# Patient Record
Sex: MALE | Race: WHITE | NOT HISPANIC OR LATINO | Employment: OTHER | URBAN - METROPOLITAN AREA
[De-identification: names, ages, dates, MRNs, and addresses within clinical notes are randomized per-mention and may not be internally consistent; named-entity substitution may affect disease eponyms.]

---

## 2017-03-08 ENCOUNTER — APPOINTMENT (OUTPATIENT)
Dept: GENERAL RADIOLOGY | Facility: CLINIC | Age: 82
End: 2017-03-08
Attending: EMERGENCY MEDICINE
Payer: COMMERCIAL

## 2017-03-08 ENCOUNTER — HOSPITAL ENCOUNTER (EMERGENCY)
Facility: CLINIC | Age: 82
Discharge: HOME | End: 2017-03-08
Attending: EMERGENCY MEDICINE | Admitting: EMERGENCY MEDICINE
Payer: COMMERCIAL

## 2017-03-08 VITALS
RESPIRATION RATE: 18 BRPM | OXYGEN SATURATION: 98 % | SYSTOLIC BLOOD PRESSURE: 150 MMHG | TEMPERATURE: 99.4 F | DIASTOLIC BLOOD PRESSURE: 79 MMHG | HEART RATE: 85 BPM | BODY MASS INDEX: 24.34 KG/M2 | HEIGHT: 70 IN | WEIGHT: 170 LBS

## 2017-03-08 DIAGNOSIS — R11.0 NAUSEA: ICD-10-CM

## 2017-03-08 DIAGNOSIS — R33.9 URINARY RETENTION: ICD-10-CM

## 2017-03-08 DIAGNOSIS — R55 VASOVAGAL SYNCOPE: ICD-10-CM

## 2017-03-08 LAB
ALBUMIN SERPL-MCNC: 3.2 G/DL (ref 3.4–5)
ALBUMIN UR-MCNC: NEGATIVE MG/DL
ALP SERPL-CCNC: 122 U/L (ref 40–150)
ALT SERPL W P-5'-P-CCNC: 22 U/L (ref 0–70)
ANION GAP SERPL CALCULATED.3IONS-SCNC: 13 MMOL/L (ref 3–14)
APPEARANCE UR: CLEAR
AST SERPL W P-5'-P-CCNC: 39 U/L (ref 0–45)
BASOPHILS # BLD AUTO: 0.1 10E9/L (ref 0–0.2)
BASOPHILS NFR BLD AUTO: 1 %
BILIRUB SERPL-MCNC: 0.4 MG/DL (ref 0.2–1.3)
BILIRUB UR QL STRIP: NEGATIVE
BUN SERPL-MCNC: 13 MG/DL (ref 7–30)
CALCIUM SERPL-MCNC: 8 MG/DL (ref 8.5–10.1)
CHLORIDE SERPL-SCNC: 107 MMOL/L (ref 94–109)
CO2 SERPL-SCNC: 20 MMOL/L (ref 20–32)
COLOR UR AUTO: YELLOW
CREAT SERPL-MCNC: 0.91 MG/DL (ref 0.66–1.25)
DIFFERENTIAL METHOD BLD: ABNORMAL
EOSINOPHIL # BLD AUTO: 0.1 10E9/L (ref 0–0.7)
EOSINOPHIL NFR BLD AUTO: 2.3 %
ERYTHROCYTE [DISTWIDTH] IN BLOOD BY AUTOMATED COUNT: 13.3 % (ref 10–15)
GFR SERPL CREATININE-BSD FRML MDRD: 79 ML/MIN/1.7M2
GLUCOSE SERPL-MCNC: 157 MG/DL (ref 70–99)
GLUCOSE UR STRIP-MCNC: NEGATIVE MG/DL
HCT VFR BLD AUTO: 46.5 % (ref 40–53)
HGB BLD-MCNC: 15.9 G/DL (ref 13.3–17.7)
HGB UR QL STRIP: NEGATIVE
IMM GRANULOCYTES # BLD: 0.1 10E9/L (ref 0–0.4)
IMM GRANULOCYTES NFR BLD: 1 %
INTERPRETATION ECG - MUSE: NORMAL
KETONES UR STRIP-MCNC: 20 MG/DL
LEUKOCYTE ESTERASE UR QL STRIP: NEGATIVE
LIPASE SERPL-CCNC: 108 U/L (ref 73–393)
LYMPHOCYTES # BLD AUTO: 1 10E9/L (ref 0.8–5.3)
LYMPHOCYTES NFR BLD AUTO: 15.7 %
MCH RBC QN AUTO: 32.1 PG (ref 26.5–33)
MCHC RBC AUTO-ENTMCNC: 34.2 G/DL (ref 31.5–36.5)
MCV RBC AUTO: 94 FL (ref 78–100)
MONOCYTES # BLD AUTO: 0.8 10E9/L (ref 0–1.3)
MONOCYTES NFR BLD AUTO: 13.7 %
MUCOUS THREADS #/AREA URNS LPF: PRESENT /LPF
NEUTROPHILS # BLD AUTO: 4 10E9/L (ref 1.6–8.3)
NEUTROPHILS NFR BLD AUTO: 66.3 %
NITRATE UR QL: NEGATIVE
NRBC # BLD AUTO: 0 10*3/UL
NRBC BLD AUTO-RTO: 0 /100
PH UR STRIP: 6 PH (ref 5–7)
PLATELET # BLD AUTO: 113 10E9/L (ref 150–450)
PLATELET # BLD EST: ABNORMAL 10*3/UL
POTASSIUM SERPL-SCNC: 4.2 MMOL/L (ref 3.4–5.3)
PROT SERPL-MCNC: 6.6 G/DL (ref 6.8–8.8)
RBC # BLD AUTO: 4.95 10E12/L (ref 4.4–5.9)
RBC #/AREA URNS AUTO: 2 /HPF (ref 0–2)
RBC MORPH BLD: ABNORMAL
SODIUM SERPL-SCNC: 140 MMOL/L (ref 133–144)
SP GR UR STRIP: 1.01 (ref 1–1.03)
SQUAMOUS #/AREA URNS AUTO: <1 /HPF (ref 0–1)
TROPONIN I SERPL-MCNC: NORMAL UG/L (ref 0–0.04)
URN SPEC COLLECT METH UR: ABNORMAL
UROBILINOGEN UR STRIP-MCNC: 0 MG/DL (ref 0–2)
WBC # BLD AUTO: 6 10E9/L (ref 4–11)
WBC #/AREA URNS AUTO: 4 /HPF (ref 0–2)

## 2017-03-08 PROCEDURE — 51702 INSERT TEMP BLADDER CATH: CPT

## 2017-03-08 PROCEDURE — 96374 THER/PROPH/DIAG INJ IV PUSH: CPT

## 2017-03-08 PROCEDURE — 99285 EMERGENCY DEPT VISIT HI MDM: CPT | Mod: 25

## 2017-03-08 PROCEDURE — 74020 XR ABDOMEN 2 VW: CPT

## 2017-03-08 PROCEDURE — 36416 COLLJ CAPILLARY BLOOD SPEC: CPT | Performed by: EMERGENCY MEDICINE

## 2017-03-08 PROCEDURE — 84484 ASSAY OF TROPONIN QUANT: CPT | Performed by: EMERGENCY MEDICINE

## 2017-03-08 PROCEDURE — 71020 XR CHEST 2 VW: CPT

## 2017-03-08 PROCEDURE — 25000128 H RX IP 250 OP 636: Performed by: EMERGENCY MEDICINE

## 2017-03-08 PROCEDURE — 93005 ELECTROCARDIOGRAM TRACING: CPT

## 2017-03-08 PROCEDURE — 85025 COMPLETE CBC W/AUTO DIFF WBC: CPT | Performed by: EMERGENCY MEDICINE

## 2017-03-08 PROCEDURE — 83690 ASSAY OF LIPASE: CPT | Performed by: EMERGENCY MEDICINE

## 2017-03-08 PROCEDURE — 81001 URINALYSIS AUTO W/SCOPE: CPT | Performed by: EMERGENCY MEDICINE

## 2017-03-08 PROCEDURE — 96361 HYDRATE IV INFUSION ADD-ON: CPT

## 2017-03-08 PROCEDURE — 51798 US URINE CAPACITY MEASURE: CPT

## 2017-03-08 PROCEDURE — 80053 COMPREHEN METABOLIC PANEL: CPT | Performed by: EMERGENCY MEDICINE

## 2017-03-08 PROCEDURE — 96375 TX/PRO/DX INJ NEW DRUG ADDON: CPT

## 2017-03-08 RX ORDER — ONDANSETRON 2 MG/ML
4 INJECTION INTRAMUSCULAR; INTRAVENOUS ONCE
Status: COMPLETED | OUTPATIENT
Start: 2017-03-08 | End: 2017-03-08

## 2017-03-08 RX ORDER — PROCHLORPERAZINE MALEATE 5 MG
5 TABLET ORAL EVERY 6 HOURS PRN
Qty: 10 TABLET | Refills: 0 | Status: SHIPPED | OUTPATIENT
Start: 2017-03-08 | End: 2017-03-10

## 2017-03-08 RX ADMIN — PROCHLORPERAZINE EDISYLATE 5 MG: 5 INJECTION INTRAMUSCULAR; INTRAVENOUS at 07:59

## 2017-03-08 RX ADMIN — SODIUM CHLORIDE 1000 ML: 9 INJECTION, SOLUTION INTRAVENOUS at 07:30

## 2017-03-08 RX ADMIN — ONDANSETRON 4 MG: 2 INJECTION INTRAMUSCULAR; INTRAVENOUS at 07:30

## 2017-03-08 ASSESSMENT — ENCOUNTER SYMPTOMS
HEADACHES: 0
ABDOMINAL PAIN: 0
NAUSEA: 1
VOMITING: 0
LIGHT-HEADEDNESS: 1
WEAKNESS: 0
NUMBNESS: 0

## 2017-03-08 NOTE — ED AVS SNAPSHOT
[facility]    [address]    [facility] [address]    Phone:  [phone]    Fax:  [phone]                                       DR Zbigniew Mendoza   MRN: [id number]    Department:  [facility]   Date of Visit:  3/8/2017           Patient Information     Date Of Birth          5/22/1934        Your diagnoses for this visit were:     Nausea     Vasovagal syncope     Urinary retention        You were seen by Ivis Berman MD.      Follow-up Information     Schedule an appointment as soon as possible for a visit with [facility] [place].    Contact information:    [address]  [address]  [facility] [id number] [phone]        Schedule an appointment as soon as possible for a visit with Epi Thompson MD.    Specialty:  Internal Medicine    Contact information:    [facility]  [address]  [facility] [id number] [phone]          Follow up with [facility].    Specialty:  EMERGENCY MEDICINE    Why:  As needed, If symptoms worsen    Contact information:    [address]  [facility] [address]  [phone]        Discharge Instructions         Fainting: Vagal Reaction  Fainting (syncope) is a temporary loss of consciousness. It s also called passing out. It occurs when blood flow to the brain is less than normal. Your health care provider believes that your fainting was because of a vagal reaction. This condition is not a sign of serious disease.  A vagal reaction is a response in your body that causes your pulse to slow down or the blood vessels to expand. This causes your blood pressure to fall. And this sends less blood to your brain if you are standing or sitting. That results in dizziness, near-fainting, or fainting. Lying down usually stops the reaction within 60 seconds.  This response can occur during  sudden fear, severe pain, emotional stress, overexertion, overheating, hunger, nausea or vomiting, prolonged standing, or standing up after sitting or lying for a long time.  Home care  Follow these guidelines when caring for yourself at home:    Rest today. Go back to your normal activities as soon as you are feeling back to normal.    If you feel lightheaded or dizzy, lie down right away. Or sit with your head lowered between your knees.  Follow-up care  Follow up with your health care provider, or as advised.  When to seek medical advice  Call your health care provider right away if any of these occur:    Another fainting spell that s not explained by the common causes listed above    Pain in your chest, arm, neck, jaw, back, or abdomen    Shortness of breath    Severe headache or seizure    Blood in vomit or stools (black or red color)    Unexpected vaginal bleeding    Your heart beats very rapidly, very slowly, or irregularly (palpitations)  Also call your provider if you have signs of stroke:    Weakness in an arm or leg or on one side of the face    Difficulty speaking or seeing    Extreme drowsiness, confusion, dizziness, or fainting     9142-4467 [employer]. [address] [place], [place] [address]. All rights reserved. This information is not intended as a substitute for professional medical care. Always follow your healthcare professional's instructions.          Urinary Retention (Male)  Urinary retention is the medical term for difficulty or inability to pass urine, even though your bladder is full.  Causes  The most common cause of urinary retention in men is the bladder outlet being blocked. This can be due to an enlarged prostate gland or a bladder infection. Certain medicines can also cause this problem. This condition is more likely to occur as men get older.    This condition is treated by insertion of a catheter into the bladder to drain the urine. This provides immediate relief.  The catheter may need to remain in place for a few days to prevent a recurrence. The catheter has a balloon on the tip which was inflated after insertion. This prevents the catheter from falling out.  Symptoms  Common symptoms of urinary retention include:    Pain (not experienced by everyone)    Frequent urination    Feeling that the bladder is still full after urinating    Incontinence (not being able to control the release of urine)    Swollen abdomen  Treatment  This condition is treated by inserting a tube (catheter) into the bladder to drain the urine. This provides immediate relief. The catheter may need to stay in place for a few days. The catheter has a balloon on the tip, which is inflated after insertion. This prevents the catheter from falling out.  Home care    If you were given antibiotics, take them until they are used up, or your healthcare provider tells you to stop. It is important to finish the antibiotics even though you feel better. This is to make sure your infection has cleared.    If a catheter was left in place, it is important to keep bacteria from getting into the collection bag. Do not disconnect the catheter from the collection bag.    Use a leg band to secure the drainage tube, so it does not pull on the catheter. Drain the collection bag when it becomes full using the drain spout at the bottom of the bag.    Do not pull on or try to remove your catheter. This will injure your urethra. The catheter must be removed by a healthcare provider.  Follow-up care  Follow up with your healthcare provider, or as advised.  If a catheter was left in place, it can usually be removed within 3 to 7 days. Some conditions require the catheter to stay in longer. Your healthcare provider will tell you when to return to have the catheter removed.  When to seek medical advice  Call your healthcare provider right away if any of these occur:    Fever of 100.4 F (38 C) or higher, or as directed by your  healthcare provider    Bladder or lower-abdominal pain or fullness    Abdominal swelling, nausea, vomiting, or back pain    Blood or urine leakage around the catheter    Bloody urine coming from the catheter (if a new symptom)    Weakness, dizziness, or fainting    Confusion or change in usual level of alertness    If a catheter was left in place, return if:    Catheter falls out    Catheter stops draining for 6 hours    2404-0385 [employer]. [address] [place] [address], [place] [address]. All rights reserved. This information is not intended as a substitute for professional medical care. Always follow your healthcare professional's instructions.          24 Hour Appointment Hotline       To make an appointment at any [facility], call [phone] ([phone]). If you don't have a family doctor or clinic, we will help you find one. [facility] clinics are conveniently located to serve the needs of you and your family.             Review of your medicines      START taking        Dose / Directions Last dose taken    prochlorperazine 5 MG tablet   Commonly known as:  COMPAZINE   Dose:  5 mg   Quantity:  10 tablet        Take 1 tablet (5 mg) by mouth every 6 hours as needed for nausea or vomiting   Refills:  0          Our records show that you are taking the medicines listed below. If these are incorrect, please call your family doctor or clinic.        Dose / Directions Last dose taken    aspirin EC 81 MG EC tablet   Dose:  81 mg        Take 1 tablet by mouth daily.   Refills:  0        losartan 50 MG tablet   Commonly known as:  COZAAR   Dose:  50 mg   Quantity:  90 tablet        Take 1 tablet (50 mg) by mouth daily   Refills:  4        MULTIVITAL Tabs        Take  by mouth daily.   Refills:  0                Prescriptions were sent or printed at these locations (1 Prescription)                   Other Prescriptions                Printed at Department/Unit printer (1 of 1)         prochlorperazine  (COMPAZINE) 5 MG tablet                Procedures and tests performed during your visit     Abdomen XR, 2 vw, flat and upright    CBC with platelets differential    Comprehensive metabolic panel    EKG 12-lead, tracing only    Lipase    Troponin I (now)    UA with Microscopic    XR Chest 2 Views      Orders Needing Specimen Collection     None      Pending Results     No orders found from 3/6/2017 to 3/9/2017.            Pending Culture Results     No orders found from 3/6/2017 to 3/9/2017.             Test Results from your hospital stay     3/8/2017  8:42 AM - Interface, Radiant Ib      Narrative     XR CHEST 2 VW 3/8/2017 8:39 AM    HISTORY: Chest pain, short of breath.    COMPARISON: 2/25/2015    FINDINGS: No airspace consolidation, pleural effusion or pneumothorax.  Stable heart size.        Impression     IMPRESSION: No acute cardiopulmonary abnormality.    GEORGI LIRIANO MD         3/8/2017  9:01 AM - Interface, Flexilab Results      Component Results     Component Value Ref Range & Units Status    WBC 6.0 4.0 - 11.0 10e9/L Final    RBC Count 4.95 4.4 - 5.9 10e12/L Final    Hemoglobin 15.9 13.3 - 17.7 g/dL Final    Hematocrit 46.5 40.0 - 53.0 % Final    MCV 94 78 - 100 fl Final    MCH 32.1 26.5 - 33.0 pg Final    MCHC 34.2 31.5 - 36.5 g/dL Final    RDW 13.3 10.0 - 15.0 % Final    Platelet Count 113 (L) 150 - 450 10e9/L Final    Diff Method Automated Method  Final    % Neutrophils 66.3 % Final    % Lymphocytes 15.7 % Final    % Monocytes 13.7 % Final    % Eosinophils 2.3 % Final    % Basophils 1.0 % Final    % Immature Granulocytes 1.0 % Final    Nucleated RBCs 0 0 /100 Final    Absolute Neutrophil 4.0 1.6 - 8.3 10e9/L Final    Absolute Lymphocytes 1.0 0.8 - 5.3 10e9/L Final    Absolute Monocytes 0.8 0.0 - 1.3 10e9/L Final    Absolute Eosinophils 0.1 0.0 - 0.7 10e9/L Final    Absolute Basophils 0.1 0.0 - 0.2 10e9/L Final    Abs Immature Granulocytes 0.1 0 - 0.4 10e9/L Final    Absolute Nucleated RBC 0.0  Final     RBC Morphology   Final    Consistent with reported results    Platelet Estimate Decreased  Final         3/8/2017  8:19 AM - Interface, Flexilab Results      Component Results     Component Value Ref Range & Units Status    Sodium 140 133 - 144 mmol/L Final    Potassium 4.2 3.4 - 5.3 mmol/L Final    Chloride 107 94 - 109 mmol/L Final    Carbon Dioxide 20 20 - 32 mmol/L Final    Anion Gap 13 3 - 14 mmol/L Final    Glucose 157 (H) 70 - 99 mg/dL Final    Urea Nitrogen 13 7 - 30 mg/dL Final    Creatinine 0.91 0.66 - 1.25 mg/dL Final    GFR Estimate 79 >60 mL/min/1.7m2 Final    Non  GFR Calc    GFR Estimate If Black >90   GFR Calc   >60 mL/min/1.7m2 Final    Calcium 8.0 (L) 8.5 - 10.1 mg/dL Final    Bilirubin Total 0.4 0.2 - 1.3 mg/dL Final    Albumin 3.2 (L) 3.4 - 5.0 g/dL Final    Protein Total 6.6 (L) 6.8 - 8.8 g/dL Final    Alkaline Phosphatase 122 40 - 150 U/L Final    ALT 22 0 - 70 U/L Final    AST 39 0 - 45 U/L Final         3/8/2017  8:19 AM - Interface, Flexilab Results      Component Results     Component Value Ref Range & Units Status    Lipase 108 73 - 393 U/L Final         3/8/2017  8:19 AM - Interface, Flexilab Results      Component Results     Component Value Ref Range & Units Status    Troponin I ES  0.000 - 0.045 ug/L Final    <0.015  The 99th percentile for upper reference range is 0.045 ug/L.  Troponin values in   the range of 0.045 - 0.120 ug/L may be associated with risks of adverse   clinical events.           3/8/2017 10:20 AM - Interface, Radiant Ib      Narrative     XR ABDOMEN 2 VW 3/8/2017 10:11 AM    HISTORY: Abdominal pain and abdominal distention.    COMPARISON: 4/21/2009    FINDINGS: Large amount of stool throughout the colon. The gas pattern  is nonobstructive. No specific evidence of free intraperitoneal air.        Impression     IMPRESSION: Possible constipation.    GEORGI LIRIANO MD         3/8/2017 11:41 AM - Interface, Flexilab Results       Component Results     Component Value Ref Range & Units Status    Color Urine Yellow  Final    Appearance Urine Clear  Final    Glucose Urine Negative NEG mg/dL Final    Bilirubin Urine Negative NEG Final    Ketones Urine 20 (A) NEG mg/dL Final    Specific Gravity Urine 1.012 1.003 - 1.035 Final    Blood Urine Negative NEG Final    pH Urine 6.0 5.0 - 7.0 pH Final    Protein Albumin Urine Negative NEG mg/dL Final    Urobilinogen mg/dL 0.0 0.0 - 2.0 mg/dL Final    Nitrite Urine Negative NEG Final    Leukocyte Esterase Urine Negative NEG Final    Source Catheterized Urine  Final    WBC Urine 4 (H) 0 - 2 /HPF Final    RBC Urine 2 0 - 2 /HPF Final    Squamous Epithelial /HPF Urine <1 0 - 1 /HPF Final    Mucous Urine Present (A) NEG /LPF Final                Clinical Quality Measure: Blood Pressure Screening     Your blood pressure was checked while you were in the emergency department today. The last reading we obtained was  BP: 156/87 . Please read the guidelines below about what these numbers mean and what you should do about them.  If your systolic blood pressure (the top number) is less than 120 and your diastolic blood pressure (the bottom number) is less than 80, then your blood pressure is normal. There is nothing more that you need to do about it.  If your systolic blood pressure (the top number) is 120-139 or your diastolic blood pressure (the bottom number) is 80-89, your blood pressure may be higher than it should be. You should have your blood pressure rechecked within a year by a primary care provider.  If your systolic blood pressure (the top number) is 140 or greater or your diastolic blood pressure (the bottom number) is 90 or greater, you may have high blood pressure. High blood pressure is treatable, but if left untreated over time it can put you at risk for heart attack, stroke, or kidney failure. You should have your blood pressure rechecked by a primary care provider within the next 4 weeks.  If  your provider in the emergency department today gave you specific instructions to follow-up with your doctor or provider even sooner than that, you should follow that instruction and not wait for up to 4 weeks for your follow-up visit.        Thank you for choosing [facility]       Thank you for choosing [facility] for your care. Our goal is always to provide you with excellent care. Hearing back from our patients is one way we can continue to improve our services. Please take a few minutes to complete the written survey that you may receive in the mail after you visit with us. Thank you!        [employer] Information     [employer] gives you secure access to your electronic health record. If you see a primary care provider, you can also send messages to your care team and make appointments. If you have questions, please call your primary care clinic.  If you do not have a primary care provider, please call [phone] and they will assist you.        Care EveryWhere ID     This is your Care EveryWhere ID. This could be used by other organizations to access your [facility] medical records  [id number]        After Visit Summary       This is your record. Keep this with you and show to your community pharmacist(s) and doctor(s) at your next visit.

## 2017-03-08 NOTE — ED NOTES
Osorio switched over from drainage bag to leg bag.  Patient instructed on aseptic technique to switch drainage bags at home.  Verbalized understanding.

## 2017-03-08 NOTE — ED NOTES
Bed: ED14  Expected date: 3/8/17  Expected time: 7:07 AM  Means of arrival: Ambulance  Comments:  Cayla 81 yo M

## 2017-03-08 NOTE — ED AVS SNAPSHOT
[facility] Emergency Department    [address]    [facility] [address]    Phone:  [phone]    Fax:  [phone]                                       DR Zbigniew Mendoza   MRN: [id number]    Department:  [facility]   Date of Visit:  3/8/2017           After Visit Summary Signature Page     I have received my discharge instructions, and my questions have been answered. I have discussed any challenges I see with this plan with the nurse or doctor.    ..........................................................................................................................................  Patient/Patient Representative Signature      ..........................................................................................................................................  Patient Representative Print Name and Relationship to Patient    ..................................................               ................................................  Date                                            Time    ..........................................................................................................................................  Reviewed by Signature/Title    ...................................................              ..............................................  Date                                                            Time

## 2017-03-08 NOTE — ED NOTES
"Pt presents via EMS after having woke at 0200 with light-headedness, nausea and vomiting. Pt believes he may have \"passed out\" at home. Pt is A&O, ABC's intact.   "

## 2017-03-08 NOTE — DISCHARGE INSTRUCTIONS
Fainting: Vagal Reaction  Fainting (syncope) is a temporary loss of consciousness. It s also called passing out. It occurs when blood flow to the brain is less than normal. Your health care provider believes that your fainting was because of a vagal reaction. This condition is not a sign of serious disease.  A vagal reaction is a response in your body that causes your pulse to slow down or the blood vessels to expand. This causes your blood pressure to fall. And this sends less blood to your brain if you are standing or sitting. That results in dizziness, near-fainting, or fainting. Lying down usually stops the reaction within 60 seconds.  This response can occur during sudden fear, severe pain, emotional stress, overexertion, overheating, hunger, nausea or vomiting, prolonged standing, or standing up after sitting or lying for a long time.  Home care  Follow these guidelines when caring for yourself at home:    Rest today. Go back to your normal activities as soon as you are feeling back to normal.    If you feel lightheaded or dizzy, lie down right away. Or sit with your head lowered between your knees.  Follow-up care  Follow up with your health care provider, or as advised.  When to seek medical advice  Call your health care provider right away if any of these occur:    Another fainting spell that s not explained by the common causes listed above    Pain in your chest, arm, neck, jaw, back, or abdomen    Shortness of breath    Severe headache or seizure    Blood in vomit or stools (black or red color)    Unexpected vaginal bleeding    Your heart beats very rapidly, very slowly, or irregularly (palpitations)  Also call your provider if you have signs of stroke:    Weakness in an arm or leg or on one side of the face    Difficulty speaking or seeing    Extreme drowsiness, confusion, dizziness, or fainting     9592-8335 [employer]. [address] [place] [address], [place] [address]. All rights reserved.  This information is not intended as a substitute for professional medical care. Always follow your healthcare professional's instructions.          Urinary Retention (Male)  Urinary retention is the medical term for difficulty or inability to pass urine, even though your bladder is full.  Causes  The most common cause of urinary retention in men is the bladder outlet being blocked. This can be due to an enlarged prostate gland or a bladder infection. Certain medicines can also cause this problem. This condition is more likely to occur as men get older.    This condition is treated by insertion of a catheter into the bladder to drain the urine. This provides immediate relief. The catheter may need to remain in place for a few days to prevent a recurrence. The catheter has a balloon on the tip which was inflated after insertion. This prevents the catheter from falling out.  Symptoms  Common symptoms of urinary retention include:    Pain (not experienced by everyone)    Frequent urination    Feeling that the bladder is still full after urinating    Incontinence (not being able to control the release of urine)    Swollen abdomen  Treatment  This condition is treated by inserting a tube (catheter) into the bladder to drain the urine. This provides immediate relief. The catheter may need to stay in place for a few days. The catheter has a balloon on the tip, which is inflated after insertion. This prevents the catheter from falling out.  Home care    If you were given antibiotics, take them until they are used up, or your healthcare provider tells you to stop. It is important to finish the antibiotics even though you feel better. This is to make sure your infection has cleared.    If a catheter was left in place, it is important to keep bacteria from getting into the collection bag. Do not disconnect the catheter from the collection bag.    Use a leg band to secure the drainage tube, so it does not pull on the catheter.  Drain the collection bag when it becomes full using the drain spout at the bottom of the bag.    Do not pull on or try to remove your catheter. This will injure your urethra. The catheter must be removed by a healthcare provider.  Follow-up care  Follow up with your healthcare provider, or as advised.  If a catheter was left in place, it can usually be removed within 3 to 7 days. Some conditions require the catheter to stay in longer. Your healthcare provider will tell you when to return to have the catheter removed.  When to seek medical advice  Call your healthcare provider right away if any of these occur:    Fever of 100.4 F (38 C) or higher, or as directed by your healthcare provider    Bladder or lower-abdominal pain or fullness    Abdominal swelling, nausea, vomiting, or back pain    Blood or urine leakage around the catheter    Bloody urine coming from the catheter (if a new symptom)    Weakness, dizziness, or fainting    Confusion or change in usual level of alertness    If a catheter was left in place, return if:    Catheter falls out    Catheter stops draining for 6 hours    [id number] [employer]. [address] [place] [address], [place] [address]. All rights reserved. This information is not intended as a substitute for professional medical care. Always follow your healthcare professional's instructions.

## 2017-03-08 NOTE — ED NOTES
Pt. Stated he is having a problem being able to urinate. Bladder scanned Pt. 605ml noted in bladder MD notified, [employer] monitoring devices (EKG, BP, and pulse ox) onto Patient.

## 2017-03-08 NOTE — ED PROVIDER NOTES
"  History     Chief Complaint:  Nausea and lightheadedness     HPI   Zbigniew Mendoza is a 82 year old male who presents via EMS for evaluation of nausea and lightheadedness. The patient states that he awoke about 5 hours prior to arrival around 0200 he began to feel acutely nauseated. Since that time he has had \"an uncountable,\" amount of dry heaves without any actual production of vomiting or development of abdominal pain. After about 4 hours of discomfort he attempted to get out of bed, however upon standing he states that he must have sustained a loss of consciousness because the next thing he remembers is waking up sitting at the bottom of his bed. The patient does not recall any other preceding symptoms such as chest pain, shortness of breath, heart palpitations, shortness of breath, or numbness, tingling or weakness in her extremities. He was able to contact EMS who found him lying on the floor to weak to stand. En route they provided 4 mg of Zofran and found his BG to be 152.     Of note, the patient states that the constellation of these symptoms feel similar to when he had an episode of atrial fibrillation which resolved after spontaneous cardioversion as did his nausea. No known episodes of atrial fibrillation since.     Allergies:  Ciprofloxacin, confusion      Medications:    Losartan 50mg     Past medical history:  Hypertension   Single episode of atrial fibrillation w/ spontaneous resolution.     Past Surgical History:    Right rotator cuff surgery  Back surgery     Family History:    Father positive for coronary artery disease     Social History:  Negative for tobacco use.  Positive for alcohol use.    Marital Status:   [2]  The patient is a semi-retired psychiatrics professor at the  of     Review of Systems   Gastrointestinal: Positive for nausea. Negative for abdominal pain and vomiting.   Neurological: Positive for syncope and light-headedness. Negative for weakness, numbness and " "headaches.   All other systems reviewed and are negative.    Physical Exam   First Vitals:  BP: 160/88  Temp: 97.2  F (36.2  C)  Temp src: Temporal  Pulse: 85  Resp: 18  SpO2: 100 %  Height: 177.8 cm (5' 10\")  Weight: 77.1 kg (170 lb) (03/08 0719)        Physical Exam  General/Appearance: appears stated age, well-groomed, appears to not feel well, active dry heaving, diaphoretic    Eyes: EOMI, no scleral injection, no icterus  ENT: MMM  Neck: supple, nl ROM, no stiffness  Cardiovascular: RRR, nl S1S2, no m/r/g, 2+ pulses in all 4 extremities, cap refill <2sec  Respiratory: CTAB, good air movement throughout, no wheezes/rhonchi/rales, no increased WOB, no retractions  Back: no lesions  GI: abd soft, non-distended, nttp however palpating increases nausea,  no HSM, no rebound, no guarding, nl BS  MSK: PURVIS, good tone, no bony abnormality  Skin: warm and well-perfused, no rash, no edema, no ecchymosis, nl turgor  Neuro: GCS 15, alert and oriented, no gross focal neuro deficits  Psych: interacts appropriately  Heme: no petechia, no purpura, no active bleeding      Emergency Department Course   ECG:  Indication: nausea  Time: 0720  Vent. Rate 91 bpm. AL interval 186. QRS duration 96. QT/QTc 402/466. P-R-T axis 88 -4 68. normal sinus rhythm. Normal ECG. Read time: 0722     Imaging:  Radiographic findings were communicated with the patient who voiced understanding of the findings.    XR Chest 2 views:   No acute cardiopulmonary abnormality. As per radiology.    XR Abdomen, 2 views:   Possible constipation. As per radiology.       Laboratory:  CBC: WBC: 6.0, HGB: 15.9, PLT: 113(H)  CMP: Glucose 157(H), Ca 8.0(L), albumin 3.2(L), Protein total 6.6(L), o/w WNL (Creatinine: 0.91)      Lipase: 108  Troponin: <0.015    UA with micro: ketone 20, WBC 4(H), mucous present, o/w negative     Interventions:  0730 Zofran, 4 mg, IV injection   NS 1L IV   0759 Compazine 5mg IV     Emergency Department Course:  Nursing notes and vitals " reviewed. I performed an exam of the patient as documented above.      EKG obtained in the ED, see results above.      Blood drawn. This was sent to the lab for further testing, results above.    The patient was sent for a CXR while in the emergency department, findings above.      0911 I reevaluated the patient and provided an update in regards to his ED course. Nausea had initially improved, though is slowly returning. He requests another compazine.     1022 The patient disclosed to nursing that he has stood up multiple times during ED visit w/o lightheadedness. Unable to urinate here in the ED. Bedside scan shows slightly over 600cc's in bladder. Will place christie catheter.     Findings and plan explained to the Patient. Patient discharged home with instructions regarding supportive care, medications, and reasons to return. The importance of close follow-up was reviewed.     I personally reviewed the laboratory results with the Patient and answered all related questions prior to discharge.     Impression & Plan    Medical Decision Making:  Zbigniew Mendoza is a 82 year old male who presents after a syncopal episode with some diaphoresis, nausea, and dry heaving. Nausea was significant that it required multiple doses of medication. He also appeared mildly distended in the abdomen so a KUB was obtained. There was large stool in the colon which may be contributing to his symptoms, however no evidence of obstruction. At this time, given lack of tenderness to exam; I feel it is reasonable to forgo a CT scan. He also today endorses difficulty with urination; bedside bladder scanning shows significant urinary retention. There was no obvious positive urinalysis and this is therefore it is possible this is secondary to prostate enlargement versus stool in the colon restricting bladder emptying. Either way, he will leave with a leg-bag and follow up with urology. I suspect his syncope episode was secondary to a vasovagal  response from just standing up as well as the bladder distension. EKG and troponin were negative. He denies any chest pain or shortness of breath, or other concerning features. I think it is reasonable as he has had resolution of the lightheadedness with fluids; that he be discharged which he feels comfortable with.     Diagnosis:    ICD-10-CM    1. Nausea R11.0    2. Vasovagal syncope R55    3. Urinary retention R33.9      Discharge Medications:  New Prescriptions    PROCHLORPERAZINE (COMPAZINE) 5 MG TABLET    Take 1 tablet (5 mg) by mouth every 6 hours as needed for nausea or vomiting     I, Ayush Fernandez, am serving as a scribe on 3/8/2017 at 7:14 AM to personally document services performed by Ivis Berman MD based on my observations and the provider's statements to me.      Ayush Fernandez  3/8/2017   [facility] EMERGENCY DEPARTMENT       Ivis Berman MD  03/08/17 [id number]

## 2017-03-09 ENCOUNTER — TELEPHONE (OUTPATIENT)
Dept: NURSING | Facility: CLINIC | Age: 82
End: 2017-03-09

## 2017-03-10 ENCOUNTER — OFFICE VISIT (OUTPATIENT)
Dept: UROLOGY | Facility: CLINIC | Age: 82
End: 2017-03-10
Payer: COMMERCIAL

## 2017-03-10 VITALS
HEIGHT: 70 IN | HEART RATE: 80 BPM | SYSTOLIC BLOOD PRESSURE: 138 MMHG | DIASTOLIC BLOOD PRESSURE: 78 MMHG | WEIGHT: 170 LBS | BODY MASS INDEX: 24.34 KG/M2

## 2017-03-10 DIAGNOSIS — Z90.79 S/P TURP: ICD-10-CM

## 2017-03-10 DIAGNOSIS — R33.9 URINARY RETENTION: Primary | ICD-10-CM

## 2017-03-10 DIAGNOSIS — Z29.89 NEED FOR PROPHYLAXIS AGAINST URINARY TRACT INFECTION: ICD-10-CM

## 2017-03-10 DIAGNOSIS — N40.0 BENIGN PROSTATIC HYPERPLASIA, PRESENCE OF LOWER URINARY TRACT SYMPTOMS UNSPECIFIED, UNSPECIFIED MORPHOLOGY: ICD-10-CM

## 2017-03-10 DIAGNOSIS — Z87.442 HISTORY OF NEPHROLITHIASIS: ICD-10-CM

## 2017-03-10 PROCEDURE — 99203 OFFICE O/P NEW LOW 30 MIN: CPT | Performed by: PHYSICIAN ASSISTANT

## 2017-03-10 RX ORDER — SULFAMETHOXAZOLE/TRIMETHOPRIM 800-160 MG
1 TABLET ORAL ONCE
Qty: 1 TABLET | Refills: 0 | Status: SHIPPED | OUTPATIENT
Start: 2017-03-10 | End: 2017-03-10

## 2017-03-10 RX ORDER — TAMSULOSIN HYDROCHLORIDE 0.4 MG/1
0.4 CAPSULE ORAL DAILY
Qty: 30 CAPSULE | Refills: 5 | Status: SHIPPED | OUTPATIENT
Start: 2017-03-10 | End: 2017-08-11

## 2017-03-10 ASSESSMENT — PAIN SCALES - GENERAL: PAINLEVEL: MODERATE PAIN (5)

## 2017-03-10 NOTE — TELEPHONE ENCOUNTER
"Call Type: Triage Call    Presenting Problem: \"I was seen yesterday(see epic) for urinary  retention, I have a catheter and I have an appt with Urology  tomorrow. I haven't had as much output today as I did yesterday.  (Today about 350 cc) I have some mild pain in the penis area.I  haven't had as much water as I should though. \"  Denies blood in  urine, no fever, no flank pain or other sx. Advised to push fluids,  if sx develop advised ER. Keep appt tomorrow, call back as needed.  Triage Note:  Guideline Title: Postoperative Problems ; Postoperative Problems  Recommended Disposition: Call Provider within 72 Hours  Original Inclination: Wanted to speak with a nurse  Override Disposition: See Physician within 24 Hours  Intended Action: Follow advice given  Physician Contacted: No  Evaluated by provider and has question/concern about their condition, treatment  plan, treatment options, follow-up appointments, or other follow-up care. ?  YES  Pain in shoulder or upper back following laparoscopic procedure ? NO  Signs of dehydration ? NO  Unconscious now ? NO  Abdominal bloating ? NO  New onset OR worsening bleeding from incision requiring pressure to control ? NO  Depression and no other symptoms ? NO  Severe breathing problems ? NO  Wound separation AND internal organs protrude through wound or surgical incision  (evisceration) ? NO  Hives /Urticaria /Rash ? NO  New or worsening signs and symptoms that may indicate shock ? NO  Neck lump/swelling ? NO  Coughing up large amount of obvious blood (not blood-streaked sputum) ? NO  Orthopedic hardware (metal plate, sagrario or screw) newly bulging under or through  skin ? NO  New seizure now or within last 6 hours ? NO  Continuous or heavy bleeding from operative site and NOT controlled with 10  minutes of steady pressure ? NO  Productive cough AND new onset green, yellow, brown or bloody sputum ? NO  Pain not relieved by pain medication when taken as directed ? NO  Passing red, " black or tarry material from rectum AND onset of new signs and  symptoms of hypovolemia ? NO  Wearing cast or splint AND new or worsening pain, swelling, numbness, tingling,  coolness or change in color that is NOT improved by elevation for 30 minutes OR  not resolved within 2 hours ? NO  Temperature of 101.5 F (38.6 C) or greater that has not responded to 24 hours of  home care measures ? NO  Vomiting red, bloody or coffee-ground material, more than streaks of blood or  scant amount (not following nosebleed within past day) ? NO  New onset severe pain and pale, discolored or cool below the surgical site  compared to the other extremity ? NO  Current or recent urinary tract instrumentation AND urinary tract symptoms OR no  urine flow ? NO  New or worsening breathing problems ? NO  Heavy vaginal bleeding (soaking 1 pad/tampon every hour for 2 hours or more) ? NO  Urinary tract symptoms AND any flank or low back pain ? NO  Medical device (pump, infusion catheter) damaged or not functioning as expected  (leaking, not infusing, swelling at insertion site) ? NO  More bleeding from site of instrumentation or procedure OR bleeding lasting longer  than defined in provider specified discharge information ? NO  Has one or more urinary tract symptoms AND has not been previously evaluated ? NO  Chest discomfort associated with shortness of breath, sweating, odd heartbeats or  different heart rate, nausea, vomiting, lightheadedness, or fainting lasting 5 or  more minutes now or within the last hour ? NO  Chest pain spreading to the shoulders, neck, jaw, in one or both arms, stomach or  back lasting 5 or more minutes now or within the last hour. Pain is NOT  associated with taking a deep breath or a productive cough, movement, or touch to  a localized area. ? NO  Recent onset of pain, tenderness or aching in an extremity that may worsen with  standing or walking OR a cord-like swelling in an extremity that may feel warm,  look  red or discolored. ? NO  Signs and symptoms of anaphylaxis ? NO  Questions or concerns about postoperative wound ? NO  Headache following spinal anesthesia AND not relieved by pain medication ? NO  Pressure, fullness, squeezing sensation or pain anywhere in the chest lasting 5 or  more minutes now or within the last hour. Pain is NOT associated with taking a  deep breath or a productive cough, movement, or touch to a localized area on the  chest. ? NO  Persistent abdominal OR pelvic pain lasting longer than defined in provider  specified discharge information. ? NO  Persistent mild-moderate vaginal bleeding lasting longer than defined in provider  specified discharge information. ? NO  Sudden onset of focal neurological changes (difficulty speaking, numbness,  weakness, paralysis, loss of coordination, or change in vision such as double  vision or loss of visual field) ? NO  Sudden onset of shortness of breath, chest pain and cough with blood tinged sputum  ? NO  No urination for 12 or more hours ? NO  Abdominal pain, any blood in vomitus or stool, abdominal bloating, new fever or  other cautionary symptoms began after GI surgery or procedure and is lasting  longer than defined in provider specified discharge information. ? NO  Abdominal pain, any blood in vomitus or stool, abdominal bloating, new fever or  other cautionary symptoms began after GI surgery or procedure and is lasting  longer than defined in provider specified discharge information. ? NO  New onset of unbearable pain within last 24 hours ? NO  New onset of appetite loss that has lasted 7 days or more ? NO  Any temperature elevation in an immunocompromised individual OR frail elderly with  signs of dehydration ? NO  Unable to retain food or fluids for 24 hours or more due to recurrent vomiting ? NO  Vomiting within 48 hours after the procedure and is not relieved by provider  recommended treatment ? NO  Any other unexpected urinary symptoms following  urinary tract or abdominal surgery  within timeframe specified by provider ? NO  Physician Instructions:  Care Advice: HEALTH PROMOTION / MAINTENANCE

## 2017-03-10 NOTE — LETTER
3/10/2017       RE: Zbigniew Mendoza  [address] [facility] [id number]     Dear Colleague,    Thank you for referring your patient, Zbigniew Mendoza, to the [facility] [place] at [facility]. Please see a copy of my visit note below.    CC: Urinary retention.    HPI: It is a pleasure to see Mr. Zbigniew Mendoza, a very pleasant 82 year old male with a history of nephrolithiasis and BPH s/p TURP in 1988 seen today in the urology clinic in consultation from ED physician for evaluation of urinary retention. The patient had initially woken from sleep in the early morning hours 2 days ago and was quite dizzy, diaphoretic, and nauseous. He had an apparent vasovagal versus syncopal event and was taken to the ER via EMS. Workup here included normal EKG, normal CXR, abdominal flat plate which revealed a large amount of stool throughout the colon, normal blood work, and a normal UA. He was bladder scanned for > 600 cc and was unable to void in the ED. Therefore, Osorio catheter was placed and he presents today for continued evaluation of urinary retention and possible voiding trial. Suspicion was that he had a vasovagal episode from his distended bladder.     Today, he states that he was voiding well with possibly only very mild obstructive symptoms in the preceding weeks to months. Had previously been managed with alfuzosin 10 mg daily but eventually stopped this as he felt he was voiding without any difficulty. He does note significant issues with constipation in the past week. Took Colace yesterday and had a small BM this morning but only passed very small, hard, round kristopher of stool. Feels he has more in his colon and is considering attempting a Fleets enema later today. He currently complains that the catheter is quite painful. Wonders if he pushed it out of place with straining for a BM. He would like it removed today if possible. Was not  "started on any alpha blockers in the ED.     Past Medical History   Diagnosis Date     Atrial fibrillation (H)      vasovagal from pain?     Calculus of kidney      Cyst      infected hair follicle     Mitral prolapse      mild, with mild MR     Syncope      vasovagal from pain?     Past Surgical History   Procedure Laterality Date     C nonspecific procedure  04/93     S/P TURP     C nonspecific procedure       S/P Spiral fracture of right tibia closed reduction     Rotator cuff repair rt/lt  1996     right     Back surgery       Current Outpatient Prescriptions   Medication Sig Dispense Refill     losartan (COZAAR) 50 MG tablet Take 1 tablet (50 mg) by mouth daily 90 tablet 4     Multiple Vitamins-Minerals (MULTIVITAL) TABS Take  by mouth daily.       aspirin EC 81 MG tablet Take 1 tablet by mouth daily.       Allergies   Allergen Reactions     Ciprofloxacin      Acute confusion     Family History: There is no h/o  malignancy.  There is no h/o urolithiasis.     Social History: The patient does not smoke cigarettes, minimal EtOH and no illicit drug use.    ROS: A comprehensive 14 point ROS was obtained and was positive for nephrolithiasis, HTN, h/o atrial fibrillation and otherwise negative except for that outlined above in the HPI.    PHYSICAL EXAM:   Vitals:    03/10/17 1326   BP: 138/78   BP Location: Left arm   Patient Position: Dangled   Cuff Size: Adult Regular   Pulse: 80   Weight: 77.1 kg (170 lb)   Height: 1.778 m (5' 10\")     GENERAL: Well groomed/well developed/well nourished male in NAD.  HEENT: EOMI, AT, NC.  SKIN: Warm to touch, dry.  No visible rashes or lesions.  RESP: No increased respiratory effort.  LYMPH: No LE edema.  MS: Full ROM in extremities.  : Osorio catheter indwelling draining clear yellow urine.  NEURO: Alert and oriented x 3.  PSYCH: Normal mood and affect, pleasant and agreeable during interview and exam.    PROCEDURE: A trial of void was performed by nursing staff today in the " clinic.  The patient's Osorio leg bag was disconnected and 175 cc of sterile water were infused into the patient's bladder via gravity drainage, which the patient tolerated well.  The Osorio balloon was decompressed and the catheter was then removed.  After 2 minutes Mr. Mendoza voided 200 cc.  Postvoid residual urine volume by ultrasound was measured as 0 cc.  The patient did pass today's trial of void and the catheter did not need to be replaced.      REVIEW OF OUTSIDE RECORDS: 5 minutes spent reviewing previous/outside records.    IMAGING:   XR ABDOMEN 2 VW 3/8/2017   FINDINGS: Large amount of stool throughout the colon. The gas pattern  is nonobstructive. No specific evidence of free intraperitoneal air.    PSA   Date Value Ref Range Status   05/23/2012 1.13 0 - 4 ug/L Final       ASSESSMENT/PLAN:  Mr. Zbigniew Mendoza is a pleasant 82 year old male with a h/o nephrolithiasis and BPH s/p TURP in 1988 who developed acute urinary retention in the setting of significant constipation on 3/8/17 requiring Osorio catheter placement in the ED. We discussed potential etiologies for retention including constipation, an enlarged prostate resulting in outlet obstruction, urethral stricture disease, medications, etc. His retention is most likely felt to be 2/2 severe constipation, but cannot rule out regrowth of obstructing prostate tissue without further evaluation. He is quite eager to have the catheter removed so it was mutually agreed to perform a trial of void in clinic today. He understands that if he is unable to void, his options would be 1) have Osorio catheter replaced or 2) instruction in clean intermittent catheterization.    The patient did successfully pass a trial of void today in clinic today (see above for details). Plan for the following:  - Start tamsulosin 0.4 mg daily. Rx sent to pharmacy. Side effects discussed.   - Take 1 dose of Bactrim DS to prevent UTI   - Drink plenty of water and avoid known bladder  irritants  - Use a Fleets enema today to promote a BM as it sounds as though he remains constipated    Follow up in 1-2 months for uroflow/PVR, AUA SS, and JAMMIE. Call or RTC sooner with any difficulty voiding. Warning signs and ER precautions discussed. Patient verbalizes understanding and agreement.     Should the patient continue to have voiding difficulty, the next appropriate studies would be cystoscopy and/or videourodynamics to better assess bladder function.      I have enjoyed participating in the medical care of this very pleasant patient.  Please don't hesitate to contact me with any questions or concerns.      Violeta Pérez PA-C  Urology

## 2017-03-10 NOTE — MR AVS SNAPSHOT
After Visit Summary   3/10/2017    DR Zbigniew Mendoza    MRN: [id number]           Patient Information     Date Of Birth          5/22/1934        Visit Information        Provider Department      3/10/2017 1:20 PM Violeta Pérez PA-C [facility] [place]        Today's Diagnoses     Urinary retention    -  1    Need for prophylaxis against urinary tract infection        Benign prostatic hyperplasia, presence of lower urinary tract symptoms unspecified, unspecified morphology        S/P TURP        History of nephrolithiasis           Follow-ups after your visit        Who to contact     If you have questions or need follow up information about today's clinic visit or your schedule please contact [facility] [place] directly at [phone].  Normal or non-critical lab and imaging results will be communicated to you by CareDoxhart, letter or phone within 4 business days after the clinic has received the results. If you do not hear from us within 7 days, please contact the clinic through CareDoxhart or phone. If you have a critical or abnormal lab result, we will notify you by phone as soon as possible.  Submit refill requests through 4vets or call your pharmacy and they will forward the refill request to us. Please allow 3 business days for your refill to be completed.          Additional Information About Your Visit        MyChart Information     4vets gives you secure access to your electronic health record. If you see a primary care provider, you can also send messages to your care team and make appointments. If you have questions, please call your primary care clinic.  If you do not have a primary care provider, please call [phone] and they will assist you.        Care EveryWhere ID     This is your Care EveryWhere ID. This could be used by other organizations to access your [facility] records  [id number]        Your  "Vitals Were     Pulse Height BMI (Body Mass Index)             80 1.778 m (5' 10\") 24.39 kg/m2          Blood Pressure from Last 3 Encounters:   03/10/17 138/78   03/08/17 150/79   06/21/16 144/72    Weight from Last 3 Encounters:   03/10/17 77.1 kg (170 lb)   03/08/17 77.1 kg (170 lb)   06/21/16 77.4 kg (170 lb 11.2 oz)              Today, you had the following     No orders found for display         Today's Medication Changes          These changes are accurate as of: 3/10/17  5:19 PM.  If you have any questions, ask your nurse or doctor.               Start taking these medicines.        Dose/Directions    sulfamethoxazole-trimethoprim 800-160 MG per tablet   Commonly known as:  BACTRIM DS/SEPTRA DS   Used for:  Need for prophylaxis against urinary tract infection   Started by:  Violeta Pérez PA-C        Dose:  1 tablet   Take 1 tablet by mouth once for 1 dose   Quantity:  1 tablet   Refills:  0       tamsulosin 0.4 MG capsule   Commonly known as:  FLOMAX   Used for:  Urinary retention   Started by:  Violeta Pérez PA-C        Dose:  0.4 mg   Take 1 capsule (0.4 mg) by mouth daily   Quantity:  30 capsule   Refills:  5            Where to get your medicines      These medications were sent to [facility]pharm[facility] #[id number] - [place], [place] [facility] [address] [address] [facility]  [address] [address] [facility] [address]     Phone:  [phone]     sulfamethoxazole-trimethoprim 800-160 MG per tablet    tamsulosin 0.4 MG capsule                Primary Care Provider Office Phone # Fax #    Epi Thompson [phone]       [facility] [address]  [facility] [address]        Thank you!     Thank you for choosing [facility] [place]  for your care. Our goal is always to provide you with excellent care. Hearing back from our patients is one way we can continue to improve our services. Please take a few minutes to complete the written survey that you may receive " in the mail after your visit with us. Thank you!             Your Updated Medication List - Protect others around you: Learn how to safely use, store and throw away your medicines at www.disposemymeds.org.          This list is accurate as of: 3/10/17  5:19 PM.  Always use your most recent med list.                   Brand Name Dispense Instructions for use    aspirin EC 81 MG EC tablet      Take 1 tablet by mouth daily.       losartan 50 MG tablet    COZAAR    90 tablet    Take 1 tablet (50 mg) by mouth daily       MULTIVITAL Tabs      Take  by mouth daily.       sulfamethoxazole-trimethoprim 800-160 MG per tablet    BACTRIM DS/SEPTRA DS    1 tablet    Take 1 tablet by mouth once for 1 dose       tamsulosin 0.4 MG capsule    FLOMAX    30 capsule    Take 1 capsule (0.4 mg) by mouth daily

## 2017-03-10 NOTE — LETTER
3/10/2017      RE: Zbigniew Mendoza  [address] [address] [address]       CC: Urinary retention.    HPI: It is a pleasure to see Mr. Zbigniew Mendoza, a very pleasant 82 year old male with a history of nephrolithiasis and BPH s/p TURP in 1988 seen today in the urology clinic in consultation from ED physician for evaluation of urinary retention. The patient had initially woken from sleep in the early morning hours 2 days ago and was quite dizzy, diaphoretic, and nauseous. He had an apparent vasovagal versus syncopal event and was taken to the ER via EMS. Workup here included normal EKG, normal CXR, abdominal flat plate which revealed a large amount of stool throughout the colon, normal blood work, and a normal UA. He was bladder scanned for > 600 cc and was unable to void in the ED. Therefore, Osorio catheter was placed and he presents today for continued evaluation of urinary retention and possible voiding trial. Suspicion was that he had a vasovagal episode from his distended bladder.     Today, he states that he was voiding well with possibly only very mild obstructive symptoms in the preceding weeks to months. Had previously been managed with alfuzosin 10 mg daily but eventually stopped this as he felt he was voiding without any difficulty. He does note significant issues with constipation in the past week. Took Colace yesterday and had a small BM this morning but only passed very small, hard, round kristopher of stool. Feels he has more in his colon and is considering attempting a Fleets enema later today. He currently complains that the catheter is quite painful. Wonders if he pushed it out of place with straining for a BM. He would like it removed today if possible. Was not started on any alpha blockers in the ED.     Past Medical History   Diagnosis Date     Atrial fibrillation (H)      vasovagal from pain?     Calculus of kidney      Cyst      infected hair follicle     Mitral prolapse      mild, with  "mild MR     Syncope      vasovagal from pain?     Past Surgical History   Procedure Laterality Date     C nonspecific procedure  04/93     S/P TURP     C nonspecific procedure       S/P Spiral fracture of right tibia closed reduction     Rotator cuff repair rt/lt  1996     right     Back surgery       Current Outpatient Prescriptions   Medication Sig Dispense Refill     losartan (COZAAR) 50 MG tablet Take 1 tablet (50 mg) by mouth daily 90 tablet 4     Multiple Vitamins-Minerals (MULTIVITAL) TABS Take  by mouth daily.       aspirin EC 81 MG tablet Take 1 tablet by mouth daily.       Allergies   Allergen Reactions     Ciprofloxacin      Acute confusion     Family History: There is no h/o  malignancy.  There is no h/o urolithiasis.     Social History: The patient does not smoke cigarettes, minimal EtOH and no illicit drug use.    ROS: A comprehensive 14 point ROS was obtained and was positive for nephrolithiasis, HTN, h/o atrial fibrillation and otherwise negative except for that outlined above in the HPI.    PHYSICAL EXAM:   Vitals:    03/10/17 1326   BP: 138/78   BP Location: Left arm   Patient Position: Dangled   Cuff Size: Adult Regular   Pulse: 80   Weight: 77.1 kg (170 lb)   Height: 1.778 m (5' 10\")     GENERAL: Well groomed/well developed/well nourished male in NAD.  HEENT: EOMI, AT, NC.  SKIN: Warm to touch, dry.  No visible rashes or lesions.  RESP: No increased respiratory effort.  LYMPH: No LE edema.  MS: Full ROM in extremities.  : Osorio catheter indwelling draining clear yellow urine.  NEURO: Alert and oriented x 3.  PSYCH: Normal mood and affect, pleasant and agreeable during interview and exam.    PROCEDURE: A trial of void was performed by nursing staff today in the clinic.  The patient's Osorio leg bag was disconnected and 175 cc of sterile water were infused into the patient's bladder via gravity drainage, which the patient tolerated well.  The Osorio balloon was decompressed and the catheter was " then removed.  After 2 minutes Mr. Mendoza voided 200 cc.  Postvoid residual urine volume by ultrasound was measured as 0 cc.  The patient did pass today's trial of void and the catheter did not need to be replaced.      REVIEW OF OUTSIDE RECORDS: 5 minutes spent reviewing previous/outside records.    IMAGING:   XR ABDOMEN 2 VW 3/8/2017   FINDINGS: Large amount of stool throughout the colon. The gas pattern  is nonobstructive. No specific evidence of free intraperitoneal air.    PSA   Date Value Ref Range Status   05/23/2012 1.13 0 - 4 ug/L Final       ASSESSMENT/PLAN:  Mr. Zbigniew Mendoza is a pleasant 82 year old male with a h/o nephrolithiasis and BPH s/p TURP in 1988 who developed acute urinary retention in the setting of significant constipation on 3/8/17 requiring Osorio catheter placement in the ED. We discussed potential etiologies for retention including constipation, an enlarged prostate resulting in outlet obstruction, urethral stricture disease, medications, etc. His retention is most likely felt to be 2/2 severe constipation, but cannot rule out regrowth of obstructing prostate tissue without further evaluation. He is quite eager to have the catheter removed so it was mutually agreed to perform a trial of void in clinic today. He understands that if he is unable to void, his options would be 1) have Osorio catheter replaced or 2) instruction in clean intermittent catheterization.    The patient did successfully pass a trial of void today in clinic today (see above for details). Plan for the following:  - Start tamsulosin 0.4 mg daily. Rx sent to pharmacy. Side effects discussed.   - Take 1 dose of Bactrim DS to prevent UTI   - Drink plenty of water and avoid known bladder irritants  - Use a Fleets enema today to promote a BM as it sounds as though he remains constipated    Follow up in 1-2 months for uroflow/PVR, AUA SS, and JAMMIE. Call or RTC sooner with any difficulty voiding. Warning signs and ER precautions  discussed. Patient verbalizes understanding and agreement.     Should the patient continue to have voiding difficulty, the next appropriate studies would be cystoscopy and/or videourodynamics to better assess bladder function.      I have enjoyed participating in the medical care of this very pleasant patient.  Please don't hesitate to contact me with any questions or concerns.      Violeta Pérez PA-C  Urology

## 2017-03-10 NOTE — PROGRESS NOTES
CC: Urinary retention.    HPI: It is a pleasure to see Mr. Zbigniew Mendoza, a very pleasant 82 year old male with a history of nephrolithiasis and BPH s/p TURP in 1988 seen today in the urology clinic in consultation from ED physician for evaluation of urinary retention. The patient had initially woken from sleep in the early morning hours 2 days ago and was quite dizzy, diaphoretic, and nauseous. He had an apparent vasovagal versus syncopal event and was taken to the ER via EMS. Workup here included normal EKG, normal CXR, abdominal flat plate which revealed a large amount of stool throughout the colon, normal blood work, and a normal UA. He was bladder scanned for > 600 cc and was unable to void in the ED. Therefore, Osorio catheter was placed and he presents today for continued evaluation of urinary retention and possible voiding trial. Suspicion was that he had a vasovagal episode from his distended bladder.     Today, he states that he was voiding well with possibly only very mild obstructive symptoms in the preceding weeks to months. Had previously been managed with alfuzosin 10 mg daily but eventually stopped this as he felt he was voiding without any difficulty. He does note significant issues with constipation in the past week. Took Colace yesterday and had a small BM this morning but only passed very small, hard, round kristopher of stool. Feels he has more in his colon and is considering attempting a Fleets enema later today. He currently complains that the catheter is quite painful. Wonders if he pushed it out of place with straining for a BM. He would like it removed today if possible. Was not started on any alpha blockers in the ED.     Past Medical History   Diagnosis Date     Atrial fibrillation (H)      vasovagal from pain?     Calculus of kidney      Cyst      infected hair follicle     Mitral prolapse      mild, with mild MR     Syncope      vasovagal from pain?     Past Surgical History   Procedure  "Laterality Date     C nonspecific procedure  04/93     S/P TURP     C nonspecific procedure       S/P Spiral fracture of right tibia closed reduction     Rotator cuff repair rt/lt  1996     right     Back surgery       Current Outpatient Prescriptions   Medication Sig Dispense Refill     losartan (COZAAR) 50 MG tablet Take 1 tablet (50 mg) by mouth daily 90 tablet 4     Multiple Vitamins-Minerals (MULTIVITAL) TABS Take  by mouth daily.       aspirin EC 81 MG tablet Take 1 tablet by mouth daily.       Allergies   Allergen Reactions     Ciprofloxacin      Acute confusion     Family History: There is no h/o  malignancy.  There is no h/o urolithiasis.     Social History: The patient does not smoke cigarettes, minimal EtOH and no illicit drug use.    ROS: A comprehensive 14 point ROS was obtained and was positive for nephrolithiasis, HTN, h/o atrial fibrillation and otherwise negative except for that outlined above in the HPI.    PHYSICAL EXAM:   Vitals:    03/10/17 1326   BP: 138/78   BP Location: Left arm   Patient Position: Dangled   Cuff Size: Adult Regular   Pulse: 80   Weight: 77.1 kg (170 lb)   Height: 1.778 m (5' 10\")     GENERAL: Well groomed/well developed/well nourished male in NAD.  HEENT: EOMI, AT, NC.  SKIN: Warm to touch, dry.  No visible rashes or lesions.  RESP: No increased respiratory effort.  LYMPH: No LE edema.  MS: Full ROM in extremities.  : Osroio catheter indwelling draining clear yellow urine.  NEURO: Alert and oriented x 3.  PSYCH: Normal mood and affect, pleasant and agreeable during interview and exam.    PROCEDURE: A trial of void was performed by nursing staff today in the clinic.  The patient's Osorio leg bag was disconnected and 175 cc of sterile water were infused into the patient's bladder via gravity drainage, which the patient tolerated well.  The Osorio balloon was decompressed and the catheter was then removed.  After 2 minutes Mr. Mendoza voided 200 cc.  Postvoid residual urine " volume by ultrasound was measured as 0 cc.  The patient did pass today's trial of void and the catheter did not need to be replaced.      REVIEW OF OUTSIDE RECORDS: 5 minutes spent reviewing previous/outside records.    IMAGING:   XR ABDOMEN 2 VW 3/8/2017   FINDINGS: Large amount of stool throughout the colon. The gas pattern  is nonobstructive. No specific evidence of free intraperitoneal air.    PSA   Date Value Ref Range Status   05/23/2012 1.13 0 - 4 ug/L Final       ASSESSMENT/PLAN:  Mr. Zbigniew Mendoza is a pleasant 82 year old male with a h/o nephrolithiasis and BPH s/p TURP in 1988 who developed acute urinary retention in the setting of significant constipation on 3/8/17 requiring Osorio catheter placement in the ED. We discussed potential etiologies for retention including constipation, an enlarged prostate resulting in outlet obstruction, urethral stricture disease, medications, etc. His retention is most likely felt to be 2/2 severe constipation, but cannot rule out regrowth of obstructing prostate tissue without further evaluation. He is quite eager to have the catheter removed so it was mutually agreed to perform a trial of void in clinic today. He understands that if he is unable to void, his options would be 1) have Osorio catheter replaced or 2) instruction in clean intermittent catheterization.    The patient did successfully pass a trial of void today in clinic today (see above for details). Plan for the following:  - Start tamsulosin 0.4 mg daily. Rx sent to pharmacy. Side effects discussed.   - Take 1 dose of Bactrim DS to prevent UTI   - Drink plenty of water and avoid known bladder irritants  - Use a Fleets enema today to promote a BM as it sounds as though he remains constipated    Follow up in 1-2 months for uroflow/PVR, AUA SS, and JAMMIE. Call or RTC sooner with any difficulty voiding. Warning signs and ER precautions discussed. Patient verbalizes understanding and agreement.     Should the  patient continue to have voiding difficulty, the next appropriate studies would be cystoscopy and/or videourodynamics to better assess bladder function.      I have enjoyed participating in the medical care of this very pleasant patient.  Please don't hesitate to contact me with any questions or concerns.      Violeta Pérez PA-C  Urology

## 2017-03-11 ENCOUNTER — HOSPITAL ENCOUNTER (EMERGENCY)
Facility: CLINIC | Age: 82
Discharge: HOME | End: 2017-03-11
Attending: EMERGENCY MEDICINE | Admitting: EMERGENCY MEDICINE
Payer: COMMERCIAL

## 2017-03-11 VITALS
RESPIRATION RATE: 18 BRPM | HEART RATE: 145 BPM | OXYGEN SATURATION: 95 % | WEIGHT: 170 LBS | DIASTOLIC BLOOD PRESSURE: 68 MMHG | TEMPERATURE: 99.5 F | BODY MASS INDEX: 24.39 KG/M2 | SYSTOLIC BLOOD PRESSURE: 113 MMHG

## 2017-03-11 DIAGNOSIS — E86.9 VOLUME DEPLETION: ICD-10-CM

## 2017-03-11 LAB
ALBUMIN SERPL-MCNC: 3.5 G/DL (ref 3.4–5)
ALP SERPL-CCNC: 115 U/L (ref 40–150)
ALT SERPL W P-5'-P-CCNC: 27 U/L (ref 0–70)
ANION GAP SERPL CALCULATED.3IONS-SCNC: 9 MMOL/L (ref 3–14)
AST SERPL W P-5'-P-CCNC: 38 U/L (ref 0–45)
BASOPHILS # BLD AUTO: 0.1 10E9/L (ref 0–0.2)
BASOPHILS NFR BLD AUTO: 0.8 %
BILIRUB SERPL-MCNC: 0.8 MG/DL (ref 0.2–1.3)
BUN SERPL-MCNC: 15 MG/DL (ref 7–30)
CALCIUM SERPL-MCNC: 8.6 MG/DL (ref 8.5–10.1)
CHLORIDE SERPL-SCNC: 103 MMOL/L (ref 94–109)
CO2 SERPL-SCNC: 26 MMOL/L (ref 20–32)
CREAT SERPL-MCNC: 1.12 MG/DL (ref 0.66–1.25)
DIFFERENTIAL METHOD BLD: ABNORMAL
EOSINOPHIL # BLD AUTO: 0.1 10E9/L (ref 0–0.7)
EOSINOPHIL NFR BLD AUTO: 1.6 %
ERYTHROCYTE [DISTWIDTH] IN BLOOD BY AUTOMATED COUNT: 13.4 % (ref 10–15)
GFR SERPL CREATININE-BSD FRML MDRD: 63 ML/MIN/1.7M2
GLUCOSE SERPL-MCNC: 139 MG/DL (ref 70–99)
HCT VFR BLD AUTO: 46.7 % (ref 40–53)
HGB BLD-MCNC: 16 G/DL (ref 13.3–17.7)
IMM GRANULOCYTES # BLD: 0 10E9/L (ref 0–0.4)
IMM GRANULOCYTES NFR BLD: 0.5 %
LYMPHOCYTES # BLD AUTO: 0.7 10E9/L (ref 0.8–5.3)
LYMPHOCYTES NFR BLD AUTO: 9.4 %
MCH RBC QN AUTO: 32.1 PG (ref 26.5–33)
MCHC RBC AUTO-ENTMCNC: 34.3 G/DL (ref 31.5–36.5)
MCV RBC AUTO: 94 FL (ref 78–100)
MONOCYTES # BLD AUTO: 0.7 10E9/L (ref 0–1.3)
MONOCYTES NFR BLD AUTO: 8.8 %
NEUTROPHILS # BLD AUTO: 5.9 10E9/L (ref 1.6–8.3)
NEUTROPHILS NFR BLD AUTO: 78.9 %
NRBC # BLD AUTO: 0 10*3/UL
NRBC BLD AUTO-RTO: 0 /100
PLATELET # BLD AUTO: 166 10E9/L (ref 150–450)
POTASSIUM SERPL-SCNC: 3.8 MMOL/L (ref 3.4–5.3)
PROT SERPL-MCNC: 7.4 G/DL (ref 6.8–8.8)
RBC # BLD AUTO: 4.98 10E12/L (ref 4.4–5.9)
SODIUM SERPL-SCNC: 138 MMOL/L (ref 133–144)
WBC # BLD AUTO: 7.5 10E9/L (ref 4–11)

## 2017-03-11 PROCEDURE — 96361 HYDRATE IV INFUSION ADD-ON: CPT

## 2017-03-11 PROCEDURE — 85025 COMPLETE CBC W/AUTO DIFF WBC: CPT | Performed by: EMERGENCY MEDICINE

## 2017-03-11 PROCEDURE — 93005 ELECTROCARDIOGRAM TRACING: CPT

## 2017-03-11 PROCEDURE — 25000128 H RX IP 250 OP 636: Performed by: EMERGENCY MEDICINE

## 2017-03-11 PROCEDURE — 99284 EMERGENCY DEPT VISIT MOD MDM: CPT | Mod: 25

## 2017-03-11 PROCEDURE — 96360 HYDRATION IV INFUSION INIT: CPT

## 2017-03-11 PROCEDURE — 80053 COMPREHEN METABOLIC PANEL: CPT | Performed by: EMERGENCY MEDICINE

## 2017-03-11 RX ADMIN — SODIUM CHLORIDE 1000 ML: 9 INJECTION, SOLUTION INTRAVENOUS at 10:58

## 2017-03-11 ASSESSMENT — ENCOUNTER SYMPTOMS
FEVER: 0
DIFFICULTY URINATING: 0
PALPITATIONS: 1
LIGHT-HEADEDNESS: 0
COUGH: 1
NAUSEA: 0
SHORTNESS OF BREATH: 0
ABDOMINAL PAIN: 0
FREQUENCY: 1

## 2017-03-11 NOTE — ED PROVIDER NOTES
History     Chief Complaint:  Tachycardia and Palpitations    HPI   Zbigniew Mendoza is a 82 year old male with a history of HTN who presents with palpitations.  The patient was seen here 3 days ago after waking up feeling cold and clammy and then had multiple episodes of dry heaving.  He then passed out after standing and woke up on the floor at the end of his bed.  Those symptoms felt similar to when he had an episode of atrial fibrillation a year and a half ago which spontaneously converted.  He did see cardiology at that time and was given a clean bill of health.  During his ER evaluation 3 days ago, he had an abdominal x-ray that showed constipation and was also found to have urinary retention for which a Osorio catheter was placed.  He was not in atrial fibrillation and no other significant abnormalities seen on x-ray or lab work.  He had the catheter removed yesterday in urology clinic after passing a voiding test.  He was started on Flomax and took the first dose last night.  He also did a Fleets enema last night with good results.  He woke this morning with a rapid irregular pulse which has gradually improved since then.  He felt somewhat generally unwell but had no lightheadedness, nausea, or chest pain.  He has been urinating a lot, noting 1000 cc overnight the first night and then 1200 cc the second night.  He also lost a fair amount of fluid with the enema last night.  He has been trying to drink a lot of fluids.  He has noticed some bruising over his lower chest/upper abdominal area which he believes is from hitting the side of the bed when he fell the other day.  He did not notice this initially at the time of the fall and denies any pain in that area.  He works out every other day and has no symptoms with exercise.  He recently had a cough with sputum production although this has improved in the last few days and he has not taken Robitussin for this in several days.  He denies any leg  swelling.    Cardiac/PE/DVT Risk Factors:  The patient has a history of hypertension.  He has no history of hyperlipidemia or diabetes and is not a smoker.  He reports a family history of coronary artery disease in his father.  He denies any personal or familial history of PE, DVT or clotting disorder.  He reports no recent travel, surgery or other immobilizations.  He is not on hormone therapy and has no known malignancy.     Allergies:  Ciprofloxacin - confusion     Medications:    Flomax  Losartan  Multivitamin  Aspirin     Past Medical History:    Atrial fibrillation, single episode  Mitral valve prolapse with mild mitral regurgitation  Hypertension   Kidney stone  Generalized osteoarthrosis     Past Surgical History:    Rotator cuff repair 1996  Transurethral prostatectomy 1993  Back surgery  Cystoscopy    Family History:    Coronary artery disease - father    Social History:  Marital Status:   Presents to the ED with his daughter, Nisreen.   Occupation: retired psychiatrist  Tobacco Use: No previous or current tobacco use.  Alcohol Use: Occasional alcohol use.   PCP: Epi Thompson      Review of Systems   Constitutional: Negative for fever.   Respiratory: Positive for cough. Negative for shortness of breath.    Cardiovascular: Positive for palpitations. Negative for chest pain and leg swelling.   Gastrointestinal: Negative for abdominal pain and nausea.   Genitourinary: Positive for frequency. Negative for decreased urine volume and difficulty urinating.   Skin:        Positive for bruise.   Neurological: Negative for light-headedness.   All other systems reviewed and are negative.    Physical Exam   First Vitals:  BP: (!) 139/98  Pulse: 145  Heart Rate: 145  Temp: 99.5  F (37.5  C)  Resp: 18  Weight: 77.1 kg (170 lb)  SpO2: 97 %      Physical Exam  Gen: Pleasant, appears stated age.    Eye:   Pupils are equal, round, and reactive.     Sclera non-injected.    ENT:   Moist mucus membranes.     Normal  tongue.    Oropharynx without lesions.    Cardiac:     Tachycardic but regular rhythm.    No murmurs, gallops, or rubs.    Pulmonary:     Clear to auscultation bilaterally.    No wheezes, rales, or rhonchi.    Abdomen:     Normal active bowel sounds.     Abdomen is soft and non-distended, without focal tenderness.    Musculoskeletal:     Normal movement of all extremities without evidence for deficit.    Extremities:    No edema.    Skin:   Warm and dry.   Resolving bruising over epigastrium.    Neurologic:    Non-focal exam without asymmetric weakness or numbness.    Normal tone    Psychiatric:     Normal affect with appropriate interaction with examiner.     Emergency Department Course   ECG:  @ 0953  Indication: palpitations  Vent. Rate 116 bpm. RI interval 174 ms. QRS duration 90 ms. QT/QTc 320/444 ms. P-R-T axis 76 -16 64.   Sinus tachycardia with frequent premature ventricular complexes. Otherwise normal ECG.  No significant change when compared to previous ECG from 3/8/17.   Read @ 0955 by Dr. Mcfarland.     Laboratory:  CBC: WNL (WBC 7.5, HGB 16, )   CMP: Glucose 139 (H), otherwise WNL (Creatinine 1.12)     Interventions:  (1058) Normal Saline, 1 liter, IV bolus      Emergency Department Course:  EKG was done, interpretation as above.     A peripheral IV was established. Blood was drawn from the patient. This was sent for laboratory testing, findings above.       Nursing notes and vitals reviewed.  I performed an exam of the patient as documented above.     (1130) /68, HR 88.  Patient feeling much better.  Ambulating steadily, symptoms resolved.    (1225) Findings and plan explained to the patient.   Patient discharged home with instructions regarding supportive care, medications, and reasons to return. The importance of close follow-up was reviewed.    I personally reviewed the laboratory results with the patient and answered all related questions prior to discharge.       Impression & Plan       Medical Decision Making:  This is a 82 year old male with a history of hypertension who presents with palpitations.  On exam, the patient was initially slightly tachycardic although otherwise well appearing.  His tachycardia seemed to get markedly worse with sitting upright in bed.  Essentially resolved with fluid resuscitation.  Laboratory evaluation is unchanged although blood sugar was slightly elevated.  I informed him of this although I do not think he meets criteria for DKA or for diabetes at this time.  He notes that diuresis he had a large volume of fluid loss and also had a large volume of fluid loss with his bowel movement last night.  I think this is likely the cause of his symptoms.  He is able to ambulate now without dizziness and overall feels much improved.  EKG confirms sinus tachycardia with frequent PVC's.  At this point, there is no evidence for atrial fibrillation or other dangerous arrhythmia.  I think he is safe to follow up with PCP as needed and otherwise I have recommended avoiding Dextromethorphan as I think this may have caused his urinary retention. He will return to the ED as needed for chest pain, high heart rates, or for any other concerns.      Diagnosis:    ICD-10-CM    1. Volume depletion E86.9      Disposition:  Discharged to home.     Discharge Medications:  None       I, Sonya Jeffery, am serving as a scribe on 3/11/2017 at 10:12 AM to personally document services performed by Dr. Mcfarland based on my observations and the provider's statements to me.    Sonya Jeffery  3/11/2017   [facility] EMERGENCY DEPARTMENT       Elly Mcfarland MD  03/11/17 2020

## 2017-03-11 NOTE — ED AVS SNAPSHOT
[facility] Emergency Department    [address]    [facility] [address]    Phone:  [phone]    Fax:  [phone]                                       DR Zbigniew Mendoza   MRN: [id number]    Department:  [facility] Emergency Department   Date of Visit:  3/11/2017           Patient Information     Date Of Birth          5/22/1934        Your diagnoses for this visit were:     Volume depletion        You were seen by Elly Mcfarland MD.      Follow-up Information     Follow up with [facility] Emergency Department.    Specialty:  EMERGENCY MEDICINE    Why:  immediately , If symptoms worsen    Contact information:    [address]  [facility] [address] [phone]        Follow up with Epi Thompson MD In 4 days.    Specialty:  Internal Medicine    Contact information:    [facility]  [address]  [facility] [id number] [phone]          Discharge Instructions         Dehydration (Adult)  Dehydration occurs when your body loses too much fluid. This may be the result of prolonged vomiting or diarrhea, excessive sweating, or a high fever. It may also happen if you don t drink enough fluid when you re sick or out in the heat. Misuse of diuretics (water pills) can also be a cause.  Symptoms include thirst and decreased urine output. You may also feel dizzy, weak, fatigued, or very drowsy. The diet described below is usually enough to treat dehydration. In some cases, you may need medicine.  Home care    Drink at least 12 8-ounce glasses of fluid every day to resolve the dehydration. Fluid may include water; orange juice; lemonade; apple, grape, or cranberry juice; clear fruit drinks; electrolyte replacement and sports drinks; and teas and coffee without caffeine. If you have been diagnosed with a kidney disease, ask your doctor how much and what types of fluids you should drink to prevent dehydration. If you have  kidney disease, fluid can build up in the body. This can be dangerous to your health.     If you have a fever, muscle aches, or a headache as a result of a cold or flu, you may take acetaminophen or ibuprofen, unless another medicine was prescribed. If you have chronic liver or kidney disease, or have ever had a stomach ulcer or gastrointestinal bleeding, talk with your health care provider before using these medicines. Don't take aspirin if you are younger than 18 and have a fever. Aspirin raises the chance for severe liver injury.  Follow-up care  Follow up with your health care provider, or as advised.  When to seek medical advice  Call your health care provider right away if any of these occur:    Continued vomiting    Frequent diarrhea (more than 5 times a day); blood (red or black color) or mucus in diarrhea    Blood in vomit or stool    Swollen abdomen or increasing abdominal pain    Weakness, dizziness, or fainting    Unusual drowsiness or confusion    Reduced urine output or extreme thirst    Fever of 100.4 F (34 C) or higher     1131-0848 [employer]. [address] [place] [address]. All rights reserved. This information is not intended as a substitute for professional medical care. Always follow your healthcare professional's instructions.          24 Hour Appointment Hotline       To make an appointment at any [facility], call [phone] ([phone]). If you don't have a family doctor or clinic, we will help you find one. [facility] are conveniently located to serve the needs of you and your family.             Review of your medicines      Our records show that you are taking the medicines listed below. If these are incorrect, please call your family doctor or clinic.        Dose / Directions Last dose taken    aspirin EC 81 MG EC tablet   Dose:  81 mg        Take 1 tablet by mouth daily.   Refills:  0        losartan 50 MG tablet   Commonly known as:  COZAAR    Dose:  50 mg   Quantity:  90 tablet        Take 1 tablet (50 mg) by mouth daily   Refills:  4        MULTIVITAL Tabs        Take  by mouth daily.   Refills:  0        tamsulosin 0.4 MG capsule   Commonly known as:  FLOMAX   Dose:  0.4 mg   Quantity:  30 capsule        Take 1 capsule (0.4 mg) by mouth daily   Refills:  5          ASK your doctor about these medications        Dose / Directions Last dose taken    sulfamethoxazole-trimethoprim 800-160 MG per tablet   Commonly known as:  BACTRIM DS/SEPTRA DS   Dose:  1 tablet   Quantity:  1 tablet   Ask about: Should I take this medication?        Take 1 tablet by mouth once for 1 dose   Refills:  0                Procedures and tests performed during your visit     CBC with platelets differential    Comprehensive metabolic panel    EKG 12 lead      Orders Needing Specimen Collection     None      Pending Results     Date and Time Order Name Status Description    3/11/2017 0951 EKG 12 lead Preliminary             Pending Culture Results     No orders found from 3/9/2017 to 3/12/2017.             Test Results from your hospital stay     3/11/2017 10:15 AM - Interface, [employer] Results      Component Results     Component Value Ref Range & Units Status    WBC 7.5 4.0 - 11.0 10e9/L Final    RBC Count 4.98 4.4 - 5.9 10e12/L Final    Hemoglobin 16.0 13.3 - 17.7 g/dL Final    Hematocrit 46.7 40.0 - 53.0 % Final    MCV 94 78 - 100 fl Final    MCH 32.1 26.5 - 33.0 pg Final    MCHC 34.3 31.5 - 36.5 g/dL Final    RDW 13.4 10.0 - 15.0 % Final    Platelet Count 166 150 - 450 10e9/L Final    Diff Method Automated Method  Final    % Neutrophils 78.9 % Final    % Lymphocytes 9.4 % Final    % Monocytes 8.8 % Final    % Eosinophils 1.6 % Final    % Basophils 0.8 % Final    % Immature Granulocytes 0.5 % Final    Nucleated RBCs 0 0 /100 Final    Absolute Neutrophil 5.9 1.6 - 8.3 10e9/L Final    Absolute Lymphocytes 0.7 (L) 0.8 - 5.3 10e9/L Final    Absolute Monocytes 0.7 0.0 - 1.3  10e9/L Final    Absolute Eosinophils 0.1 0.0 - 0.7 10e9/L Final    Absolute Basophils 0.1 0.0 - 0.2 10e9/L Final    Abs Immature Granulocytes 0.0 0 - 0.4 10e9/L Final    Absolute Nucleated RBC 0.0  Final         3/11/2017 10:33 AM - Interface, [employer] Results      Component Results     Component Value Ref Range & Units Status    Sodium 138 133 - 144 mmol/L Final    Potassium 3.8 3.4 - 5.3 mmol/L Final    Chloride 103 94 - 109 mmol/L Final    Carbon Dioxide 26 20 - 32 mmol/L Final    Anion Gap 9 3 - 14 mmol/L Final    Glucose 139 (H) 70 - 99 mg/dL Final    Urea Nitrogen 15 7 - 30 mg/dL Final    Creatinine 1.12 0.66 - 1.25 mg/dL Final    GFR Estimate 63 >60 mL/min/1.7m2 Final    Non  GFR Calc    GFR Estimate If Black 76 >60 mL/min/1.7m2 Final    African American GFR Calc    Calcium 8.6 8.5 - 10.1 mg/dL Final    Bilirubin Total 0.8 0.2 - 1.3 mg/dL Final    Albumin 3.5 3.4 - 5.0 g/dL Final    Protein Total 7.4 6.8 - 8.8 g/dL Final    Alkaline Phosphatase 115 40 - 150 U/L Final    ALT 27 0 - 70 U/L Final    AST 38 0 - 45 U/L Final                Clinical Quality Measure: Blood Pressure Screening     Your blood pressure was checked while you were in the emergency department today. The last reading we obtained was  BP: 113/68 . Please read the guidelines below about what these numbers mean and what you should do about them.  If your systolic blood pressure (the top number) is less than 120 and your diastolic blood pressure (the bottom number) is less than 80, then your blood pressure is normal. There is nothing more that you need to do about it.  If your systolic blood pressure (the top number) is 120-139 or your diastolic blood pressure (the bottom number) is 80-89, your blood pressure may be higher than it should be. You should have your blood pressure rechecked within a year by a primary care provider.  If your systolic blood pressure (the top number) is 140 or greater or your diastolic blood pressure  (the bottom number) is 90 or greater, you may have high blood pressure. High blood pressure is treatable, but if left untreated over time it can put you at risk for heart attack, stroke, or kidney failure. You should have your blood pressure rechecked by a primary care provider within the next 4 weeks.  If your provider in the emergency department today gave you specific instructions to follow-up with your doctor or provider even sooner than that, you should follow that instruction and not wait for up to 4 weeks for your follow-up visit.        Thank you for choosing [facility]       Thank you for choosing [facility] for your care. Our goal is always to provide you with excellent care. Hearing back from our patients is one way we can continue to improve our services. Please take a few minutes to complete the written survey that you may receive in the mail after you visit with us. Thank you!        "[employer]hart Information     [facility] gives you secure access to your electronic health record. If you see a primary care provider, you can also send messages to your care team and make appointments. If you have questions, please call your primary care clinic.  If you do not have a primary care provider, please call [phone] and they will assist you.        Care EveryWhere ID     This is your Care EveryWhere ID. This could be used by other organizations to access your [facility] medical records  [id number]        After Visit Summary       This is your record. Keep this with you and show to your community pharmacist(s) and doctor(s) at your next visit.

## 2017-03-11 NOTE — ED NOTES
"Pt states that when he woke up this morning he felt \"not quite right\", took his pulse and states it was \"pretty rapid\". Pt states he felt like he was having heart palpitations, however he says he normally has PVCs. Pt denies CP, SOB. Alert and oriented. ABCs intact.    "

## 2017-03-11 NOTE — ED AVS SNAPSHOT
[facility] Emergency Department    [address]    [facility] [address]    Phone:  [phone]    Fax:  [phone]                                       DR Zbigniew Mendoza   MRN: [id number]    Department:  [facility] Emergency Department   Date of Visit:  3/11/2017           After Visit Summary Signature Page     I have received my discharge instructions, and my questions have been answered. I have discussed any challenges I see with this plan with the nurse or doctor.    ..........................................................................................................................................  Patient/Patient Representative Signature      ..........................................................................................................................................  Patient Representative Print Name and Relationship to Patient    ..................................................               ................................................  Date                                            Time    ..........................................................................................................................................  Reviewed by Signature/Title    ...................................................              ..............................................  Date                                                            Time

## 2017-03-11 NOTE — DISCHARGE INSTRUCTIONS
Dehydration (Adult)  Dehydration occurs when your body loses too much fluid. This may be the result of prolonged vomiting or diarrhea, excessive sweating, or a high fever. It may also happen if you don t drink enough fluid when you re sick or out in the heat. Misuse of diuretics (water pills) can also be a cause.  Symptoms include thirst and decreased urine output. You may also feel dizzy, weak, fatigued, or very drowsy. The diet described below is usually enough to treat dehydration. In some cases, you may need medicine.  Home care    Drink at least 12 8-ounce glasses of fluid every day to resolve the dehydration. Fluid may include water; orange juice; lemonade; apple, grape, or cranberry juice; clear fruit drinks; electrolyte replacement and sports drinks; and teas and coffee without caffeine. If you have been diagnosed with a kidney disease, ask your doctor how much and what types of fluids you should drink to prevent dehydration. If you have kidney disease, fluid can build up in the body. This can be dangerous to your health.     If you have a fever, muscle aches, or a headache as a result of a cold or flu, you may take acetaminophen or ibuprofen, unless another medicine was prescribed. If you have chronic liver or kidney disease, or have ever had a stomach ulcer or gastrointestinal bleeding, talk with your health care provider before using these medicines. Don't take aspirin if you are younger than 18 and have a fever. Aspirin raises the chance for severe liver injury.  Follow-up care  Follow up with your health care provider, or as advised.  When to seek medical advice  Call your health care provider right away if any of these occur:    Continued vomiting    Frequent diarrhea (more than 5 times a day); blood (red or black color) or mucus in diarrhea    Blood in vomit or stool    Swollen abdomen or increasing abdominal pain    Weakness, dizziness, or fainting    Unusual drowsiness or confusion    Reduced  urine output or extreme thirst    Fever of 100.4 F (34 C) or higher     8023-5658 [employer]. [address] [place] [address], [place] [address]. All rights reserved. This information is not intended as a substitute for professional medical care. Always follow your healthcare professional's instructions.

## 2017-03-13 LAB — INTERPRETATION ECG - MUSE: NORMAL

## 2017-08-11 ENCOUNTER — OFFICE VISIT (OUTPATIENT)
Dept: INTERNAL MEDICINE | Facility: CLINIC | Age: 82
End: 2017-08-11
Payer: COMMERCIAL

## 2017-08-11 ENCOUNTER — MYC MEDICAL ADVICE (OUTPATIENT)
Dept: INTERNAL MEDICINE | Facility: CLINIC | Age: 82
End: 2017-08-11

## 2017-08-11 ENCOUNTER — RADIANT APPOINTMENT (OUTPATIENT)
Dept: GENERAL RADIOLOGY | Facility: CLINIC | Age: 82
End: 2017-08-11
Attending: INTERNAL MEDICINE
Payer: COMMERCIAL

## 2017-08-11 VITALS
TEMPERATURE: 97.9 F | WEIGHT: 174 LBS | BODY MASS INDEX: 24.91 KG/M2 | HEIGHT: 70 IN | HEART RATE: 100 BPM | OXYGEN SATURATION: 99 % | DIASTOLIC BLOOD PRESSURE: 66 MMHG | SYSTOLIC BLOOD PRESSURE: 160 MMHG

## 2017-08-11 DIAGNOSIS — Z00.00 ROUTINE GENERAL MEDICAL EXAMINATION AT A HEALTH CARE FACILITY: Primary | ICD-10-CM

## 2017-08-11 DIAGNOSIS — I10 ESSENTIAL HYPERTENSION WITH GOAL BLOOD PRESSURE LESS THAN 130/85: ICD-10-CM

## 2017-08-11 DIAGNOSIS — M54.2 CERVICALGIA: ICD-10-CM

## 2017-08-11 DIAGNOSIS — N40.0 HYPERTROPHY OF PROSTATE WITHOUT URINARY OBSTRUCTION: ICD-10-CM

## 2017-08-11 DIAGNOSIS — Z23 PNEUMOCOCCAL VACCINATION ADMINISTERED AT CURRENT VISIT: ICD-10-CM

## 2017-08-11 DIAGNOSIS — R33.9 URINARY RETENTION: ICD-10-CM

## 2017-08-11 LAB
ALBUMIN UR-MCNC: NEGATIVE MG/DL
APPEARANCE UR: CLEAR
BILIRUB UR QL STRIP: NEGATIVE
COLOR UR AUTO: YELLOW
ERYTHROCYTE [DISTWIDTH] IN BLOOD BY AUTOMATED COUNT: 13.9 % (ref 10–15)
GLUCOSE UR STRIP-MCNC: NEGATIVE MG/DL
HCT VFR BLD AUTO: 46.3 % (ref 40–53)
HGB BLD-MCNC: 15.3 G/DL (ref 13.3–17.7)
HGB UR QL STRIP: NEGATIVE
KETONES UR STRIP-MCNC: NEGATIVE MG/DL
LEUKOCYTE ESTERASE UR QL STRIP: NEGATIVE
MCH RBC QN AUTO: 31.4 PG (ref 26.5–33)
MCHC RBC AUTO-ENTMCNC: 33 G/DL (ref 31.5–36.5)
MCV RBC AUTO: 95 FL (ref 78–100)
NITRATE UR QL: NEGATIVE
PH UR STRIP: 6 PH (ref 5–7)
PLATELET # BLD AUTO: 140 10E9/L (ref 150–450)
RBC # BLD AUTO: 4.88 10E12/L (ref 4.4–5.9)
SP GR UR STRIP: 1.02 (ref 1–1.03)
URN SPEC COLLECT METH UR: NORMAL
UROBILINOGEN UR STRIP-ACNC: 0.2 EU/DL (ref 0.2–1)
WBC # BLD AUTO: 5 10E9/L (ref 4–11)

## 2017-08-11 PROCEDURE — 99397 PER PM REEVAL EST PAT 65+ YR: CPT | Mod: 25 | Performed by: INTERNAL MEDICINE

## 2017-08-11 PROCEDURE — 80053 COMPREHEN METABOLIC PANEL: CPT | Performed by: INTERNAL MEDICINE

## 2017-08-11 PROCEDURE — 81003 URINALYSIS AUTO W/O SCOPE: CPT | Performed by: INTERNAL MEDICINE

## 2017-08-11 PROCEDURE — G0009 ADMIN PNEUMOCOCCAL VACCINE: HCPCS | Performed by: INTERNAL MEDICINE

## 2017-08-11 PROCEDURE — 36415 COLL VENOUS BLD VENIPUNCTURE: CPT | Performed by: INTERNAL MEDICINE

## 2017-08-11 PROCEDURE — 72040 X-RAY EXAM NECK SPINE 2-3 VW: CPT

## 2017-08-11 PROCEDURE — 85027 COMPLETE CBC AUTOMATED: CPT | Performed by: INTERNAL MEDICINE

## 2017-08-11 PROCEDURE — 90670 PCV13 VACCINE IM: CPT | Performed by: INTERNAL MEDICINE

## 2017-08-11 PROCEDURE — 80061 LIPID PANEL: CPT | Performed by: INTERNAL MEDICINE

## 2017-08-11 RX ORDER — LOSARTAN POTASSIUM 50 MG/1
50 TABLET ORAL DAILY
Qty: 90 TABLET | Refills: 4 | Status: SHIPPED | OUTPATIENT
Start: 2017-08-11 | End: 2018-09-19

## 2017-08-11 RX ORDER — TAMSULOSIN HYDROCHLORIDE 0.4 MG/1
0.4 CAPSULE ORAL DAILY
Qty: 90 CAPSULE | Refills: 3 | Status: SHIPPED | OUTPATIENT
Start: 2017-08-11 | End: 2018-09-19

## 2017-08-11 NOTE — NURSING NOTE
"Chief Complaint   Patient presents with     Wellness Visit     Fasting Px       Initial /66  Pulse 100  Temp 97.9  F (36.6  C) (Oral)  Ht 5' 10\" (1.778 m)  Wt 174 lb (78.9 kg)  SpO2 99%  BMI 24.97 kg/m2 Estimated body mass index is 24.97 kg/(m^2) as calculated from the following:    Height as of this encounter: 5' 10\" (1.778 m).    Weight as of this encounter: 174 lb (78.9 kg).  Medication Reconciliation: complete   Lynda Farfan MA      "

## 2017-08-11 NOTE — PROGRESS NOTES
SUBJECTIVE:   Zbigniew Mendoza is a 83 year old male who presents for Preventive Visit.      Are you in the first 12 months of your Medicare Part B coverage?  No    Healthy Habits:    Do you get at least three servings of calcium containing foods daily (dairy, green leafy vegetables, etc.)? yes    Amount of exercise or daily activities, outside of work: 4 day(s) per week    Problems taking medications regularly No    Medication side effects: No    Have you had an eye exam in the past two years? yes    Do you see a dentist twice per year? yes    Do you have sleep apnea, excessive snoring or daytime drowsiness?no    COGNITIVE SCREEN  1) Repeat 3 items (Banana, Sunrise, Chair)    2) Clock draw: NORMAL  3) 3 item recall: Recalls 3 objects  Results: NORMAL clock, 2 items recalled: COGNITIVE IMPAIRMENT LESS LIKELY    Mini-CogTM Copyright S Zoila. Licensed by the author for use in [facility]; reprinted with permission (coby@[facility]). All rights reserved.              PROBLEMS TO ADD ON...    Has h/o HTN. on medical treatment. BP has been controlled. No side effects from medications. No CP, HA, dizziness. good compliance with medications and low salt diet.  Has H/O BPH. On treatment with Flomax . No  symptoms of frequency, decreased urinary stream or dysuria. No side effects from medications.      Reviewed and updated as needed this visit by clinical staff         Reviewed and updated as needed this visit by Provider      Social History   Substance Use Topics     Smoking status: Never Smoker     Smokeless tobacco: Never Used     Alcohol use Yes      Comment: oc glass of wine       The patient does not drink >3 drinks per day nor >7 drinks per week.    Today's PHQ-2 Score: 0  PHQ-2 ( 1999 Pf[employer]) 8/11/2017 6/21/2016   Q1: Little interest or pleasure in doing things 0 0   Q2: Feeling down, depressed or hopeless 0 0   PHQ-2 Score 0 0       Do you feel safe in your environment - Yes    Do you have a Health Care  "Directive?: Yes: Advance Directive has been received and scanned.    Current providers sharing in care for this patient include:   Patient Care Team:  Eagle Negrete MD as PCP - General (Internal Medicine)      Hearing impairment: Yes, Difficulty following a conversation in a noisy restaurant or crowded room.    Ability to successfully perform activities of daily living: Yes, no assistance needed     Fall risk:         Home safety:  none identified      The following health maintenance items are reviewed in Epic and correct as of today:Health Maintenance   Topic Date Due     PNEUMOCOCCAL (2 of 2 - PCV13) 03/18/2016     ADVANCE DIRECTIVE PLANNING Q5 YRS  05/23/2017     FALL RISK ASSESSMENT  06/21/2017     INFLUENZA VACCINE (SYSTEM ASSIGNED)  09/01/2017     TETANUS IMMUNIZATION (SYSTEM ASSIGNED)  11/18/2019     Labs reviewed in EPIC    Pneumonia Vaccine:Adults age 65+ who received Pneumovax (PPSV23) at 65 years or older: Should be given PCV13 > 1 year after their most recent PPSV23    ROS:  C: NEGATIVE for fever, chills, change in weight  I: NEGATIVE for worrisome rashes, moles or lesions  E: NEGATIVE for vision changes or irritation  E/M: NEGATIVE for ear, mouth and throat problems  R: NEGATIVE for significant cough or SOB  B: NEGATIVE for masses, tenderness or discharge  CV: NEGATIVE for chest pain, palpitations or peripheral edema  GI: NEGATIVE for nausea, abdominal pain, heartburn, or change in bowel habits  : NEGATIVE for frequency, dysuria, or hematuria  M: NEGATIVE for significant arthralgias or myalgia  N: NEGATIVE for weakness, dizziness or paresthesias  E: NEGATIVE for temperature intolerance, skin/hair changes  H: NEGATIVE for bleeding problems  P: NEGATIVE for changes in mood or affect    OBJECTIVE:   There were no vitals taken for this visit. Estimated body mass index is 24.39 kg/(m^2) as calculated from the following:    Height as of 3/10/17: 5' 10\" (1.778 m).    Weight as of 3/11/17: 170 lb (77.1 " kg).  EXAM:   GENERAL: healthy, alert and no distress  EYES: Eyes grossly normal to inspection, PERRL and conjunctivae and sclerae normal  HENT: ear canals and TM's normal, nose and mouth without ulcers or lesions  NECK: no adenopathy, no asymmetry, masses, or scars and thyroid normal to palpation  RESP: lungs clear to auscultation - no rales, rhonchi or wheezes  CV: regular rate and rhythm, normal S1 S2, no S3 or S4, no murmur, click or rub, no peripheral edema and peripheral pulses strong  ABDOMEN: soft, nontender, no hepatosplenomegaly, no masses and bowel sounds normal  MS: no gross musculoskeletal defects noted, no edema  SKIN: no suspicious lesions or rashes  NEURO: Normal strength and tone, mentation intact and speech normal  PSYCH: mentation appears normal, affect normal/bright    ASSESSMENT / PLAN:       ICD-10-CM    1. Routine general medical examination at a health care facility Z00.00 Lipid panel reflex to direct LDL     CBC with platelets     Comprehensive metabolic panel     *UA reflex to Microscopic and Culture ([facility] (except [place] and [place])   2. Essential hypertension with goal blood pressure less than 130/85 I10 losartan (COZAAR) 50 MG tablet     Lipid panel reflex to direct LDL     CBC with platelets     Comprehensive metabolic panel     *UA reflex to Microscopic and Culture ([facility] (except [place] and [place])   3. Urinary retention R33.9 tamsulosin (FLOMAX) 0.4 MG capsule     *UA reflex to Microscopic and Culture ([facility] (except [place] and [place])   4. Cervicalgia M54.2 XR Cervical Spine 2/3 Views     [facility] PT, HAND, AND CHIROPRACTIC REFERRAL   5. Pneumococcal vaccination administered at current visit Z23 PNEUMOCOCCAL CONJ VACCINE 13 VALENT IM   6. Hypertrophy of prostate without urinary obstruction N40.0      Monitor BP, recheck in one month with a nurse.   Has not taken his Losartan today because of his visit/  "fasting.     End of Life Planning:  Patient currently has an advanced directive: Yes.  Practitioner is supportive of decision.    COUNSELING:  Reviewed preventive health counseling, as reflected in patient instructions       Regular exercise       Healthy diet/nutrition       Vision screening       Hearing screening       Dental care       Colon cancer screening       Prostate cancer screening        Estimated body mass index is 24.39 kg/(m^2) as calculated from the following:    Height as of 3/10/17: 5' 10\" (1.778 m).    Weight as of 3/11/17: 170 lb (77.1 kg).     reports that he has never smoked. He has never used smokeless tobacco.        Appropriate preventive services were discussed with this patient, including applicable screening as appropriate for cardiovascular disease, diabetes, osteopenia/osteoporosis, and glaucoma.  As appropriate for age/gender, discussed screening for colorectal cancer, prostate cancer, breast cancer, and cervical cancer. Checklist reviewing preventive services available has been given to the patient.    Reviewed patients plan of care and provided an AVS. The Intermediate Care Plan ( asthma action plan, low back pain action plan, and migraine action plan) for Zbigniew meets the Care Plan requirement. This Care Plan has been established and reviewed with the Patient.    Counseling Resources:  ATP IV Guidelines  Pooled Cohorts Equation Calculator  Breast Cancer Risk Calculator  FRAX Risk Assessment  ICSI Preventive Guidelines  Dietary Guidelines for Americans, 2010  USDA's MyPlate  ASA Prophylaxis  Lung CA Screening    Eagle Negrete MD  [facility]  "

## 2017-08-11 NOTE — NURSING NOTE
Prior to injection verified patient identity using patient's name and date of birth.  Screening Questionnaire for Adult Immunization    Are you sick today?   No   Do you have allergies to medications, food, a vaccine component or latex?   Yes   Have you ever had a serious reaction after receiving a vaccination?   No   Do you have a long-term health problem with heart disease, lung disease, asthma, kidney disease, metabolic disease (e.g. diabetes), anemia, or other blood disorder?   HTN   Do you have cancer, leukemia, HIV/AIDS, or any other immune system problem?   No   In the past 3 months, have you taken medications that affect  your immune system, such as prednisone, other steroids, or anticancer drugs; drugs for the treatment of rheumatoid arthritis, Crohn s disease, or psoriasis; or have you had radiation treatments?   No   Have you had a seizure, or a brain or other nervous system problem?   No   During the past year, have you received a transfusion of blood or blood     products, or been given immune (gamma) globulin or antiviral drug?   No   For women: Are you pregnant or is there a chance you could become        pregnant during the next month?   No   Have you received any vaccinations in the past 4 weeks?   No     Immunization questionnaire was positive for at least one answer.   MNVFC doesn't apply on this patient  Per orders of Dr. Negrete, injection of Prevnar given by Tad Torre. Patient instructed to remain in clinic for 20 minutes afterwards, and to report any adverse reaction to me immediately.     Screening performed by Tad Torre on 5/24/2017 at 11:08 AM.    Patient instructed to remain in clinic for 20 minutes afterwards, and to report any adverse reaction to me immediately.

## 2017-08-11 NOTE — MR AVS SNAPSHOT
After Visit Summary   8/11/2017    DR Zbigniew Mendoza    MRN: [id number]           Patient Information     Date Of Birth          5/22/1934        Visit Information        Provider Department      8/11/2017 8:40 AM Eagle Negrete MD [facility]        Today's Diagnoses     Routine general medical examination at a health care facility    -  1    Essential hypertension with goal blood pressure less than 130/85        Urinary retention        Cervicalgia          Care Instructions      Preventive Health Recommendations:       Male Ages 65 and over    Yearly exam:             See your health care provider every year in order to  o   Review health changes.   o   Discuss preventive care.    o   Review your medicines if your doctor has prescribed any.    Talk with your health care provider about whether you should have a test to screen for prostate cancer (PSA).    Every 3 years, have a diabetes test (fasting glucose). If you are at risk for diabetes, you should have this test more often.    Every 5 years, have a cholesterol test. Have this test more often if you are at risk for high cholesterol or heart disease.     Every 10 years, have a colonoscopy. Or, have a yearly FIT test (stool test). These exams will check for colon cancer.    Talk to with your health care provider about screening for Abdominal Aortic Aneurysm if you have a family history of AAA or have a history of smoking.  Shots:     Get a flu shot each year.     Get a tetanus shot every 10 years.     Talk to your doctor about your pneumonia vaccines. There are now two you should receive - Pneumovax (PPSV 23) and Prevnar (PCV 13).    Talk to your doctor about a shingles vaccine.     Talk to your doctor about the hepatitis B vaccine.  Nutrition:     Eat at least 5 servings of fruits and vegetables each day.     Eat whole-grain bread, whole-wheat pasta and brown rice instead of white grains and rice.     Talk to your doctor about  Calcium and Vitamin D.   Lifestyle    Exercise for at least 150 minutes a week (30 minutes a day, 5 days a week). This will help you control your weight and prevent disease.     Limit alcohol to one drink per day.     No smoking.     Wear sunscreen to prevent skin cancer.     See your dentist every six months for an exam and cleaning.     See your eye doctor every 1 to 2 years to screen for conditions such as glaucoma, macular degeneration and cataracts.          Follow-ups after your visit        Additional Services     [facility] PT, HAND, AND CHIROPRACTIC REFERRAL       **This order will print in the [facility] Scheduling Office**    Physical Therapy, Hand Therapy and Chiropractic Care are available through:    *[facility]  *[facility]  *[facility]    Call one number to schedule at any of the above locations: [phone].    Your provider has referred you to: Physical Therapy at [facility] or [facility]    Indication/Reason for Referral: Neck Pain  Onset of Illness: 1 month   Therapy Orders: Evaluate and Treat  Special Programs: None  Special Request: None    Maximino Stanton      Additional Comments for the Therapist or Chiropractor:     Please be aware that coverage of these services is subject to the terms and limitations of your health insurance plan.  Call member services at your health plan with any benefit or coverage questions.      Please bring the following to your appointment:    *Your personal calendar for scheduling future appointments  *Comfortable clothing                  Follow-up notes from your care team     Return in about 4 weeks (around 9/8/2017) for BP Recheck.      Future tests that were ordered for you today     Open Future Orders        Priority Expected Expires Ordered    XR Cervical Spine 2/3 Views Routine 8/11/2017 8/11/2018 8/11/2017            Who to contact     If you have questions or need follow up information about today's clinic visit or your  "schedule please contact [facility] directly at [phone].  Normal or non-critical lab and imaging results will be communicated to you by MyChart, letter or phone within 4 business days after the clinic has received the results. If you do not hear from us within 7 days, please contact the clinic through MyChart or phone. If you have a critical or abnormal lab result, we will notify you by phone as soon as possible.  Submit refill requests through [employer] or call your pharmacy and they will forward the refill request to us. Please allow 3 business days for your refill to be completed.          Additional Information About Your Visit        [employer] Information     [employer] gives you secure access to your electronic health record. If you see a primary care provider, you can also send messages to your care team and make appointments. If you have questions, please call your primary care clinic.  If you do not have a primary care provider, please call [phone] and they will assist you.        Care EveryWhere ID     This is your Care EveryWhere ID. This could be used by other organizations to access your [facility] medical records  [id number]        Your Vitals Were     Pulse Temperature Height Pulse Oximetry BMI (Body Mass Index)       100 97.9  F (36.6  C) (Oral) 5' 10\" (1.778 m) 99% 24.97 kg/m2        Blood Pressure from Last 3 Encounters:   08/11/17 160/66   03/11/17 113/68   03/10/17 138/78    Weight from Last 3 Encounters:   08/11/17 174 lb (78.9 kg)   03/11/17 170 lb (77.1 kg)   03/10/17 170 lb (77.1 kg)              We Performed the Following     *UA reflex to Microscopic and Culture ([place] [facility] (except [place] and [place])     CBC with platelets     Comprehensive metabolic panel     [facility] PT, HAND, AND CHIROPRACTIC REFERRAL     Lipid panel reflex to direct LDL          Where to get your medicines      These medications were sent to [facility]/pharmacy #[id number] - [place] - " [address]  [address], [facility] [address]     Phone:  [phone]     losartan 50 MG tablet    tamsulosin 0.4 MG capsule          Primary Care Provider Office Phone # Fax #    Eagle Negrete [phone]       [address]  [facility] [address]        Equal Access to Services     [facility]: Hadii aad ku hadasho Soomaali, waaxda luqadaha, qaybta kaalmada adeegyada, waxay idiin hayaan adeeg kharash la'aan ah. So [facility] [phone].    ATENCIÓN: Si habla español, tiene a spence disposición servicios gratuitos de asistencia lingüística. Llame al [phone].    We comply with applicable federal civil rights laws and [place] laws. We do not discriminate on the basis of race, color, national origin, age, disability sex, sexual orientation or gender identity.            Thank you!     Thank you for choosing [facility]  for your care. Our goal is always to provide you with excellent care. Hearing back from our patients is one way we can continue to improve our services. Please take a few minutes to complete the written survey that you may receive in the mail after your visit with us. Thank you!             Your Updated Medication List - Protect others around you: Learn how to safely use, store and throw away your medicines at www.disposemymeds.org.          This list is accurate as of: 8/11/17  9:23 AM.  Always use your most recent med list.                   Brand Name Dispense Instructions for use Diagnosis    aspirin EC 81 MG EC tablet      Take 1 tablet by mouth daily.        losartan 50 MG tablet    COZAAR    90 tablet    Take 1 tablet (50 mg) by mouth daily    Essential hypertension with goal blood pressure less than 130/85       MULTIVITAL Tabs      Take  by mouth daily.        tamsulosin 0.4 MG capsule    FLOMAX    90 capsule    Take 1 capsule (0.4 mg) by mouth daily    Urinary retention

## 2017-08-12 LAB
ALBUMIN SERPL-MCNC: 4 G/DL (ref 3.4–5)
ALP SERPL-CCNC: 89 U/L (ref 40–150)
ALT SERPL W P-5'-P-CCNC: 14 U/L (ref 0–70)
ANION GAP SERPL CALCULATED.3IONS-SCNC: 11 MMOL/L (ref 3–14)
AST SERPL W P-5'-P-CCNC: 24 U/L (ref 0–45)
BILIRUB SERPL-MCNC: 0.7 MG/DL (ref 0.2–1.3)
BUN SERPL-MCNC: 20 MG/DL (ref 7–30)
CALCIUM SERPL-MCNC: 9.1 MG/DL (ref 8.5–10.1)
CHLORIDE SERPL-SCNC: 105 MMOL/L (ref 94–109)
CHOLEST SERPL-MCNC: 125 MG/DL
CO2 SERPL-SCNC: 26 MMOL/L (ref 20–32)
CREAT SERPL-MCNC: 0.98 MG/DL (ref 0.66–1.25)
GFR SERPL CREATININE-BSD FRML MDRD: 73 ML/MIN/1.7M2
GLUCOSE SERPL-MCNC: 91 MG/DL (ref 70–99)
HDLC SERPL-MCNC: 51 MG/DL
LDLC SERPL CALC-MCNC: 58 MG/DL
NONHDLC SERPL-MCNC: 74 MG/DL
POTASSIUM SERPL-SCNC: 4.1 MMOL/L (ref 3.4–5.3)
PROT SERPL-MCNC: 7.3 G/DL (ref 6.8–8.8)
SODIUM SERPL-SCNC: 142 MMOL/L (ref 133–144)
TRIGL SERPL-MCNC: 80 MG/DL

## 2017-09-26 DIAGNOSIS — I10 ESSENTIAL HYPERTENSION WITH GOAL BLOOD PRESSURE LESS THAN 130/85: ICD-10-CM

## 2017-09-26 RX ORDER — LOSARTAN POTASSIUM 50 MG/1
50 TABLET ORAL DAILY
Qty: 90 TABLET | Refills: 4 | OUTPATIENT
Start: 2017-09-26

## 2017-09-26 NOTE — TELEPHONE ENCOUNTER
LOSARTAN      Last Written Prescription Date: 08/11/17  Last Fill Quantity: 90, # refills: 4  Last Office Visit with [facility], [facility] or [facility] prescribing provider: 08/11/17       Potassium   Date Value Ref [facility] Status   08/11/2017 4.1 3.4 - 5.3 mmol/L Final     Creatinine   Date Value Ref [facility] Status   08/11/2017 0.98 0.66 - 1.25 mg/dL Final     BP Readings from Last 3 Encounters:   08/11/17 160/66   03/11/17 113/68   03/10/17 138/78

## 2018-03-27 ENCOUNTER — TELEPHONE (OUTPATIENT)
Dept: INTERNAL MEDICINE | Facility: CLINIC | Age: 83
End: 2018-03-27

## 2018-03-27 NOTE — LETTER
[facility]  [address], [address]  [place] [address]  [phone]        August 22, 2018    Zbigniew Joinern  [address] [facility] [address]            Dear Mr. Zbigniew Joinern:    We sent you a letter a couple of weeks ago informing you of health maintenance that is due. We hope that you received it. This letter is just a follow up to remind you to schedule an appointment.         Sincerely,        Your [facility] Care Team

## 2018-03-27 NOTE — LETTER
[facility]  [address], [address]  [place] [address]  [phone]        July 24, 2018    Zbigniew SHEARER Reji  [address] [address]            Dear Mr. Zbigniew SHEARER Reji:    In order to ensure we are providing the best quality care, we have reviewed your chart and see that you are due for:     1. Follow up on blood pressure.       Please call the clinic at your earliest convenience to schedule an appointment.   Thank you for trusting us with your health care.    Sincerely,        Dr. Patricia Sandy

## 2018-03-27 NOTE — TELEPHONE ENCOUNTER
Panel Management Review      Patient has the following on his problem list:     Hypertension   Last three blood pressure readings:  BP Readings from Last 3 Encounters:   08/11/17 160/66   03/11/17 113/68   03/10/17 138/78     Blood pressure: FAILED    HTN Guidelines:  Age 18-59 BP range:  Less than 140/90  Age 60-85 with Diabetes:  Less than 140/90  Age 60-85 without Diabetes:  less than 150/90      Composite cancer screening  Chart review shows that this patient is due/due soon for the following None  Summary:    Patient is due/failing the following:   Hypertension    Action needed:   Patient needs office visit for Hypertension.    Type of outreach:    Phone, left message for patient to call back.     Questions for provider review:    None                                                                                                                                    Jerilyn Sorto MA       Chart routed to Care Team .

## 2018-08-11 ENCOUNTER — HOSPITAL ENCOUNTER (EMERGENCY)
Facility: CLINIC | Age: 83
Discharge: HOME | End: 2018-08-11
Attending: EMERGENCY MEDICINE | Admitting: EMERGENCY MEDICINE
Payer: COMMERCIAL

## 2018-08-11 VITALS
OXYGEN SATURATION: 97 % | DIASTOLIC BLOOD PRESSURE: 65 MMHG | SYSTOLIC BLOOD PRESSURE: 139 MMHG | RESPIRATION RATE: 18 BRPM | TEMPERATURE: 97.8 F

## 2018-08-11 DIAGNOSIS — T63.441A BEE STING REACTION, ACCIDENTAL OR UNINTENTIONAL, INITIAL ENCOUNTER: ICD-10-CM

## 2018-08-11 PROCEDURE — 99283 EMERGENCY DEPT VISIT LOW MDM: CPT

## 2018-08-11 PROCEDURE — 25000132 ZZH RX MED GY IP 250 OP 250 PS 637: Performed by: EMERGENCY MEDICINE

## 2018-08-11 RX ADMIN — Medication 10 MG: at 08:39

## 2018-08-11 RX ADMIN — RANITIDINE 150 MG: 150 TABLET ORAL at 08:39

## 2018-08-11 ASSESSMENT — ENCOUNTER SYMPTOMS
SHORTNESS OF BREATH: 0
TROUBLE SWALLOWING: 0

## 2018-08-11 NOTE — ED TRIAGE NOTES
A&Ox4. ABC's intact. Pt c/o bee sting x3 that occurred yesterday, over right eye, right hand and left thigh.  Used topical benadryl yesterday.  States there is discomfort, 4/10 at this time.  Hx of cellulitis after wasp sting.

## 2018-08-11 NOTE — ED AVS SNAPSHOT
[facility]    [address]    [facility] [address]    Phone:  [phone]    Fax:  [phone]                                       DR Zbigniew Mendoza   MRN: [id number]    Department:  [facility]   Date of Visit:  8/11/2018           After Visit Summary Signature Page     I have received my discharge instructions, and my questions have been answered. I have discussed any challenges I see with this plan with the nurse or doctor.    ..........................................................................................................................................  Patient/Patient Representative Signature      ..........................................................................................................................................  Patient Representative Print Name and Relationship to Patient    ..................................................               ................................................  Date                                            Time    ..........................................................................................................................................  Reviewed by Signature/Title    ...................................................              ..............................................  Date                                                            Time

## 2018-08-11 NOTE — ED AVS SNAPSHOT
[facility]    [address]    [facility] [address]    Phone:  [phone]    Fax:  [phone]                                       DR Zbigniew Mendoza   MRN: [id number]    Department:  [facility]   Date of Visit:  8/11/2018           Patient Information     Date Of Birth          5/22/1934        Your diagnoses for this visit were:     Bee sting reaction, accidental or unintentional, initial encounter        You were seen by Cyril Sharp MD.      Follow-up Information     Follow up with [facility].    Specialty:  EMERGENCY MEDICINE    Why:  As needed    Contact information:    [address]  [facility] [address] [phone]      Discharge References/Attachments     INSECT STING, LOCAL REACTION (ENGLISH)      24 Hour Appointment Hotline       To make an appointment at any [facility] clinic, call [phone] ([phone]). If you don't have a family doctor or clinic, we will help you find one. [facility] clinics are conveniently located to serve the needs of you and your family.             Review of your medicines      START taking        Dose / Directions Last dose taken    ranitidine 150 MG tablet   Commonly known as:  ZANTAC   Dose:  150 mg   Quantity:  10 tablet        Take 1 tablet (150 mg) by mouth 2 times daily for 5 days   Refills:  0          Our records show that you are taking the medicines listed below. If these are incorrect, please call your family doctor or clinic.        Dose / Directions Last dose taken    aspirin 81 MG EC tablet   Dose:  81 mg        Take 1 tablet by mouth daily.   Refills:  0        losartan 50 MG tablet   Commonly known as:  COZAAR   Dose:  50 mg   Quantity:  90 tablet        Take 1 tablet (50 mg) by mouth daily   Refills:  4        MULTIVITAL Tabs        Take  by mouth daily.   Refills:  0        tamsulosin 0.4 MG capsule   Commonly  known as:  FLOMAX   Dose:  0.4 mg   Quantity:  90 capsule        Take 1 capsule (0.4 mg) by mouth daily   Refills:  3                Prescriptions were sent or printed at these locations (1 Prescription)                   Other Prescriptions                Printed at Department/Unit printer (1 of 1)         ranitidine (ZANTAC) 150 MG tablet                Orders Needing Specimen Collection     None      Pending Results     No orders found from 8/9/2018 to 8/12/2018.            Pending Culture Results     No orders found from 8/9/2018 to 8/12/2018.            Pending Results Instructions     If you had any lab results that were not finalized at the time of your Discharge, you can call the ED Lab Result RN at [phone]. You will be contacted by this team for any positive Lab results or changes in treatment. The nurses are available 7 days a week from 10A to 6:30P.  You can leave a message 24 hours per day and they will return your call.        Test Results From Your Hospital Stay               Clinical Quality Measure: Blood Pressure Screening     Your blood pressure was checked while you were in the emergency department today. The last reading we obtained was  BP: 177/88 . Please read the guidelines below about what these numbers mean and what you should do about them.  If your systolic blood pressure (the top number) is less than 120 and your diastolic blood pressure (the bottom number) is less than 80, then your blood pressure is normal. There is nothing more that you need to do about it.  If your systolic blood pressure (the top number) is 120-139 or your diastolic blood pressure (the bottom number) is 80-89, your blood pressure may be higher than it should be. You should have your blood pressure rechecked within a year by a primary care provider.  If your systolic blood pressure (the top number) is 140 or greater or your diastolic blood pressure (the bottom number) is 90 or greater, you may have high blood  pressure. High blood pressure is treatable, but if left untreated over time it can put you at risk for heart attack, stroke, or kidney failure. You should have your blood pressure rechecked by a primary care provider within the next 4 weeks.  If your provider in the emergency department today gave you specific instructions to follow-up with your doctor or provider even sooner than that, you should follow that instruction and not wait for up to 4 weeks for your follow-up visit.        Thank you for choosing [facility]       Thank you for choosing [facility] for your care. Our goal is always to provide you with excellent care. Hearing back from our patients is one way we can continue to improve our services. Please take a few minutes to complete the written survey that you may receive in the mail after you visit with us. Thank you!        [facility] Information     [employer] gives you secure access to your electronic health record. If you see a primary care provider, you can also send messages to your care team and make appointments. If you have questions, please call your primary care clinic.  If you do not have a primary care provider, please call [phone] and they will assist you.        Care EveryWhere ID     This is your Care EveryWhere ID. This could be used by other organizations to access your [facility] medical records  [id number]        Equal Access to Services     KI WATKINS : Fawad Gomez, laura faith, ramón manning, jaki sears. So [facility] [phone].    ATENCIÓN: Si habla español, tiene a spence disposición servicios gratuitos de asistencia lingüística. Llame al [phone].    We comply with applicable federal civil rights laws and [place] laws. We do not discriminate on the basis of race, color, national origin, age, disability, sex, sexual orientation, or gender identity.            After Visit Summary       This is your record. Keep this with  you and show to your community pharmacist(s) and doctor(s) at your next visit.

## 2018-08-11 NOTE — ED PROVIDER NOTES
History     Chief Complaint:  Bee Sting      HPI   Zbigniew Mendoza is a 84 year old male with a history of high blood pressure and kidney stones who presents to the emergency department for evaluation of bee stings. Upon evaluation, the patient states that he was in his yard yesterday when he was attacked by bees and got stung on his righteye lid, right hand and right hip. After the episode the patient applied some topical Benadryl and one pill of Doxycycline that he had left over from a previous prescription. This morning the patient noticed that his right eyelid was very droopy and his hand were swollen at the bee sting locations. He presents to the emergency department because he was diagnosed with cellulitis around 15 years ago after being stung by bees per patient report. The patient denies any difficulty swallowing or shortness of breath.     Allergies:  Ciproglaxacin       Medications:    Aspirin  COZAAR  FLOMAX       Past Medical History:    Atrial fibrillation  Calculus of kidney  Cyst  Mitral Prolapse  Palpitations  Prostate infection  Syncope    Past Surgical History:    Back surgery  Prostate surgery  Right rotator cuff repair    Family History:    Coronary artery disease    Social History:  Smoking status: Never smoker  Alcohol use: Yes    Marital Status:   [2]       Review of Systems   HENT: Negative for trouble swallowing.    Respiratory: Negative for shortness of breath.    All other systems reviewed and are negative.        Physical Exam     Patient Vitals for the past 24 hrs:   BP Temp Temp src Heart Rate Resp SpO2   08/11/18 0821 177/88 97.8  F (36.6  C) Oral 103 18 99 %         Physical Exam  General:   Pleasant, age appropriate male resting comfortably in the bed.  HEENT:    Oropharynx is moist, without lesions or trismus.     No posterior pharyngeal edema.     No pooling of secretions.  Eyes:    Conjunctiva normal, PERRL  Neck:    Supple, no meningismus.     CV:     Regular rate and  rhythm.      No murmurs, rubs or gallops.       No  lower extremity edema.  PULM:    Clear to auscultation bilateral.       No respiratory distress.      Good air exchange.     No wheezing or stridor.  ABD:    Soft, non-tender, non-distended.      No rebound, guarding or rigidity.  MSK:     No gross deformity to all four extremities.   LYMPH:   No cervical lymphadenopathy.  NEURO:   Alert, good muscular tone, no atrophy.   Skin:    Warm, dry and intact.      Right hand: mild soft tissue edema with faint, poorly circumscribed erythematous patch       No overlying warmth or tenderness     Faint edema to right superior eyelid  Psych:    Mood is good and affect is appropriate.      Emergency Department Course   Interventions:  39 Zantac 150mg PO  839 Decadron 10mg PO      Emergency Department Course:  Past medical records, nursing notes, and vitals reviewed.  08: I performed an exam of the patient and obtained history, as documented above.          Impression & Plan      Medical Decision Makin-year-old male seen in the ED with a bee sting to the right hand and right eyebrow with mild developing edema and erythema.  He was concerned about the development of cellulitis.  Symptoms are consistent with localized inflammatory reaction and no clinical signs of cellulitis.  Patient was given a single dose of dexamethasone and Zantac.  He will be discharged home on a 5 day course of Zantac and use Benadryl as needed.  No other allergic phenomenon noted on examination.  Patient safe for discharge home and will return to ED for any worsening symptoms.      Diagnosis:    ICD-10-CM   1. Bee sting reaction, accidental or unintentional, initial encounter T63.441A       Disposition:  discharged to home    Discharge Medications:  New Prescriptions    RANITIDINE (ZANTAC) 150 MG TABLET    Take 1 tablet (150 mg) by mouth 2 times daily for 5 days         Artemio Gregory  2018   [facility] EMERGENCY  DEPARTMENT    Scribe Disclosure:  I, Artemio Gregory, am serving as a scribe at 8:22 AM on 8/11/2018 to document services personally performed by Cyril Sharp MD based on my observations and the provider's statements to me.        Cyril Sharp MD  08/11/18 0844

## 2018-09-19 DIAGNOSIS — R33.9 URINARY RETENTION: ICD-10-CM

## 2018-09-19 DIAGNOSIS — I10 ESSENTIAL HYPERTENSION WITH GOAL BLOOD PRESSURE LESS THAN 130/85: ICD-10-CM

## 2018-09-19 NOTE — TELEPHONE ENCOUNTER
"Requested Prescriptions   Pending Prescriptions Disp Refills     losartan (COZAAR) 50 MG tablet [Pharmacy Med Name: LOSARTAN POTASSIUM 50 MG TAB] 90 tablet 0    Last Written Prescription Date:  08/11/2017  Last Fill Quantity: 90,  # refills: 4  Last office visit: 8/11/2017 with prescribing provider:     Future Office Visit:   Sig: TAKE 1 TABLET (50 MG) BY MOUTH DAILY    Angiotensin-II Receptors Failed    9/19/2018  9:57 AM       Failed - Normal serum creatinine on file in past 12 months    Recent Labs   Lab Test  08/11/17   0934   CR  0.98            Failed - Normal serum potassium on file in past 12 months    Recent Labs   Lab Test  08/11/17   0934   POTASSIUM  4.1                   Passed - Blood pressure under 140/90 in past 12 months    BP Readings from Last 3 Encounters:   08/11/18 139/65   08/11/17 160/66   03/11/17 113/68                Passed - Recent (12 mo) or future (30 days) visit within the authorizing provider's specialty    Patient had office visit in the last 12 months or has a visit in the next 30 days with authorizing provider or within the authorizing provider's specialty.  See \"Patient Info\" tab in inbasket, or \"Choose Columns\" in Meds & Orders section of the refill encounter.           Passed - Patient is age 18 or older        tamsulosin (FLOMAX) 0.4 MG capsule [Pharmacy Med Name: TAMSULOSIN HCL 0.4 MG CAPSULE] 90 capsule 2    Last Written Prescription Date:  08/11/2017  Last Fill Quantity: 90,  # refills: 3   Last office visit: 8/11/2017 with prescribing provider:     Future Office Visit:   Sig: TAKE 1 CAPSULE (0.4 MG) BY MOUTH DAILY    Alpha Blockers Passed    9/19/2018  9:57 AM       Passed - Blood pressure under 140/90 in past 12 months    BP Readings from Last 3 Encounters:   08/11/18 139/65   08/11/17 160/66   03/11/17 113/68                Passed - Recent (12 mo) or future (30 days) visit within the authorizing provider's specialty    Patient had office visit in the last 12 months or has " "a visit in the next 30 days with authorizing provider or within the authorizing provider's specialty.  See \"Patient Info\" tab in inbasket, or \"Choose Columns\" in Meds & Orders section of the refill encounter.           Passed - Patient does not have Tadalafil, Vardenafil, or Sildenafil on their medication list       Passed - Patient is 18 years of age or older        "

## 2018-09-21 RX ORDER — TAMSULOSIN HYDROCHLORIDE 0.4 MG/1
CAPSULE ORAL
Qty: 90 CAPSULE | Refills: 0 | Status: SHIPPED | OUTPATIENT
Start: 2018-09-21 | End: 2018-09-25

## 2018-09-21 RX ORDER — LOSARTAN POTASSIUM 50 MG/1
TABLET ORAL
Qty: 90 TABLET | Refills: 0 | Status: SHIPPED | OUTPATIENT
Start: 2018-09-21 | End: 2018-09-25

## 2018-09-21 NOTE — TELEPHONE ENCOUNTER
Last office visit 8-11-18    Medications are being filled for 1 time refill only due to:  Patient needs to be seen because it has been more than one year since last visit.     MD.Voicet message sent.

## 2018-09-25 ENCOUNTER — OFFICE VISIT (OUTPATIENT)
Dept: INTERNAL MEDICINE | Facility: CLINIC | Age: 83
End: 2018-09-25
Payer: COMMERCIAL

## 2018-09-25 VITALS
RESPIRATION RATE: 16 BRPM | SYSTOLIC BLOOD PRESSURE: 134 MMHG | OXYGEN SATURATION: 97 % | DIASTOLIC BLOOD PRESSURE: 64 MMHG | WEIGHT: 170 LBS | HEIGHT: 69 IN | HEART RATE: 98 BPM | TEMPERATURE: 97.4 F | BODY MASS INDEX: 25.18 KG/M2

## 2018-09-25 DIAGNOSIS — I10 ESSENTIAL HYPERTENSION WITH GOAL BLOOD PRESSURE LESS THAN 130/85: ICD-10-CM

## 2018-09-25 DIAGNOSIS — R33.9 URINARY RETENTION: ICD-10-CM

## 2018-09-25 DIAGNOSIS — N40.0 HYPERTROPHY OF PROSTATE WITHOUT URINARY OBSTRUCTION: ICD-10-CM

## 2018-09-25 DIAGNOSIS — Z00.00 ROUTINE GENERAL MEDICAL EXAMINATION AT A HEALTH CARE FACILITY: Primary | ICD-10-CM

## 2018-09-25 LAB
ERYTHROCYTE [DISTWIDTH] IN BLOOD BY AUTOMATED COUNT: 14.3 % (ref 10–15)
HCT VFR BLD AUTO: 46.6 % (ref 40–53)
HGB BLD-MCNC: 15.5 G/DL (ref 13.3–17.7)
MCH RBC QN AUTO: 32.1 PG (ref 26.5–33)
MCHC RBC AUTO-ENTMCNC: 33.3 G/DL (ref 31.5–36.5)
MCV RBC AUTO: 97 FL (ref 78–100)
PLATELET # BLD AUTO: 154 10E9/L (ref 150–450)
RBC # BLD AUTO: 4.83 10E12/L (ref 4.4–5.9)
WBC # BLD AUTO: 6.6 10E9/L (ref 4–11)

## 2018-09-25 PROCEDURE — 99397 PER PM REEVAL EST PAT 65+ YR: CPT | Performed by: INTERNAL MEDICINE

## 2018-09-25 PROCEDURE — 36415 COLL VENOUS BLD VENIPUNCTURE: CPT | Performed by: INTERNAL MEDICINE

## 2018-09-25 PROCEDURE — 80053 COMPREHEN METABOLIC PANEL: CPT | Performed by: INTERNAL MEDICINE

## 2018-09-25 PROCEDURE — 80061 LIPID PANEL: CPT | Performed by: INTERNAL MEDICINE

## 2018-09-25 PROCEDURE — 84443 ASSAY THYROID STIM HORMONE: CPT | Performed by: INTERNAL MEDICINE

## 2018-09-25 PROCEDURE — G0103 PSA SCREENING: HCPCS | Performed by: INTERNAL MEDICINE

## 2018-09-25 PROCEDURE — 85027 COMPLETE CBC AUTOMATED: CPT | Performed by: INTERNAL MEDICINE

## 2018-09-25 RX ORDER — LOSARTAN POTASSIUM 50 MG/1
TABLET ORAL
Qty: 90 TABLET | Refills: 3 | Status: SHIPPED | OUTPATIENT
Start: 2018-09-25 | End: 2019-05-01

## 2018-09-25 RX ORDER — TAMSULOSIN HYDROCHLORIDE 0.4 MG/1
CAPSULE ORAL
Qty: 90 CAPSULE | Refills: 3 | Status: SHIPPED | OUTPATIENT
Start: 2018-09-25 | End: 2019-11-02

## 2018-09-25 ASSESSMENT — ACTIVITIES OF DAILY LIVING (ADL)
I_NEED_ASSISTANCE_FOR_THE_FOLLOWING_DAILY_ACTIVITIES:: NO ASSISTANCE IS NEEDED
CURRENT_FUNCTION: NO ASSISTANCE NEEDED

## 2018-09-25 NOTE — LETTER
September 26, 2018      Zbigniew SHEARER Reji  [address] [facility] [address]        Dear ,    We are writing to inform you of your test results.    Normal results.    Resulted Orders   CBC with platelets   Result Value Ref Range    WBC 6.6 4.0 - 11.0 10e9/L    RBC Count 4.83 4.4 - 5.9 10e12/L    Hemoglobin 15.5 13.3 - 17.7 g/dL    Hematocrit 46.6 40.0 - 53.0 %    MCV 97 78 - 100 fl    MCH 32.1 26.5 - 33.0 pg    MCHC 33.3 31.5 - 36.5 g/dL    RDW 14.3 10.0 - 15.0 %    Platelet Count 154 150 - 450 10e9/L   Comprehensive metabolic panel   Result Value Ref Range    Sodium 139 133 - 144 mmol/L    Potassium 4.0 3.4 - 5.3 mmol/L    Chloride 104 94 - 109 mmol/L    Carbon Dioxide 22 20 - 32 mmol/L    Anion Gap 13 3 - 14 mmol/L    Glucose 93 70 - 99 mg/dL      Comment:      Fasting specimen    Urea Nitrogen 18 7 - 30 mg/dL    Creatinine 1.00 0.66 - 1.25 mg/dL    GFR Estimate 71 >60 mL/min/1.7m2      Comment:      Non  GFR Calc    GFR Estimate If Black 86 >60 mL/min/1.7m2      Comment:       GFR Calc    Calcium 8.8 8.5 - 10.1 mg/dL    Bilirubin Total 0.8 0.2 - 1.3 mg/dL    Albumin 4.1 3.4 - 5.0 g/dL    Protein Total 7.4 6.8 - 8.8 g/dL    Alkaline Phosphatase 95 40 - 150 U/L    ALT 14 0 - 70 U/L    AST 20 0 - 45 U/L   Lipid panel reflex to direct LDL Fasting   Result Value Ref Range    Cholesterol 117 <200 mg/dL    Triglycerides 58 <150 mg/dL      Comment:      Fasting specimen    HDL Cholesterol 49 >39 mg/dL    LDL Cholesterol Calculated 56 <100 mg/dL      Comment:      Desirable:       <100 mg/dl    Non HDL Cholesterol 68 <130 mg/dL   TSH with free T4 reflex   Result Value Ref Range    TSH 1.81 0.40 - 4.00 mU/L   Prostate spec antigen screen   Result Value Ref Range    PSA 1.28 0 - 4 ug/L      Comment:      Assay Method:  Chemiluminescence using Siemens Vista analyzer       If you have any questions or concerns, please call the clinic at the number listed above.        Sincerely,        Eagle Negrete MD

## 2018-09-25 NOTE — PROGRESS NOTES
SUBJECTIVE:   Zbigniew Mendoza is a 84 year old male who presents for Preventive Visit.    Are you in the first 12 months of your Medicare Part B coverage?  No    Healthy Habits:  Answers for HPI/ROS submitted by the patient on 9/25/2018   Annual Exam:  Getting at least 3 servings of Calcium per day:: Yes  Bi-annual eye exam:: Yes  Dental care twice a year:: Yes  Sleep apnea or symptoms of sleep apnea:: None  Diet:: Regular (no restrictions)  Frequency of exercise:: 2-3 days/week  Taking medications regularly:: Yes  Medication side effects:: None  Additional concerns today:: No  Activities of Daily Living: no assistance needed  Home safety: lack of grab bars in the bathroom  Hearing Impairment:: difficult to understand a speaker at a public meeting or Orthodoxy service, find that men's voices are easier to understand than woman's  PHQ-2 Score: 0        Fall risk:  Fallen 2 or more times in the past year?: No  Any fall with injury in the past year?: No        COGNITIVE SCREEN  1) Repeat 3 items (Leader, Season, Table)    2) Clock draw: NORMAL  3) 3 item recall: Recalls 3 objects  Results: 3 items recalled: COGNITIVE IMPAIRMENT LESS LIKELY    Mini-CogTM Copyright S Zoila. Licensed by the author for use in [facility]; reprinted with permission (soob@.[facility]). All rights reserved.            PROBLEMS TO ADD ON...  Has h/o HTN. on medical treatment. BP has been controlled. No side effects from medications. No CP, HA, dizziness. good compliance with medications and low salt diet.  Has H/O BPH. On treatment with Flomax . Has mild symptoms of frequency, decreased urinary stream , no dysuria. No side effects from medications.      Reviewed and updated as needed this visit by clinical staff  Tobacco  Allergies  Meds  Med Hx  Surg Hx  Fam Hx  Soc Hx        Reviewed and updated as needed this visit by Provider        Social History   Substance Use Topics     Smoking status: Never Smoker     Smokeless  tobacco: Never Used     Alcohol use Yes      Comment: oc glass of wine       If you drink alcohol do you typically have >3 drinks per day or >7 drinks per week? No                        Today's PHQ-2 Score:   PHQ-2 ( 1999 [employer]) 9/25/2018 8/11/2017   Q1: Little interest or pleasure in doing things 0 0   Q2: Feeling down, depressed or hopeless 0 0   PHQ-2 Score 0 0   Q1: Little interest or pleasure in doing things Not at all -   Q2: Feeling down, depressed or hopeless Not at all -   PHQ-2 Score 0 -       Do you feel safe in your environment - Yes    Do you have a Health Care Directive?: Yes: Patient states has Advance Directive and will bring in a copy to clinic.    Current providers sharing in care for this patient include:   Patient Care Team:  Eagle Negrete MD as PCP - General (Internal Medicine)    The following health maintenance items are reviewed in Epic and correct as of today:  Health Maintenance   Topic Date Due     ADVANCE DIRECTIVE PLANNING Q5 YRS  05/23/2017     FALL RISK ASSESSMENT  06/21/2017     PHQ-2 Q1 YR  08/11/2018     INFLUENZA VACCINE (1) 09/01/2018     TETANUS IMMUNIZATION (SYSTEM ASSIGNED)  11/18/2019     PNEUMOCOCCAL  Completed     Labs reviewed in EPIC  BP Readings from Last 3 Encounters:   09/25/18 134/64   08/11/18 139/65   08/11/17 160/66    Wt Readings from Last 3 Encounters:   09/25/18 170 lb (77.1 kg)   08/11/17 174 lb (78.9 kg)   03/11/17 170 lb (77.1 kg)                        ROS:  CONSTITUTIONAL: NEGATIVE for fever, chills, change in weight  INTEGUMENTARY/SKIN: NEGATIVE for worrisome rashes, moles or lesions  EYES: NEGATIVE for vision changes or irritation  ENT/MOUTH: NEGATIVE for ear, mouth and throat problems  RESP: NEGATIVE for significant cough or SOB  BREAST: NEGATIVE for masses, tenderness or discharge  CV: NEGATIVE for chest pain, palpitations or peripheral edema  GI: NEGATIVE for nausea, abdominal pain, heartburn, or change in bowel habits  : NEGATIVE for  "frequency, dysuria, or hematuria  MUSCULOSKELETAL: NEGATIVE for significant arthralgias or myalgia  NEURO: NEGATIVE for weakness, dizziness or paresthesias  ENDOCRINE: NEGATIVE for temperature intolerance, skin/hair changes  HEME: NEGATIVE for bleeding problems  PSYCHIATRIC: NEGATIVE for changes in mood or affect    OBJECTIVE:   /64 (BP Location: Left arm, Patient Position: Sitting, Cuff Size: Adult Large)  Pulse 98  Temp 97.4  F (36.3  C) (Oral)  Resp 16  Ht 5' 9\" (1.753 m)  Wt 170 lb (77.1 kg)  SpO2 97%  BMI 25.1 kg/m2 Estimated body mass index is 25.1 kg/(m^2) as calculated from the following:    Height as of this encounter: 5' 9\" (1.753 m).    Weight as of this encounter: 170 lb (77.1 kg).  EXAM:   GENERAL: healthy, alert and no distress  EYES: Eyes grossly normal to inspection, PERRL and conjunctivae and sclerae normal  HENT: ear canals and TM's normal, nose and mouth without ulcers or lesions  NECK: no adenopathy, no asymmetry, masses, or scars and thyroid normal to palpation  RESP: lungs clear to auscultation - no rales, rhonchi or wheezes  CV: regular rate and rhythm, normal S1 S2, no S3 or S4, no murmur, click or rub, no peripheral edema and peripheral pulses strong  ABDOMEN: soft, nontender, no hepatosplenomegaly, no masses and bowel sounds normal  MS: no gross musculoskeletal defects noted, no edema  SKIN: no suspicious lesions or rashes  NEURO: Normal strength and tone, mentation intact and speech normal  PSYCH: mentation appears normal, affect normal/bright    Diagnostic Test Results:  none     ASSESSMENT / PLAN:       ICD-10-CM    1. Routine general medical examination at a health care facility Z00.00 CBC with platelets     Comprehensive metabolic panel     Lipid panel reflex to direct LDL Fasting     TSH with free T4 reflex     Prostate spec antigen screen     *UA reflex to Microscopic   2. Essential hypertension with goal blood pressure less than 130/85 I10 losartan (COZAAR) 50 MG tablet " "    CBC with platelets     Comprehensive metabolic panel     Lipid panel reflex to direct LDL Fasting     TSH with free T4 reflex     *UA reflex to Microscopic   3. Urinary retention R33.9 tamsulosin (FLOMAX) 0.4 MG capsule   4. Hypertrophy of prostate without urinary obstruction N40.0        End of Life Planning:  Patient currently has an advanced directive: Yes.  Practitioner is supportive of decision.    COUNSELING:  Reviewed preventive health counseling, as reflected in patient instructions       Regular exercise       Healthy diet/nutrition       Vision screening       Hearing screening       Dental care       Colon cancer screening       Prostate cancer screening    BP Readings from Last 1 Encounters:   09/25/18 134/64     Estimated body mass index is 25.1 kg/(m^2) as calculated from the following:    Height as of this encounter: 5' 9\" (1.753 m).    Weight as of this encounter: 170 lb (77.1 kg).           reports that he has never smoked. He has never used smokeless tobacco.      Appropriate preventive services were discussed with this patient, including applicable screening as appropriate for cardiovascular disease, diabetes, osteopenia/osteoporosis, and glaucoma.  As appropriate for age/gender, discussed screening for colorectal cancer, prostate cancer, breast cancer, and cervical cancer. Checklist reviewing preventive services available has been given to the patient.    Reviewed patients plan of care and provided an AVS. The Intermediate Care Plan ( asthma action plan, low back pain action plan, and migraine action plan) for Zbigniew meets the Care Plan requirement. This Care Plan has been established and reviewed with the Patient.    Counseling Resources:  ATP IV Guidelines  Pooled Cohorts Equation Calculator  Breast Cancer Risk Calculator  FRAX Risk Assessment  ICSI Preventive Guidelines  Dietary Guidelines for Americans, 2010  USDA's MyPlate  ASA Prophylaxis  Lung CA Screening    Eagle Negrete, " MD  [facility]

## 2018-09-25 NOTE — MR AVS SNAPSHOT
After Visit Summary   9/25/2018    DR Zbigniew Mendoza    MRN: [id number]           Patient Information     Date Of Birth          5/22/1934        Visit Information        Provider Department      9/25/2018 1:00 PM Eagle Negrete MD [facility]        Today's Diagnoses     Routine general medical examination at a health care facility    -  1    Essential hypertension with goal blood pressure less than 130/85        Urinary retention          Care Instructions      Preventive Health Recommendations:       Male Ages 65 and over    Yearly exam:             See your health care provider every year in order to  o   Review health changes.   o   Discuss preventive care.    o   Review your medicines if your doctor has prescribed any.    Talk with your health care provider about whether you should have a test to screen for prostate cancer (PSA).    Every 3 years, have a diabetes test (fasting glucose). If you are at risk for diabetes, you should have this test more often.    Every 5 years, have a cholesterol test. Have this test more often if you are at risk for high cholesterol or heart disease.     Every 10 years, have a colonoscopy. Or, have a yearly FIT test (stool test). These exams will check for colon cancer.    Talk to with your health care provider about screening for Abdominal Aortic Aneurysm if you have a family history of AAA or have a history of smoking.  Shots:     Get a flu shot each year.     Get a tetanus shot every 10 years.     Talk to your doctor about your pneumonia vaccines. There are now two you should receive - Pneumovax (PPSV 23) and Prevnar (PCV 13).    Talk to your pharmacist about a shingles vaccine.     Talk to your doctor about the hepatitis B vaccine.  Nutrition:     Eat at least 5 servings of fruits and vegetables each day.     Eat whole-grain bread, whole-wheat pasta and brown rice instead of white grains and rice.     Get adequate Calcium and Vitamin D.    Lifestyle    Exercise for at least 150 minutes a week (30 minutes a day, 5 days a week). This will help you control your weight and prevent disease.     Limit alcohol to one drink per day.     No smoking.     Wear sunscreen to prevent skin cancer.     See your dentist every six months for an exam and cleaning.     See your eye doctor every 1 to 2 years to screen for conditions such as glaucoma, macular degeneration and cataracts.          Follow-ups after your visit        Follow-up notes from your care team     Return in about 1 year (around 9/25/2019) for Physical Exam.      Who to contact     If you have questions or need follow up information about today's clinic visit or your schedule please contact [facility] directly at [phone].  Normal or non-critical lab and imaging results will be communicated to you by ResoServhart, letter or phone within 4 business days after the clinic has received the results. If you do not hear from us within 7 days, please contact the clinic through cinvolvet or phone. If you have a critical or abnormal lab result, we will notify you by phone as soon as possible.  Submit refill requests through Hubsphere or call your pharmacy and they will forward the refill request to us. Please allow 3 business days for your refill to be completed.          Additional Information About Your Visit        Hubsphere Information     Hubsphere gives you secure access to your electronic health record. If you see a primary care provider, you can also send messages to your care team and make appointments. If you have questions, please call your primary care clinic.  If you do not have a primary care provider, please call [phone] and they will assist you.        Care EveryWhere ID     This is your Care EveryWhere ID. This could be used by other organizations to access your [place] medical records  [id number]        Your Vitals Were     Pulse Temperature Respirations Height Pulse Oximetry  "BMI (Body Mass Index)    98 97.4  F (36.3  C) (Oral) 16 5' 9\" (1.753 m) 97% 25.1 kg/m2       Blood Pressure from Last 3 Encounters:   09/25/18 134/64   08/11/18 139/65   08/11/17 160/66    Weight from Last 3 Encounters:   09/25/18 170 lb (77.1 kg)   08/11/17 174 lb (78.9 kg)   03/11/17 170 lb (77.1 kg)              We Performed the Following     *UA reflex to Microscopic     CBC with platelets     Comprehensive metabolic panel     Lipid panel reflex to direct LDL Fasting     Prostate spec antigen screen     TSH with free T4 reflex          Where to get your medicines      These medications were sent to [facility]pharm[facility] #[id number] - [place], [place] - [address]  [address], [facility] [address]     Phone:  [phone]     losartan 50 MG tablet    tamsulosin 0.4 MG capsule          Primary Care Provider Office Phone # Fax #    Eagle Negrete [phone]       [address] [facility] [address]        Equal Access to Services     [facility] : Hadii aad ku hadasho Soomaali, waaxda luqadaha, qaybta kaalmada aderodoyada, jaki suárez . So [facility] [phone].    ATENCIÓN: Si habla español, tiene a spence disposición servicios gratuitos de asistencia lingüística. [facility] [phone].    We comply with applicable federal civil rights laws and [place] laws. We do not discriminate on the basis of race, color, national origin, age, disability, sex, sexual orientation, or gender identity.            Thank you!     Thank you for choosing [facility]  for your care. Our goal is always to provide you with excellent care. Hearing back from our patients is one way we can continue to improve our services. Please take a few minutes to complete the written survey that you may receive in the mail after your visit with us. Thank you!             Your Updated Medication List - Protect others around you: Learn how to safely use, store and throw away your medicines " at www.disposemymeds.org.          This list is accurate as of 9/25/18  1:24 PM.  Always use your most recent med list.                   Brand Name Dispense Instructions for use Diagnosis    aspirin 81 MG EC tablet      Take 1 tablet by mouth daily.        losartan 50 MG tablet    COZAAR    90 tablet    TAKE 1 TABLET (50 MG) BY MOUTH DAILY    Essential hypertension with goal blood pressure less than 130/85       MULTIVITAL Tabs      Take  by mouth daily.        tamsulosin 0.4 MG capsule    FLOMAX    90 capsule    TAKE 1 CAPSULE (0.4 MG) BY MOUTH DAILY    Urinary retention

## 2018-09-26 LAB
ALBUMIN SERPL-MCNC: 4.1 G/DL (ref 3.4–5)
ALP SERPL-CCNC: 95 U/L (ref 40–150)
ALT SERPL W P-5'-P-CCNC: 14 U/L (ref 0–70)
ANION GAP SERPL CALCULATED.3IONS-SCNC: 13 MMOL/L (ref 3–14)
AST SERPL W P-5'-P-CCNC: 20 U/L (ref 0–45)
BILIRUB SERPL-MCNC: 0.8 MG/DL (ref 0.2–1.3)
BUN SERPL-MCNC: 18 MG/DL (ref 7–30)
CALCIUM SERPL-MCNC: 8.8 MG/DL (ref 8.5–10.1)
CHLORIDE SERPL-SCNC: 104 MMOL/L (ref 94–109)
CHOLEST SERPL-MCNC: 117 MG/DL
CO2 SERPL-SCNC: 22 MMOL/L (ref 20–32)
CREAT SERPL-MCNC: 1 MG/DL (ref 0.66–1.25)
GFR SERPL CREATININE-BSD FRML MDRD: 71 ML/MIN/1.7M2
GLUCOSE SERPL-MCNC: 93 MG/DL (ref 70–99)
HDLC SERPL-MCNC: 49 MG/DL
LDLC SERPL CALC-MCNC: 56 MG/DL
NONHDLC SERPL-MCNC: 68 MG/DL
POTASSIUM SERPL-SCNC: 4 MMOL/L (ref 3.4–5.3)
PROT SERPL-MCNC: 7.4 G/DL (ref 6.8–8.8)
PSA SERPL-ACNC: 1.28 UG/L (ref 0–4)
SODIUM SERPL-SCNC: 139 MMOL/L (ref 133–144)
TRIGL SERPL-MCNC: 58 MG/DL
TSH SERPL DL<=0.005 MIU/L-ACNC: 1.81 MU/L (ref 0.4–4)

## 2019-01-22 ENCOUNTER — MYC MEDICAL ADVICE (OUTPATIENT)
Dept: INTERNAL MEDICINE | Facility: CLINIC | Age: 84
End: 2019-01-22

## 2019-01-22 DIAGNOSIS — F41.9 ANXIETY: Primary | ICD-10-CM

## 2019-01-23 RX ORDER — PROPRANOLOL HYDROCHLORIDE 10 MG/1
10 TABLET ORAL DAILY PRN
Qty: 30 TABLET | Refills: 1 | Status: SHIPPED | OUTPATIENT
Start: 2019-01-23 | End: 2020-04-13

## 2019-01-23 NOTE — TELEPHONE ENCOUNTER
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     Routed to Dr. Negrete since this is not on his med list and has never been prescribed here before.      LOV 9/25/18    Message sent to pt asking what pharmacy he would like to use.

## 2019-02-20 ENCOUNTER — MYC REFILL (OUTPATIENT)
Dept: INTERNAL MEDICINE | Facility: CLINIC | Age: 84
End: 2019-02-20

## 2019-02-20 DIAGNOSIS — R33.9 URINARY RETENTION: ICD-10-CM

## 2019-02-20 RX ORDER — TAMSULOSIN HYDROCHLORIDE 0.4 MG/1
CAPSULE ORAL
Qty: 90 CAPSULE | Refills: 3 | Status: CANCELLED | OUTPATIENT
Start: 2019-02-20

## 2019-02-22 NOTE — TELEPHONE ENCOUNTER
"Requested Prescriptions   Pending Prescriptions Disp Refills     tamsulosin (FLOMAX) 0.4 MG capsule 90 capsule 3     Sig: TAKE 1 CAPSULE (0.4 MG) BY MOUTH DAILY    Alpha Blockers Passed - 2/20/2019 12:13 PM       Passed - Blood pressure under 140/90 in past 12 months    BP Readings from Last 3 Encounters:   09/25/18 134/64   08/11/18 139/65   08/11/17 160/66                Passed - Recent (12 mo) or future (30 days) visit within the authorizing provider's specialty    Patient had office visit in the last 12 months or has a visit in the next 30 days with authorizing provider or within the authorizing provider's specialty.  See \"Patient Info\" tab in inbasket, or \"Choose Columns\" in Meds & Orders section of the refill encounter.             Passed - Patient does not have Tadalafil, Vardenafil, or Sildenafil on their medication list       Passed - Medication is active on med list       Passed - Patient is 18 years of age or older          "

## 2019-03-11 ENCOUNTER — OFFICE VISIT (OUTPATIENT)
Dept: INTERNAL MEDICINE | Facility: CLINIC | Age: 84
End: 2019-03-11
Payer: COMMERCIAL

## 2019-03-11 VITALS
WEIGHT: 174 LBS | DIASTOLIC BLOOD PRESSURE: 66 MMHG | HEIGHT: 70 IN | HEART RATE: 100 BPM | TEMPERATURE: 97.9 F | SYSTOLIC BLOOD PRESSURE: 148 MMHG | BODY MASS INDEX: 24.91 KG/M2 | OXYGEN SATURATION: 99 % | RESPIRATION RATE: 12 BRPM

## 2019-03-11 DIAGNOSIS — Z01.818 PREOP GENERAL PHYSICAL EXAM: Primary | ICD-10-CM

## 2019-03-11 PROCEDURE — 99214 OFFICE O/P EST MOD 30 MIN: CPT | Performed by: INTERNAL MEDICINE

## 2019-03-11 ASSESSMENT — MIFFLIN-ST. JEOR: SCORE: 1485.51

## 2019-03-11 NOTE — NURSING NOTE
"Vital signs:  Temp: 97.9  F (36.6  C) Temp src: Oral BP: 148/66 Pulse: 100   Resp: 12 SpO2: 99 %     Height: 177.8 cm (5' 10\") Weight: 78.9 kg (174 lb)  Estimated body mass index is 24.97 kg/m  as calculated from the following:    Height as of this encounter: 1.778 m (5' 10\").    Weight as of this encounter: 78.9 kg (174 lb).          "

## 2019-03-11 NOTE — PROGRESS NOTES
[place] [address]  [facility] [address]  [phone]  Dept: [phone]    PRE-OP EVALUATION:  Today's date: 3/11/2019    Zbigniew Mendoza (: 1934) presents for pre-operative evaluation assessment as requested by Dr. Rosenbaum.  He requires evaluation and anesthesia risk assessment prior to undergoing surgery/procedure for treatment of eye cataract  .    Fax number for surgical facility: [phone]  Primary Physician: Eagle Negrete  Type of Anesthesia Anticipated: to be determined    Patient has a Health Care Directive or Living Will:  YES     Preop Questions 3/11/2019   Who is doing your surgery? Efrem Rosenbaum   What are you having done? cataract   Date of Surgery/Procedure: 3\2   Facility or Hospital where procedure/surgery will be performed: [facility]   1.  Do you have a history of Heart attack, stroke, stent, coronary bypass surgery, or other heart surgery? No   2.  Do you ever have any pain or discomfort in your chest? No   3.  Do you have a history of  Heart Failure? No   4.   Are you troubled by shortness of breath when:  walking on a level surface, or up a slight hill, or at night? No   5.  Do you currently have a cold, bronchitis or other respiratory infection? No   6.  Do you have a cough, shortness of breath, or wheezing? No   7.  Do you sometimes get pains in the calves of your legs when you walk? No   8. Do you or anyone in your family have previous history of blood clots? No   9.  Do you or does anyone in your family have a serious bleeding problem such as prolonged bleeding following surgeries or cuts? No   10. Have you ever had problems with anemia or been told to take iron pills? No   11. Have you had any abnormal blood loss such as black, tarry or bloody stools? No   12. Have you ever had a blood transfusion? No   13. Have you or any of your relatives ever had problems with anesthesia? No   14. Do you have sleep apnea, excessive snoring  or daytime drowsiness? No   15. Do you have any prosthetic heart valves? No   16. Do you have prosthetic joints? No         HPI:     HPI related to upcoming procedure: scheduled for eye cataract surgery. No acute complaints, no medication change or new medical conditions.  Has h/o HTN. on medical treatment. BP has been controlled. No side effects from medications. No CP, HA, dizziness. good compliance with medications and low salt diet.        See problem list for active medical problems.  Problems all longstanding and stable, except as noted/documented.  See ROS for pertinent symptoms related to these conditions.                                                                                                                                                          .    MEDICAL HISTORY:     Patient Active Problem List    Diagnosis Date Noted     Atrial fibrillation (H) 03/05/2015     Priority: Medium     Syncope      Priority: Medium     Advanced directives, counseling/discussion 05/23/2012     Priority: Medium     Pt has a living will, will bring a copy at next visit.       CARDIOVASCULAR SCREENING; LDL GOAL LESS THAN 130 10/31/2010     Priority: Medium     Renal calculus 11/18/2009     Priority: Medium     Hypertrophy of prostate without urinary obstruction 09/28/2005     Priority: Medium     Problem list name updated by automated process. Provider to review       Backache 09/28/2005     Priority: Medium     Problem list name updated by automated process. Provider to review       Essential hypertension 03/10/2004     Priority: Medium     Problem list name updated by automated process. Provider to review       Generalized osteoarthrosis, unspecified site 12/17/2002     Priority: Medium      Past Medical History:   Diagnosis Date     Atrial fibrillation (H)     vasovagal from pain?     Calculus of kidney      Cyst     infected hair follicle     Mitral prolapse     mild, with mild MR     Palpitations      Prostate  infection      Syncope     vasovagal from pain?     Past Surgical History:   Procedure Laterality Date     BACK SURGERY       C NONSPECIFIC PROCEDURE  04/93    S/P TURP     C NONSPECIFIC PROCEDURE      S/P Spiral fracture of right tibia closed reduction     CYSTOSCOPY       PROSTATE SURGERY       ROTATOR CUFF REPAIR RT/LT  1996    right     Current Outpatient Medications   Medication Sig Dispense Refill     aspirin EC 81 MG tablet Take 1 tablet by mouth daily.       losartan (COZAAR) 50 MG tablet TAKE 1 TABLET (50 MG) BY MOUTH DAILY 90 tablet 3     Multiple Vitamins-Minerals (MULTIVITAL) TABS Take  by mouth daily.       propranolol (INDERAL) 10 MG tablet Take 1 tablet (10 mg) by mouth daily as needed (Anxiety) 30 tablet 1     tamsulosin (FLOMAX) 0.4 MG capsule TAKE 1 CAPSULE (0.4 MG) BY MOUTH DAILY 90 capsule 3     OTC products: None, except as noted above    Allergies   Allergen Reactions     Ciprofloxacin      Acute confusion      Latex Allergy: NO    Social History     Tobacco Use     Smoking status: Never Smoker     Smokeless tobacco: Never Used   Substance Use Topics     Alcohol use: Yes     Comment: oc glass of wine     History   Drug Use No       REVIEW OF SYSTEMS:   CONSTITUTIONAL: NEGATIVE for fever, chills, change in weight  INTEGUMENTARY/SKIN: NEGATIVE for worrisome rashes, moles or lesions  EYES: NEGATIVE for vision changes or irritation  ENT/MOUTH: NEGATIVE for ear, mouth and throat problems  RESP: NEGATIVE for significant cough or SOB  BREAST: NEGATIVE for masses, tenderness or discharge  CV: NEGATIVE for chest pain, palpitations or peripheral edema  GI: NEGATIVE for nausea, abdominal pain, heartburn, or change in bowel habits  : NEGATIVE for frequency, dysuria, or hematuria  MUSCULOSKELETAL: NEGATIVE for significant arthralgias or myalgia  NEURO: NEGATIVE for weakness, dizziness or paresthesias  ENDOCRINE: NEGATIVE for temperature intolerance, skin/hair changes  HEME: NEGATIVE for bleeding  "problems  PSYCHIATRIC: NEGATIVE for changes in mood or affect    EXAM:   /66   Pulse 100   Temp 97.9  F (36.6  C) (Oral)   Resp 12   Ht 1.778 m (5' 10\")   Wt 78.9 kg (174 lb)   SpO2 99%   BMI 24.97 kg/m      GENERAL APPEARANCE: healthy, alert and no distress     EYES: EOMI,  PERRL     HENT: ear canals and TM's normal and nose and mouth without ulcers or lesions     NECK: no adenopathy, no asymmetry, masses, or scars and thyroid normal to palpation     RESP: lungs clear to auscultation - no rales, rhonchi or wheezes     CV: regular rates and rhythm, normal S1 S2, no S3 or S4 and no murmur, click or rub     ABDOMEN:  soft, nontender, no HSM or masses and bowel sounds normal     MS: extremities normal- no gross deformities noted, no evidence of inflammation in joints, FROM in all extremities.     SKIN: no suspicious lesions or rashes     NEURO: Normal strength and tone, sensory exam grossly normal, mentation intact and speech normal     PSYCH: mentation appears normal. and affect normal/bright     LYMPHATICS: No cervical adenopathy    DIAGNOSTICS:   No labs or EKG required for low risk surgery (cataract, skin procedure, breast biopsy, etc)    Recent Labs   Lab Test 09/25/18  1328 08/11/17  0934  02/25/15  1320   HGB 15.5 15.3   < > 15.7    140*   < > 133*   INR  --   --   --  1.08    142   < > 137   POTASSIUM 4.0 4.1   < > 4.0   CR 1.00 0.98   < > 0.99    < > = values in this interval not displayed.        IMPRESSION:   Reason for surgery/procedure: eye cataract   Diagnosis/reason for consult: preoperative evaluation/ clearance      The proposed surgical procedure is considered LOW risk.    REVISED CARDIAC RISK INDEX  The patient has the following serious cardiovascular risks for perioperative complications such as (MI, PE, VFib and 3  AV Block):  No serious cardiac risks  INTERPRETATION: 0 risks: Class I (very low risk - 0.4% complication rate)    The patient has the following additional " risks for perioperative complications:  No identified additional risks      ICD-10-CM    1. Preop general physical exam Z01.818        RECOMMENDATIONS:         --Patient is to take all scheduled medications on the day of surgery     APPROVAL GIVEN to proceed with proposed procedure, without further diagnostic evaluation       Signed Electronically by: Eagle Negrete MD    Copy of this evaluation report is provided to requesting physician.    [facility] Preop Guidelines    Revised Cardiac Risk Index

## 2019-05-01 ENCOUNTER — OFFICE VISIT (OUTPATIENT)
Dept: INTERNAL MEDICINE | Facility: CLINIC | Age: 84
End: 2019-05-01
Payer: COMMERCIAL

## 2019-05-01 VITALS
RESPIRATION RATE: 12 BRPM | TEMPERATURE: 97.6 F | WEIGHT: 172 LBS | OXYGEN SATURATION: 98 % | DIASTOLIC BLOOD PRESSURE: 70 MMHG | BODY MASS INDEX: 24.62 KG/M2 | HEIGHT: 70 IN | HEART RATE: 90 BPM | SYSTOLIC BLOOD PRESSURE: 140 MMHG

## 2019-05-01 DIAGNOSIS — I49.9 IRREGULAR HEART BEAT: ICD-10-CM

## 2019-05-01 DIAGNOSIS — H25.9 SENILE CATARACT, UNSPECIFIED AGE-RELATED CATARACT TYPE, UNSPECIFIED LATERALITY: ICD-10-CM

## 2019-05-01 DIAGNOSIS — I10 ESSENTIAL HYPERTENSION: ICD-10-CM

## 2019-05-01 DIAGNOSIS — Z01.818 PREOP GENERAL PHYSICAL EXAM: Primary | ICD-10-CM

## 2019-05-01 PROCEDURE — 93000 ELECTROCARDIOGRAM COMPLETE: CPT | Performed by: INTERNAL MEDICINE

## 2019-05-01 PROCEDURE — 99214 OFFICE O/P EST MOD 30 MIN: CPT | Performed by: INTERNAL MEDICINE

## 2019-05-01 RX ORDER — LOSARTAN POTASSIUM 100 MG/1
100 TABLET ORAL DAILY
Qty: 90 TABLET | Refills: 3 | Status: SHIPPED | OUTPATIENT
Start: 2019-05-01 | End: 2019-11-26

## 2019-05-01 ASSESSMENT — MIFFLIN-ST. JEOR: SCORE: 1476.44

## 2019-05-01 NOTE — PROGRESS NOTES
[place] [address]  [facility] [address]  [phone]  Dept: [phone]    PRE-OP EVALUATION:  Today's date: 2019    Zbigniew Mendoza (: 1934) presents for pre-operative evaluation assessment as requested by Dr. Rosenbaum.  He requires evaluation and anesthesia risk assessment prior to undergoing surgery/procedure for treatment of eye cataract .    Proposed Surgery/ Procedure: Lt Cataract Removal  Date of Surgery/ Procedure: 19  Time of Surgery/ Procedure: PM  Hospital/Surgical Facility: [facility] ( Fax:  [phone])  Primary Physician: Eagle Negrete  Type of Anesthesia Anticipated: to be determined    Patient has a Health Care Directive or Living Will:  YES     1. NO - Do you have a history of heart attack, stroke, stent, bypass or surgery on an artery in the head, neck, heart or legs?  2. NO - Do you ever have any pain or discomfort in your chest?  3. NO - Do you have a history of  Heart Failure?  4. NO - Are you troubled by shortness of breath when: walking on the level, up a slight hill or at night?  5. NO - Do you currently have a cold, bronchitis or other respiratory infection?  6. NO - Do you have a cough, shortness of breath or wheezing?  7. NO - Do you sometimes get pains in the calves of your legs when you walk?  8. NO - Do you or anyone in your family have previous history of blood clots?  9. NO - Do you or does anyone in your family have a serious bleeding problem such as prolonged bleeding following surgeries or cuts?  10. NO - Have you ever had problems with anemia or been told to take iron pills?  11. NO - Have you had any abnormal blood loss such as black, tarry or bloody stools, or abnormal vaginal bleeding?  12. NO - Have you ever had a blood transfusion?  13. NO - Have you or any of your relatives ever had problems with anesthesia?  14. NO - Do you have sleep apnea, excessive snoring or daytime drowsiness?  15. NO - Do  you have any prosthetic heart valves?  16. NO - Do you have prosthetic joints?  17. NO - Is there any chance that you may be pregnant?      HPI:     HPI related to upcoming procedure: scheduled for eye cataract surgery.   No acute complaints, no medication change or new medical conditions.  Has h/o HTN. on medical treatment. BP has been controlled. No side effects from medications. No CP, HA, dizziness. good compliance with medications and low salt diet.  Has H/O BPH. On treatment with Flomax . Has mild symptoms of frequency, decreased urinary stream , no dysuria. No side effects from medications.        See problem list for active medical problems.  Problems all longstanding and stable, except as noted/documented.  See ROS for pertinent symptoms related to these conditions.                                                                                                                                                          .    MEDICAL HISTORY:     Patient Active Problem List    Diagnosis Date Noted     Atrial fibrillation (H) 03/05/2015     Priority: Medium     Syncope      Priority: Medium     Advanced directives, counseling/discussion 05/23/2012     Priority: Medium     Pt has a living will, will bring a copy at next visit.       CARDIOVASCULAR SCREENING; LDL GOAL LESS THAN 130 10/31/2010     Priority: Medium     Renal calculus 11/18/2009     Priority: Medium     Hypertrophy of prostate without urinary obstruction 09/28/2005     Priority: Medium     Problem list name updated by automated process. Provider to review       Backache 09/28/2005     Priority: Medium     Problem list name updated by automated process. Provider to review       Essential hypertension 03/10/2004     Priority: Medium     Problem list name updated by automated process. Provider to review       Generalized osteoarthrosis, unspecified site 12/17/2002     Priority: Medium      Past Medical History:   Diagnosis Date     Atrial fibrillation  (H)     vasovagal from pain?     Calculus of kidney      Cyst     infected hair follicle     Mitral prolapse     mild, with mild MR     Palpitations      Prostate infection      Syncope     vasovagal from pain?     Past Surgical History:   Procedure Laterality Date     BACK SURGERY       C NONSPECIFIC PROCEDURE  04/93    S/P TURP     C NONSPECIFIC PROCEDURE      S/P Spiral fracture of right tibia closed reduction     CYSTOSCOPY       PROSTATE SURGERY       ROTATOR CUFF REPAIR RT/LT  1996    right     Current Outpatient Medications   Medication Sig Dispense Refill     aspirin EC 81 MG tablet Take 1 tablet by mouth daily.       losartan (COZAAR) 50 MG tablet TAKE 1 TABLET (50 MG) BY MOUTH DAILY 90 tablet 3     Multiple Vitamins-Minerals (MULTIVITAL) TABS Take  by mouth daily.       propranolol (INDERAL) 10 MG tablet Take 1 tablet (10 mg) by mouth daily as needed (Anxiety) 30 tablet 1     tamsulosin (FLOMAX) 0.4 MG capsule TAKE 1 CAPSULE (0.4 MG) BY MOUTH DAILY 90 capsule 3     OTC products: None, except as noted above    Allergies   Allergen Reactions     Ciprofloxacin      Acute confusion      Latex Allergy: NO    Social History     Tobacco Use     Smoking status: Never Smoker     Smokeless tobacco: Never Used   Substance Use Topics     Alcohol use: Yes     Comment: oc glass of wine     History   Drug Use No       REVIEW OF SYSTEMS:   CONSTITUTIONAL: NEGATIVE for fever, chills, change in weight  INTEGUMENTARY/SKIN: NEGATIVE for worrisome rashes, moles or lesions  EYES: NEGATIVE for vision changes or irritation  ENT/MOUTH: NEGATIVE for ear, mouth and throat problems  RESP: NEGATIVE for significant cough or SOB  BREAST: NEGATIVE for masses, tenderness or discharge  CV: NEGATIVE for chest pain, palpitations or peripheral edema  GI: NEGATIVE for nausea, abdominal pain, heartburn, or change in bowel habits  : NEGATIVE for frequency, dysuria, or hematuria  MUSCULOSKELETAL: NEGATIVE for significant arthralgias or  myalgia  NEURO: NEGATIVE for weakness, dizziness or paresthesias  ENDOCRINE: NEGATIVE for temperature intolerance, skin/hair changes  HEME: NEGATIVE for bleeding problems  PSYCHIATRIC: NEGATIVE for changes in mood or affect    EXAM:   There were no vitals taken for this visit.    GENERAL APPEARANCE: healthy, alert and no distress     EYES: EOMI,  PERRL     HENT: ear canals and TM's normal and nose and mouth without ulcers or lesions     NECK: no adenopathy, no asymmetry, masses, or scars and thyroid normal to palpation     RESP: lungs clear to auscultation - no rales, rhonchi or wheezes     CV: irregular rates and rhythm, normal S1 S2, no S3 or S4 and no murmur, click or rub     ABDOMEN:  soft, nontender, no HSM or masses and bowel sounds normal     MS: extremities normal- no gross deformities noted, no evidence of inflammation in joints, FROM in all extremities.     SKIN: no suspicious lesions or rashes     NEURO: Normal strength and tone, sensory exam grossly normal, mentation intact and speech normal     PSYCH: mentation appears normal. and affect normal/bright     LYMPHATICS: No cervical adenopathy    DIAGNOSTICS:     EKG: appears normal, NSR, normal axis, normal intervals, no acute ST/T changes c/w ischemia, no LVH by voltage criteria, unchanged from previous tracings, PVCs present   Labs Resulted Today:   Results for orders placed or performed in visit on 09/25/18   CBC with platelets   Result Value Ref Range    WBC 6.6 4.0 - 11.0 10e9/L    RBC Count 4.83 4.4 - 5.9 10e12/L    Hemoglobin 15.5 13.3 - 17.7 g/dL    Hematocrit 46.6 40.0 - 53.0 %    MCV 97 78 - 100 fl    MCH 32.1 26.5 - 33.0 pg    MCHC 33.3 31.5 - 36.5 g/dL    RDW 14.3 10.0 - 15.0 %    Platelet Count 154 150 - 450 10e9/L   Comprehensive metabolic panel   Result Value Ref Range    Sodium 139 133 - 144 mmol/L    Potassium 4.0 3.4 - 5.3 mmol/L    Chloride 104 94 - 109 mmol/L    Carbon Dioxide 22 20 - 32 mmol/L    Anion Gap 13 3 - 14 mmol/L    Glucose  93 70 - 99 mg/dL    Urea Nitrogen 18 7 - 30 mg/dL    Creatinine 1.00 0.66 - 1.25 mg/dL    GFR Estimate 71 >60 mL/min/1.7m2    GFR Estimate If Black 86 >60 mL/min/1.7m2    Calcium 8.8 8.5 - 10.1 mg/dL    Bilirubin Total 0.8 0.2 - 1.3 mg/dL    Albumin 4.1 3.4 - 5.0 g/dL    Protein Total 7.4 6.8 - 8.8 g/dL    Alkaline Phosphatase 95 40 - 150 U/L    ALT 14 0 - 70 U/L    AST 20 0 - 45 U/L   Lipid panel reflex to direct LDL Fasting   Result Value Ref Range    Cholesterol 117 <200 mg/dL    Triglycerides 58 <150 mg/dL    HDL Cholesterol 49 >39 mg/dL    LDL Cholesterol Calculated 56 <100 mg/dL    Non HDL Cholesterol 68 <130 mg/dL   TSH with free T4 reflex   Result Value Ref Range    TSH 1.81 0.40 - 4.00 mU/L   Prostate spec antigen screen   Result Value Ref Range    PSA 1.28 0 - 4 ug/L       Recent Labs   Lab Test 09/25/18  1328 08/11/17  0934  02/25/15  1320   HGB 15.5 15.3   < > 15.7    140*   < > 133*   INR  --   --   --  1.08    142   < > 137   POTASSIUM 4.0 4.1   < > 4.0   CR 1.00 0.98   < > 0.99    < > = values in this interval not displayed.        IMPRESSION:   Reason for surgery/procedure: eye cataract   Diagnosis/reason for consult: preoperative evaluation/ clearance      The proposed surgical procedure is considered LOW risk.    REVISED CARDIAC RISK INDEX  The patient has the following serious cardiovascular risks for perioperative complications such as (MI, PE, VFib and 3  AV Block):  No serious cardiac risks  INTERPRETATION: 0 risks: Class I (very low risk - 0.4% complication rate)    The patient has the following additional risks for perioperative complications:  No identified additional risks      ICD-10-CM    1. Preop general physical exam Z01.818        RECOMMENDATIONS:         --Patient is to take all scheduled medications on the day of surgery .    APPROVAL GIVEN to proceed with proposed procedure, without further diagnostic evaluation       Signed Electronically by: Eagle Negrete,  MD    Copy of this evaluation report is provided to requesting physician.    [place] Preop Guidelines    Revised Cardiac Risk Index

## 2019-05-01 NOTE — NURSING NOTE
"Vital signs:  Temp: 97.6  F (36.4  C) Temp src: Oral BP: 140/70 Pulse: 90   Resp: 12 SpO2: 98 %     Height: 177.8 cm (5' 10\") Weight: 78 kg (172 lb)  Estimated body mass index is 24.68 kg/m  as calculated from the following:    Height as of this encounter: 1.778 m (5' 10\").    Weight as of this encounter: 78 kg (172 lb).          "

## 2019-09-02 ENCOUNTER — APPOINTMENT (OUTPATIENT)
Dept: GENERAL RADIOLOGY | Facility: CLINIC | Age: 84
End: 2019-09-02
Attending: EMERGENCY MEDICINE
Payer: COMMERCIAL

## 2019-09-02 ENCOUNTER — HOSPITAL ENCOUNTER (EMERGENCY)
Facility: CLINIC | Age: 84
Discharge: HOME | End: 2019-09-02
Attending: EMERGENCY MEDICINE | Admitting: EMERGENCY MEDICINE
Payer: COMMERCIAL

## 2019-09-02 VITALS
DIASTOLIC BLOOD PRESSURE: 53 MMHG | HEART RATE: 91 BPM | BODY MASS INDEX: 24.91 KG/M2 | SYSTOLIC BLOOD PRESSURE: 137 MMHG | HEIGHT: 70 IN | WEIGHT: 174 LBS | OXYGEN SATURATION: 96 % | RESPIRATION RATE: 18 BRPM

## 2019-09-02 DIAGNOSIS — I48.0 PAROXYSMAL ATRIAL FIBRILLATION WITH RVR (H): ICD-10-CM

## 2019-09-02 DIAGNOSIS — I48.91 ATRIAL FIBRILLATION WITH RVR (H): ICD-10-CM

## 2019-09-02 LAB
ANION GAP SERPL CALCULATED.3IONS-SCNC: 7 MMOL/L (ref 3–14)
BASOPHILS # BLD AUTO: 0.1 10E9/L (ref 0–0.2)
BASOPHILS NFR BLD AUTO: 2.1 %
BUN SERPL-MCNC: 23 MG/DL (ref 7–30)
CALCIUM SERPL-MCNC: 8.8 MG/DL (ref 8.5–10.1)
CHLORIDE SERPL-SCNC: 106 MMOL/L (ref 94–109)
CO2 SERPL-SCNC: 26 MMOL/L (ref 20–32)
CREAT SERPL-MCNC: 1.12 MG/DL (ref 0.66–1.25)
DIFFERENTIAL METHOD BLD: NORMAL
EOSINOPHIL # BLD AUTO: 0.1 10E9/L (ref 0–0.7)
EOSINOPHIL NFR BLD AUTO: 2.8 %
ERYTHROCYTE [DISTWIDTH] IN BLOOD BY AUTOMATED COUNT: 14 % (ref 10–15)
GFR SERPL CREATININE-BSD FRML MDRD: 59 ML/MIN/{1.73_M2}
GLUCOSE SERPL-MCNC: 174 MG/DL (ref 70–99)
HCT VFR BLD AUTO: 48.8 % (ref 40–53)
HGB BLD-MCNC: 15.7 G/DL (ref 13.3–17.7)
IMM GRANULOCYTES # BLD: 0 10E9/L (ref 0–0.4)
IMM GRANULOCYTES NFR BLD: 0.4 %
LYMPHOCYTES # BLD AUTO: 1.1 10E9/L (ref 0.8–5.3)
LYMPHOCYTES NFR BLD AUTO: 23.3 %
MAGNESIUM SERPL-MCNC: 2.1 MG/DL (ref 1.6–2.3)
MCH RBC QN AUTO: 31.6 PG (ref 26.5–33)
MCHC RBC AUTO-ENTMCNC: 32.2 G/DL (ref 31.5–36.5)
MCV RBC AUTO: 98 FL (ref 78–100)
MONOCYTES # BLD AUTO: 0.4 10E9/L (ref 0–1.3)
MONOCYTES NFR BLD AUTO: 7.4 %
NEUTROPHILS # BLD AUTO: 3 10E9/L (ref 1.6–8.3)
NEUTROPHILS NFR BLD AUTO: 64 %
NRBC # BLD AUTO: 0 10*3/UL
NRBC BLD AUTO-RTO: 0 /100
NT-PROBNP SERPL-MCNC: 151 PG/ML (ref 0–1800)
PLATELET # BLD AUTO: 155 10E9/L (ref 150–450)
POTASSIUM SERPL-SCNC: 3.6 MMOL/L (ref 3.4–5.3)
RBC # BLD AUTO: 4.97 10E12/L (ref 4.4–5.9)
SODIUM SERPL-SCNC: 139 MMOL/L (ref 133–144)
WBC # BLD AUTO: 4.7 10E9/L (ref 4–11)

## 2019-09-02 PROCEDURE — 83880 ASSAY OF NATRIURETIC PEPTIDE: CPT | Performed by: EMERGENCY MEDICINE

## 2019-09-02 PROCEDURE — 99285 EMERGENCY DEPT VISIT HI MDM: CPT | Mod: 25

## 2019-09-02 PROCEDURE — 96365 THER/PROPH/DIAG IV INF INIT: CPT

## 2019-09-02 PROCEDURE — 80048 BASIC METABOLIC PNL TOTAL CA: CPT | Performed by: EMERGENCY MEDICINE

## 2019-09-02 PROCEDURE — 25000125 ZZHC RX 250: Performed by: EMERGENCY MEDICINE

## 2019-09-02 PROCEDURE — 83735 ASSAY OF MAGNESIUM: CPT | Performed by: EMERGENCY MEDICINE

## 2019-09-02 PROCEDURE — 93005 ELECTROCARDIOGRAM TRACING: CPT | Mod: 76

## 2019-09-02 PROCEDURE — 25000128 H RX IP 250 OP 636: Performed by: EMERGENCY MEDICINE

## 2019-09-02 PROCEDURE — 96361 HYDRATE IV INFUSION ADD-ON: CPT

## 2019-09-02 PROCEDURE — 71046 X-RAY EXAM CHEST 2 VIEWS: CPT

## 2019-09-02 PROCEDURE — 85025 COMPLETE CBC W/AUTO DIFF WBC: CPT | Performed by: EMERGENCY MEDICINE

## 2019-09-02 PROCEDURE — 93005 ELECTROCARDIOGRAM TRACING: CPT

## 2019-09-02 PROCEDURE — 96375 TX/PRO/DX INJ NEW DRUG ADDON: CPT

## 2019-09-02 RX ORDER — METOPROLOL TARTRATE 1 MG/ML
5 INJECTION, SOLUTION INTRAVENOUS ONCE
Status: COMPLETED | OUTPATIENT
Start: 2019-09-02 | End: 2019-09-02

## 2019-09-02 RX ADMIN — Medication 2 G: at 09:19

## 2019-09-02 RX ADMIN — SODIUM CHLORIDE 500 ML: 9 INJECTION, SOLUTION INTRAVENOUS at 09:17

## 2019-09-02 RX ADMIN — METOPROLOL TARTRATE 5 MG: 5 INJECTION INTRAVENOUS at 09:16

## 2019-09-02 ASSESSMENT — ENCOUNTER SYMPTOMS
SHORTNESS OF BREATH: 0
APPETITE CHANGE: 0
FATIGUE: 0
NAUSEA: 0
PALPITATIONS: 1

## 2019-09-02 ASSESSMENT — MIFFLIN-ST. JEOR: SCORE: 1480.51

## 2019-09-02 NOTE — ED TRIAGE NOTES
Pt was eating breakfast and felt palpitations.  He had an episode of atrial fib in the past and spontaneously converted.

## 2019-09-02 NOTE — ED AVS SNAPSHOT
[facility] Emergency Department  [address]  [facility] [address]  Phone:  [phone]  Fax:  [phone]                                    DR Zbigniew Mendoza   MRN: [id number]    Department:  [facility] Emergency Department   Date of Visit:  9/2/2019           After Visit Summary Signature Page    I have received my discharge instructions, and my questions have been answered. I have discussed any challenges I see with this plan with the nurse or doctor.    ..........................................................................................................................................  Patient/Patient Representative Signature      ..........................................................................................................................................  Patient Representative Print Name and Relationship to Patient    ..................................................               ................................................  Date                                   Time    ..........................................................................................................................................  Reviewed by Signature/Title    ...................................................              ..............................................  Date                                               Time          22EPIC Rev 08/18

## 2019-09-02 NOTE — DISCHARGE INSTRUCTIONS
I recommend you make an appoint with your primary doctor to discuss further need for anticoagulation (blood thinners) as well as additional medications to help keep your heart rate slow.  In your case given the infrequency of your rapid heart rate, and a repeat ultrasound of your heart as well as an event monitor to see if your A. fib is more frequent than we realize could be helpful.  Though I defer further testing to your primary doctor.  You should return the emergency department should you have recurrence of your palpitations, rapid heart rate, chest pain or shortness of breath.

## 2019-09-02 NOTE — ED PROVIDER NOTES
History     Chief Complaint:  Tachycardia    HPI   Zbigniew Mendoza is a 85 year old male with a history of paroxysmal atrial fibrillation and hypertension who presents for evaluation of tachycardia. This morning, the patient reports while eating breakfast today, he started to feel uncomfortable without any noted chest pain or nausea. Because of the general discomfort, he decided to check his pulse which he noted to be fast and slightly irregular and decided to present for evaluation. Here, he reports no chest pain, dyspnea, or any other symptoms. He reports one prior episode of atrial fibrillation a couple years ago for which he presented to the ED, but he spontaneously converted out of the rhythm. He has never had a recurrent episode and is not anticoagulated or on rate-control medications. He reports he felt at baseline in the last few days, and even worked out at the gym yesterday without any noted palpitations, tachycardia, or pains. He denies any appetite change, reason for dehydration, recent alcohol use, or recent travel.     Allergies:  Cipro    Medications:    Losartan  Flomax  Preservision     Past Medical History:    Atrial fibrillation  Kidney stones  BPH  Syncope  Hypertension    Past Surgical History:    Back surgery  TURP  Spiral fracture - right tibia close reduction  Right rotator cuff repair  Cystoscopy     Family History:    Father: Coronary artery disease    Social History:  Marital Status:   [2]  Former psychiatrist.   Negative for tobacco use.  Positive for alcohol use. Comment: Occasional glass of wine.   Negative for drug use.      Review of Systems   Constitutional: Negative for appetite change and fatigue.   Respiratory: Negative for shortness of breath.    Cardiovascular: Positive for palpitations. Negative for chest pain.   Gastrointestinal: Negative for nausea.   All other systems reviewed and are negative.      Physical Exam     Patient Vitals for the past 24 hrs:   BP Heart  "Rate Resp Height Weight   09/02/19 0846 (!) 143/98 144 18 1.778 m (5' 10\") 78.9 kg (174 lb)      Physical Exam  Constitutional: Alert, attentive, GCS 15  HENT:    Nose: Nose normal.    Mouth/Throat: Oropharynx is clear, mucous membranes are moist  Eyes: EOM are normal, anicteric, conjugate gaze  CV: tachycardic and irregularly irregular; no murmurs  Chest: Effort normal and breath sounds clear without wheezing or rales, symmetric bilaterally   GI:  non tender. No distension. No guarding or rebound.    MSK: trace LE edema, no tenderness to palpation of BLE.  Neurological: Alert, attentive, moving all extremities equally.   Skin: Skin is warm and dry.    Emergency Department Course   ECG:  Indication: Tachycardia  Time: 0845  Vent. Rate 144 bpm. UT interval *. QRS duration 88. QT/QTc 294/455. P-R-T axis * -24 69.    atrial fibrillation with rapid ventricular response   Nonspecific ST abnormality.  Abnormal ECG. New A. Fib. compared to EKG dated 5/1/19. Read time: 0845.    Indication: Repeat  Time: 0956  Vent. Rate 90 bpm. UT interval 226. QRS duration 90. QT/QTc 354/433. P-R-T axis 63 -20 46.    Sinus rhythm with 1st degree AV block.  Otherwise normal ECG.  A. Fib. Resolved compared to EKG dated 9/2/19 (above). Read time: 0956.    Imaging:  Radiographic findings were communicated with the patient who voiced understanding of the findings.  XR Chest 2 views:   negative, as per radiology.     Laboratory:  CBC: WBC: 4.7, HGB: 15.7, PLT: 155  BMP: Glucose 174 (H), GFR: 59 (L), o/w WNL (Creatinine: 1.12)  Magnesium: 2.1  BNP: 151    Interventions:  0916 Metoprolol, 5 mg, IV injection  0917  mL IV   0919 MgS, 2 g, IV infusion    Emergency Department Course:  Nursing notes and vitals reviewed.   0842: I performed an exam of the patient as documented above.      EKG obtained in the ED, see results above.     IV was inserted and blood was drawn for laboratory testing, results above.    Medicine administered as documented " above.     Repeat EKG obtained in the ED, see results above.  This showed resolution of the patient's a. Fib.     The patient was sent for a chest x-ray while in the emergency department, results above.     1032: I rechecked the patient and discussed the results of his workup thus far.     I personally reviewed the laboratory and imaging results with the Patient and answered all related questions prior to discharge.    Impression & Plan      Medical Decision Making:  Zbigniew Mendoza is a 85 year old male with past medical history significant for hypertension and very remote history of paroxysmal A. fib not on anticoagulation or rate control presenting for evaluation of patient's he noticed early this morning.  On arrival, heart rate in the 1 chest pain, chest pressure or shortness of breath.  EKG consistent with A. fib with RVR no gross evidence of ischemia.  IV fluids, 2 g of mag dose of IV metoprolol, patient spontaneously converted back into a sinus rhythm.  Repeat EKG confirmed this.  Electrolytes are within normal limits, chest x-ray is clear.  Patient is chads vas 2 score of 3 given his age and hypertension, however in discussion with him he declined initiation of anticoagulation only very remote history of the same, I do feel he is safe for follow-up with his PCP for further discussion of anticoagulation and initiation of rate control.  Blood pressure here within normal limits.  Return precautions were reviewed including increasing chest pain, shortness of breath or recurrence of palpitations.  Recommended adequate hydration, avoidance of stimulants.    Diagnosis:    ICD-10-CM    1. Atrial fibrillation with RVR (H) I48.91    2. Paroxysmal atrial fibrillation with RVR (H) I48.0        Disposition:  discharged to home    Discharge Medications:  None    Krishna Sanchez MD, MD   [employer]  10:33 AM 09/02/19       Scribe Disclosure:  Vanessa GARCÍA, am serving as a scribe on  9/2/2019 at 8:42 AM to personally document services performed by Krishna Sanchez MD based on my observations and the provider's statements to me.     9/2/2019   [facility] EMERGENCY DEPARTMENT       Krishna Sanchez MD  09/02/19 1033

## 2019-09-03 LAB
INTERPRETATION ECG - MUSE: NORMAL
INTERPRETATION ECG - MUSE: NORMAL

## 2019-09-04 ENCOUNTER — OFFICE VISIT (OUTPATIENT)
Dept: INTERNAL MEDICINE | Facility: CLINIC | Age: 84
End: 2019-09-04
Payer: COMMERCIAL

## 2019-09-04 VITALS
HEART RATE: 94 BPM | WEIGHT: 174 LBS | TEMPERATURE: 97.7 F | DIASTOLIC BLOOD PRESSURE: 64 MMHG | OXYGEN SATURATION: 100 % | SYSTOLIC BLOOD PRESSURE: 136 MMHG | HEIGHT: 70 IN | BODY MASS INDEX: 24.91 KG/M2 | RESPIRATION RATE: 18 BRPM

## 2019-09-04 DIAGNOSIS — I10 ESSENTIAL HYPERTENSION: ICD-10-CM

## 2019-09-04 DIAGNOSIS — Z00.00 ENCOUNTER FOR PREVENTATIVE ADULT HEALTH CARE EXAMINATION: Primary | ICD-10-CM

## 2019-09-04 DIAGNOSIS — Z00.00 ROUTINE GENERAL MEDICAL EXAMINATION AT A HEALTH CARE FACILITY: ICD-10-CM

## 2019-09-04 DIAGNOSIS — Z23 NEED FOR TD VACCINE: ICD-10-CM

## 2019-09-04 DIAGNOSIS — I48.0 PAROXYSMAL ATRIAL FIBRILLATION (H): ICD-10-CM

## 2019-09-04 DIAGNOSIS — N40.0 HYPERTROPHY OF PROSTATE WITHOUT URINARY OBSTRUCTION: ICD-10-CM

## 2019-09-04 PROCEDURE — 90714 TD VACC NO PRESV 7 YRS+ IM: CPT | Performed by: INTERNAL MEDICINE

## 2019-09-04 PROCEDURE — 99397 PER PM REEVAL EST PAT 65+ YR: CPT | Mod: 25 | Performed by: INTERNAL MEDICINE

## 2019-09-04 PROCEDURE — 90471 IMMUNIZATION ADMIN: CPT | Performed by: INTERNAL MEDICINE

## 2019-09-04 ASSESSMENT — MIFFLIN-ST. JEOR: SCORE: 1480.51

## 2019-09-04 NOTE — PROGRESS NOTES
SUBJECTIVE:   CC: Zbigniew Mendoza is an 85 year old male who presents for preventive health visit.     Healthy Habits:    Do you get at least three servings of calcium containing foods daily (dairy, green leafy vegetables, etc.)? yes    Amount of exercise or daily activities, outside of work: 3 day(s) per week    Problems taking medications regularly No    Medication side effects: No    Have you had an eye exam in the past two years? yes    Do you see a dentist twice per year? yes    Do you have sleep apnea, excessive snoring or daytime drowsiness?no      PROBLEMS TO ADD ON...  Has h/o HTN. on medical treatment. BP has been controlled. No side effects from medications. No CP, HA, dizziness. good compliance with medications and low salt diet.  Has history of paroxysmal  atrial fibrillation. Has had recent episode lasted for an hour, seen in ED, converted to NSR. Prior episode was 2 years ago. Not symptomatic.     Today's PHQ-2 Score:   PHQ-2 ( 1999 [employer]) 9/25/2018 8/11/2017   Q1: Little interest or pleasure in doing things 0 0   Q2: Feeling down, depressed or hopeless 0 0   PHQ-2 Score 0 0   Q1: Little interest or pleasure in doing things Not at all -   Q2: Feeling down, depressed or hopeless Not at all -   PHQ-2 Score 0 -       Abuse: Current or Past(Physical, Sexual or Emotional)- No  Do you feel safe in your environment? Yes    Social History     Tobacco Use     Smoking status: Never Smoker     Smokeless tobacco: Never Used   Substance Use Topics     Alcohol use: Yes     Comment: oc glass of wine     If you drink alcohol do you typically have >3 drinks per day or >7 drinks per week? No                      Last PSA:   PSA   Date Value Ref Range Status   09/25/2018 1.28 0 - 4 ug/L Final     Comment:     Assay Method:  Chemiluminescence using Siemens Vista analyzer       Reviewed orders with patient. Reviewed health maintenance and updated orders accordingly - Yes  Lab work is in process  Labs reviewed in  "EPIC    Reviewed and updated as needed this visit by clinical staff  Tobacco  Allergies  Meds  Med Hx  Surg Hx  Fam Hx  Soc Hx        Reviewed and updated as needed this visit by Provider            ROS:  CONSTITUTIONAL: NEGATIVE for fever, chills, change in weight  INTEGUMENTARY/SKIN: NEGATIVE for worrisome rashes, moles or lesions  EYES: NEGATIVE for vision changes or irritation  ENT: NEGATIVE for ear, mouth and throat problems  RESP: NEGATIVE for significant cough or SOB  CV: NEGATIVE for chest pain, palpitations or peripheral edema  GI: NEGATIVE for nausea, abdominal pain, heartburn, or change in bowel habits   male: negative for dysuria, hematuria, decreased urinary stream, erectile dysfunction, urethral discharge  MUSCULOSKELETAL: NEGATIVE for significant arthralgias or myalgia  NEURO: NEGATIVE for weakness, dizziness or paresthesias  PSYCHIATRIC: NEGATIVE for changes in mood or affect    OBJECTIVE:   /64 (BP Location: Left arm, Patient Position: Sitting, Cuff Size: Adult Large)   Pulse 94   Temp 97.7  F (36.5  C) (Oral)   Resp 18   Ht 1.778 m (5' 10\")   Wt 78.9 kg (174 lb)   SpO2 100%   BMI 24.97 kg/m    EXAM:  GENERAL: healthy, alert and no distress  EYES: Eyes grossly normal to inspection, PERRL and conjunctivae and sclerae normal  HENT: ear canals and TM's normal, nose and mouth without ulcers or lesions  NECK: no adenopathy, no asymmetry, masses, or scars and thyroid normal to palpation  RESP: lungs clear to auscultation - no rales, rhonchi or wheezes  CV: regular rate and rhythm, normal S1 S2, no S3 or S4, no murmur, click or rub, no peripheral edema and peripheral pulses strong  ABDOMEN: soft, nontender, no hepatosplenomegaly, no masses and bowel sounds normal  MS: no gross musculoskeletal defects noted, no edema  SKIN: no suspicious lesions or rashes  NEURO: Normal strength and tone, mentation intact and speech normal  PSYCH: mentation appears normal, affect " "normal/bright    Diagnostic Test Results:  Labs reviewed in Epic    ASSESSMENT/PLAN:       ICD-10-CM    1. Encounter for preventative adult health care examination Z00.00 Hepatic panel     Lipid panel reflex to direct LDL Fasting     TSH with free T4 reflex     Prostate spec antigen screen     *UA reflex to Microscopic   2. Paroxysmal atrial fibrillation (H) I48.0 TSH with free T4 reflex     Echocardiogram Complete     Cardiac Event Monitor Adult Pediatric   3. Essential hypertension I10 Hepatic panel     Lipid panel reflex to direct LDL Fasting     TSH with free T4 reflex     *UA reflex to Microscopic   4. Hypertrophy of prostate without urinary obstruction N40.0    5. Need for Td vaccine Z23 C TD PRSERV FREE >=7 YRS ADS IM   6. Routine general medical examination at a health care facility Z00.00        COUNSELING:  Reviewed preventive health counseling, as reflected in patient instructions       Regular exercise       Healthy diet/nutrition       Vision screening       Hearing screening       Prostate cancer screening    Estimated body mass index is 24.97 kg/m  as calculated from the following:    Height as of this encounter: 1.778 m (5' 10\").    Weight as of this encounter: 78.9 kg (174 lb).         reports that he has never smoked. He has never used smokeless tobacco.      Counseling Resources:  ATP IV Guidelines  Pooled Cohorts Equation Calculator  FRAX Risk Assessment  ICSI Preventive Guidelines  Dietary Guidelines for Americans, 2010  USDA's MyPlate  ASA Prophylaxis  Lung CA Screening    Eagle Negrete MD  [facility]  "

## 2019-09-04 NOTE — NURSING NOTE
"/64 (BP Location: Left arm, Patient Position: Sitting, Cuff Size: Adult Large)   Pulse 94   Temp 97.7  F (36.5  C) (Oral)   Resp 18   Ht 1.778 m (5' 10\")   Wt 78.9 kg (174 lb)   SpO2 100%   BMI 24.97 kg/m    Brittany Huitron CMA    "

## 2019-09-04 NOTE — PROGRESS NOTES
Subjective     Zbigniew Mendoza is a 85 year old male who presents to clinic today for the following health issues:    HPI   ED/UC Followup:    Facility:  [facility]  Date of visit: 9/2/19  Reason for visit:   1.) Atrial fibrillation with RVR (H) I48.91  2.) Paroxysmal atrial fibrillation with RVR (H) I48.0  Current Status: Has not had any symptoms since that night in the ER. Had similar instance happen about 2 years ago where he spontaneously converted back to a normal rhythm at that time as well.      Seen in ED, presented with palpitations.   Had recurrent A fib with resumption of NSR. Prior one episode was 2 years ago.   No symptoms of CP, dizziness, SOB.           Patient Active Problem List   Diagnosis     Generalized osteoarthrosis, unspecified site     Essential hypertension     Hypertrophy of prostate without urinary obstruction     Backache     Renal calculus     CARDIOVASCULAR SCREENING; LDL GOAL LESS THAN 130     Advanced directives, counseling/discussion     Atrial fibrillation (H)     Syncope     Past Surgical History:   Procedure Laterality Date     BACK SURGERY       C NONSPECIFIC PROCEDURE  04/93    S/P TURP     C NONSPECIFIC PROCEDURE      S/P Spiral fracture of right tibia closed reduction     CYSTOSCOPY       PROSTATE SURGERY       ROTATOR CUFF REPAIR RT/LT  1996    right       Social History     Tobacco Use     Smoking status: Never Smoker     Smokeless tobacco: Never Used   Substance Use Topics     Alcohol use: Yes     Comment: oc glass of wine     Family History   Problem Relation Age of Onset     Coronary Artery Disease Father      No Known Problems Mother          Current Outpatient Medications   Medication Sig Dispense Refill     losartan (COZAAR) 100 MG tablet Take 1 tablet (100 mg) by mouth daily 90 tablet 3     Multiple Vitamins-Minerals (MULTIVITAL) TABS Take  by mouth daily.       propranolol (INDERAL) 10 MG tablet Take 1 tablet (10 mg) by mouth daily as needed (Anxiety) 30  tablet 1     tamsulosin (FLOMAX) 0.4 MG capsule TAKE 1 CAPSULE (0.4 MG) BY MOUTH DAILY 90 capsule 3         Reviewed and updated as needed this visit by Provider         Review of Systems   ROS COMP: CONSTITUTIONAL: NEGATIVE for fever, chills, change in weight  INTEGUMENTARY/SKIN: NEGATIVE for worrisome rashes, moles or lesions  EYES: NEGATIVE for vision changes or irritation  ENT/MOUTH: NEGATIVE for ear, mouth and throat problems  RESP: NEGATIVE for significant cough or SOB  BREAST: NEGATIVE for masses, tenderness or discharge  CV: NEGATIVE for chest pain, palpitations or peripheral edema  GI: NEGATIVE for nausea, abdominal pain, heartburn, or change in bowel habits  : NEGATIVE for frequency, dysuria, or hematuria  MUSCULOSKELETAL: NEGATIVE for significant arthralgias or myalgia  NEURO: NEGATIVE for weakness, dizziness or paresthesias  ENDOCRINE: NEGATIVE for temperature intolerance, skin/hair changes  HEME: NEGATIVE for bleeding problems  PSYCHIATRIC: NEGATIVE for changes in mood or affect      Objective    There were no vitals taken for this visit.  There is no height or weight on file to calculate BMI.  Physical Exam   GENERAL: healthy, alert and no distress  EYES: Eyes grossly normal to inspection, PERRL and conjunctivae and sclerae normal  HENT: ear canals and TM's normal, nose and mouth without ulcers or lesions  NECK: no adenopathy, no asymmetry, masses, or scars and thyroid normal to palpation  RESP: lungs clear to auscultation - no rales, rhonchi or wheezes  CV: regular rate and rhythm, normal S1 S2, no S3 or S4, no murmur, click or rub, no peripheral edema and peripheral pulses strong  ABDOMEN: soft, nontender, no hepatosplenomegaly, no masses and bowel sounds normal  MS: no gross musculoskeletal defects noted, no edema  SKIN: no suspicious lesions or rashes  NEURO: Normal strength and tone, mentation intact and speech normal  PSYCH: mentation appears normal, affect normal/bright    Diagnostic Test  "Results:  Labs reviewed in Epic        Assessment & Plan   Problem List Items Addressed This Visit     Essential hypertension    Relevant Orders    Hepatic panel    Lipid panel reflex to direct LDL Fasting    TSH with free T4 reflex    *UA reflex to Microscopic    Hypertrophy of prostate without urinary obstruction    Atrial fibrillation (H)    Relevant Orders    TSH with free T4 reflex    Echocardiogram Complete    Cardiac Event Monitor Adult Pediatric      Other Visit Diagnoses     Encounter for preventative adult health care examination    -  Primary    Relevant Orders    Hepatic panel    Lipid panel reflex to direct LDL Fasting    TSH with free T4 reflex    Prostate spec antigen screen    *UA reflex to Microscopic    Need for Td vaccine        Relevant Orders    C TD PRSERV FREE >=7 YRS ADS IM (Completed)    Routine general medical examination at a health care facility             Assess lab work   Assess ECHO and event monitor, if evidence of a fib consider AC, otherwise ASA    BMI:   Estimated body mass index is 24.97 kg/m  as calculated from the following:    Height as of this encounter: 1.778 m (5' 10\").    Weight as of this encounter: 78.9 kg (174 lb).           See Patient Instructions  Return in about 1 year (around 9/4/2020) for Physical Exam.    Eagle Negrete MD  [facility]        "

## 2019-09-04 NOTE — PATIENT INSTRUCTIONS
Preventive Health Recommendations:     See your health care provider every year to    Review health changes.     Discuss preventive care.      Review your medicines if your doctor has prescribed any.      Talk with your health care provider about whether you should have a test to screen for prostate cancer (PSA).    Every 3 years, have a diabetes test (fasting glucose). If you are at risk for diabetes, you should have this test more often.    Every 5 years, have a cholesterol test. Have this test more often if you are at risk for high cholesterol or heart disease.     Every 10 years, have a colonoscopy. Or, have a yearly FIT test (stool test). These exams will check for colon cancer.    Talk to with your health care provider about screening for Abdominal Aortic Aneurysm if you have a family history of AAA or have a history of smoking.    Shots:     Get a flu shot each year.     Get a tetanus shot every 10 years.     Talk to your doctor about your pneumonia vaccines. There are now two you should receive - Pneumovax (PPSV 23) and Prevnar (PCV 13).     Talk to your pharmacist about a shingles vaccine.     Talk to your doctor about the hepatitis B vaccine.  Nutrition:     Eat at least 5 servings of fruits and vegetables each day.     Eat whole-grain bread, whole-wheat pasta and brown rice instead of white grains and rice.     Get adequate Calcium and Vitamin D.   Lifestyle    Exercise for at least 150 minutes a week (30 minutes a day, 5 days a week). This will help you control your weight and prevent disease.     Limit alcohol to one drink per day.     No smoking.     Wear sunscreen to prevent skin cancer.    See your dentist every six months for an exam and cleaning.    See your eye doctor every 1 to 2 years to screen for conditions such as glaucoma, macular degeneration, cataracts, etc.    Personalized Prevention Plan  You are due for the preventive services outlined below.  Your care team is available to assist you  in scheduling these services.  If you have already completed any of these items, please share that information with your care team to update in your medical record.  Health Maintenance Due   Topic Date Due     Zoster (Shingles) Vaccine (1 of 2) 05/22/1984     Discuss Advance Care Planning  05/23/2017     PHQ-2  01/01/2019     Flu Vaccine (1) 09/01/2019     Annual Wellness Visit  09/25/2019     FALL RISK ASSESSMENT  09/25/2019

## 2019-09-05 DIAGNOSIS — I48.0 PAROXYSMAL ATRIAL FIBRILLATION (H): ICD-10-CM

## 2019-09-05 DIAGNOSIS — Z00.00 ENCOUNTER FOR PREVENTATIVE ADULT HEALTH CARE EXAMINATION: ICD-10-CM

## 2019-09-05 DIAGNOSIS — I10 ESSENTIAL HYPERTENSION: ICD-10-CM

## 2019-09-05 LAB
ALBUMIN SERPL-MCNC: 3.7 G/DL (ref 3.4–5)
ALBUMIN UR-MCNC: NEGATIVE MG/DL
ALP SERPL-CCNC: 89 U/L (ref 40–150)
ALT SERPL W P-5'-P-CCNC: 12 U/L (ref 0–70)
APPEARANCE UR: CLEAR
AST SERPL W P-5'-P-CCNC: 16 U/L (ref 0–45)
BILIRUB DIRECT SERPL-MCNC: 0.2 MG/DL (ref 0–0.2)
BILIRUB SERPL-MCNC: 0.8 MG/DL (ref 0.2–1.3)
BILIRUB UR QL STRIP: NEGATIVE
CHOLEST SERPL-MCNC: 122 MG/DL
COLOR UR AUTO: YELLOW
GLUCOSE UR STRIP-MCNC: NEGATIVE MG/DL
HDLC SERPL-MCNC: 48 MG/DL
HGB UR QL STRIP: NEGATIVE
KETONES UR STRIP-MCNC: NEGATIVE MG/DL
LDLC SERPL CALC-MCNC: 62 MG/DL
LEUKOCYTE ESTERASE UR QL STRIP: NEGATIVE
NITRATE UR QL: NEGATIVE
NONHDLC SERPL-MCNC: 74 MG/DL
PH UR STRIP: 5.5 PH (ref 5–7)
PROT SERPL-MCNC: 7.1 G/DL (ref 6.8–8.8)
SOURCE: NORMAL
SP GR UR STRIP: 1.02 (ref 1–1.03)
TRIGL SERPL-MCNC: 62 MG/DL
TSH SERPL DL<=0.005 MIU/L-ACNC: 1.87 MU/L (ref 0.4–4)
UROBILINOGEN UR STRIP-ACNC: 0.2 EU/DL (ref 0.2–1)

## 2019-09-05 PROCEDURE — 36415 COLL VENOUS BLD VENIPUNCTURE: CPT | Performed by: INTERNAL MEDICINE

## 2019-09-05 PROCEDURE — 80061 LIPID PANEL: CPT | Performed by: INTERNAL MEDICINE

## 2019-09-05 PROCEDURE — 81003 URINALYSIS AUTO W/O SCOPE: CPT | Performed by: INTERNAL MEDICINE

## 2019-09-05 PROCEDURE — G0103 PSA SCREENING: HCPCS | Performed by: INTERNAL MEDICINE

## 2019-09-05 PROCEDURE — 84443 ASSAY THYROID STIM HORMONE: CPT | Performed by: INTERNAL MEDICINE

## 2019-09-05 PROCEDURE — 80076 HEPATIC FUNCTION PANEL: CPT | Performed by: INTERNAL MEDICINE

## 2019-09-06 LAB — PSA SERPL-ACNC: 2.17 UG/L (ref 0–4)

## 2019-09-20 ENCOUNTER — HOSPITAL ENCOUNTER (OUTPATIENT)
Dept: CARDIOLOGY | Facility: CLINIC | Age: 84
Discharge: HOME | End: 2019-09-20
Attending: INTERNAL MEDICINE | Admitting: INTERNAL MEDICINE
Payer: COMMERCIAL

## 2019-09-20 DIAGNOSIS — I48.0 PAROXYSMAL ATRIAL FIBRILLATION (H): ICD-10-CM

## 2019-09-20 PROCEDURE — 93306 TTE W/DOPPLER COMPLETE: CPT | Mod: 26 | Performed by: INTERNAL MEDICINE

## 2019-09-20 PROCEDURE — 93272 ECG/REVIEW INTERPRET ONLY: CPT | Performed by: INTERNAL MEDICINE

## 2019-09-20 PROCEDURE — 93306 TTE W/DOPPLER COMPLETE: CPT

## 2019-09-20 PROCEDURE — 93270 REMOTE 30 DAY ECG REV/REPORT: CPT

## 2019-09-20 NOTE — LETTER
[facility]  [address], [address]  [place] [address]  [phone]        October 24, 2019    Zbigniew Joinern  [address] [place] [address]            Dear Mr. Zbigniew Mendoza:      The results of your recent event monitor was normal.  If you have any further questions or problems, please contact our office.    Sincerely,        Eagle Negrete M.D.

## 2019-09-23 ENCOUNTER — MYC MEDICAL ADVICE (OUTPATIENT)
Dept: INTERNAL MEDICINE | Facility: CLINIC | Age: 84
End: 2019-09-23

## 2019-10-31 ENCOUNTER — HEALTH MAINTENANCE LETTER (OUTPATIENT)
Age: 84
End: 2019-10-31

## 2019-11-02 DIAGNOSIS — R33.9 URINARY RETENTION: ICD-10-CM

## 2019-11-02 RX ORDER — TAMSULOSIN HYDROCHLORIDE 0.4 MG/1
CAPSULE ORAL
Qty: 90 CAPSULE | Refills: 3 | Status: SHIPPED | OUTPATIENT
Start: 2019-11-02 | End: 2021-01-08

## 2019-11-02 NOTE — TELEPHONE ENCOUNTER
"Prescription approved per [facility] Refill Protocol.    Last office visit was 9/4/19 with instructions to return in one year for Px.   Last refilled on 9/25/18 for 90 day supply with 3 refills.    Requested Prescriptions   Pending Prescriptions Disp Refills     tamsulosin (FLOMAX) 0.4 MG capsule [Pharmacy Med Name: TAMSULOSIN HCL 0.4 MG CAPSULE] 90 capsule 3     Sig: TAKE 1 CAPSULE BY MOUTH EVERY DAY       Alpha Blockers Passed - 11/2/2019  1:18 AM        Passed - Blood pressure under 140/90 in past 12 months     BP Readings from Last 3 Encounters:   09/04/19 136/64   09/02/19 137/53   05/01/19 140/70                 Passed - Recent (12 mo) or future (30 days) visit within the authorizing provider's specialty     Patient has had an office visit with the authorizing provider or a provider within the authorizing providers department within the previous 12 mos or has a future within next 30 days. See \"Patient Info\" tab in inbasket, or \"Choose Columns\" in Meds & Orders section of the refill encounter.              Passed - Patient does not have Tadalafil, Vardenafil, or Sildenafil on their medication list        Passed - Medication is active on med list        Passed - Patient is 18 years of age or older          "

## 2019-11-18 ENCOUNTER — MYC REFILL (OUTPATIENT)
Dept: INTERNAL MEDICINE | Facility: CLINIC | Age: 84
End: 2019-11-18

## 2019-11-18 DIAGNOSIS — I10 ESSENTIAL HYPERTENSION: ICD-10-CM

## 2019-11-20 DIAGNOSIS — I10 ESSENTIAL HYPERTENSION: ICD-10-CM

## 2019-11-20 DIAGNOSIS — I10 ESSENTIAL HYPERTENSION WITH GOAL BLOOD PRESSURE LESS THAN 130/85: ICD-10-CM

## 2019-11-20 RX ORDER — LOSARTAN POTASSIUM 100 MG/1
100 TABLET ORAL DAILY
Qty: 90 TABLET | Refills: 3 | OUTPATIENT
Start: 2019-11-20

## 2019-11-20 NOTE — TELEPHONE ENCOUNTER
"Requested Prescriptions   Pending Prescriptions Disp Refills     losartan (COZAAR) 50 MG tablet [Pharmacy Med Name: LOSARTAN POTASSIUM 50 MG TAB] 90 tablet 3     Sig: TAKE 1 TABLET BY MOUTH EVERY DAY   Last Written Prescription Date:  05/01/2019  Last Fill Quantity: 90,  # refills: 03   Last office visit: 9/4/2019 with prescribing provider:     Future Office Visit:      Angiotensin-II Receptors Passed - 11/20/2019  2:01 PM        Passed - Last blood pressure under 140/90 in past 12 months     BP Readings from Last 3 Encounters:   09/04/19 136/64   09/02/19 137/53   05/01/19 140/70                 Passed - Recent (12 mo) or future (30 days) visit within the authorizing provider's specialty     Patient has had an office visit with the authorizing provider or a provider within the authorizing providers department within the previous 12 mos or has a future within next 30 days. See \"Patient Info\" tab in inbasket, or \"Choose Columns\" in Meds & Orders section of the refill encounter.              Passed - Medication is active on med list        Passed - Patient is age 18 or older        Passed - Normal serum creatinine on file in past 12 months     Recent Labs   Lab Test 09/02/19  0857   CR 1.12             Passed - Normal serum potassium on file in past 12 months     Recent Labs   Lab Test 09/02/19  0857   POTASSIUM 3.6                    "

## 2019-11-20 NOTE — TELEPHONE ENCOUNTER
"Requested Prescriptions   Pending Prescriptions Disp Refills     losartan (COZAAR) 100 MG tablet 90 tablet 3     Sig: Take 1 tablet (100 mg) by mouth daily       Angiotensin-II Receptors Passed - 11/18/2019 10:25 AM        Passed - Last blood pressure under 140/90 in past 12 months     BP Readings from Last 3 Encounters:   09/04/19 136/64   09/02/19 137/53   05/01/19 140/70                 Passed - Recent (12 mo) or future (30 days) visit within the authorizing provider's specialty     Patient has had an office visit with the authorizing provider or a provider within the authorizing providers department within the previous 12 mos or has a future within next 30 days. See \"Patient Info\" tab in inbasket, or \"Choose Columns\" in Meds & Orders section of the refill encounter.              Passed - Medication is active on med list        Passed - Patient is age 18 or older        Passed - Normal serum creatinine on file in past 12 months     Recent Labs   Lab Test 09/02/19  0857   CR 1.12             Passed - Normal serum potassium on file in past 12 months     Recent Labs   Lab Test 09/02/19  0857   POTASSIUM 3.6                      "

## 2019-11-21 RX ORDER — LOSARTAN POTASSIUM 50 MG/1
TABLET ORAL
Qty: 90 TABLET | Refills: 3 | OUTPATIENT
Start: 2019-11-21

## 2019-11-26 RX ORDER — LOSARTAN POTASSIUM 100 MG/1
100 TABLET ORAL DAILY
Qty: 90 TABLET | Refills: 3 | Status: SHIPPED | OUTPATIENT
Start: 2019-11-26 | End: 2021-01-08

## 2020-01-24 ENCOUNTER — HOSPITAL ENCOUNTER (EMERGENCY)
Facility: CLINIC | Age: 85
Discharge: HOME | End: 2020-01-25
Attending: EMERGENCY MEDICINE | Admitting: EMERGENCY MEDICINE
Payer: COMMERCIAL

## 2020-01-24 DIAGNOSIS — I48.0 PAROXYSMAL ATRIAL FIBRILLATION (H): ICD-10-CM

## 2020-01-24 LAB
BASOPHILS # BLD AUTO: 0.1 10E9/L (ref 0–0.2)
BASOPHILS NFR BLD AUTO: 2.1 %
DIFFERENTIAL METHOD BLD: NORMAL
EOSINOPHIL # BLD AUTO: 0.2 10E9/L (ref 0–0.7)
EOSINOPHIL NFR BLD AUTO: 3 %
ERYTHROCYTE [DISTWIDTH] IN BLOOD BY AUTOMATED COUNT: 14.6 % (ref 10–15)
HCT VFR BLD AUTO: 47.7 % (ref 40–53)
HGB BLD-MCNC: 15.5 G/DL (ref 13.3–17.7)
IMM GRANULOCYTES # BLD: 0 10E9/L (ref 0–0.4)
IMM GRANULOCYTES NFR BLD: 0.5 %
LYMPHOCYTES # BLD AUTO: 0.8 10E9/L (ref 0.8–5.3)
LYMPHOCYTES NFR BLD AUTO: 11.3 %
MCH RBC QN AUTO: 32.4 PG (ref 26.5–33)
MCHC RBC AUTO-ENTMCNC: 32.5 G/DL (ref 31.5–36.5)
MCV RBC AUTO: 100 FL (ref 78–100)
MONOCYTES # BLD AUTO: 0.5 10E9/L (ref 0–1.3)
MONOCYTES NFR BLD AUTO: 8 %
NEUTROPHILS # BLD AUTO: 5 10E9/L (ref 1.6–8.3)
NEUTROPHILS NFR BLD AUTO: 75.1 %
NRBC # BLD AUTO: 0 10*3/UL
NRBC BLD AUTO-RTO: 0 /100
PLATELET # BLD AUTO: 176 10E9/L (ref 150–450)
RBC # BLD AUTO: 4.79 10E12/L (ref 4.4–5.9)
WBC # BLD AUTO: 6.6 10E9/L (ref 4–11)

## 2020-01-24 PROCEDURE — 84484 ASSAY OF TROPONIN QUANT: CPT | Performed by: EMERGENCY MEDICINE

## 2020-01-24 PROCEDURE — 80048 BASIC METABOLIC PNL TOTAL CA: CPT | Performed by: EMERGENCY MEDICINE

## 2020-01-24 PROCEDURE — 85025 COMPLETE CBC W/AUTO DIFF WBC: CPT | Performed by: EMERGENCY MEDICINE

## 2020-01-24 PROCEDURE — 25000125 ZZHC RX 250: Performed by: EMERGENCY MEDICINE

## 2020-01-24 PROCEDURE — 99285 EMERGENCY DEPT VISIT HI MDM: CPT

## 2020-01-24 PROCEDURE — 93005 ELECTROCARDIOGRAM TRACING: CPT | Mod: 76

## 2020-01-24 PROCEDURE — 96374 THER/PROPH/DIAG INJ IV PUSH: CPT

## 2020-01-24 PROCEDURE — 93005 ELECTROCARDIOGRAM TRACING: CPT

## 2020-01-24 RX ORDER — METOPROLOL TARTRATE 1 MG/ML
5 INJECTION, SOLUTION INTRAVENOUS ONCE
Status: COMPLETED | OUTPATIENT
Start: 2020-01-24 | End: 2020-01-24

## 2020-01-24 RX ADMIN — METOPROLOL TARTRATE 5 MG: 5 INJECTION INTRAVENOUS at 23:54

## 2020-01-24 ASSESSMENT — ENCOUNTER SYMPTOMS
PALPITATIONS: 1
CONFUSION: 0
DIARRHEA: 0
FEVER: 0
VOMITING: 0
DIZZINESS: 0
SHORTNESS OF BREATH: 0

## 2020-01-24 NOTE — ED AVS SNAPSHOT
[facility] Emergency Department  [address]  [facility] [address]  Phone:  [phone]  Fax:  [phone]                                    DR Zbigniew Mendoza   MRN: [id number]    Department:  [facility] Emergency Department   Date of Visit:  1/24/2020           After Visit Summary Signature Page    I have received my discharge instructions, and my questions have been answered. I have discussed any challenges I see with this plan with the nurse or doctor.    ..........................................................................................................................................  Patient/Patient Representative Signature      ..........................................................................................................................................  Patient Representative Print Name and Relationship to Patient    ..................................................               ................................................  Date                                   Time    ..........................................................................................................................................  Reviewed by Signature/Title    ...................................................              ..............................................  Date                                               Time          22EPIC Rev 08/18

## 2020-01-25 VITALS
DIASTOLIC BLOOD PRESSURE: 92 MMHG | HEART RATE: 76 BPM | OXYGEN SATURATION: 98 % | SYSTOLIC BLOOD PRESSURE: 130 MMHG | TEMPERATURE: 98.3 F | RESPIRATION RATE: 20 BRPM

## 2020-01-25 LAB
ANION GAP SERPL CALCULATED.3IONS-SCNC: 3 MMOL/L (ref 3–14)
BUN SERPL-MCNC: 22 MG/DL (ref 7–30)
CALCIUM SERPL-MCNC: 8.5 MG/DL (ref 8.5–10.1)
CHLORIDE SERPL-SCNC: 107 MMOL/L (ref 94–109)
CO2 SERPL-SCNC: 28 MMOL/L (ref 20–32)
CREAT SERPL-MCNC: 0.9 MG/DL (ref 0.66–1.25)
GFR SERPL CREATININE-BSD FRML MDRD: 77 ML/MIN/{1.73_M2}
GLUCOSE SERPL-MCNC: 95 MG/DL (ref 70–99)
INTERPRETATION ECG - MUSE: NORMAL
INTERPRETATION ECG - MUSE: NORMAL
POTASSIUM SERPL-SCNC: 4 MMOL/L (ref 3.4–5.3)
SODIUM SERPL-SCNC: 138 MMOL/L (ref 133–144)
TROPONIN I SERPL-MCNC: <0.015 UG/L (ref 0–0.04)

## 2020-01-25 NOTE — ED PROVIDER NOTES
History     Chief Complaint:  Atrial Fib    HPI   Zbigniew Mendoza is a 85 year old male with a history of atrial fibrillation who presents for the evaluation of atrial fibrillation. The patient reports that at 2030 this evening while watching TV he started to experience heart palpitations that he knew was atrial fibrillation from experiencing it in the past, prompting him to the ED. The patient notes that he took a dose of propanolol at 2100 prior to presenting to the ED. He also notes that he has been seen here in the ED in the past for atrial fibrillation with the most recent being in September where spontaneously converted after IV metoprolol for his tachycardia. He had a reassuring echocardiogram after this ED visit as well. He states that he has been evaluated by cardiology for this atrial fibrillation before and that he was never put on any blood thinners. The patient denies chest pain, shortness of breath, dizziness, confusion, fever, vomiting, diarrhea, and other issues.      Allergies:  Ciprofloxacin      Medications:    Cozaar  Inderal    Past Medical History:    Atrial fibrillation  Calculus of kidney  Cyst  Mitral prolapse  Hypertension  Osteoarthrosis    Past Surgical History:    History reviewed. No pertinent surgical history.     Family History:    CAD - Father    Social History:  Smoking status: Never Smoker   Alcohol use: Yes  Drug use: No  Marital Status:       Review of Systems   Constitutional: Negative for fever.   Respiratory: Negative for shortness of breath.    Cardiovascular: Positive for palpitations. Negative for chest pain.   Gastrointestinal: Negative for diarrhea and vomiting.   Neurological: Negative for dizziness.   Psychiatric/Behavioral: Negative for confusion.   All other systems reviewed and are negative.    Physical Exam     Patient Vitals for the past 24 hrs:   BP Temp Temp src Heart Rate Resp SpO2   01/25/20 0100 130/92 -- -- 72 20 98 %   01/25/20 0045 133/71 -- -- 73  17 97 %   01/25/20 0030  146/80 -- -- 77 15 97 %   01/25/20 0015 133/84 -- -- 98 18 98 %   01/25/20 0000  154/82 -- -- 93 9 100 %   01/24/20 2345 155/92 -- -- 99 18    01/24/20 2330  146/81 -- -- 99 12 --   01/24/20 2315  138/90 -- -- 93 17 --   01/24/20 2300  129/120 -- -- 105 12 98 %   01/24/20 2233  148/103 98.3  F (36.8  C) Temporal 108 18 97 %      Physical Exam  Nursing note and vitals reviewed.  Constitutional: Cooperative.   HENT:   Mouth/Throat: Mucous membranes are normal.   Cardiovascular: Tachycardic rate, irregularly irregular rhythm and normal heart sounds.  No murmur.  Pulmonary/Chest: Effort normal and breath sounds normal. No respiratory distress. No wheezes. No rales.   Abdominal: Soft. Normal appearance and bowel sounds are normal. No distension. There is no tenderness.   Musculoskeletal: No LE edema  Neurological: Alert. Oriented x4  Skin: Skin is warm and dry.    Psychiatric: Normal mood and affect.     Emergency Department Course   ECG # 1 (22:27:25):  Rate 103 bpm. GA interval *. QRS duration 98. QT/QTc 322/421. P-R-T axes * -20 58. Atrial fibrillation with rapid ventricular response. Interpreted at 2330 by Krishna Lauren MD.     ECG # 2 (00:35:01):  Rate 75 bpm. GA interval 214. QRS duration 92. QT/QTc 372/415. P-R-T axes 72 -16 47. Sinus rhythm with 1st degree AV block. Interpreted at 0040 by Krishna Lauren MD.     Laboratory:  CBC: WNL (WBC 6.6, HGB 15.5, )  BMP: WNL (Creatinine 0.90)    Troponin (2306): <0.015    Interventions:  2354, metoprolol, 5 mg, IV    Emergency Department Course:  Past medical records, nursing notes, and vitals reviewed.  2343: I performed an exam of the patient and obtained history, as documented above.     IV inserted and blood drawn.     0102: I rechecked the patient. Explained findings to patient.     Findings and plan explained to the Patient. Patient discharged home with instructions regarding supportive care, medications, and reasons to return. The  importance of close follow-up was reviewed.       Impression & Plan    Medical Decision Making:  Zbigniew Mendoza is a 85 year old male with the above history with several episodes of atrial fibrillation in the last year. After IV beta blockade he converted to normal sinus rhythm. Labs including troponin and electrolytes are reassuring. EKG following conversion shows no ischemia or other concerning findings. He has already had the anticoagulating discussion with his cardiologist and opted not to pursue this. They will reinvestigate this with close follow up going forward as well as consideration of any other preventative antiarrhythmics or beta blockers at the discretion of his cardiologist. Return precautions given and patient discharged to home.     Diagnosis:    ICD-10-CM    1. Paroxysmal atrial fibrillation (H) I48.0        Disposition:  Discharged to home.    Scribe Disclosure:  Rogelio GARCÍA, am serving as a scribe at 11:42 PM on 1/24/2020 to document services personally performed by Krishna Lauren MD based on my observations and the provider's statements to me.      Rogelio Aquino  1/24/2020   [facility] EMERGENCY DEPARTMENT       Krishna Lauren MD  01/25/20 0700

## 2020-01-25 NOTE — ED TRIAGE NOTES
Pt states felt like he went into a-fib around 2100 tonight. States took home propranolol without relief pta. ABCs intact GCS 15

## 2020-01-29 ENCOUNTER — HOSPITAL ENCOUNTER (EMERGENCY)
Facility: CLINIC | Age: 85
Discharge: HOME | End: 2020-01-29
Attending: EMERGENCY MEDICINE | Admitting: EMERGENCY MEDICINE
Payer: COMMERCIAL

## 2020-01-29 VITALS
SYSTOLIC BLOOD PRESSURE: 120 MMHG | TEMPERATURE: 98 F | DIASTOLIC BLOOD PRESSURE: 63 MMHG | RESPIRATION RATE: 16 BRPM | OXYGEN SATURATION: 96 %

## 2020-01-29 DIAGNOSIS — I48.0 PAROXYSMAL ATRIAL FIBRILLATION (H): ICD-10-CM

## 2020-01-29 LAB — INTERPRETATION ECG - MUSE: NORMAL

## 2020-01-29 PROCEDURE — 93005 ELECTROCARDIOGRAM TRACING: CPT

## 2020-01-29 PROCEDURE — 99283 EMERGENCY DEPT VISIT LOW MDM: CPT

## 2020-01-29 PROCEDURE — 25000132 ZZH RX MED GY IP 250 OP 250 PS 637: Performed by: EMERGENCY MEDICINE

## 2020-01-29 RX ORDER — METOPROLOL SUCCINATE 25 MG/1
12.5 TABLET, EXTENDED RELEASE ORAL DAILY
Qty: 30 TABLET | Refills: 0 | Status: SHIPPED | OUTPATIENT
Start: 2020-01-29 | End: 2020-02-11

## 2020-01-29 RX ADMIN — METOPROLOL SUCCINATE 12.5 MG: 25 TABLET, EXTENDED RELEASE ORAL at 09:33

## 2020-01-29 ASSESSMENT — ENCOUNTER SYMPTOMS
SHORTNESS OF BREATH: 0
PALPITATIONS: 1

## 2020-01-29 NOTE — DISCHARGE INSTRUCTIONS
There is a 48-hour window to convert to using electricity in the emergency room if you are in atrial fibrillation all the time.  Please start metoprolol extended release 12.5 daily.  Discuss with your primary care physician adjustments in medication and follow-up with cardiology.

## 2020-01-29 NOTE — ED TRIAGE NOTES
Recently seen in ED Friday night for afib and given BB, which resolved dysrhythmia. This morning he felt off and felt his pulse, which was irregular. Presents for evaluation of afib.

## 2020-01-29 NOTE — ED PROVIDER NOTES
History     Chief Complaint:  Atrial Fib    HPI  Zbigniew Mendoza is a 85 year old male with history of atrial fibrillation who presents today with atrial fibrillation. The patient states a couple years ago he had an episode of atrial fibrillation that converted spontaneously. He states he had another episode last fall, had an echo and cardiac monitor that was normal and met with his cardiologist who did not recommend anticoagulation due to the rarity of episodes. He states he was here 5 days ago, was given a betablocker and to follow up with cardiology. He now presents today again for the fluttering and faster heart rate. He states this only happens when he is at rest. He states he works out on treadmills with no issues. He states known PVCs. He states he took some propanolol at home today. He denies chest pain or shortness of breath.    Allergies:  Ciprofloxacin    Medications:    Cozaar  Inderal  Flomax    Past Medical History:    Atrial fibrillation  Calculus of kidney  Cyst  Mitral prolapse  Hypertension  Osteoarthrosis    Past Surgical History:    History reviewed. No pertinent surgical history.      Family History:    Father - CAD    Social History:  The patient was accompanied to the ED alone.  Smoking Status: Never  Smokeless Tobacco: Never  Alcohol Use: Yes   Drug Use: No   PCP: Eagle Negrete   Marital Status:      Review of Systems   Respiratory: Negative for shortness of breath.    Cardiovascular: Positive for palpitations. Negative for chest pain.   All other systems reviewed and are negative.      Physical Exam     Patient Vitals for the past 24 hrs:   BP Temp Temp src Heart Rate Resp SpO2   01/29/20 0933 120/63 -- -- 70 -- --   01/29/20 0900 (!) 160/75 98  F (36.7  C) Oral 85 16 96 %       Physical Exam  Vitals signs and nursing note reviewed.   Constitutional:       Comments: Well-appearing looks a lot younger than stated 85-year-old age   HENT:      Right Ear: Tympanic membrane normal.    Cardiovascular:      Rate and Rhythm: Normal rate and regular rhythm.      Pulses: Normal pulses.   Pulmonary:      Effort: Pulmonary effort is normal.   Abdominal:      General: Abdomen is flat.      Palpations: Abdomen is soft.   Skin:     Capillary Refill: Capillary refill takes less than 2 seconds.   Neurological:      General: No focal deficit present.      Mental Status: He is alert.           Emergency Department Course     ECG:  Indication: atrial fibrillation  Time: 0859  Vent. Rate 87 bpm. MI interval 202. QRS duration 94. QT/QTc 350/421. P-R-T axis 78 -7 70.  Normal sinus rhythm. Normal ECG.  Read time: 0900    Interventions:  0933 Toprol 12.5 mg PO    Emergency Department Course:  Nursing notes and vitals reviewed. (8:48 AM) I performed an exam of the patient as documented above.     Medicine administered as documented above.    0945 I rechecked the patient and discussed the results of their workup thus far.     Findings and plan explained to the Patient. Patient discharged home with instructions regarding supportive care, medications, and reasons to return. The importance of close follow-up was reviewed. The patient was prescribed as below.    Impression & Plan      Medical Decision Making:  Patient returns to the emergency room with concerns for rapid atrial fibrillation.  Patient was seen here 2 days ago with similar complaints.  Lab work and troponin were negative.  Patient is a quite physically active [age]-year-old male who has had no chest pain but palpitations patient on arrival symptoms have resolved and EKG shows sinus rhythm.  Patient was advised that paroxysmal atrial fibrillation is common.  Patient has refused anticoagulation in the past his Juan Vascor is 2 do not feel that the patient would want to now add anticoagulation to the mix but is encouraged to follow-up with cardiology for reassessment.  Patient was advised a low-dose of metoprolol might be helpful in rate control this was  prescribed through the emergency room and follow-up with primary care is recommended.  Patient is discharged and advised the patient has a 48-hour window to return to the emergency room to consider cardioversion otherwise rate control and follow-up with primary care and/or primary cardiology.    Diagnosis:    ICD-10-CM    1. Paroxysmal atrial fibrillation (H) I48.0      Disposition:  Discharged to home.    Discharge Medications:  New Prescriptions    METOPROLOL SUCCINATE ER (TOPROL-XL) 25 MG 24 HR TABLET    Take 0.5 tablets (12.5 mg) by mouth daily     Scribe Disclosure:  Antonio GARCÍA, am serving as a scribe at 8:48 AM on 1/29/2020 to document services personally performed by Wesley Gates MD based on my observations and the provider's statements to me.      1/29/2020   [facility] EMERGENCY DEPARTMENT       Wesley Gates MD  01/29/20 1526

## 2020-01-29 NOTE — ED AVS SNAPSHOT
[facility] Emergency Department  [address]  [facility] [address]  Phone:  [phone]  Fax:  [phone]                                    DR Zbigniew Mendoza   MRN: [id number]    Department:  [facility] Emergency Department   Date of Visit:  1/29/2020           After Visit Summary Signature Page    I have received my discharge instructions, and my questions have been answered. I have discussed any challenges I see with this plan with the nurse or doctor.    ..........................................................................................................................................  Patient/Patient Representative Signature      ..........................................................................................................................................  Patient Representative Print Name and Relationship to Patient    ..................................................               ................................................  Date                                   Time    ..........................................................................................................................................  Reviewed by Signature/Title    ...................................................              ..............................................  Date                                               Time          22EPIC Rev 08/18

## 2020-02-11 ENCOUNTER — OFFICE VISIT (OUTPATIENT)
Dept: INTERNAL MEDICINE | Facility: CLINIC | Age: 85
End: 2020-02-11
Payer: COMMERCIAL

## 2020-02-11 VITALS
RESPIRATION RATE: 16 BRPM | OXYGEN SATURATION: 92 % | DIASTOLIC BLOOD PRESSURE: 70 MMHG | WEIGHT: 175 LBS | SYSTOLIC BLOOD PRESSURE: 160 MMHG | BODY MASS INDEX: 25.11 KG/M2 | HEART RATE: 60 BPM

## 2020-02-11 DIAGNOSIS — I10 ESSENTIAL HYPERTENSION: ICD-10-CM

## 2020-02-11 DIAGNOSIS — I48.0 PAROXYSMAL ATRIAL FIBRILLATION (H): ICD-10-CM

## 2020-02-11 DIAGNOSIS — Z09 HOSPITAL DISCHARGE FOLLOW-UP: Primary | ICD-10-CM

## 2020-02-11 PROCEDURE — 99214 OFFICE O/P EST MOD 30 MIN: CPT | Performed by: INTERNAL MEDICINE

## 2020-02-11 RX ORDER — METOPROLOL SUCCINATE 25 MG/1
12.5 TABLET, EXTENDED RELEASE ORAL DAILY
Qty: 45 TABLET | Refills: 3 | Status: SHIPPED | OUTPATIENT
Start: 2020-02-11 | End: 2021-01-12

## 2020-02-11 NOTE — PROGRESS NOTES
Subjective     Zbigniew Mendoza is a 85 year old male who presents to clinic today for the following health issues:    HPI   ED/UC Followup:    Facility:  [facility] ED   Date of visit: 1/24/20 and 1/29/20   Reason for visit: A  Fib   Current Status: ok        Patient is seen for a follow up visit.  Seen in ED for episodes of A fib. Has h/o paroxysmal a fib. Feels palpitations, heart rate is over 100. No chest pain. Mild SOB. Had 2 visits to ER within 5 days. First time resolved with Metoprolol. Second time spontaneous resumption of NSR. No recent acute infections. No change in medications. Has had ECHO, event monitor and stress test in the past, normal results.   Has h/o HTN. on medical treatment. BP has been controlled. No side effects from medications. No CP, HA, dizziness. good compliance with medications and low salt diet.  Has been on lower dose of Losartan 50 mg instead of 100 because has been started on Metoprolol 12.5 mg daily in the ER.         Patient Active Problem List   Diagnosis     Generalized osteoarthrosis, unspecified site     Essential hypertension     Hypertrophy of prostate without urinary obstruction     Backache     Renal calculus     CARDIOVASCULAR SCREENING; LDL GOAL LESS THAN 130     Advanced directives, counseling/discussion     Atrial fibrillation (H)     Syncope     Past Surgical History:   Procedure Laterality Date     ARTHROSCOPY SHOULDER ROTATOR CUFF REPAIR Right 1996     BACK SURGERY       CYSTOSCOPY, TRANSURETHRAL RESECTION (TUR) PROSTATE, COMBINED  04/93    S/P TURP       Social History     Tobacco Use     Smoking status: Never Smoker     Smokeless tobacco: Never Used   Substance Use Topics     Alcohol use: Yes     Comment: oc glass of wine     Family History   Problem Relation Age of Onset     Coronary Artery Disease Father      No Known Problems Mother          Current Outpatient Medications   Medication Sig Dispense Refill     aspirin (ASA) 81 MG EC tablet Take 1 tablet (81 mg) by mouth  daily 100 tablet 3     losartan (COZAAR) 100 MG tablet Take 1 tablet (100 mg) by mouth daily 90 tablet 3     metoprolol succinate ER (TOPROL-XL) 25 MG 24 hr tablet Take 0.5 tablets (12.5 mg) by mouth daily 45 tablet 3     Multiple Vitamins-Minerals (MULTIVITAL) TABS Take  by mouth daily.       tamsulosin (FLOMAX) 0.4 MG capsule TAKE 1 CAPSULE BY MOUTH EVERY DAY 90 capsule 3     propranolol (INDERAL) 10 MG tablet Take 1 tablet (10 mg) by mouth daily as needed (Anxiety) 30 tablet 1       Reviewed and updated as needed this visit by Provider         Review of Systems   ROS COMP: Constitutional, HEENT, cardiovascular, pulmonary, gi and gu systems are negative, except as otherwise noted.      Objective    BP (!) 160/70   Pulse 60   Resp 16   Wt 79.4 kg (175 lb)   SpO2 92%   BMI 25.11 kg/m    Body mass index is 25.11 kg/m .  Physical Exam   GENERAL: healthy, alert and no distress  EYES: Eyes grossly normal to inspection, PERRL and conjunctivae and sclerae normal  HENT: ear canals and TM's normal, nose and mouth without ulcers or lesions  NECK: no adenopathy, no asymmetry, masses, or scars and thyroid normal to palpation  RESP: lungs clear to auscultation - no rales, rhonchi or wheezes  CV: regular rate and rhythm, normal S1 S2, no S3 or S4, no murmur, click or rub, no peripheral edema and peripheral pulses strong  ABDOMEN: soft, nontender, no hepatosplenomegaly, no masses and bowel sounds normal  MS: no gross musculoskeletal defects noted, no edema  SKIN: no suspicious lesions or rashes    Diagnostic Test Results:  Labs reviewed in Epic        Assessment & Plan   Problem List Items Addressed This Visit     Essential hypertension    Relevant Medications    metoprolol succinate ER (TOPROL-XL) 25 MG 24 hr tablet    aspirin (ASA) 81 MG EC tablet    Other Relevant Orders    NM Lexiscan stress test    Atrial fibrillation (H)    Relevant Medications    metoprolol succinate ER (TOPROL-XL) 25 MG 24 hr tablet    aspirin (ASA)  81 MG EC tablet    Other Relevant Orders    NM Lexiscan stress test      Other Visit Diagnoses     Hospital discharge follow-up    -  Primary           Continue Metoprolol   Increase Losartan back to 100 mg   Assess stress test  Start on aspirin 81 mg daily   Consider cardiology assessment if recurrent a fib       See Patient Instructions  Return in about 6 months (around 8/11/2020) for Physical Exam.    Eagle Negrete MD  [facility]

## 2020-02-24 ENCOUNTER — HOSPITAL ENCOUNTER (OUTPATIENT)
Dept: NUCLEAR MEDICINE | Facility: CLINIC | Age: 85
Setting detail: NUCLEAR MEDICINE
End: 2020-02-24
Attending: INTERNAL MEDICINE
Payer: COMMERCIAL

## 2020-02-24 ENCOUNTER — HOSPITAL ENCOUNTER (OUTPATIENT)
Dept: CARDIOLOGY | Facility: CLINIC | Age: 85
Discharge: HOME | End: 2020-02-24
Attending: INTERNAL MEDICINE | Admitting: INTERNAL MEDICINE
Payer: COMMERCIAL

## 2020-02-24 VITALS — WEIGHT: 175 LBS | BODY MASS INDEX: 25.11 KG/M2

## 2020-02-24 DIAGNOSIS — I10 ESSENTIAL HYPERTENSION: ICD-10-CM

## 2020-02-24 DIAGNOSIS — I48.0 PAROXYSMAL ATRIAL FIBRILLATION (H): ICD-10-CM

## 2020-02-24 LAB
STRESS ECHO TARGET HR: 135
STRESS/REST PERFUSION RATIO: 1.1

## 2020-02-24 PROCEDURE — 93018 CV STRESS TEST I&R ONLY: CPT | Performed by: INTERNAL MEDICINE

## 2020-02-24 PROCEDURE — 93017 CV STRESS TEST TRACING ONLY: CPT

## 2020-02-24 PROCEDURE — A9502 TC99M TETROFOSMIN: HCPCS | Performed by: INTERNAL MEDICINE

## 2020-02-24 PROCEDURE — 93016 CV STRESS TEST SUPVJ ONLY: CPT | Performed by: INTERNAL MEDICINE

## 2020-02-24 PROCEDURE — 34300033 ZZH RX 343: Performed by: INTERNAL MEDICINE

## 2020-02-24 PROCEDURE — 78452 HT MUSCLE IMAGE SPECT MULT: CPT | Mod: 26 | Performed by: INTERNAL MEDICINE

## 2020-02-24 PROCEDURE — 25000128 H RX IP 250 OP 636

## 2020-02-24 PROCEDURE — 78452 HT MUSCLE IMAGE SPECT MULT: CPT

## 2020-02-24 RX ORDER — ACYCLOVIR 200 MG/1
0-1 CAPSULE ORAL
Status: DISCONTINUED | OUTPATIENT
Start: 2020-02-24 | End: 2020-02-25 | Stop reason: HOSPADM

## 2020-02-24 RX ORDER — ALBUTEROL SULFATE 90 UG/1
2 AEROSOL, METERED RESPIRATORY (INHALATION) EVERY 5 MIN PRN
Status: DISCONTINUED | OUTPATIENT
Start: 2020-02-24 | End: 2020-02-25 | Stop reason: HOSPADM

## 2020-02-24 RX ORDER — AMINOPHYLLINE 25 MG/ML
50-100 INJECTION, SOLUTION INTRAVENOUS
Status: DISCONTINUED | OUTPATIENT
Start: 2020-02-24 | End: 2020-02-25 | Stop reason: HOSPADM

## 2020-02-24 RX ORDER — REGADENOSON 0.08 MG/ML
0.4 INJECTION, SOLUTION INTRAVENOUS ONCE
Status: COMPLETED | OUTPATIENT
Start: 2020-02-24 | End: 2020-02-24

## 2020-02-24 RX ORDER — REGADENOSON 0.08 MG/ML
INJECTION, SOLUTION INTRAVENOUS
Status: COMPLETED
Start: 2020-02-24 | End: 2020-02-24

## 2020-02-24 RX ADMIN — TETROFOSMIN 10 MCI.: 1.38 INJECTION, POWDER, LYOPHILIZED, FOR SOLUTION INTRAVENOUS at 11:05

## 2020-02-24 RX ADMIN — REGADENOSON 0.4 MG: 0.08 INJECTION, SOLUTION INTRAVENOUS at 12:37

## 2020-02-24 RX ADMIN — TETROFOSMIN 31 MCI.: 1.38 INJECTION, POWDER, LYOPHILIZED, FOR SOLUTION INTRAVENOUS at 12:39

## 2020-02-26 ENCOUNTER — HOSPITAL ENCOUNTER (EMERGENCY)
Facility: CLINIC | Age: 85
Discharge: HOME | End: 2020-02-26
Attending: EMERGENCY MEDICINE | Admitting: EMERGENCY MEDICINE
Payer: COMMERCIAL

## 2020-02-26 VITALS
TEMPERATURE: 97.4 F | DIASTOLIC BLOOD PRESSURE: 65 MMHG | BODY MASS INDEX: 24.97 KG/M2 | WEIGHT: 174 LBS | RESPIRATION RATE: 16 BRPM | OXYGEN SATURATION: 99 % | SYSTOLIC BLOOD PRESSURE: 151 MMHG

## 2020-02-26 DIAGNOSIS — R68.84 JAW PAIN: ICD-10-CM

## 2020-02-26 PROCEDURE — 99282 EMERGENCY DEPT VISIT SF MDM: CPT

## 2020-02-26 ASSESSMENT — ENCOUNTER SYMPTOMS
NECK PAIN: 0
SHORTNESS OF BREATH: 0

## 2020-02-26 NOTE — ED TRIAGE NOTES
Pt arrives to ED with left sided jaw pain sudden onset this morning when turning his neck. Pt a/ox 3, ABC's intact.

## 2020-02-26 NOTE — DISCHARGE INSTRUCTIONS
Diagnosis: Right-sided jaw pain.  It is possible that you could have dislocated your jaw based on how you describe your symptoms, though it appears that right now everything is correctly aligned with normal range of motion.  What should you do next: Use over-the-counter treatments and ice as needed.  When should you return: If you feel an acute worsening of pain, have misalignment of your jaw or teeth, are unable to close your jaw, or have other symptoms that concern you, you should return to the ED for reevaluation.    Thank you for allowing us to care for you today.

## 2020-02-26 NOTE — ED AVS SNAPSHOT
[facility] Emergency Department  [address]  [facility] [place] [address]  Phone:  [phone]  Fax:  [phone]                                    DR Zbigniew Mendoza   MRN: [id number]    Department:  [facility] Emergency Department   Date of Visit:  2/26/2020           After Visit Summary Signature Page    I have received my discharge instructions, and my questions have been answered. I have discussed any challenges I see with this plan with the nurse or doctor.    ..........................................................................................................................................  Patient/Patient Representative Signature      ..........................................................................................................................................  Patient Representative Print Name and Relationship to Patient    ..................................................               ................................................  Date                                   Time    ..........................................................................................................................................  Reviewed by Signature/Title    ...................................................              ..............................................  Date                                               Time          22EPIC Rev 08/18

## 2020-02-26 NOTE — ED PROVIDER NOTES
History     Chief Complaint:  Jaw Pain    The history is provided by the patient.      Zbigniew Mendoza is a 85 year old male who presents with left jaw pain. The patient reports turning his head to look at his cat this morning when he developed acute pain in the left side of his jaw. He notes that he was not able to shut his jaw. This seemed to gradually improve, and he is now able to open and close his mouth without difficulty. The patient denies shortness of breath, tooth pain, pain radiating into the ear, facial tingling, or neck pain.     Allergies:  Ciprofloxacin     Medications:    aspirin 81 mg  losartan   metoprolol succinate   propranolol  tamsulosin     Past Medical History:    Atrial fibrillation   Mitral prolapse  Calculus of kidney  Osteoarthrosis  Hypertension     Past Surgical History:    Right shoulder rotator cuff repair  Back surgery  Cystoscopy and TURP, combined    Family History:    Father - coronary artery disease     Social History:  Tobacco use: negative   Alcohol use: occasionally   PCP: Eagle Negrete     Review of Systems   HENT: Negative for ear pain.    Respiratory: Negative for shortness of breath.    Musculoskeletal: Negative for neck pain.        Left-sided jaw pain    All other systems reviewed and are negative.    Physical Exam     Patient Vitals for the past 24 hrs:   BP Temp Temp src Heart Rate Resp SpO2 Weight   02/26/20 1230 (!) 151/65 -- -- -- -- 99 % --   02/26/20 1107 (!) 173/86 -- -- 71 16 100 % --   02/26/20 1106 -- 97.4  F (36.3  C) Temporal -- 18 99 % 78.9 kg (174 lb)        Physical Exam  Constitutional: Vital signs reviewed as above.   HEENT:    Head: No external signs of trauma. No lesions noted.   Eyes: PEERL, EOMI B/L, no pain or limitation of superior gaze   Ears: Normal B/L TM and external canals   Nose: Noncongested, no exudates. No rhinorrhea. No FB noted   Mouth/Throat:     Mucous membranes are moist and normal.     No Oropharyngeal exudate. No  oropharyngeal erythema noted.    No tonsilar swelling noted.     No uvular deviation noted.    No swelling noted on the floor of the mouth   Jaw: Normal alignment. No focal mandibular or TMJ pain or swelling noted.  Neck: FROM. Neck is supple  Cardiovascular: Normal rate, regular rhythm and normal heart sounds.  No murmur heard. Equal B/L peripheral pulses.  Pulmonary/Chest: Effort normal and breath sounds normal. No respiratory distress. Patient has no wheezes. Patient has no rales.   Gastrointestinal: Soft. There is no tenderness.   Musculoskeletal/Extremities: No edema noted. Normal tone.  Neurological: Patient is alert and oriented to person, place, and time.   Skin: Skin is warm and dry. There is no diaphoresis noted.   Psychiatric: The patient appears calm.        Emergency Department Course     Emergency Department Course:  Past medical records, nursing notes, and vitals reviewed.    1212 I performed an exam of the patient as documented above.  The patient's symptoms, as he described him at home, seem to have resolved.  I believe he can be discharged without imaging.  The patient is comfortable with this plan will follow-up in the outpatient setting as needed.  Return precautions given.    Impression & Plan     Medical Decision Making:  Zbigniew Mendoza is a 85 year old male who presents to the emergency department today with concern for left jaw pain.  Please see the HPI and exam for specifics.  The patient's symptoms, as he described at home, seem to have resolved.  There is no instability, deformity, or gross tenderness over the left mandible or TMJ.  At this time, I believe the patient can be discharged and should follow-up routinely in the outpatient setting.  Anticipatory guidance given prior to discharge.    Discharge Diagnosis:    ICD-10-CM    1. Jaw pain R68.84     Left sided       Disposition:  Discharged     Discharge Medications:  New Prescriptions    No medications on file       Scribe  Disclosure:  I, Lucila Ambrocio, am serving as a scribe at 12:12 PM on 2/26/2020 to document services personally performed by Hema Perez DO based on my observations and the provider's statements to me.       Hema Perez DO  02/26/20 1245

## 2020-04-10 DIAGNOSIS — F41.9 ANXIETY: ICD-10-CM

## 2020-04-10 NOTE — TELEPHONE ENCOUNTER
"Requested Prescriptions   Pending Prescriptions Disp Refills     propranolol (INDERAL) 10 MG tablet [Pharmacy Med Name: PROPRANOLOL 10 MG TABLET] 30 tablet 1     Sig: TAKE 1 TABLET (10 MG) BY MOUTH DAILY AS NEEDED (ANXIETY)   Last Written Prescription Date:  01/23/2019  Last Fill Quantity: 30,  # refills: 01   Last office visit: 2/11/2020 with prescribing provider:     Future Office Visit:      Beta-Blockers Protocol Failed - 4/10/2020 10:47 AM        Failed - Blood pressure under 140/90 in past 12 months     BP Readings from Last 3 Encounters:   02/26/20 (!) 151/65   02/11/20 (!) 160/70   01/29/20 120/63                 Passed - Patient is age 6 or older        Passed - Recent (12 mo) or future (30 days) visit within the authorizing provider's specialty     Patient has had an office visit with the authorizing provider or a provider within the authorizing providers department within the previous 12 mos or has a future within next 30 days. See \"Patient Info\" tab in inbasket, or \"Choose Columns\" in Meds & Orders section of the refill encounter.              Passed - Medication is active on med list           "

## 2020-04-10 NOTE — TELEPHONE ENCOUNTER
Routing refill request to provider for review/approval because:  BP elevated  Klaudia EDMOND - Registered Nurse  [facility]  Acute and Diagnostic Services

## 2020-04-13 RX ORDER — PROPRANOLOL HYDROCHLORIDE 10 MG/1
10 TABLET ORAL DAILY PRN
Qty: 30 TABLET | Refills: 1 | Status: SHIPPED | OUTPATIENT
Start: 2020-04-13 | End: 2020-05-07

## 2020-05-07 DIAGNOSIS — F41.9 ANXIETY: ICD-10-CM

## 2020-05-07 RX ORDER — PROPRANOLOL HYDROCHLORIDE 10 MG/1
10 TABLET ORAL DAILY PRN
Qty: 90 TABLET | Refills: 0 | Status: SHIPPED | OUTPATIENT
Start: 2020-05-07 | End: 2021-02-11

## 2020-05-07 NOTE — TELEPHONE ENCOUNTER
"Propranolol refill request.   Provider approval needed. This is PRN and pt takes 2 beta blockers.     Last OV on 2/11/20     Requested Prescriptions   Pending Prescriptions Disp Refills     propranolol (INDERAL) 10 MG tablet [Pharmacy Med Name: PROPRANOLOL 10 MG TABLET] 30 tablet 1     Sig: TAKE 1 TABLET (10 MG) BY MOUTH DAILY AS NEEDED (ANXIETY)       Beta-Blockers Protocol Failed - 5/7/2020  8:32 AM        Failed - Blood pressure under 140/90 in past 12 months     BP Readings from Last 3 Encounters:   02/26/20 (!) 151/65   02/11/20 (!) 160/70   01/29/20 120/63                 Passed - Patient is age 6 or older        Passed - Recent (12 mo) or future (30 days) visit within the authorizing provider's specialty     Patient has had an office visit with the authorizing provider or a provider within the authorizing providers department within the previous 12 mos or has a future within next 30 days. See \"Patient Info\" tab in inbasket, or \"Choose Columns\" in Meds & Orders section of the refill encounter.              Passed - Medication is active on med list             "

## 2020-12-14 ENCOUNTER — TRANSFERRED RECORDS (OUTPATIENT)
Dept: HEALTH INFORMATION MANAGEMENT | Facility: CLINIC | Age: 85
End: 2020-12-14

## 2020-12-18 ENCOUNTER — MYC MEDICAL ADVICE (OUTPATIENT)
Dept: INTERNAL MEDICINE | Facility: CLINIC | Age: 85
End: 2020-12-18

## 2020-12-21 ENCOUNTER — HOSPITAL ENCOUNTER (EMERGENCY)
Facility: CLINIC | Age: 85
Discharge: HOME | End: 2020-12-21
Attending: PHYSICIAN ASSISTANT | Admitting: PHYSICIAN ASSISTANT
Payer: COMMERCIAL

## 2020-12-21 VITALS
OXYGEN SATURATION: 98 % | DIASTOLIC BLOOD PRESSURE: 56 MMHG | TEMPERATURE: 98.2 F | HEART RATE: 58 BPM | RESPIRATION RATE: 17 BRPM | SYSTOLIC BLOOD PRESSURE: 115 MMHG

## 2020-12-21 DIAGNOSIS — R00.2 PALPITATIONS: ICD-10-CM

## 2020-12-21 DIAGNOSIS — Z86.79 HISTORY OF ATRIAL FIBRILLATION: ICD-10-CM

## 2020-12-21 LAB
ANION GAP SERPL CALCULATED.3IONS-SCNC: 5 MMOL/L (ref 3–14)
BASOPHILS # BLD AUTO: 0.1 10E9/L (ref 0–0.2)
BASOPHILS NFR BLD AUTO: 1.6 %
BUN SERPL-MCNC: 17 MG/DL (ref 7–30)
CALCIUM SERPL-MCNC: 9.1 MG/DL (ref 8.5–10.1)
CHLORIDE SERPL-SCNC: 109 MMOL/L (ref 94–109)
CO2 SERPL-SCNC: 24 MMOL/L (ref 20–32)
CREAT SERPL-MCNC: 0.91 MG/DL (ref 0.66–1.25)
DIFFERENTIAL METHOD BLD: ABNORMAL
EOSINOPHIL # BLD AUTO: 0.2 10E9/L (ref 0–0.7)
EOSINOPHIL NFR BLD AUTO: 1.9 %
ERYTHROCYTE [DISTWIDTH] IN BLOOD BY AUTOMATED COUNT: 14.2 % (ref 10–15)
GFR SERPL CREATININE-BSD FRML MDRD: 76 ML/MIN/{1.73_M2}
GLUCOSE SERPL-MCNC: 96 MG/DL (ref 70–99)
HCT VFR BLD AUTO: 47 % (ref 40–53)
HGB BLD-MCNC: 14.5 G/DL (ref 13.3–17.7)
IMM GRANULOCYTES # BLD: 0.1 10E9/L (ref 0–0.4)
IMM GRANULOCYTES NFR BLD: 0.6 %
LYMPHOCYTES # BLD AUTO: 0.9 10E9/L (ref 0.8–5.3)
LYMPHOCYTES NFR BLD AUTO: 11.2 %
MCH RBC QN AUTO: 32.4 PG (ref 26.5–33)
MCHC RBC AUTO-ENTMCNC: 30.9 G/DL (ref 31.5–36.5)
MCV RBC AUTO: 105 FL (ref 78–100)
MONOCYTES # BLD AUTO: 0.6 10E9/L (ref 0–1.3)
MONOCYTES NFR BLD AUTO: 8 %
NEUTROPHILS # BLD AUTO: 6.1 10E9/L (ref 1.6–8.3)
NEUTROPHILS NFR BLD AUTO: 76.7 %
NRBC # BLD AUTO: 0 10*3/UL
NRBC BLD AUTO-RTO: 0 /100
PLATELET # BLD AUTO: 156 10E9/L (ref 150–450)
POTASSIUM SERPL-SCNC: 5 MMOL/L (ref 3.4–5.3)
RBC # BLD AUTO: 4.47 10E12/L (ref 4.4–5.9)
SODIUM SERPL-SCNC: 138 MMOL/L (ref 133–144)
TROPONIN I SERPL-MCNC: <0.015 UG/L (ref 0–0.04)
WBC # BLD AUTO: 8 10E9/L (ref 4–11)

## 2020-12-21 PROCEDURE — 93005 ELECTROCARDIOGRAM TRACING: CPT

## 2020-12-21 PROCEDURE — 99284 EMERGENCY DEPT VISIT MOD MDM: CPT

## 2020-12-21 PROCEDURE — 85025 COMPLETE CBC W/AUTO DIFF WBC: CPT | Performed by: PHYSICIAN ASSISTANT

## 2020-12-21 PROCEDURE — 80048 BASIC METABOLIC PNL TOTAL CA: CPT | Performed by: PHYSICIAN ASSISTANT

## 2020-12-21 PROCEDURE — 84484 ASSAY OF TROPONIN QUANT: CPT | Performed by: PHYSICIAN ASSISTANT

## 2020-12-21 ASSESSMENT — ENCOUNTER SYMPTOMS
PALPITATIONS: 1
DIZZINESS: 0

## 2020-12-21 NOTE — ED TRIAGE NOTES
Patient presents with complaints of rapid heart rate this morning. Patient does have history of Afib. Denies any chest pain or SOB. Patient states symptoms started about one hour ago. ABC intact without need for intervention at this time.

## 2020-12-21 NOTE — ED PROVIDER NOTES
History   Chief Complaint:  Tachycardia       HPI   Zbigniew Mendoza is a 86 year old male with history of atrial fibrillation, mitral prolapse, and hypertension who presents with tachycardia. The patient states that this morning around 8:30 (four hours prior to arrival) he felt as if his heart was racing and he was going into atrial fibrillation. He then decided to present to the ED, and he thinks he converted out of atrial fibrillation while in the lobby. He says that he has had 4-5 episodes of atrial fibrillation since his first in 2015 and has been on a low dosage of Metoprolol since then. His most recent episode was in 2019, and after that he did a Holter monitor and a stress test which were both clear. His dosage of Metoprolol was recently increased to 25 mg, and he takes it at night. He says he feels otherwise fine. The patient denies chest pain, dizziness, or taking blood thinners.      Review of Systems   Cardiovascular: Positive for palpitations. Negative for chest pain.   Neurological: Negative for dizziness.   All other systems reviewed and are negative.      Allergies:  Ciprofloxacin    Medications:  Losartan  Metoprolol succinate  Inderal    Past Medical History:    Atrial fibrillation  Calculus of kidney  Mitral prolapse  Prostate infection  Hypertension  Generalized osteoarthrosis     Past Surgical History:    Arthroscopy shoulder rotator cuff repair, right  Back surgery  Cystoscopy, transurethral resection (TUR) prostate, combined     Family History:    Coronary artery disease    Social History:  The patient was unaccompanied to the ED. He works as a psychiatrist and just published a book.    Physical Exam     Patient Vitals for the past 24 hrs:   BP Temp Pulse Resp SpO2   12/21/20 1400 111/56 -- 55 13 96 %   12/21/20 1345 113/74 -- 60 18 96 %   12/21/20 1315 102/81 -- 66 10 95 %   12/21/20 1013 (!) 153/93 98.2  F (36.8  C) 103 18 97 %       Physical Exam  General: Alert, interactive.   Head:  Scalp  is atraumatic.  Eyes:  EOM intact. The pupils are equal, round, and reactive to light. No scleral icterus.   ENT:                                      Ears:  The external ears are normal.   Nose:  The external nose is normal.  Throat:  The oropharynx is normal. Mucus membranes are moist.                 Neck:  Normal range of motion. There is no rigidity.   CV:  Regular rate and rhythm. No murmur. 2+ radial pulses  Resp:  Breath sounds are clear bilaterally. Non-labored, no retractions or accessory muscle use.  GI:  Abdomen is soft, no distension, no tenderness.   MS:  Normal range of motion.   Skin:  Warm and dry.   Neuro:  Strength and sensation grossly intact.   Psych:  Awake. Alert.  Appropriate interactions.       Emergency Department Course     ECG:  ECG taken at 1028, ECG read at 1031  Normal sinus rhythm  Normal ECG  Rate 71 bpm. MO interval 202 ms. QRS duration 92 ms. QT/QTc 372/404 ms. P-R-T axes 66 -14 58.     Laboratory:  CBC: WBC 8.0, HGB 14.5,    BMP: (Creatinine: 0.91) o/w WNL    Troponin (Collected 1323): <0.015    Emergency Department Course:    Reviewed:  1307: I reviewed nursing notes, vitals and past medical history    Assessments:  1310: I performed an exam of the patient as noted above.    Disposition:  1423: The patient was discharged to home.       Impression & Plan     Medical Decision Making:  Zbigniew Mendoza is a 86 year old male with medical history including paroxysmal atrial fibrillation presents emergency department with palpitations.  He reports he believes he converted to normal sinus rhythm while sitting in the lobby.  please see above history for further details.  EKG reveals normal sinus rhythm.  He remained on cardiac monitor here and there is no evidence of arrhythmia.  Blood work reassuring.  He denies associated chest pain.  Troponin negative and I doubt acute coronary syndrome.  Recommended continuing metoprolol as prescribed.  He is not anticoagulated as he is  discussed the risk versus benefit with his cardiologist in the past.  I recommended following up closely with cardiology and return precautions discussed including chest pain, shortness of breath, palpitations, or any other concerning symptoms develop.  All questions and concerns addressed prior to discharge home.      Diagnosis:    ICD-10-CM    1. History of atrial fibrillation  Z86.79    2. Palpitations  R00.2        Discharge Medications:  New Prescriptions    No medications on file       Scribe Disclosure:  I, May Shirley, am serving as a scribe at 1:09 PM on 12/21/2020 to document services personally performed by Loly Castro PA-C based on my observations and the provider's statements to me.          Loly Castro PA-C  12/21/20 [id number]

## 2020-12-21 NOTE — DISCHARGE INSTRUCTIONS
*Follow up closely with cardiologist.   *Return for any new/concerning symptoms including chest pain, shortness of breath, persistent palpations.

## 2020-12-21 NOTE — ED AVS SNAPSHOT
[facility] Emergency Dept  [address]  [facility] [address]  Phone: [phone]  Fax: [phone]                                    DR Zbigniew Mendoza   MRN: [id number]    Department: [facility] Emergency Dept   Date of Visit: 12/21/2020           After Visit Summary Signature Page    I have received my discharge instructions, and my questions have been answered. I have discussed any challenges I see with this plan with the nurse or doctor.    ..........................................................................................................................................  Patient/Patient Representative Signature      ..........................................................................................................................................  Patient Representative Print Name and Relationship to Patient    ..................................................               ................................................  Date                                   Time    ..........................................................................................................................................  Reviewed by Signature/Title    ...................................................              ..............................................  Date                                               Time          22EPIC Rev 08/18

## 2020-12-22 LAB — INTERPRETATION ECG - MUSE: NORMAL

## 2021-01-07 DIAGNOSIS — I10 ESSENTIAL HYPERTENSION: ICD-10-CM

## 2021-01-08 DIAGNOSIS — R33.9 URINARY RETENTION: ICD-10-CM

## 2021-01-08 RX ORDER — TAMSULOSIN HYDROCHLORIDE 0.4 MG/1
CAPSULE ORAL
Qty: 90 CAPSULE | Refills: 0 | Status: SHIPPED | OUTPATIENT
Start: 2021-01-08 | End: 2021-04-05

## 2021-01-08 RX ORDER — LOSARTAN POTASSIUM 100 MG/1
TABLET ORAL
Qty: 90 TABLET | Refills: 3 | Status: SHIPPED | OUTPATIENT
Start: 2021-01-08 | End: 2021-12-22

## 2021-01-08 NOTE — TELEPHONE ENCOUNTER
Medication is being filled for 1 time refill only due to:  Patient needs to be seen because needs appt February.    Routing to MA/TC pool. The Pt is due for a visit with PCP    Ammon Gomez RN, BSN

## 2021-01-08 NOTE — LETTER
[address]  [facility] [address]  [phone]        January 12, 2021      Zbigniew Mendoza  [address] [facility] [address]          Dear Zbigniew Mendoza    APPOINTMENT REMINDER:   Our records indicates that it is time for you to be seen for a recheck.     Your current medication request will be approved for one refill but you will need to be seen before any additional refills can be approved.    Taking care of your health is important to us, and ongoing visits with your provider are vital to your care.    We look forward to seeing you in the near future.  You may call our office at [phone] to schedule a visit.    Please disregard this notice if you have already made an appointment.      Sincerely,      [facility]

## 2021-01-11 DIAGNOSIS — I48.0 PAROXYSMAL ATRIAL FIBRILLATION (H): ICD-10-CM

## 2021-01-11 DIAGNOSIS — I10 ESSENTIAL HYPERTENSION: ICD-10-CM

## 2021-01-12 RX ORDER — METOPROLOL SUCCINATE 25 MG/1
TABLET, EXTENDED RELEASE ORAL
Qty: 45 TABLET | Refills: 0 | Status: SHIPPED | OUTPATIENT
Start: 2021-01-12 | End: 2021-01-26

## 2021-01-13 ENCOUNTER — MYC MEDICAL ADVICE (OUTPATIENT)
Dept: INTERNAL MEDICINE | Facility: CLINIC | Age: 86
End: 2021-01-13

## 2021-01-15 ENCOUNTER — HEALTH MAINTENANCE LETTER (OUTPATIENT)
Age: 86
End: 2021-01-15

## 2021-01-26 ENCOUNTER — OFFICE VISIT (OUTPATIENT)
Dept: INTERNAL MEDICINE | Facility: CLINIC | Age: 86
End: 2021-01-26
Payer: COMMERCIAL

## 2021-01-26 VITALS
WEIGHT: 175 LBS | DIASTOLIC BLOOD PRESSURE: 74 MMHG | BODY MASS INDEX: 25.05 KG/M2 | HEIGHT: 70 IN | HEART RATE: 48 BPM | SYSTOLIC BLOOD PRESSURE: 159 MMHG | TEMPERATURE: 97.8 F

## 2021-01-26 DIAGNOSIS — I10 ESSENTIAL HYPERTENSION: ICD-10-CM

## 2021-01-26 DIAGNOSIS — Z12.5 SCREENING FOR PROSTATE CANCER: ICD-10-CM

## 2021-01-26 DIAGNOSIS — I48.0 PAROXYSMAL ATRIAL FIBRILLATION (H): ICD-10-CM

## 2021-01-26 DIAGNOSIS — Z00.00 ENCOUNTER FOR PREVENTATIVE ADULT HEALTH CARE EXAMINATION: Primary | ICD-10-CM

## 2021-01-26 LAB
ALBUMIN UR-MCNC: NEGATIVE MG/DL
APPEARANCE UR: CLEAR
BACTERIA #/AREA URNS HPF: ABNORMAL /HPF
BILIRUB UR QL STRIP: NEGATIVE
COLOR UR AUTO: YELLOW
ERYTHROCYTE [DISTWIDTH] IN BLOOD BY AUTOMATED COUNT: 14.5 % (ref 10–15)
GLUCOSE UR STRIP-MCNC: NEGATIVE MG/DL
HCT VFR BLD AUTO: 43.9 % (ref 40–53)
HGB BLD-MCNC: 14.4 G/DL (ref 13.3–17.7)
HGB UR QL STRIP: ABNORMAL
KETONES UR STRIP-MCNC: ABNORMAL MG/DL
LEUKOCYTE ESTERASE UR QL STRIP: NEGATIVE
MCH RBC QN AUTO: 32.4 PG (ref 26.5–33)
MCHC RBC AUTO-ENTMCNC: 32.8 G/DL (ref 31.5–36.5)
MCV RBC AUTO: 99 FL (ref 78–100)
MUCOUS THREADS #/AREA URNS LPF: PRESENT /LPF
NITRATE UR QL: NEGATIVE
NON-SQ EPI CELLS #/AREA URNS LPF: ABNORMAL /LPF
PH UR STRIP: 6 PH (ref 5–7)
PLATELET # BLD AUTO: 126 10E9/L (ref 150–450)
RBC # BLD AUTO: 4.44 10E12/L (ref 4.4–5.9)
RBC #/AREA URNS AUTO: ABNORMAL /HPF
SOURCE: ABNORMAL
SP GR UR STRIP: >1.03 (ref 1–1.03)
UROBILINOGEN UR STRIP-ACNC: 0.2 EU/DL (ref 0.2–1)
WBC # BLD AUTO: 4.7 10E9/L (ref 4–11)
WBC #/AREA URNS AUTO: ABNORMAL /HPF

## 2021-01-26 PROCEDURE — 80061 LIPID PANEL: CPT | Performed by: INTERNAL MEDICINE

## 2021-01-26 PROCEDURE — 85027 COMPLETE CBC AUTOMATED: CPT | Performed by: INTERNAL MEDICINE

## 2021-01-26 PROCEDURE — 81001 URINALYSIS AUTO W/SCOPE: CPT | Performed by: INTERNAL MEDICINE

## 2021-01-26 PROCEDURE — 80053 COMPREHEN METABOLIC PANEL: CPT | Performed by: INTERNAL MEDICINE

## 2021-01-26 PROCEDURE — 84443 ASSAY THYROID STIM HORMONE: CPT | Performed by: INTERNAL MEDICINE

## 2021-01-26 PROCEDURE — G0103 PSA SCREENING: HCPCS | Performed by: INTERNAL MEDICINE

## 2021-01-26 PROCEDURE — 99213 OFFICE O/P EST LOW 20 MIN: CPT | Mod: 25 | Performed by: INTERNAL MEDICINE

## 2021-01-26 PROCEDURE — 36415 COLL VENOUS BLD VENIPUNCTURE: CPT | Performed by: INTERNAL MEDICINE

## 2021-01-26 PROCEDURE — 99397 PER PM REEVAL EST PAT 65+ YR: CPT | Performed by: INTERNAL MEDICINE

## 2021-01-26 RX ORDER — METOPROLOL SUCCINATE 25 MG/1
25 TABLET, EXTENDED RELEASE ORAL DAILY
Qty: 90 TABLET | Refills: 3 | Status: SHIPPED | OUTPATIENT
Start: 2021-01-26 | End: 2022-02-11

## 2021-01-26 ASSESSMENT — ENCOUNTER SYMPTOMS
COUGH: 0
NAUSEA: 0
DIZZINESS: 0
HEMATOCHEZIA: 0
HEARTBURN: 0
HEADACHES: 0
HEMATURIA: 0
WEAKNESS: 0
PALPITATIONS: 0
CHILLS: 0
FEVER: 0
MYALGIAS: 0
ABDOMINAL PAIN: 0
SORE THROAT: 0
ARTHRALGIAS: 0
FREQUENCY: 0
PARESTHESIAS: 0
EYE PAIN: 0
DYSURIA: 0
SHORTNESS OF BREATH: 0
CONSTIPATION: 0
DIARRHEA: 0
NERVOUS/ANXIOUS: 0
JOINT SWELLING: 0

## 2021-01-26 ASSESSMENT — ACTIVITIES OF DAILY LIVING (ADL): CURRENT_FUNCTION: NO ASSISTANCE NEEDED

## 2021-01-26 ASSESSMENT — MIFFLIN-ST. JEOR: SCORE: 1480.04

## 2021-01-26 NOTE — PROGRESS NOTES
"SUBJECTIVE:   Zbigniew Mendoza is a 86 year old male who presents for Preventive Visit.      Patient has been advised of split billing requirements and indicates understanding: Yes   Are you in the first 12 months of your Medicare coverage?  No    Healthy Habits:     In general, how would you rate your overall health?  Good    Frequency of exercise:  4-5 days/week    Duration of exercise:  15-30 minutes    Do you usually eat at least 4 servings of fruit and vegetables a day, include whole grains    & fiber and avoid regularly eating high fat or \"junk\" foods?  Yes    Taking medications regularly:  Yes    Medication side effects:  None    Ability to successfully perform activities of daily living:  No assistance needed    Home Safety:  No safety concerns identified    Hearing Impairment:  Difficulty following a conversation in a noisy restaurant or crowded room    In the past 6 months, have you been bothered by leaking of urine?  No    In general, how would you rate your overall mental or emotional health?  Good      PHQ-2 Total Score: 0    Additional concerns today:  No    Do you feel safe in your environment? Yes    Have you ever done Advance Care Planning? (For example, a Health Directive, POLST, or a discussion with a medical provider or your loved ones about your wishes): Yes, patient states has an Advance Care Planning document and will bring a copy to the clinic.       Fall risk  Fallen 2 or more times in the past year?: No  Any fall with injury in the past year?: No    Cognitive Screening   1) Repeat 3 items (Leader, Season, Table)    2) Clock draw: NORMAL  3) 3 item recall: Recalls 3 objects  Results: 3 items recalled: COGNITIVE IMPAIRMENT LESS LIKELY    Mini-CogTM Copyright CIERRA Cummings. Licensed by the author for use in [facility]; reprinted with permission (coby@.[facility]). All rights reserved.      Do you have sleep apnea, excessive snoring or daytime drowsiness?: no    Reviewed and updated as needed " this visit by clinical staff  Tobacco  Allergies  Meds   Med Hx  Surg Hx  Fam Hx  Soc Hx        Reviewed and updated as needed this visit by Provider                Social History     Tobacco Use     Smoking status: Never Smoker     Smokeless tobacco: Never Used   Substance Use Topics     Alcohol use: Not Currently     If you drink alcohol do you typically have >3 drinks per day or >7 drinks per week? No    Alcohol Use 1/26/2021   Prescreen: >3 drinks/day or >7 drinks/week? No   Prescreen: >3 drinks/day or >7 drinks/week? -           PROBLEMS TO ADD ON...  Has h/o HTN. on medical treatment. BP has been controlled. No side effects from medications. No CP, HA, dizziness. good compliance with medications and low salt diet.  Has history of paroxysmal atrial fibrillation. On rate control medications. Asymptonatic - no chest pains , palpitations,  no side effects from medications.      Current providers sharing in care for this patient include:   Patient Care Team:  Eagle Negrete MD as PCP - General (Internal Medicine)  Eagle Negrete MD as Assigned PCP    The following health maintenance items are reviewed in Epic and correct as of today:  Health Maintenance   Topic Date Due     ZOSTER IMMUNIZATION (1 of 2) 05/22/1984     FALL RISK ASSESSMENT  09/25/2019     MEDICARE ANNUAL WELLNESS VISIT  01/26/2022     ADVANCE CARE PLANNING  01/26/2026     DTAP/TDAP/TD IMMUNIZATION (3 - Td) 09/04/2029     PHQ-2  Completed     INFLUENZA VACCINE  Completed     Pneumococcal Vaccine: 65+ Years  Completed     Pneumococcal Vaccine: Pediatrics (0 to 5 Years) and At-Risk Patients (6 to 64 Years)  Aged Out     IPV IMMUNIZATION  Aged Out     MENINGITIS IMMUNIZATION  Aged Out     HEPATITIS B IMMUNIZATION  Aged Out     Lab work is in process  Labs reviewed in EPIC      Review of Systems   Constitutional: Negative for chills and fever.   HENT: Positive for hearing loss. Negative for congestion, ear pain and sore throat.    Eyes:  "Negative for pain and visual disturbance.   Respiratory: Negative for cough and shortness of breath.    Cardiovascular: Negative for chest pain, palpitations and peripheral edema.   Gastrointestinal: Negative for abdominal pain, constipation, diarrhea, heartburn, hematochezia and nausea.   Genitourinary: Negative for discharge, dysuria, frequency, genital sores, hematuria, impotence and urgency.   Musculoskeletal: Negative for arthralgias, joint swelling and myalgias.   Skin: Negative for rash.   Neurological: Negative for dizziness, weakness, headaches and paresthesias.   Psychiatric/Behavioral: Negative for mood changes. The patient is not nervous/anxious.          OBJECTIVE:   BP (!) 159/74 (BP Location: Left arm, Patient Position: Sitting, Cuff Size: Adult Regular)   Pulse (!) 48   Temp 97.8  F (36.6  C) (Oral)   Ht 1.778 m (5' 10\")   Wt 79.4 kg (175 lb)   BMI 25.11 kg/m   Estimated body mass index is 25.11 kg/m  as calculated from the following:    Height as of this encounter: 1.778 m (5' 10\").    Weight as of this encounter: 79.4 kg (175 lb).  Physical Exam  GENERAL: healthy, alert and no distress  EYES: Eyes grossly normal to inspection, PERRL and conjunctivae and sclerae normal  HENT: ear canals and TM's normal, nose and mouth without ulcers or lesions  NECK: no adenopathy, no asymmetry, masses, or scars and thyroid normal to palpation  RESP: lungs clear to auscultation - no rales, rhonchi or wheezes  CV: regular rate and rhythm, normal S1 S2, no S3 or S4, no murmur, click or rub, no peripheral edema and peripheral pulses strong  ABDOMEN: soft, nontender, no hepatosplenomegaly, no masses and bowel sounds normal  MS: no gross musculoskeletal defects noted, no edema  SKIN: no suspicious lesions or rashes  NEURO: Normal strength and tone, mentation intact and speech normal  PSYCH: mentation appears normal, affect normal/bright    Diagnostic Test Results:  Labs reviewed in Epic    ASSESSMENT / PLAN:       " "ICD-10-CM    1. Encounter for preventative adult health care examination  Z00.00 CBC with platelets     Comprehensive metabolic panel     Lipid panel reflex to direct LDL Non-fasting     TSH with free T4 reflex     Prostate spec antigen screen     *UA reflex to Microscopic   2. Essential hypertension  I10 metoprolol succinate ER (TOPROL-XL) 25 MG 24 hr tablet     CBC with platelets     Comprehensive metabolic panel     Lipid panel reflex to direct LDL Non-fasting     TSH with free T4 reflex     *UA reflex to Microscopic   3. Paroxysmal atrial fibrillation (H)  I48.0 metoprolol succinate ER (TOPROL-XL) 25 MG 24 hr tablet   4. Screening for prostate cancer  Z12.5 Prostate spec antigen screen       Patient has been advised of split billing requirements and indicates understanding: Yes  COUNSELING:  Reviewed preventive health counseling, as reflected in patient instructions       Regular exercise       Healthy diet/nutrition       Vision screening       Hearing screening       Colon cancer screening       Prostate cancer screening    Estimated body mass index is 25.11 kg/m  as calculated from the following:    Height as of this encounter: 1.778 m (5' 10\").    Weight as of this encounter: 79.4 kg (175 lb).        He reports that he has never smoked. He has never used smokeless tobacco.      Appropriate preventive services were discussed with this patient, including applicable screening as appropriate for cardiovascular disease, diabetes, osteopenia/osteoporosis, and glaucoma.  As appropriate for age/gender, discussed screening for colorectal cancer, prostate cancer, breast cancer, and cervical cancer. Checklist reviewing preventive services available has been given to the patient.    Reviewed patients plan of care and provided an AVS. The Intermediate Care Plan ( asthma action plan, low back pain action plan, and migraine action plan) for Zbigniew meets the Care Plan requirement. This Care Plan has been established and " reviewed with the Patient.    Counseling Resources:  ATP IV Guidelines  Pooled Cohorts Equation Calculator  Breast Cancer Risk Calculator  Breast Cancer: Medication to Reduce Risk  FRAX Risk Assessment  ICSI Preventive Guidelines  Dietary Guidelines for Americans, 2010  USDA's MyPlate  ASA Prophylaxis  Lung CA Screening    Eagle Negrete MD  [facility]    Identified Health Risks:

## 2021-01-27 DIAGNOSIS — I48.0 PAROXYSMAL ATRIAL FIBRILLATION (H): Primary | ICD-10-CM

## 2021-01-27 LAB
ALBUMIN SERPL-MCNC: 3.8 G/DL (ref 3.4–5)
ALP SERPL-CCNC: 115 U/L (ref 40–150)
ALT SERPL W P-5'-P-CCNC: 15 U/L (ref 0–70)
ANION GAP SERPL CALCULATED.3IONS-SCNC: 6 MMOL/L (ref 3–14)
AST SERPL W P-5'-P-CCNC: 29 U/L (ref 0–45)
BILIRUB SERPL-MCNC: 0.4 MG/DL (ref 0.2–1.3)
BUN SERPL-MCNC: 22 MG/DL (ref 7–30)
CALCIUM SERPL-MCNC: 9.5 MG/DL (ref 8.5–10.1)
CHLORIDE SERPL-SCNC: 107 MMOL/L (ref 94–109)
CHOLEST SERPL-MCNC: 104 MG/DL
CO2 SERPL-SCNC: 27 MMOL/L (ref 20–32)
CREAT SERPL-MCNC: 1.06 MG/DL (ref 0.66–1.25)
GFR SERPL CREATININE-BSD FRML MDRD: 63 ML/MIN/{1.73_M2}
GLUCOSE SERPL-MCNC: 84 MG/DL (ref 70–99)
HDLC SERPL-MCNC: 42 MG/DL
LDLC SERPL CALC-MCNC: 41 MG/DL
NONHDLC SERPL-MCNC: 62 MG/DL
POTASSIUM SERPL-SCNC: 4.9 MMOL/L (ref 3.4–5.3)
PROT SERPL-MCNC: 7.7 G/DL (ref 6.8–8.8)
PSA SERPL-ACNC: 1.14 UG/L (ref 0–4)
SODIUM SERPL-SCNC: 140 MMOL/L (ref 133–144)
TRIGL SERPL-MCNC: 103 MG/DL
TSH SERPL DL<=0.005 MIU/L-ACNC: 2.62 MU/L (ref 0.4–4)

## 2021-02-11 ENCOUNTER — OFFICE VISIT (OUTPATIENT)
Dept: CARDIOLOGY | Facility: CLINIC | Age: 86
End: 2021-02-11
Payer: COMMERCIAL

## 2021-02-11 VITALS
DIASTOLIC BLOOD PRESSURE: 66 MMHG | SYSTOLIC BLOOD PRESSURE: 136 MMHG | OXYGEN SATURATION: 95 % | BODY MASS INDEX: 25.34 KG/M2 | HEIGHT: 70 IN | HEART RATE: 60 BPM | WEIGHT: 177 LBS

## 2021-02-11 DIAGNOSIS — I48.0 PAROXYSMAL ATRIAL FIBRILLATION (H): ICD-10-CM

## 2021-02-11 DIAGNOSIS — I10 ESSENTIAL HYPERTENSION: ICD-10-CM

## 2021-02-11 DIAGNOSIS — Z71.89 ENCOUNTER FOR ANTICOAGULATION DISCUSSION AND COUNSELING: Primary | ICD-10-CM

## 2021-02-11 PROCEDURE — 99204 OFFICE O/P NEW MOD 45 MIN: CPT | Performed by: INTERNAL MEDICINE

## 2021-02-11 PROCEDURE — 93000 ELECTROCARDIOGRAM COMPLETE: CPT | Performed by: INTERNAL MEDICINE

## 2021-02-11 ASSESSMENT — MIFFLIN-ST. JEOR: SCORE: 1489.12

## 2021-02-11 NOTE — PROGRESS NOTES
Service Date: 02/11/2021      PRIMARY CARE AND REFERRING PROVIDER:  Dr. Eagle Negrete, [place], [place].      REASON FOR VISIT:   1.  To discuss anticoagulation recommendations for paroxysmal atrial fibrillation.      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  He is , has 6 adult children, several of whom are in the medical field.      His medical history is significant for paroxysmal atrial fibrillation (for which he saw my partner, Dr. Escobar, in 2015), optimally treated essential hypertension (his blood pressure tends to run in doctors' offices, but at home they are less than 140/70), prostatic hypertrophy.  He has never used tobacco products, is an avid exerciser, and BMI is 25 kg/m2.      I am seeing Dr. Mendoza specifically for the question of anticoagulation.  He has had paroxysmal atrial fibrillation and approximately once a year gets an episode of tachy palpitations, presents to the emergency room and while he is waiting, reverts back to sinus rhythm.  His last such ER visit was a couple of months ago in 12/2020.  He has never needed any kind of cardioversion.  Occasionally, he cardioverts with IV metoprolol.  Based on this, his metoprolol was increased from 12.5 mg daily to 25 mg daily.  It would be fair to say that at other times he is entirely asymptomatic.  He does cycling, walking, weights and has a home gym.      Two of his children are in medical school.  One is in the third year at [employer].  His son is just starting a Gastroenterology fellowship at [employer].  Both of them have recommended to him that he should be on systemic anticoagulation for stroke and thromboembolic prophylaxis, and he is here for a formal opinion.      Dr. Mendoza has never had a stroke or a TIA before, he has a normal LVEF  and no significant valve disease, not diabetic.  His CHADS-VASc score is 2 (age and hypertension) which translates to a 4% annual risk of stroke.      DIAGNOSTIC DATA:     ECG done today shows sinus rhythm with frequent monomorphic ventricular ectopic beats.  Sinus rate is 60 BPM.      Transthoracic echocardiogram -- 09/2019 -- Normal LVEF of 60%, mild MR and TR, normal right ventricular systolic function, normal atrial size.      Labs -- LDL 41, creatinine 1.0, hemoglobin 14.4, TSH normal.      PHYSICAL EXAMINATION:    Pertinent for a gentleman who appears much younger than his stated age of 86 years, has good muscle mass, and a normal cardiovascular exam, currently in sinus rhythm.  Please see attached note for details.      DIAGNOSES:   1.  Encounter for anticoagulation counseling for paroxysmal atrial fibrillation with a CHADS-VASc score of 2.   2.  Optimally treated, benign essential hypertension.   3.  Paroxysmal atrial fibrillation.      ASSESSMENT AND PLAN:   1.  I completely agree with the opinions put forth by Dr. Mendoza's children who are in medical school/physicians.  With a CHADS-VASc score of 2, his yearly risk of stroke is 4%, which is much less than his bleeding risk.  Also, he is highly functional, still employed as a psychiatrist, enjoys a high level of exercise, and a stroke would be devastating.  We talked about my recommendation is that he should stop aspirin 81 mg and start on systemic anticoagulation.  Having reviewed both rivaroxaban and apixaban, he has elected apixaban.   2.  Stop aspirin 81 mg.   3.  Start Eliquis (apixaban) 5 mg 2 times daily.   4.  Follow up with Cardiology as needed.  I specifically said that if he continues to have recurrent episodes of breakthrough paroxysmal atrial fibrillation, medical therapy will be challenging because his resting pulse is 60 BPM.  Therefore, I would refer him to one of my EP colleagues to discuss the option of catheter ablation.      cc:   Eagle  MD Caden    [facility]    [address], [address]    [place]  [address]         [facility] JUANJO ROBLERO MD             D: 2021   T: 2021   MT: JACKELINE      Name:     FARZANEH FOURNIER   MRN:      [id number]        Account:      [id number]   :      1934           Service Date: 2021      Document: [id number]

## 2021-02-11 NOTE — LETTER
2/11/2021    Eagle Negrete MD  [address] [facility] [address]    RE: Zbigniew Mendoza       Dear Colleague,    I had the pleasure of seeing Zbigniew Mendoza in the [facility].    Clinic visit note dictated. Dictation reference number - [id number]        REVIEW OF SYSTEMS:  A comprehensive 10-point review of systems was completed and the pertinent positives are documented in the history of present illness.    Skin:  Negative     Eyes:  Positive for glasses  ENT:  Positive for hearing loss  Respiratory:  Negative    Cardiovascular:  Negative    Gastroenterology: Negative    Genitourinary:  Positive for prostate problem  Musculoskeletal:  Positive for back pain  Neurologic:  Positive for    Psychiatric:  Negative    Heme/Lymph/Imm:  Negative    Endocrine:  Negative      CURRENT MEDICATIONS:  Current Outpatient Medications   Medication Sig Dispense Refill     apixaban ANTICOAGULANT (ELIQUIS) 5 MG tablet Take 1 tablet (5 mg) by mouth 2 times daily 60 tablet 4     losartan (COZAAR) 100 MG tablet TAKE 1 TABLET BY MOUTH EVERY DAY 90 tablet 3     metoprolol succinate ER (TOPROL-XL) 25 MG 24 hr tablet Take 1 tablet (25 mg) by mouth daily 90 tablet 3     Multiple Vitamins-Minerals (MULTIVITAL) TABS Take  by mouth daily.       tamsulosin (FLOMAX) 0.4 MG capsule TAKE 1 CAPSULE BY MOUTH EVERY DAY 90 capsule 0         ALLERGIES:  Allergies   Allergen Reactions     Ciprofloxacin      Acute confusion       PAST MEDICAL HISTORY:    Past Medical History:   Diagnosis Date     Atrial fibrillation      Calculus of kidney      Mitral prolapse     mild, with mild MR     Prostate infection        PAST SURGICAL HISTORY:    Past Surgical History:   Procedure Laterality Date     ARTHROSCOPY SHOULDER ROTATOR CUFF REPAIR Right 1996     BACK SURGERY       CYSTOSCOPY, TRANSURETHRAL RESECTION (TUR) PROSTATE, COMBINED  04/93    S/P TURP       FAMILY HISTORY:    Family History   Problem  "Relation Age of Onset     Coronary Artery Disease Father      No Known Problems Mother        SOCIAL HISTORY:    Social History     Socioeconomic History     Marital status:      Spouse name: None     Number of children: None     Years of education: None     Highest education level: None   Occupational History     None   Social Needs     Financial resource strain: None     Food insecurity     Worry: None     Inability: None     Transportation needs     Medical: None     Non-medical: None   Tobacco Use     Smoking status: Never Smoker     Smokeless tobacco: Never Used   Substance and Sexual Activity     Alcohol use: Yes     Comment: very rare if at all     Drug use: No     Sexual activity: Not Currently   Lifestyle     Physical activity     Days per week: None     Minutes per session: None     Stress: None   Relationships     Social connections     Talks on phone: None     Gets together: None     Attends Zoroastrianism service: None     Active member of club or organization: None     Attends meetings of clubs or organizations: None     Relationship status: None     Intimate partner violence     Fear of current or ex partner: None     Emotionally abused: None     Physically abused: None     Forced sexual activity: None   Other Topics Concern     Parent/sibling w/ CABG, MI or angioplasty before 65F 55M? Not Asked   Social History Narrative     None       PHYSICAL EXAM:    Vitals: /66 (BP Location: Right arm, Patient Position: Sitting, Cuff Size: Adult Regular)   Pulse 60   Ht 1.778 m (5' 10\")   Wt 80.3 kg (177 lb)   SpO2 95%   BMI 25.40 kg/m    Wt Readings from Last 5 Encounters:   02/11/21 80.3 kg (177 lb)   01/26/21 79.4 kg (175 lb)   02/26/20 78.9 kg (174 lb)   02/24/20 79.4 kg (175 lb)   02/11/20 79.4 kg (175 lb)       Constitutional: Comfortable at rest. Cooperative, alert and oriented, well developed, well nourished.  Eyes: Pupils equal and round, conjunctivae and lids unremarkable, sclera white, no " xanthalasma,   ENT: Satisfactory dentition.  No cyanosis or pallor.  Neck: Carotid pulses are full and equal bilaterally, no carotid bruit, no thyromegaly     Respiratory: Normal respiratory effort with symmetrical chest wall movements and no use of accessory muscles. Good air entry with normal breath sounds and no rales or wheeze.  Cardiovascular: Normal jugular venous pulse and pressure.  Normal carotid pulse character and volume.  No carotid bruit.  Apical impulse is undisplaced and normal to palpation without parasternal heave or thrill.  Heart sounds are normal and regular. No audible murmur. No S3, S4 or friction rub.    GI: Soft, nontender, no hepatosplenomegaly, no masses, active bowel sounds.    Skin: No rash, erythema, ecchymosis.  Musculoskeletal: Normal muscle tone and strength. Normal gait. No spinal deformities.  Neuropsychiatric: Oriented to time place and person.  Affect normal.  No gross motor deficits.  Extremities: No edema. No clubbing or deformities.        Encounter Diagnoses   Name Primary?     Encounter for anticoagulation discussion and counseling Yes     Paroxysmal atrial fibrillation (H)      Essential hypertension        No orders of the defined types were placed in this encounter.                Thank you for allowing me to participate in the care of your patient.      Sincerely,     Tong Lal MD     [facility]  cc:   Eagle Negrete MD  [address] [place] [address]

## 2021-02-11 NOTE — PROGRESS NOTES
Clinic visit note dictated. Dictation reference number - [id number]        REVIEW OF SYSTEMS:  A comprehensive 10-point review of systems was completed and the pertinent positives are documented in the history of present illness.    Skin:  Negative     Eyes:  Positive for glasses  ENT:  Positive for hearing loss  Respiratory:  Negative    Cardiovascular:  Negative    Gastroenterology: Negative    Genitourinary:  Positive for prostate problem  Musculoskeletal:  Positive for back pain  Neurologic:  Positive for    Psychiatric:  Negative    Heme/Lymph/Imm:  Negative    Endocrine:  Negative      CURRENT MEDICATIONS:  Current Outpatient Medications   Medication Sig Dispense Refill     apixaban ANTICOAGULANT (ELIQUIS) 5 MG tablet Take 1 tablet (5 mg) by mouth 2 times daily 60 tablet 4     losartan (COZAAR) 100 MG tablet TAKE 1 TABLET BY MOUTH EVERY DAY 90 tablet 3     metoprolol succinate ER (TOPROL-XL) 25 MG 24 hr tablet Take 1 tablet (25 mg) by mouth daily 90 tablet 3     Multiple Vitamins-Minerals (MULTIVITAL) TABS Take  by mouth daily.       tamsulosin (FLOMAX) 0.4 MG capsule TAKE 1 CAPSULE BY MOUTH EVERY DAY 90 capsule 0         ALLERGIES:  Allergies   Allergen Reactions     Ciprofloxacin      Acute confusion       PAST MEDICAL HISTORY:    Past Medical History:   Diagnosis Date     Atrial fibrillation      Calculus of kidney      Mitral prolapse     mild, with mild MR     Prostate infection        PAST SURGICAL HISTORY:    Past Surgical History:   Procedure Laterality Date     ARTHROSCOPY SHOULDER ROTATOR CUFF REPAIR Right 1996     BACK SURGERY       CYSTOSCOPY, TRANSURETHRAL RESECTION (TUR) PROSTATE, COMBINED  04/93    S/P TURP       FAMILY HISTORY:    Family History   Problem Relation Age of Onset     Coronary Artery Disease Father      No Known Problems Mother        SOCIAL HISTORY:    Social History     Socioeconomic History     Marital status:      Spouse name: None     Number of children: None     Years of  "education: None     Highest education level: None   Occupational History     None   Social Needs     Financial resource strain: None     Food insecurity     Worry: None     Inability: None     Transportation needs     Medical: None     Non-medical: None   Tobacco Use     Smoking status: Never Smoker     Smokeless tobacco: Never Used   Substance and Sexual Activity     Alcohol use: Yes     Comment: very rare if at all     Drug use: No     Sexual activity: Not Currently   Lifestyle     Physical activity     Days per week: None     Minutes per session: None     Stress: None   Relationships     Social connections     Talks on phone: None     Gets together: None     Attends Oriental orthodox service: None     Active member of club or organization: None     Attends meetings of clubs or organizations: None     Relationship status: None     Intimate partner violence     Fear of current or ex partner: None     Emotionally abused: None     Physically abused: None     Forced sexual activity: None   Other Topics Concern     Parent/sibling w/ CABG, MI or angioplasty before 65F 55M? Not Asked   Social History Narrative     None       PHYSICAL EXAM:    Vitals: /66 (BP Location: Right arm, Patient Position: Sitting, Cuff Size: Adult Regular)   Pulse 60   Ht 1.778 m (5' 10\")   Wt 80.3 kg (177 lb)   SpO2 95%   BMI 25.40 kg/m    Wt Readings from Last 5 Encounters:   02/11/21 80.3 kg (177 lb)   01/26/21 79.4 kg (175 lb)   02/26/20 78.9 kg (174 lb)   02/24/20 79.4 kg (175 lb)   02/11/20 79.4 kg (175 lb)       Constitutional: Comfortable at rest. Cooperative, alert and oriented, well developed, well nourished.  Eyes: Pupils equal and round, conjunctivae and lids unremarkable, sclera white, no xanthalasma,   ENT: Satisfactory dentition.  No cyanosis or pallor.  Neck: Carotid pulses are full and equal bilaterally, no carotid bruit, no thyromegaly     Respiratory: Normal respiratory effort with symmetrical chest wall movements and no use " of accessory muscles. Good air entry with normal breath sounds and no rales or wheeze.  Cardiovascular: Normal jugular venous pulse and pressure.  Normal carotid pulse character and volume.  No carotid bruit.  Apical impulse is undisplaced and normal to palpation without parasternal heave or thrill.  Heart sounds are normal and regular. No audible murmur. No S3, S4 or friction rub.    GI: Soft, nontender, no hepatosplenomegaly, no masses, active bowel sounds.    Skin: No rash, erythema, ecchymosis.  Musculoskeletal: Normal muscle tone and strength. Normal gait. No spinal deformities.  Neuropsychiatric: Oriented to time place and person.  Affect normal.  No gross motor deficits.  Extremities: No edema. No clubbing or deformities.        Encounter Diagnoses   Name Primary?     Encounter for anticoagulation discussion and counseling Yes     Paroxysmal atrial fibrillation (H)      Essential hypertension        No orders of the defined types were placed in this encounter.

## 2021-02-11 NOTE — PATIENT INSTRUCTIONS
1. Stop Aspirin.    2. Start Eliquis (Apixaban) 5 mg two times daily.    3. Continue the Metoprolol.    4. Follow up with Dr. Lal, as needed.    If you have any questions or concerns, please contact my nurses at [phone].

## 2021-02-11 NOTE — LETTER
2/11/2021      Eagle Negrete MD  [address] [facility] [address]      RE: Zbigniew Mendoza       Dear Colleague,    I had the pleasure of seeing Zbigniew Mendoza in the [facility].    Service Date: 02/11/2021      PRIMARY CARE AND REFERRING PROVIDER:  Dr. Eagle Negrete, [place], [place].      REASON FOR VISIT:   1.  To discuss anticoagulation recommendations for paroxysmal atrial fibrillation.      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  He is , has 6 adult children, several of whom are in the medical field.      His medical history is significant for paroxysmal atrial fibrillation (for which he saw my partner, Dr. Escobar, in 2015), optimally treated essential hypertension (his blood pressure tends to run in doctors' offices, but at home they are less than 140/70), prostatic hypertrophy.  He has never used tobacco products, is an avid exerciser, and BMI is 25 kg/m2.      I am seeing Dr. Mendoza specifically for the question of anticoagulation.  He has had paroxysmal atrial fibrillation and approximately once a year gets an episode of tachy palpitations, presents to the emergency room and while he is waiting, reverts back to sinus rhythm.  His last such ER visit was a couple of months ago in 12/2020.  He has never needed any kind of cardioversion.  Occasionally, he cardioverts with IV metoprolol.  Based on this, his metoprolol was increased from 12.5 mg daily to 25 mg daily.  It would be fair to say that at other times he is entirely asymptomatic.  He does cycling, walking, weights and has a home gym.      Two of his children are in medical school.  One is in the third year at [employer].  His son is just starting a Gastroenterology fellowship  at [employer].  Both of them have recommended to him that he should be on systemic anticoagulation for stroke and thromboembolic prophylaxis, and he is here for a formal opinion.      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  His CHADS-VASc score is 2 (age and hypertension) which translates to a 4% annual risk of stroke.      DIAGNOSTIC DATA:     ECG done today shows sinus rhythm with frequent monomorphic ventricular ectopic beats.  Sinus rate is 60 BPM.      Transthoracic echocardiogram -- 09/2019 -- Normal LVEF of 60%, mild MR and TR, normal right ventricular systolic function, normal atrial size.      Labs -- LDL 41, creatinine 1.0, hemoglobin 14.4, TSH normal.      PHYSICAL EXAMINATION:  Pertinent for a gentleman who appears much younger than his stated age of 86 years, has good muscle mass, and a normal cardiovascular exam, currently in sinus rhythm.  Please see attached note for details.      DIAGNOSES:   1.  Encounter for anticoagulation counseling for paroxysmal atrial fibrillation with a CHADS-VASc score of 2.   2.  Optimally treated, benign essential hypertension.   3.  Paroxysmal atrial fibrillation.      ASSESSMENT AND PLAN:   1.  I completely agree with the opinions put forth by Dr. Mendoza's children who are in medical school/physicians.  With a CHADS-VASc score of 2, his yearly risk of stroke is 4%, which is much less than his bleeding risk.  Also, he is highly functional, still employed as a psychiatrist, enjoys a high level of exercise, and a stroke would be devastating.  We talked about my recommendation is that he should stop aspirin 81 mg and start on systemic anticoagulation.  Having reviewed both rivaroxaban and apixaban, he has elected apixaban.   2.  Stop aspirin 81 mg.   3.  Start Eliquis (apixaban) 5 mg 2 times daily.   4.  Follow up with Cardiology as needed.  I specifically said that if he continues to have recurrent  episodes of breakthrough paroxysmal atrial fibrillation, medical therapy will be challenging because his resting pulse is 60 BPM.  Therefore, I would refer him to one of my EP colleagues to discuss the option of catheter ablation.      cc:   Eagle Negrete MD    [facility]    [address], [address]    [place]  [address]         JOAQUÍN LAL MD             D: 2021   T: 2021   MT: JACKELINE      Name:     FARZANEH FOURNIER   MRN:      [id number]        Account:      [id number]   :      1934           Service Date: 2021      Document: [id number]          Outpatient Encounter Medications as of 2021   Medication Sig Dispense Refill     apixaban ANTICOAGULANT (ELIQUIS) 5 MG tablet Take 1 tablet (5 mg) by mouth 2 times daily 60 tablet 4     losartan (COZAAR) 100 MG tablet TAKE 1 TABLET BY MOUTH EVERY DAY 90 tablet 3     metoprolol succinate ER (TOPROL-XL) 25 MG 24 hr tablet Take 1 tablet (25 mg) by mouth daily 90 tablet 3     Multiple Vitamins-Minerals (MULTIVITAL) TABS Take  by mouth daily.       tamsulosin (FLOMAX) 0.4 MG capsule TAKE 1 CAPSULE BY MOUTH EVERY DAY 90 capsule 0     [DISCONTINUED] aspirin (ASA) 81 MG EC tablet Take 1 tablet (81 mg) by mouth daily 100 tablet 3     [DISCONTINUED] propranolol (INDERAL) 10 MG tablet Take 1 tablet (10 mg) by mouth daily as needed (Anxiety) 90 tablet 0     No facility-administered encounter medications on file as of 2021.        Again, thank you for allowing me to participate in the care of your patient.      Sincerely,    Joaquín Lal MD     [facility]

## 2021-02-15 ENCOUNTER — IMMUNIZATION (OUTPATIENT)
Dept: PEDIATRICS | Facility: CLINIC | Age: 86
End: 2021-02-15
Payer: COMMERCIAL

## 2021-02-15 PROCEDURE — 91300 PR COVID VAC PFIZER DIL RECON 30 MCG/0.3 ML IM: CPT

## 2021-02-15 PROCEDURE — 0001A PR COVID VAC PFIZER DIL RECON 30 MCG/0.3 ML IM: CPT

## 2021-03-08 ENCOUNTER — IMMUNIZATION (OUTPATIENT)
Dept: PEDIATRICS | Facility: CLINIC | Age: 86
End: 2021-03-08
Attending: INTERNAL MEDICINE
Payer: COMMERCIAL

## 2021-03-08 PROCEDURE — 91300 PR COVID VAC PFIZER DIL RECON 30 MCG/0.3 ML IM: CPT

## 2021-03-08 PROCEDURE — 0002A PR COVID VAC PFIZER DIL RECON 30 MCG/0.3 ML IM: CPT

## 2021-06-29 DIAGNOSIS — F41.9 ANXIETY: ICD-10-CM

## 2021-07-01 RX ORDER — PROPRANOLOL HYDROCHLORIDE 10 MG/1
10 TABLET ORAL DAILY PRN
Qty: 90 TABLET | Refills: 0 | Status: SHIPPED | OUTPATIENT
Start: 2021-07-01 | End: 2021-12-22

## 2021-07-22 ENCOUNTER — MYC MEDICAL ADVICE (OUTPATIENT)
Dept: CARDIOLOGY | Facility: CLINIC | Age: 86
End: 2021-07-22

## 2021-07-22 NOTE — TELEPHONE ENCOUNTER
My Chart message from patient:  Good day: I have an upcoming visit with the dentist that may require some work beyond cleaning. What should I do re the eliquis I'm taking? No adverse effects so far; still taking 5 mg bid.  Similarly, I'm considering an epidural for back pain later in the season, although I understand from Ortho that the interventional radiologist will set paramenters.     Zbigniew Mendoza M.D.       Kristina, Dr. Mendoza,    Most dental offices do not ask for an eliquis hold for regular cleanings. We suggest that you call your team now and verify their protocols. For future visits with more extensive gum work, we ask that your dental team submit their preference for the hold and we can then approve the plan.    Dr. Lal is out of the clinic for another month or so. We can discuss a hold plan (if needed) with one of her partners.    For your possible back injection we recommend a similar plan - ask your provider for their preference and then we can reply back.    Thank you for preparing a plan before your procedures.  Team 2 RNs  [phone]

## 2021-08-11 ENCOUNTER — MYC MEDICAL ADVICE (OUTPATIENT)
Dept: CARDIOLOGY | Facility: CLINIC | Age: 86
End: 2021-08-11

## 2021-08-11 DIAGNOSIS — I48.0 PAROXYSMAL ATRIAL FIBRILLATION (H): ICD-10-CM

## 2021-08-11 NOTE — TELEPHONE ENCOUNTER
My Chart message 8-11-21  Good morning, Dr Lal:     Just attempted to have my apixaban refilled at [facility] on  [address] only to find it had been denied. I have 2 tabs left, so can you correct this?     Zbigniew Mendoza M.D.      Last Dr. Lal visit 2-11-21 To discuss anticoagulation recommendations for paroxysmal atrial fibrillation.   Recommendations:  2.  Stop aspirin 81 mg.   3.  Start Eliquis (apixaban) 5 mg 2 times daily.     Refill e scribed.

## 2021-08-12 ENCOUNTER — MYC MEDICAL ADVICE (OUTPATIENT)
Dept: CARDIOLOGY | Facility: CLINIC | Age: 86
End: 2021-08-12

## 2021-08-12 NOTE — TELEPHONE ENCOUNTER
My chart message from patient:  [facility] pharmacy in [place] indicated that they could not refill my script for apixaban since you had denied it. I am very close to having no tabs left. What is the problem?       Per yesterday's Epic note - refill was sent at 11:26  Called patient with update - he had not checked with the pharmacy yet today.

## 2021-09-04 ENCOUNTER — HEALTH MAINTENANCE LETTER (OUTPATIENT)
Age: 86
End: 2021-09-04

## 2021-11-12 ENCOUNTER — MYC MEDICAL ADVICE (OUTPATIENT)
Dept: INTERNAL MEDICINE | Facility: CLINIC | Age: 86
End: 2021-11-12
Payer: COMMERCIAL

## 2021-11-12 NOTE — TELEPHONE ENCOUNTER
Routing to provider to please see patient's [employer] message and advise.    Per [employer]: Tamsluosin can cause rhinitis (13.1% to 17.9% ).     Thank you!    Gabrielle LESLIE RN   [facility]

## 2021-11-16 ENCOUNTER — MYC MEDICAL ADVICE (OUTPATIENT)
Dept: INTERNAL MEDICINE | Facility: CLINIC | Age: 86
End: 2021-11-16
Payer: COMMERCIAL

## 2021-11-20 ENCOUNTER — MYC MEDICAL ADVICE (OUTPATIENT)
Dept: INTERNAL MEDICINE | Facility: CLINIC | Age: 86
End: 2021-11-20
Payer: COMMERCIAL

## 2021-12-22 ENCOUNTER — OFFICE VISIT (OUTPATIENT)
Dept: INTERNAL MEDICINE | Facility: CLINIC | Age: 86
End: 2021-12-22
Payer: COMMERCIAL

## 2021-12-22 VITALS
DIASTOLIC BLOOD PRESSURE: 66 MMHG | WEIGHT: 163 LBS | SYSTOLIC BLOOD PRESSURE: 156 MMHG | OXYGEN SATURATION: 99 % | HEIGHT: 70 IN | HEART RATE: 69 BPM | TEMPERATURE: 97.3 F | BODY MASS INDEX: 23.34 KG/M2

## 2021-12-22 DIAGNOSIS — K59.04 CHRONIC IDIOPATHIC CONSTIPATION: ICD-10-CM

## 2021-12-22 DIAGNOSIS — J31.0 CHRONIC RHINITIS: Primary | ICD-10-CM

## 2021-12-22 DIAGNOSIS — I10 ESSENTIAL HYPERTENSION: ICD-10-CM

## 2021-12-22 PROCEDURE — 99214 OFFICE O/P EST MOD 30 MIN: CPT | Performed by: INTERNAL MEDICINE

## 2021-12-22 RX ORDER — LOSARTAN POTASSIUM 25 MG/1
25 TABLET ORAL DAILY
Qty: 90 TABLET | Refills: 3 | Status: SHIPPED | OUTPATIENT
Start: 2021-12-22 | End: 2022-01-01

## 2021-12-22 RX ORDER — FLUTICASONE PROPIONATE 50 MCG
1 SPRAY, SUSPENSION (ML) NASAL DAILY
Qty: 16 G | Refills: 3 | Status: SHIPPED | OUTPATIENT
Start: 2021-12-22 | End: 2022-02-14

## 2021-12-22 ASSESSMENT — MIFFLIN-ST. JEOR: SCORE: 1420.61

## 2021-12-22 NOTE — PROGRESS NOTES
"  Assessment & Plan     Chronic rhinitis  Start on nasal steroid, consider Ipratropium   - fluticasone (FLONASE) 50 MCG/ACT nasal spray; Spray 1 spray into both nostrils daily    Essential hypertension  Start on low dose Losartan, continue Metoprolol.   - losartan (COZAAR) 25 MG tablet; Take 1 tablet (25 mg) by mouth daily    Chronic idiopathic constipation  Continue Miralax, add dietary fiber                See Patient Instructions    Return in about 2 months (around 2/22/2022) for Routine Visit, Physical Exam.    Eagle Negrete MD  [facility] MARIELLA Mireles is a 87 year old who presents for the following health issues     HPI   Chief Complaint   Patient presents with     Constipation     Improved since started on Miralax         Concern for constipation.   Has improved on Miralax. Has BMs every 2-3 days.   No abdominal pain. No blood in the stools. No nausea, vomiting.   Has concern for runny nose, all the time. Clear secretions. No pressure, no fevers, no HA.   Has chronic LBP, related to DDD. Controlled.   Has history of atrial fibrillation. On anticoagulation with Coumadin and rate control medications. Asymptonatic - no chest pains , palpitations,  no side effects from medications.  Has h/o HTN. on medical treatment. BP has been controlled. No side effects from medications. No CP, HA, dizziness. good compliance with medications and low salt diet.          Review of Systems   Constitutional, HEENT, cardiovascular, pulmonary, gi and gu systems are negative, except as otherwise noted.      Objective    BP (!) 156/66 (BP Location: Right arm, Patient Position: Sitting, Cuff Size: Adult Regular)   Pulse 69   Temp 97.3  F (36.3  C) (Oral)   Ht 1.778 m (5' 10\")   Wt 73.9 kg (163 lb)   SpO2 99%   BMI 23.39 kg/m    Body mass index is 23.39 kg/m .  Physical Exam   GENERAL: healthy, alert and no distress  NECK: no adenopathy, no asymmetry, masses, or scars and thyroid normal to " palpation  RESP: lungs clear to auscultation - no rales, rhonchi or wheezes  CV: regular rate and rhythm, normal S1 S2, no S3 or S4, no murmur, click or rub, no peripheral edema and peripheral pulses strong  ABDOMEN: soft, nontender, no hepatosplenomegaly, no masses and bowel sounds normal  MS: no gross musculoskeletal defects noted, no edema    Office Visit on 01/26/2021   Component Date Value Ref Range Status     WBC 01/26/2021 4.7  4.0 - 11.0 10e9/L Final     RBC Count 01/26/2021 4.44  4.4 - 5.9 10e12/L Final     Hemoglobin 01/26/2021 14.4  13.3 - 17.7 g/dL Final     Hematocrit 01/26/2021 43.9  40.0 - 53.0 % Final     MCV 01/26/2021 99  78 - 100 fl Final     MCH 01/26/2021 32.4  26.5 - 33.0 pg Final     MCHC 01/26/2021 32.8  31.5 - 36.5 g/dL Final     RDW 01/26/2021 14.5  10.0 - 15.0 % Final     Platelet Count 01/26/2021 126* 150 - 450 10e9/L Final     Sodium 01/26/2021 140  133 - 144 mmol/L Final     Potassium 01/26/2021 4.9  3.4 - 5.3 mmol/L Final     Chloride 01/26/2021 107  94 - 109 mmol/L Final     Carbon Dioxide 01/26/2021 27  20 - 32 mmol/L Final     Anion Gap 01/26/2021 6  3 - 14 mmol/L Final     Glucose 01/26/2021 84  70 - 99 mg/dL Final    Non Fasting     Urea Nitrogen 01/26/2021 22  7 - 30 mg/dL Final     Creatinine 01/26/2021 1.06  0.66 - 1.25 mg/dL Final     GFR Estimate 01/26/2021 63  >60 mL/min/[1.73_m2] Final    Comment: Non  GFR Calc  Starting 12/18/2018, serum creatinine based estimated GFR (eGFR) will be   calculated using the Chronic Kidney Disease Epidemiology Collaboration   (CKD-EPI) equation.       GFR Estimate If Black 01/26/2021 73  >60 mL/min/[1.73_m2] Final    Comment:  GFR Calc  Starting 12/18/2018, serum creatinine based estimated GFR (eGFR) will be   calculated using the Chronic Kidney Disease Epidemiology Collaboration   (CKD-EPI) equation.       Calcium 01/26/2021 9.5  8.5 - 10.1 mg/dL Final     Bilirubin Total 01/26/2021 0.4  0.2 - 1.3 mg/dL Final      Albumin 01/26/2021 3.8  3.4 - 5.0 g/dL Final     Protein Total 01/26/2021 7.7  6.8 - 8.8 g/dL Final     Alkaline Phosphatase 01/26/2021 115  40 - 150 U/L Final     ALT 01/26/2021 15  0 - 70 U/L Final     AST 01/26/2021 29  0 - 45 U/L Final     Cholesterol 01/26/2021 104  <200 mg/dL Final     Triglycerides 01/26/2021 103  <150 mg/dL Final    Non Fasting     HDL Cholesterol 01/26/2021 42  >39 mg/dL Final     LDL Cholesterol Calculated 01/26/2021 41  <100 mg/dL Final    Desirable:       <100 mg/dl     Non HDL Cholesterol 01/26/2021 62  <130 mg/dL Final     TSH 01/26/2021 2.62  0.40 - 4.00 mU/L Final     PSA 01/26/2021 1.14  0 - 4 ug/L Final    Assay Method:  Chemiluminescence using Siemens Vista analyzer     Color Urine 01/26/2021 Yellow   Final     Appearance Urine 01/26/2021 Clear   Final     Glucose Urine 01/26/2021 Negative  NEG^Negative mg/dL Final     Bilirubin Urine 01/26/2021 Negative  NEG^Negative Final     Ketones Urine 01/26/2021 Trace* NEG^Negative mg/dL Final     Specific Gravity Urine 01/26/2021 >1.030  1.003 - 1.035 Final     Blood Urine 01/26/2021 Trace* NEG^Negative Final     pH Urine 01/26/2021 6.0  5.0 - 7.0 pH Final     Protein Albumin Urine 01/26/2021 Negative  NEG^Negative mg/dL Final     Urobilinogen Urine 01/26/2021 0.2  0.2 - 1.0 EU/dL Final     Nitrite Urine 01/26/2021 Negative  NEG^Negative Final     Leukocyte Esterase Urine 01/26/2021 Negative  NEG^Negative Final     Source 01/26/2021 Midstream Urine   Final     WBC Urine 01/26/2021 0 - 5  OTO5^0 - 5 /HPF Final     RBC Urine 01/26/2021 2-5* OTO2^O - 2 /HPF Final     Squamous Epithelial /LPF Urine 01/26/2021 Few  FEW^Few /LPF Final     Bacteria Urine 01/26/2021 Moderate* NEG^Negative /HPF Final     Mucous Urine 01/26/2021 Present* NEG^Negative /LPF Final

## 2022-01-01 ENCOUNTER — TRANSFERRED RECORDS (OUTPATIENT)
Dept: HEALTH INFORMATION MANAGEMENT | Facility: CLINIC | Age: 87
End: 2022-01-01

## 2022-01-01 ENCOUNTER — MYC MEDICAL ADVICE (OUTPATIENT)
Dept: INTERNAL MEDICINE | Facility: CLINIC | Age: 87
End: 2022-01-01

## 2022-01-01 ENCOUNTER — MYC MEDICAL ADVICE (OUTPATIENT)
Dept: INTERNAL MEDICINE | Facility: CLINIC | Age: 87
End: 2022-01-01
Payer: COMMERCIAL

## 2022-01-01 ENCOUNTER — ANCILLARY PROCEDURE (OUTPATIENT)
Dept: GENERAL RADIOLOGY | Facility: CLINIC | Age: 87
End: 2022-01-01
Attending: PODIATRIST
Payer: COMMERCIAL

## 2022-01-01 ENCOUNTER — OFFICE VISIT (OUTPATIENT)
Dept: INTERNAL MEDICINE | Facility: CLINIC | Age: 87
End: 2022-01-01
Payer: COMMERCIAL

## 2022-01-01 ENCOUNTER — TELEPHONE (OUTPATIENT)
Dept: INTERNAL MEDICINE | Facility: CLINIC | Age: 87
End: 2022-01-01

## 2022-01-01 ENCOUNTER — HOSPITAL ENCOUNTER (EMERGENCY)
Facility: CLINIC | Age: 87
Discharge: HOME | End: 2022-09-04
Attending: EMERGENCY MEDICINE | Admitting: EMERGENCY MEDICINE
Payer: COMMERCIAL

## 2022-01-01 ENCOUNTER — ANESTHESIA (OUTPATIENT)
Dept: SURGERY | Facility: CLINIC | Age: 87
End: 2022-01-01
Payer: COMMERCIAL

## 2022-01-01 ENCOUNTER — TRANSFERRED RECORDS (OUTPATIENT)
Dept: INTERNAL MEDICINE | Facility: CLINIC | Age: 87
End: 2022-01-01

## 2022-01-01 ENCOUNTER — OFFICE VISIT (OUTPATIENT)
Dept: PODIATRY | Facility: CLINIC | Age: 87
End: 2022-01-01
Payer: COMMERCIAL

## 2022-01-01 ENCOUNTER — MEDICAL CORRESPONDENCE (OUTPATIENT)
Dept: HEALTH INFORMATION MANAGEMENT | Facility: CLINIC | Age: 87
End: 2022-01-01

## 2022-01-01 ENCOUNTER — HOSPITAL ENCOUNTER (OUTPATIENT)
Facility: CLINIC | Age: 87
Discharge: HOME | End: 2022-12-01
Attending: INTERNAL MEDICINE | Admitting: INTERNAL MEDICINE
Payer: COMMERCIAL

## 2022-01-01 ENCOUNTER — OFFICE VISIT (OUTPATIENT)
Dept: UROLOGY | Facility: CLINIC | Age: 87
End: 2022-01-01
Payer: COMMERCIAL

## 2022-01-01 ENCOUNTER — OFFICE VISIT (OUTPATIENT)
Dept: URGENT CARE | Facility: URGENT CARE | Age: 87
End: 2022-01-01
Payer: COMMERCIAL

## 2022-01-01 ENCOUNTER — HOSPITAL ENCOUNTER (OUTPATIENT)
Dept: CT IMAGING | Facility: CLINIC | Age: 87
Discharge: HOME | End: 2022-09-15
Attending: NURSE PRACTITIONER | Admitting: NURSE PRACTITIONER
Payer: COMMERCIAL

## 2022-01-01 ENCOUNTER — ANESTHESIA EVENT (OUTPATIENT)
Dept: SURGERY | Facility: CLINIC | Age: 87
End: 2022-01-01
Payer: COMMERCIAL

## 2022-01-01 ENCOUNTER — HOSPITAL ENCOUNTER (EMERGENCY)
Facility: CLINIC | Age: 87
Discharge: HOME | End: 2022-08-11
Attending: EMERGENCY MEDICINE | Admitting: EMERGENCY MEDICINE
Payer: COMMERCIAL

## 2022-01-01 ENCOUNTER — HOSPITAL ENCOUNTER (OUTPATIENT)
Dept: MRI IMAGING | Facility: CLINIC | Age: 87
Discharge: HOME | End: 2022-10-29
Attending: NURSE PRACTITIONER | Admitting: NURSE PRACTITIONER
Payer: COMMERCIAL

## 2022-01-01 ENCOUNTER — LAB (OUTPATIENT)
Dept: LAB | Facility: CLINIC | Age: 87
End: 2022-01-01
Payer: COMMERCIAL

## 2022-01-01 VITALS
OXYGEN SATURATION: 95 % | HEIGHT: 70 IN | TEMPERATURE: 97.5 F | WEIGHT: 139.7 LBS | RESPIRATION RATE: 16 BRPM | SYSTOLIC BLOOD PRESSURE: 124 MMHG | DIASTOLIC BLOOD PRESSURE: 74 MMHG | BODY MASS INDEX: 20 KG/M2 | HEART RATE: 73 BPM

## 2022-01-01 VITALS
HEART RATE: 73 BPM | BODY MASS INDEX: 21.07 KG/M2 | TEMPERATURE: 98 F | OXYGEN SATURATION: 98 % | SYSTOLIC BLOOD PRESSURE: 150 MMHG | RESPIRATION RATE: 18 BRPM | WEIGHT: 147.2 LBS | DIASTOLIC BLOOD PRESSURE: 66 MMHG | HEIGHT: 70 IN

## 2022-01-01 VITALS — DIASTOLIC BLOOD PRESSURE: 90 MMHG | WEIGHT: 162 LBS | BODY MASS INDEX: 23.24 KG/M2 | SYSTOLIC BLOOD PRESSURE: 132 MMHG

## 2022-01-01 VITALS
SYSTOLIC BLOOD PRESSURE: 116 MMHG | WEIGHT: 140 LBS | HEIGHT: 70 IN | DIASTOLIC BLOOD PRESSURE: 56 MMHG | BODY MASS INDEX: 20.04 KG/M2

## 2022-01-01 VITALS — BODY MASS INDEX: 20.09 KG/M2 | SYSTOLIC BLOOD PRESSURE: 112 MMHG | DIASTOLIC BLOOD PRESSURE: 60 MMHG | WEIGHT: 140 LBS

## 2022-01-01 VITALS
RESPIRATION RATE: 16 BRPM | WEIGHT: 155.3 LBS | OXYGEN SATURATION: 100 % | TEMPERATURE: 96.9 F | HEIGHT: 70 IN | HEART RATE: 68 BPM | DIASTOLIC BLOOD PRESSURE: 62 MMHG | SYSTOLIC BLOOD PRESSURE: 132 MMHG | BODY MASS INDEX: 22.23 KG/M2

## 2022-01-01 VITALS
OXYGEN SATURATION: 100 % | WEIGHT: 162 LBS | HEART RATE: 55 BPM | TEMPERATURE: 97 F | BODY MASS INDEX: 23.19 KG/M2 | DIASTOLIC BLOOD PRESSURE: 62 MMHG | HEIGHT: 70 IN | SYSTOLIC BLOOD PRESSURE: 108 MMHG

## 2022-01-01 VITALS
TEMPERATURE: 97 F | RESPIRATION RATE: 16 BRPM | HEART RATE: 60 BPM | HEIGHT: 70 IN | SYSTOLIC BLOOD PRESSURE: 136 MMHG | WEIGHT: 142.7 LBS | OXYGEN SATURATION: 100 % | DIASTOLIC BLOOD PRESSURE: 66 MMHG | BODY MASS INDEX: 20.43 KG/M2

## 2022-01-01 VITALS
OXYGEN SATURATION: 97 % | HEART RATE: 62 BPM | RESPIRATION RATE: 18 BRPM | SYSTOLIC BLOOD PRESSURE: 150 MMHG | TEMPERATURE: 98.3 F | DIASTOLIC BLOOD PRESSURE: 90 MMHG

## 2022-01-01 VITALS
OXYGEN SATURATION: 100 % | WEIGHT: 140.4 LBS | TEMPERATURE: 99.2 F | RESPIRATION RATE: 16 BRPM | SYSTOLIC BLOOD PRESSURE: 105 MMHG | HEIGHT: 70 IN | BODY MASS INDEX: 20.1 KG/M2 | HEART RATE: 78 BPM | DIASTOLIC BLOOD PRESSURE: 60 MMHG

## 2022-01-01 VITALS
DIASTOLIC BLOOD PRESSURE: 70 MMHG | RESPIRATION RATE: 18 BRPM | OXYGEN SATURATION: 100 % | SYSTOLIC BLOOD PRESSURE: 135 MMHG | HEART RATE: 65 BPM | TEMPERATURE: 99 F

## 2022-01-01 VITALS — DIASTOLIC BLOOD PRESSURE: 68 MMHG | BODY MASS INDEX: 23.24 KG/M2 | WEIGHT: 162 LBS | SYSTOLIC BLOOD PRESSURE: 128 MMHG

## 2022-01-01 DIAGNOSIS — R82.90 ABNORMAL URINALYSIS: ICD-10-CM

## 2022-01-01 DIAGNOSIS — R31.0 GROSS HEMATURIA: Primary | ICD-10-CM

## 2022-01-01 DIAGNOSIS — N40.1 BENIGN PROSTATIC HYPERPLASIA WITH LOWER URINARY TRACT SYMPTOMS, SYMPTOM DETAILS UNSPECIFIED: ICD-10-CM

## 2022-01-01 DIAGNOSIS — R31.29 MICROSCOPIC HEMATURIA: ICD-10-CM

## 2022-01-01 DIAGNOSIS — F51.01 PRIMARY INSOMNIA: ICD-10-CM

## 2022-01-01 DIAGNOSIS — L08.9 INFECTED SEBACEOUS CYST OF SKIN: Primary | ICD-10-CM

## 2022-01-01 DIAGNOSIS — I10 ESSENTIAL HYPERTENSION: ICD-10-CM

## 2022-01-01 DIAGNOSIS — M79.675 GREAT TOE PAIN, LEFT: ICD-10-CM

## 2022-01-01 DIAGNOSIS — Z01.818 PRE-OP EXAM: Primary | ICD-10-CM

## 2022-01-01 DIAGNOSIS — Z23 NEED FOR PROPHYLACTIC VACCINATION AND INOCULATION AGAINST INFLUENZA: ICD-10-CM

## 2022-01-01 DIAGNOSIS — L60.0 INGROWING LEFT GREAT TOENAIL: Primary | ICD-10-CM

## 2022-01-01 DIAGNOSIS — R19.4 CHANGE IN BOWEL HABITS: ICD-10-CM

## 2022-01-01 DIAGNOSIS — I48.0 PAROXYSMAL ATRIAL FIBRILLATION (H): ICD-10-CM

## 2022-01-01 DIAGNOSIS — R10.13 ABDOMINAL PAIN, EPIGASTRIC: Primary | ICD-10-CM

## 2022-01-01 DIAGNOSIS — M79.675 TOE PAIN, LEFT: ICD-10-CM

## 2022-01-01 DIAGNOSIS — R31.9 HEMATURIA, UNSPECIFIED TYPE: Primary | ICD-10-CM

## 2022-01-01 DIAGNOSIS — L72.3 INFECTED SEBACEOUS CYST OF SKIN: Primary | ICD-10-CM

## 2022-01-01 DIAGNOSIS — K92.89 GAS BLOAT SYNDROME: ICD-10-CM

## 2022-01-01 DIAGNOSIS — D64.9 ANEMIA, UNSPECIFIED TYPE: ICD-10-CM

## 2022-01-01 DIAGNOSIS — K30 INDIGESTION: ICD-10-CM

## 2022-01-01 DIAGNOSIS — N41.0 ACUTE PROSTATITIS: Primary | ICD-10-CM

## 2022-01-01 DIAGNOSIS — T14.8XXA LOCAL INFECTION OF WOUND: ICD-10-CM

## 2022-01-01 DIAGNOSIS — R31.29 MICROSCOPIC HEMATURIA: Primary | ICD-10-CM

## 2022-01-01 DIAGNOSIS — K30 INDIGESTION: Primary | ICD-10-CM

## 2022-01-01 DIAGNOSIS — J31.0 CHRONIC RHINITIS: ICD-10-CM

## 2022-01-01 DIAGNOSIS — R93.89 ABNORMAL CT SCAN: ICD-10-CM

## 2022-01-01 DIAGNOSIS — L08.9 LOCAL INFECTION OF WOUND: ICD-10-CM

## 2022-01-01 DIAGNOSIS — M79.675 GREAT TOE PAIN, LEFT: Primary | ICD-10-CM

## 2022-01-01 DIAGNOSIS — R35.0 URINE FREQUENCY: ICD-10-CM

## 2022-01-01 DIAGNOSIS — R31.9 HEMATURIA, UNSPECIFIED TYPE: ICD-10-CM

## 2022-01-01 DIAGNOSIS — R63.4 WEIGHT LOSS: ICD-10-CM

## 2022-01-01 DIAGNOSIS — R10.9 ABDOMINAL PAIN: ICD-10-CM

## 2022-01-01 DIAGNOSIS — K21.9 GASTROESOPHAGEAL REFLUX: ICD-10-CM

## 2022-01-01 DIAGNOSIS — F51.01 PRIMARY INSOMNIA: Primary | ICD-10-CM

## 2022-01-01 DIAGNOSIS — M54.50 LOW BACK PAIN, UNSPECIFIED BACK PAIN LATERALITY, UNSPECIFIED CHRONICITY, UNSPECIFIED WHETHER SCIATICA PRESENT: ICD-10-CM

## 2022-01-01 DIAGNOSIS — M51.369 DDD (DEGENERATIVE DISC DISEASE), LUMBAR: ICD-10-CM

## 2022-01-01 LAB
ALBUMIN SERPL BCG-MCNC: 4.3 G/DL (ref 3.5–5.2)
ALBUMIN SERPL-MCNC: 3.8 G/DL (ref 3.4–5)
ALBUMIN UR-MCNC: 100 MG/DL
ALBUMIN UR-MCNC: 100 MG/DL
ALBUMIN UR-MCNC: 30 MG/DL
ALBUMIN UR-MCNC: 30 MG/DL
ALBUMIN UR-MCNC: >=300 MG/DL
ALP SERPL-CCNC: 83 U/L (ref 40–150)
ALP SERPL-CCNC: 85 U/L (ref 40–129)
ALT SERPL W P-5'-P-CCNC: 10 U/L (ref 0–70)
ALT SERPL W P-5'-P-CCNC: 5 U/L (ref 10–50)
AMYLASE SERPL-CCNC: 56 U/L (ref 30–110)
ANION GAP SERPL CALCULATED.3IONS-SCNC: 13 MMOL/L (ref 7–15)
ANION GAP SERPL CALCULATED.3IONS-SCNC: 6 MMOL/L (ref 3–14)
APPEARANCE UR: ABNORMAL
APPEARANCE UR: CLEAR
AST SERPL W P-5'-P-CCNC: 17 U/L (ref 0–45)
AST SERPL W P-5'-P-CCNC: 45 U/L (ref 10–50)
BACTERIA #/AREA URNS HPF: ABNORMAL /HPF
BACTERIA UR CULT: NORMAL
BASOPHILS # BLD AUTO: 0 10E3/UL (ref 0–0.2)
BASOPHILS NFR BLD AUTO: 2 %
BILIRUB SERPL-MCNC: 0.7 MG/DL
BILIRUB SERPL-MCNC: 0.9 MG/DL (ref 0.2–1.3)
BILIRUB UR QL STRIP: ABNORMAL
BILIRUB UR QL STRIP: NEGATIVE
BUN SERPL-MCNC: 13 MG/DL (ref 7–30)
BUN SERPL-MCNC: 19.1 MG/DL (ref 8–23)
CALCIUM SERPL-MCNC: 8.8 MG/DL (ref 8.5–10.1)
CALCIUM SERPL-MCNC: 8.8 MG/DL (ref 8.8–10.2)
CAOX CRY #/AREA URNS HPF: ABNORMAL /HPF
CAOX CRY #/AREA URNS HPF: ABNORMAL /HPF
CHLORIDE BLD-SCNC: 106 MMOL/L (ref 94–109)
CHLORIDE SERPL-SCNC: 102 MMOL/L (ref 98–107)
CO2 SERPL-SCNC: 27 MMOL/L (ref 20–32)
COLONOSCOPY: NORMAL
COLOR UR AUTO: YELLOW
CREAT BLD-MCNC: 1 MG/DL (ref 0.7–1.3)
CREAT SERPL-MCNC: 0.8 MG/DL (ref 0.66–1.25)
CREAT SERPL-MCNC: 0.93 MG/DL (ref 0.67–1.17)
DEPRECATED HCO3 PLAS-SCNC: 22 MMOL/L (ref 22–29)
EOSINOPHIL # BLD AUTO: 0.1 10E3/UL (ref 0–0.7)
EOSINOPHIL NFR BLD AUTO: 3 %
ERYTHROCYTE [DISTWIDTH] IN BLOOD BY AUTOMATED COUNT: 15.2 % (ref 10–15)
ERYTHROCYTE [DISTWIDTH] IN BLOOD BY AUTOMATED COUNT: 17.1 % (ref 10–15)
ERYTHROCYTE [DISTWIDTH] IN BLOOD BY AUTOMATED COUNT: 17.8 % (ref 10–15)
GFR SERPL CREATININE-BSD FRML MDRD: 79 ML/MIN/1.73M2
GFR SERPL CREATININE-BSD FRML MDRD: 85 ML/MIN/1.73M2
GFR SERPL CREATININE-BSD FRML MDRD: >60 ML/MIN/1.73M2
GLUCOSE BLD-MCNC: 90 MG/DL (ref 70–99)
GLUCOSE SERPL-MCNC: 87 MG/DL (ref 70–99)
GLUCOSE UR STRIP-MCNC: NEGATIVE MG/DL
HCT VFR BLD AUTO: 31.1 % (ref 40–53)
HCT VFR BLD AUTO: 33.3 % (ref 40–53)
HCT VFR BLD AUTO: 39.3 % (ref 40–53)
HGB BLD-MCNC: 11 G/DL (ref 13.3–17.7)
HGB BLD-MCNC: 12.8 G/DL (ref 13.3–17.7)
HGB BLD-MCNC: 9.6 G/DL (ref 13.3–17.7)
HGB UR QL STRIP: ABNORMAL
IMM GRANULOCYTES # BLD: 0 10E3/UL
IMM GRANULOCYTES NFR BLD: 0 %
KETONES UR STRIP-MCNC: 80 MG/DL
KETONES UR STRIP-MCNC: ABNORMAL MG/DL
KETONES UR STRIP-MCNC: NEGATIVE MG/DL
LEUKOCYTE ESTERASE UR QL STRIP: ABNORMAL
LEUKOCYTE ESTERASE UR QL STRIP: NEGATIVE
LIPASE SERPL-CCNC: 70 U/L (ref 13–60)
LYMPHOCYTES # BLD AUTO: 0.8 10E3/UL (ref 0.8–5.3)
LYMPHOCYTES NFR BLD AUTO: 36 %
MCH RBC QN AUTO: 32.1 PG (ref 26.5–33)
MCH RBC QN AUTO: 33.3 PG (ref 26.5–33)
MCH RBC QN AUTO: 34.2 PG (ref 26.5–33)
MCHC RBC AUTO-ENTMCNC: 30.9 G/DL (ref 31.5–36.5)
MCHC RBC AUTO-ENTMCNC: 32.6 G/DL (ref 31.5–36.5)
MCHC RBC AUTO-ENTMCNC: 33 G/DL (ref 31.5–36.5)
MCV RBC AUTO: 103 FL (ref 78–100)
MCV RBC AUTO: 108 FL (ref 78–100)
MCV RBC AUTO: 99 FL (ref 78–100)
MONOCYTES # BLD AUTO: 0.1 10E3/UL (ref 0–1.3)
MONOCYTES NFR BLD AUTO: 4 %
MUCOUS THREADS #/AREA URNS LPF: PRESENT /LPF
NEUTROPHILS # BLD AUTO: 1.2 10E3/UL (ref 1.6–8.3)
NEUTROPHILS NFR BLD AUTO: 55 %
NITRATE UR QL: NEGATIVE
NRBC # BLD AUTO: 0 10E3/UL
NRBC BLD AUTO-RTO: 0 /100
PATH REPORT.ADDENDUM SPEC: NORMAL
PATH REPORT.COMMENTS IMP SPEC: NORMAL
PATH REPORT.COMMENTS IMP SPEC: NORMAL
PATH REPORT.FINAL DX SPEC: NORMAL
PATH REPORT.GROSS SPEC: NORMAL
PATH REPORT.MICROSCOPIC SPEC OTHER STN: NORMAL
PATH REPORT.RELEVANT HX SPEC: NORMAL
PH UR STRIP: 5.5 [PH] (ref 5–7)
PH UR STRIP: 5.5 [PH] (ref 5–7)
PH UR STRIP: 6 [PH] (ref 5–7)
PH UR STRIP: 6.5 [PH] (ref 5–7)
PH UR STRIP: 6.5 [PH] (ref 5–7)
PHOTO IMAGE: NORMAL
PLATELET # BLD AUTO: 118 10E3/UL (ref 150–450)
PLATELET # BLD AUTO: 123 10E3/UL (ref 150–450)
PLATELET # BLD AUTO: 125 10E3/UL (ref 150–450)
POTASSIUM BLD-SCNC: 3.7 MMOL/L (ref 3.4–5.3)
POTASSIUM SERPL-SCNC: 4.4 MMOL/L (ref 3.4–5.3)
PROT SERPL-MCNC: 6.9 G/DL (ref 6.4–8.3)
PROT SERPL-MCNC: 7 G/DL (ref 6.8–8.8)
RBC # BLD AUTO: 2.88 10E6/UL (ref 4.4–5.9)
RBC # BLD AUTO: 3.22 10E6/UL (ref 4.4–5.9)
RBC # BLD AUTO: 3.99 10E6/UL (ref 4.4–5.9)
RBC #/AREA URNS AUTO: >100 /HPF
RBC #/AREA URNS AUTO: >100 /HPF
RBC #/AREA URNS AUTO: ABNORMAL /HPF
RBC URINE: >182 /HPF
SODIUM SERPL-SCNC: 137 MMOL/L (ref 136–145)
SODIUM SERPL-SCNC: 139 MMOL/L (ref 133–144)
SP GR UR STRIP: 1.01 (ref 1–1.03)
SP GR UR STRIP: >=1.03 (ref 1–1.03)
SQUAMOUS #/AREA URNS AUTO: ABNORMAL /LPF
SQUAMOUS #/AREA URNS AUTO: ABNORMAL /LPF
UPPER GI ENDOSCOPY: NORMAL
UROBILINOGEN UR STRIP-ACNC: 0.2 E.U./DL
UROBILINOGEN UR STRIP-ACNC: 1 E.U./DL
UROBILINOGEN UR STRIP-MCNC: NORMAL MG/DL
WBC # BLD AUTO: 2.1 10E3/UL (ref 4–11)
WBC # BLD AUTO: 2.5 10E3/UL (ref 4–11)
WBC # BLD AUTO: 4.2 10E3/UL (ref 4–11)
WBC #/AREA URNS AUTO: ABNORMAL /HPF
WBC URINE: 8 /HPF

## 2022-01-01 PROCEDURE — 80053 COMPREHEN METABOLIC PANEL: CPT | Performed by: INTERNAL MEDICINE

## 2022-01-01 PROCEDURE — 370N000017 HC ANESTHESIA TECHNICAL FEE, PER MIN: Performed by: INTERNAL MEDICINE

## 2022-01-01 PROCEDURE — 81003 URINALYSIS AUTO W/O SCOPE: CPT | Mod: QW | Performed by: PHYSICIAN ASSISTANT

## 2022-01-01 PROCEDURE — 710N000012 HC RECOVERY PHASE 2, PER MINUTE: Performed by: INTERNAL MEDICINE

## 2022-01-01 PROCEDURE — 99213 OFFICE O/P EST LOW 20 MIN: CPT | Performed by: INTERNAL MEDICINE

## 2022-01-01 PROCEDURE — 11730 AVULSION NAIL PLATE SIMPLE 1: CPT | Mod: TA | Performed by: PODIATRIST

## 2022-01-01 PROCEDURE — 258N000003 HC RX IP 258 OP 636: Performed by: ANESTHESIOLOGY

## 2022-01-01 PROCEDURE — 87086 URINE CULTURE/COLONY COUNT: CPT | Performed by: PHYSICIAN ASSISTANT

## 2022-01-01 PROCEDURE — A9585 GADOBUTROL INJECTION: HCPCS | Performed by: NURSE PRACTITIONER

## 2022-01-01 PROCEDURE — 36415 COLL VENOUS BLD VENIPUNCTURE: CPT | Performed by: INTERNAL MEDICINE

## 2022-01-01 PROCEDURE — 36415 COLL VENOUS BLD VENIPUNCTURE: CPT

## 2022-01-01 PROCEDURE — 250N000011 HC RX IP 250 OP 636

## 2022-01-01 PROCEDURE — 250N000011 HC RX IP 250 OP 636: Performed by: NURSE PRACTITIONER

## 2022-01-01 PROCEDURE — 99282 EMERGENCY DEPT VISIT SF MDM: CPT

## 2022-01-01 PROCEDURE — 74183 MRI ABD W/O CNTR FLWD CNTR: CPT

## 2022-01-01 PROCEDURE — 250N000013 HC RX MED GY IP 250 OP 250 PS 637: Performed by: EMERGENCY MEDICINE

## 2022-01-01 PROCEDURE — 99213 OFFICE O/P EST LOW 20 MIN: CPT | Mod: 25

## 2022-01-01 PROCEDURE — 88342 IMHCHEM/IMCYTCHM 1ST ANTB: CPT | Mod: 26

## 2022-01-01 PROCEDURE — 250N000009 HC RX 250: Performed by: NURSE PRACTITIONER

## 2022-01-01 PROCEDURE — 272N000001 HC OR GENERAL SUPPLY STERILE: Performed by: INTERNAL MEDICINE

## 2022-01-01 PROCEDURE — 93000 ELECTROCARDIOGRAM COMPLETE: CPT | Performed by: INTERNAL MEDICINE

## 2022-01-01 PROCEDURE — 88305 TISSUE EXAM BY PATHOLOGIST: CPT | Mod: TC | Performed by: INTERNAL MEDICINE

## 2022-01-01 PROCEDURE — 90471 IMMUNIZATION ADMIN: CPT

## 2022-01-01 PROCEDURE — 88305 TISSUE EXAM BY PATHOLOGIST: CPT | Mod: 26

## 2022-01-01 PROCEDURE — 99203 OFFICE O/P NEW LOW 30 MIN: CPT | Performed by: PHYSICIAN ASSISTANT

## 2022-01-01 PROCEDURE — 360N000076 HC SURGERY LEVEL 3, PER MIN: Performed by: INTERNAL MEDICINE

## 2022-01-01 PROCEDURE — 99214 OFFICE O/P EST MOD 30 MIN: CPT | Performed by: INTERNAL MEDICINE

## 2022-01-01 PROCEDURE — 11720 DEBRIDE NAIL 1-5: CPT | Performed by: PODIATRIST

## 2022-01-01 PROCEDURE — 99213 OFFICE O/P EST LOW 20 MIN: CPT | Performed by: PODIATRIST

## 2022-01-01 PROCEDURE — 255N000002 HC RX 255 OP 636: Performed by: NURSE PRACTITIONER

## 2022-01-01 PROCEDURE — 250N000009 HC RX 250

## 2022-01-01 PROCEDURE — 74177 CT ABD & PELVIS W/CONTRAST: CPT

## 2022-01-01 PROCEDURE — 82565 ASSAY OF CREATININE: CPT

## 2022-01-01 PROCEDURE — 99214 OFFICE O/P EST MOD 30 MIN: CPT | Performed by: PHYSICIAN ASSISTANT

## 2022-01-01 PROCEDURE — 90662 IIV NO PRSV INCREASED AG IM: CPT

## 2022-01-01 PROCEDURE — 81001 URINALYSIS AUTO W/SCOPE: CPT

## 2022-01-01 PROCEDURE — 99203 OFFICE O/P NEW LOW 30 MIN: CPT | Mod: 25 | Performed by: PODIATRIST

## 2022-01-01 PROCEDURE — 85027 COMPLETE CBC AUTOMATED: CPT | Performed by: INTERNAL MEDICINE

## 2022-01-01 PROCEDURE — 82150 ASSAY OF AMYLASE: CPT | Performed by: INTERNAL MEDICINE

## 2022-01-01 PROCEDURE — 73660 X-RAY EXAM OF TOE(S): CPT | Mod: TC | Performed by: RADIOLOGY

## 2022-01-01 PROCEDURE — 81001 URINALYSIS AUTO W/SCOPE: CPT | Performed by: EMERGENCY MEDICINE

## 2022-01-01 PROCEDURE — 81001 URINALYSIS AUTO W/SCOPE: CPT | Performed by: PHYSICIAN ASSISTANT

## 2022-01-01 PROCEDURE — 83690 ASSAY OF LIPASE: CPT | Performed by: INTERNAL MEDICINE

## 2022-01-01 PROCEDURE — 85025 COMPLETE CBC W/AUTO DIFF WBC: CPT

## 2022-01-01 PROCEDURE — 999N000141 HC STATISTIC PRE-PROCEDURE NURSING ASSESSMENT: Performed by: INTERNAL MEDICINE

## 2022-01-01 PROCEDURE — 99283 EMERGENCY DEPT VISIT LOW MDM: CPT

## 2022-01-01 PROCEDURE — 278N000051 HC OR IMPLANT GENERAL: Performed by: INTERNAL MEDICINE

## 2022-01-01 PROCEDURE — 81001 URINALYSIS AUTO W/SCOPE: CPT | Performed by: INTERNAL MEDICINE

## 2022-01-01 RX ORDER — PROPOFOL 10 MG/ML
INJECTION, EMULSION INTRAVENOUS CONTINUOUS PRN
Status: DISCONTINUED | OUTPATIENT
Start: 2022-01-01 | End: 2022-01-01

## 2022-01-01 RX ORDER — GLYCOPYRROLATE 0.2 MG/ML
INJECTION, SOLUTION INTRAMUSCULAR; INTRAVENOUS PRN
Status: DISCONTINUED | OUTPATIENT
Start: 2022-01-01 | End: 2022-01-01

## 2022-01-01 RX ORDER — ONDANSETRON 4 MG/1
4 TABLET, ORALLY DISINTEGRATING ORAL EVERY 6 HOURS PRN
Status: DISCONTINUED | OUTPATIENT
Start: 2022-01-01 | End: 2022-01-01 | Stop reason: HOSPADM

## 2022-01-01 RX ORDER — LIDOCAINE HYDROCHLORIDE 10 MG/ML
INJECTION, SOLUTION INFILTRATION; PERINEURAL PRN
Status: DISCONTINUED | OUTPATIENT
Start: 2022-01-01 | End: 2022-01-01

## 2022-01-01 RX ORDER — SULFAMETHOXAZOLE/TRIMETHOPRIM 800-160 MG
1 TABLET ORAL 2 TIMES DAILY
Qty: 14 TABLET | Refills: 0 | Status: SHIPPED | OUTPATIENT
Start: 2022-01-01 | End: 2022-01-01

## 2022-01-01 RX ORDER — LIDOCAINE 40 MG/G
CREAM TOPICAL
Status: DISCONTINUED | OUTPATIENT
Start: 2022-01-01 | End: 2022-01-01 | Stop reason: HOSPADM

## 2022-01-01 RX ORDER — NALOXONE HYDROCHLORIDE 0.4 MG/ML
0.4 INJECTION, SOLUTION INTRAMUSCULAR; INTRAVENOUS; SUBCUTANEOUS
Status: DISCONTINUED | OUTPATIENT
Start: 2022-01-01 | End: 2022-01-01 | Stop reason: HOSPADM

## 2022-01-01 RX ORDER — ONDANSETRON 2 MG/ML
4 INJECTION INTRAMUSCULAR; INTRAVENOUS EVERY 6 HOURS PRN
Status: DISCONTINUED | OUTPATIENT
Start: 2022-01-01 | End: 2022-01-01 | Stop reason: HOSPADM

## 2022-01-01 RX ORDER — PROCHLORPERAZINE MALEATE 5 MG
5 TABLET ORAL EVERY 6 HOURS PRN
Status: DISCONTINUED | OUTPATIENT
Start: 2022-01-01 | End: 2022-01-01 | Stop reason: HOSPADM

## 2022-01-01 RX ORDER — IOPAMIDOL 755 MG/ML
500 INJECTION, SOLUTION INTRAVASCULAR ONCE
Status: COMPLETED | OUTPATIENT
Start: 2022-01-01 | End: 2022-01-01

## 2022-01-01 RX ORDER — GABAPENTIN 100 MG/1
200 CAPSULE ORAL
Qty: 60 CAPSULE | Refills: 1 | Status: SHIPPED | OUTPATIENT
Start: 2022-01-01 | End: 2023-01-01

## 2022-01-01 RX ORDER — NALOXONE HYDROCHLORIDE 0.4 MG/ML
0.2 INJECTION, SOLUTION INTRAMUSCULAR; INTRAVENOUS; SUBCUTANEOUS
Status: DISCONTINUED | OUTPATIENT
Start: 2022-01-01 | End: 2022-01-01 | Stop reason: HOSPADM

## 2022-01-01 RX ORDER — CEPHALEXIN 500 MG/1
500 CAPSULE ORAL 3 TIMES DAILY
Qty: 21 CAPSULE | Refills: 0 | Status: SHIPPED | OUTPATIENT
Start: 2022-01-01 | End: 2022-01-01

## 2022-01-01 RX ORDER — SODIUM CHLORIDE, SODIUM LACTATE, POTASSIUM CHLORIDE, CALCIUM CHLORIDE 600; 310; 30; 20 MG/100ML; MG/100ML; MG/100ML; MG/100ML
INJECTION, SOLUTION INTRAVENOUS CONTINUOUS
Status: DISCONTINUED | OUTPATIENT
Start: 2022-01-01 | End: 2022-01-01 | Stop reason: HOSPADM

## 2022-01-01 RX ORDER — FENTANYL CITRATE 50 UG/ML
25 INJECTION, SOLUTION INTRAMUSCULAR; INTRAVENOUS EVERY 5 MIN PRN
Status: DISCONTINUED | OUTPATIENT
Start: 2022-01-01 | End: 2022-01-01 | Stop reason: HOSPADM

## 2022-01-01 RX ORDER — FENTANYL CITRATE 50 UG/ML
25 INJECTION, SOLUTION INTRAMUSCULAR; INTRAVENOUS
Status: DISCONTINUED | OUTPATIENT
Start: 2022-01-01 | End: 2022-01-01 | Stop reason: HOSPADM

## 2022-01-01 RX ORDER — CEPHALEXIN 500 MG/1
500 CAPSULE ORAL ONCE
Status: COMPLETED | OUTPATIENT
Start: 2022-01-01 | End: 2022-01-01

## 2022-01-01 RX ORDER — CEPHALEXIN 500 MG/1
500 CAPSULE ORAL 3 TIMES DAILY
Qty: 30 CAPSULE | Refills: 0 | Status: SHIPPED | OUTPATIENT
Start: 2022-01-01 | End: 2022-01-01

## 2022-01-01 RX ORDER — FLUTICASONE PROPIONATE 50 MCG
SPRAY, SUSPENSION (ML) NASAL
Qty: 48 ML | Refills: 3 | Status: SHIPPED | OUTPATIENT
Start: 2022-01-01

## 2022-01-01 RX ORDER — METOPROLOL TARTRATE 1 MG/ML
1-2 INJECTION, SOLUTION INTRAVENOUS EVERY 5 MIN PRN
Status: DISCONTINUED | OUTPATIENT
Start: 2022-01-01 | End: 2022-01-01 | Stop reason: HOSPADM

## 2022-01-01 RX ORDER — PROPOFOL 10 MG/ML
INJECTION, EMULSION INTRAVENOUS PRN
Status: DISCONTINUED | OUTPATIENT
Start: 2022-01-01 | End: 2022-01-01

## 2022-01-01 RX ORDER — ONDANSETRON 4 MG/1
4 TABLET, ORALLY DISINTEGRATING ORAL EVERY 30 MIN PRN
Status: DISCONTINUED | OUTPATIENT
Start: 2022-01-01 | End: 2022-01-01 | Stop reason: HOSPADM

## 2022-01-01 RX ORDER — GABAPENTIN 100 MG/1
200 CAPSULE ORAL
Qty: 60 CAPSULE | Refills: 1 | Status: SHIPPED | OUTPATIENT
Start: 2022-01-01 | End: 2022-01-01

## 2022-01-01 RX ORDER — CEFDINIR 300 MG/1
300 CAPSULE ORAL 2 TIMES DAILY
Qty: 20 CAPSULE | Refills: 0 | Status: SHIPPED | OUTPATIENT
Start: 2022-01-01 | End: 2022-01-01

## 2022-01-01 RX ORDER — FENTANYL CITRATE 50 UG/ML
50 INJECTION, SOLUTION INTRAMUSCULAR; INTRAVENOUS EVERY 5 MIN PRN
Status: DISCONTINUED | OUTPATIENT
Start: 2022-01-01 | End: 2022-01-01 | Stop reason: HOSPADM

## 2022-01-01 RX ORDER — PANTOPRAZOLE SODIUM 40 MG/1
40 TABLET, DELAYED RELEASE ORAL DAILY
COMMUNITY
Start: 2022-01-01 | End: 2023-01-01

## 2022-01-01 RX ORDER — ONDANSETRON 2 MG/ML
4 INJECTION INTRAMUSCULAR; INTRAVENOUS EVERY 30 MIN PRN
Status: DISCONTINUED | OUTPATIENT
Start: 2022-01-01 | End: 2022-01-01 | Stop reason: HOSPADM

## 2022-01-01 RX ORDER — FLUMAZENIL 0.1 MG/ML
0.2 INJECTION, SOLUTION INTRAVENOUS
Status: DISCONTINUED | OUTPATIENT
Start: 2022-01-01 | End: 2022-01-01 | Stop reason: HOSPADM

## 2022-01-01 RX ORDER — MEPERIDINE HYDROCHLORIDE 25 MG/ML
12.5 INJECTION INTRAMUSCULAR; INTRAVENOUS; SUBCUTANEOUS
Status: DISCONTINUED | OUTPATIENT
Start: 2022-01-01 | End: 2022-01-01 | Stop reason: HOSPADM

## 2022-01-01 RX ORDER — HYDRALAZINE HYDROCHLORIDE 20 MG/ML
2.5-5 INJECTION INTRAMUSCULAR; INTRAVENOUS EVERY 10 MIN PRN
Status: DISCONTINUED | OUTPATIENT
Start: 2022-01-01 | End: 2022-01-01 | Stop reason: HOSPADM

## 2022-01-01 RX ORDER — KETOROLAC TROMETHAMINE 30 MG/ML
15 INJECTION, SOLUTION INTRAMUSCULAR; INTRAVENOUS ONCE
Status: DISCONTINUED | OUTPATIENT
Start: 2022-01-01 | End: 2022-01-01 | Stop reason: HOSPADM

## 2022-01-01 RX ORDER — ONDANSETRON 2 MG/ML
INJECTION INTRAMUSCULAR; INTRAVENOUS PRN
Status: DISCONTINUED | OUTPATIENT
Start: 2022-01-01 | End: 2022-01-01

## 2022-01-01 RX ORDER — ONDANSETRON 2 MG/ML
4 INJECTION INTRAMUSCULAR; INTRAVENOUS
Status: DISCONTINUED | OUTPATIENT
Start: 2022-01-01 | End: 2022-01-01 | Stop reason: HOSPADM

## 2022-01-01 RX ORDER — GADOBUTROL 604.72 MG/ML
7 INJECTION INTRAVENOUS ONCE
Status: COMPLETED | OUTPATIENT
Start: 2022-01-01 | End: 2022-01-01

## 2022-01-01 RX ADMIN — PROPOFOL 150 MCG/KG/MIN: 10 INJECTION, EMULSION INTRAVENOUS at 09:58

## 2022-01-01 RX ADMIN — GADOBUTROL 7 ML: 604.72 INJECTION INTRAVENOUS at 09:18

## 2022-01-01 RX ADMIN — ONDANSETRON HYDROCHLORIDE 4 MG: 2 INJECTION, SOLUTION INTRAVENOUS at 09:58

## 2022-01-01 RX ADMIN — IOPAMIDOL 73 ML: 755 INJECTION, SOLUTION INTRAVENOUS at 15:05

## 2022-01-01 RX ADMIN — PROPOFOL 20 MG: 10 INJECTION, EMULSION INTRAVENOUS at 10:14

## 2022-01-01 RX ADMIN — LIDOCAINE HYDROCHLORIDE 25 MG: 10 INJECTION, SOLUTION INFILTRATION; PERINEURAL at 09:58

## 2022-01-01 RX ADMIN — PROPOFOL 40 MG: 10 INJECTION, EMULSION INTRAVENOUS at 10:02

## 2022-01-01 RX ADMIN — SODIUM CHLORIDE 60 ML: 9 INJECTION, SOLUTION INTRAVENOUS at 15:05

## 2022-01-01 RX ADMIN — CEPHALEXIN 500 MG: 500 CAPSULE ORAL at 09:54

## 2022-01-01 RX ADMIN — GLYCOPYRROLATE 0.2 MG: 0.2 INJECTION, SOLUTION INTRAMUSCULAR; INTRAVENOUS at 09:58

## 2022-01-01 RX ADMIN — SODIUM CHLORIDE, POTASSIUM CHLORIDE, SODIUM LACTATE AND CALCIUM CHLORIDE: 600; 310; 30; 20 INJECTION, SOLUTION INTRAVENOUS at 09:52

## 2022-01-01 ASSESSMENT — ENCOUNTER SYMPTOMS
FLANK PAIN: 1
HEMATURIA: 1
DYSRHYTHMIAS: 1
HEMATURIA: 1
NAUSEA: 0
VOMITING: 0
VOMITING: 0
FEVER: 0
DIFFICULTY URINATING: 1
UNEXPECTED WEIGHT CHANGE: 1
SHORTNESS OF BREATH: 0
FEVER: 0
FREQUENCY: 0
DYSURIA: 0
CHILLS: 0
NAUSEA: 0
CONSTIPATION: 1
CONSTIPATION: 1

## 2022-01-01 ASSESSMENT — PAIN SCALES - GENERAL
PAINLEVEL: NO PAIN (0)
PAINLEVEL: NO PAIN (0)

## 2022-01-01 ASSESSMENT — ACTIVITIES OF DAILY LIVING (ADL)
ADLS_ACUITY_SCORE: 35
ADLS_ACUITY_SCORE: 33
ADLS_ACUITY_SCORE: 33

## 2022-02-07 DIAGNOSIS — R33.9 URINARY RETENTION: ICD-10-CM

## 2022-02-09 RX ORDER — TAMSULOSIN HYDROCHLORIDE 0.4 MG/1
CAPSULE ORAL
Qty: 90 CAPSULE | Refills: 3 | Status: SHIPPED | OUTPATIENT
Start: 2022-02-09 | End: 2022-01-01

## 2022-02-09 NOTE — TELEPHONE ENCOUNTER
Provider approval needed.     BP Readings from Last 3 Encounters:   12/22/21 (!) 156/66   02/11/21 136/66   01/26/21 (!) 159/74

## 2022-02-10 DIAGNOSIS — I10 ESSENTIAL HYPERTENSION: ICD-10-CM

## 2022-02-10 DIAGNOSIS — I48.0 PAROXYSMAL ATRIAL FIBRILLATION (H): ICD-10-CM

## 2022-02-11 RX ORDER — METOPROLOL SUCCINATE 25 MG/1
TABLET, EXTENDED RELEASE ORAL
Qty: 90 TABLET | Refills: 3 | Status: SHIPPED | OUTPATIENT
Start: 2022-02-11 | End: 2022-02-28 | Stop reason: DRUGHIGH

## 2022-02-11 NOTE — TELEPHONE ENCOUNTER
Routing refill request to provider for review/approval because:  Blood Pressure out of range for [facility] refill protocol.    BP Readings from Last 3 Encounters:   12/22/21 (!) 156/66   02/11/21 136/66   01/26/21 (!) 159/74

## 2022-02-13 DIAGNOSIS — J31.0 CHRONIC RHINITIS: ICD-10-CM

## 2022-02-14 RX ORDER — FLUTICASONE PROPIONATE 50 MCG
SPRAY, SUSPENSION (ML) NASAL
Qty: 16 ML | Refills: 2 | Status: SHIPPED | OUTPATIENT
Start: 2022-02-14 | End: 2022-03-10

## 2022-02-15 NOTE — TELEPHONE ENCOUNTER
Pending Prescriptions:                       Disp   Refills    fluticasone (FLONASE) 50 MCG/ACT nasal sp*16 mL  2            Sig: INSTILL 1 SPRAY INTO BOTH NOSTRILS DAILY    Prescription approved per [facility] Refill Protocol.

## 2022-02-24 ENCOUNTER — MYC MEDICAL ADVICE (OUTPATIENT)
Dept: INTERNAL MEDICINE | Facility: CLINIC | Age: 87
End: 2022-02-24

## 2022-02-24 ENCOUNTER — HOSPITAL ENCOUNTER (EMERGENCY)
Facility: CLINIC | Age: 87
Discharge: HOME | End: 2022-02-24
Attending: EMERGENCY MEDICINE | Admitting: EMERGENCY MEDICINE
Payer: COMMERCIAL

## 2022-02-24 VITALS
RESPIRATION RATE: 30 BRPM | TEMPERATURE: 97.8 F | DIASTOLIC BLOOD PRESSURE: 77 MMHG | HEART RATE: 87 BPM | OXYGEN SATURATION: 100 % | SYSTOLIC BLOOD PRESSURE: 135 MMHG

## 2022-02-24 DIAGNOSIS — I48.0 PAROXYSMAL ATRIAL FIBRILLATION (H): ICD-10-CM

## 2022-02-24 DIAGNOSIS — I48.91 ATRIAL FIBRILLATION WITH RAPID VENTRICULAR RESPONSE (H): ICD-10-CM

## 2022-02-24 LAB
ALBUMIN SERPL-MCNC: 3.4 G/DL (ref 3.4–5)
ALP SERPL-CCNC: 107 U/L (ref 40–150)
ALT SERPL W P-5'-P-CCNC: 13 U/L (ref 0–70)
ANION GAP SERPL CALCULATED.3IONS-SCNC: <1 MMOL/L (ref 3–14)
AST SERPL W P-5'-P-CCNC: 21 U/L (ref 0–45)
ATRIAL RATE - MUSE: 71 BPM
BASOPHILS # BLD AUTO: 0.1 10E3/UL (ref 0–0.2)
BASOPHILS NFR BLD AUTO: 2 %
BILIRUB SERPL-MCNC: 0.9 MG/DL (ref 0.2–1.3)
BUN SERPL-MCNC: 19 MG/DL (ref 7–30)
CALCIUM SERPL-MCNC: 8.7 MG/DL (ref 8.5–10.1)
CHLORIDE BLD-SCNC: 108 MMOL/L (ref 94–109)
CO2 SERPL-SCNC: 31 MMOL/L (ref 20–32)
CREAT SERPL-MCNC: 0.97 MG/DL (ref 0.66–1.25)
DIASTOLIC BLOOD PRESSURE - MUSE: NORMAL MMHG
EOSINOPHIL # BLD AUTO: 0.1 10E3/UL (ref 0–0.7)
EOSINOPHIL NFR BLD AUTO: 3 %
ERYTHROCYTE [DISTWIDTH] IN BLOOD BY AUTOMATED COUNT: 15.5 % (ref 10–15)
GFR SERPL CREATININE-BSD FRML MDRD: 76 ML/MIN/1.73M2
GLUCOSE BLD-MCNC: 138 MG/DL (ref 70–99)
HCT VFR BLD AUTO: 45.5 % (ref 40–53)
HGB BLD-MCNC: 14.2 G/DL (ref 13.3–17.7)
IMM GRANULOCYTES # BLD: 0 10E3/UL
IMM GRANULOCYTES NFR BLD: 1 %
INTERPRETATION ECG - MUSE: NORMAL
LYMPHOCYTES # BLD AUTO: 0.7 10E3/UL (ref 0.8–5.3)
LYMPHOCYTES NFR BLD AUTO: 15 %
MAGNESIUM SERPL-MCNC: 2.1 MG/DL (ref 1.6–2.3)
MCH RBC QN AUTO: 31.4 PG (ref 26.5–33)
MCHC RBC AUTO-ENTMCNC: 31.2 G/DL (ref 31.5–36.5)
MCV RBC AUTO: 101 FL (ref 78–100)
MONOCYTES # BLD AUTO: 0.4 10E3/UL (ref 0–1.3)
MONOCYTES NFR BLD AUTO: 8 %
NEUTROPHILS # BLD AUTO: 3.1 10E3/UL (ref 1.6–8.3)
NEUTROPHILS NFR BLD AUTO: 71 %
NRBC # BLD AUTO: 0 10E3/UL
NRBC BLD AUTO-RTO: 0 /100
P AXIS - MUSE: NORMAL DEGREES
PLATELET # BLD AUTO: 128 10E3/UL (ref 150–450)
POTASSIUM BLD-SCNC: 3.9 MMOL/L (ref 3.4–5.3)
PR INTERVAL - MUSE: NORMAL MS
PROT SERPL-MCNC: 7.4 G/DL (ref 6.8–8.8)
QRS DURATION - MUSE: 90 MS
QT - MUSE: 360 MS
QTC - MUSE: 430 MS
R AXIS - MUSE: -22 DEGREES
RBC # BLD AUTO: 4.52 10E6/UL (ref 4.4–5.9)
SODIUM SERPL-SCNC: 139 MMOL/L (ref 133–144)
SYSTOLIC BLOOD PRESSURE - MUSE: NORMAL MMHG
T AXIS - MUSE: 57 DEGREES
VENTRICULAR RATE- MUSE: 86 BPM
WBC # BLD AUTO: 4.4 10E3/UL (ref 4–11)

## 2022-02-24 PROCEDURE — 36415 COLL VENOUS BLD VENIPUNCTURE: CPT | Performed by: EMERGENCY MEDICINE

## 2022-02-24 PROCEDURE — 80053 COMPREHEN METABOLIC PANEL: CPT | Performed by: EMERGENCY MEDICINE

## 2022-02-24 PROCEDURE — 96365 THER/PROPH/DIAG IV INF INIT: CPT

## 2022-02-24 PROCEDURE — 99284 EMERGENCY DEPT VISIT MOD MDM: CPT | Mod: 25

## 2022-02-24 PROCEDURE — 83735 ASSAY OF MAGNESIUM: CPT | Performed by: EMERGENCY MEDICINE

## 2022-02-24 PROCEDURE — 85025 COMPLETE CBC W/AUTO DIFF WBC: CPT | Performed by: EMERGENCY MEDICINE

## 2022-02-24 PROCEDURE — 250N000009 HC RX 250: Performed by: EMERGENCY MEDICINE

## 2022-02-24 PROCEDURE — 93005 ELECTROCARDIOGRAM TRACING: CPT

## 2022-02-24 PROCEDURE — 250N000011 HC RX IP 250 OP 636: Performed by: EMERGENCY MEDICINE

## 2022-02-24 PROCEDURE — 258N000003 HC RX IP 258 OP 636: Performed by: EMERGENCY MEDICINE

## 2022-02-24 PROCEDURE — 96375 TX/PRO/DX INJ NEW DRUG ADDON: CPT

## 2022-02-24 RX ORDER — MAGNESIUM SULFATE HEPTAHYDRATE 40 MG/ML
2 INJECTION, SOLUTION INTRAVENOUS ONCE
Status: COMPLETED | OUTPATIENT
Start: 2022-02-24 | End: 2022-02-24

## 2022-02-24 RX ORDER — METOPROLOL TARTRATE 1 MG/ML
2.5 INJECTION, SOLUTION INTRAVENOUS ONCE
Status: COMPLETED | OUTPATIENT
Start: 2022-02-24 | End: 2022-02-24

## 2022-02-24 RX ORDER — METOPROLOL SUCCINATE 25 MG/1
50 TABLET, EXTENDED RELEASE ORAL DAILY
Qty: 30 TABLET | Refills: 0 | Status: SHIPPED | OUTPATIENT
Start: 2022-02-24 | End: 2022-02-28

## 2022-02-24 RX ADMIN — METOPROLOL TARTRATE 2.5 MG: 5 INJECTION INTRAVENOUS at 08:48

## 2022-02-24 RX ADMIN — MAGNESIUM SULFATE HEPTAHYDRATE 2 G: 2 INJECTION, SOLUTION INTRAVENOUS at 08:48

## 2022-02-24 RX ADMIN — SODIUM CHLORIDE 500 ML: 9 INJECTION, SOLUTION INTRAVENOUS at 08:48

## 2022-02-24 ASSESSMENT — ENCOUNTER SYMPTOMS
PALPITATIONS: 1
CHEST TIGHTNESS: 0

## 2022-02-24 NOTE — DISCHARGE INSTRUCTIONS
Starting tonight when you typically take your medications, I recommend taking 2 metoprolol XL (Toprol-XL tablets.  You can start by taking) 25 mg tablets you have and take 2 of them, you can use the new prescription when those run out.  I do recommend touching base with your primary doctor and reaching out to the cardiologist.  Should you develop chest pain, chest pressure dizziness lightheadedness or passout or have persistently rapid heart rates above 110 despite taking extra metoprolol you should return to the emergency department.

## 2022-02-24 NOTE — ED TRIAGE NOTES
Palpitations since about 0700. Hx of going into A-Fib. On Eliquis. Took extra propanolol this morning to help

## 2022-02-24 NOTE — TELEPHONE ENCOUNTER
Call placed to patient. Patient confirmed in office visit at the same time would work. Visit changed to in office.

## 2022-02-24 NOTE — TELEPHONE ENCOUNTER
FYI- Prior to 2/28/22 11:30 am Virtual appointment.    ED follow up. Chi was seen for Afib in ED. Patient has since converted to SR.     Advised to call or send a message if anything changes.

## 2022-02-24 NOTE — ED PROVIDER NOTES
"  History   Chief Complaint:  Palpitations       The history is provided by the patient.      Zbigniew Mendoza is a 87 year old male with history of atrial fibrillation, hypertension, and mitral prolapse who presents with palpitations. The patient reports that he began experiencing palpitations about 1 hour ago. He describes the sensation as \"rapid heart beats\" with sudden onset. He notes that he took an extra 10 mg of propranolol this morning, which relieved his symptoms. His most recent dose of his regular Eliquis was also at that time. The patient reports that he last felt palpitations about 1 year ago. His history of atrial fibrillation is reportedly episodic. He notes that he last received a stress test about 1.5 years ago. At this time, the patient reports no chest pain, pressure, or heaviness. He denies any other symptoms.    Review of Systems   Respiratory: Negative for chest tightness.    Cardiovascular: Positive for palpitations. Negative for chest pain.   All other systems reviewed and are negative.      Allergies:  Ciprofloxacin    Medications:  Eliquis  Bisacodyl  Cozaar  Toprol  Propranolol  MiraLAX  Flomax    Past Medical History:     Atrial fibrillation  Chronic idiopathic constipation   Hypertension  Kidney calculus  Mitral prolapse  Osteoarthrosis   Prostate hypertrophy   Prostate infection      Past Surgical History:    Right shoulder surgery  Cystoscopy with TURP procedure  Unspecified back surgery     Family History:    Father: CAD    Social History:  Presents to the emergency department alone   Arrives via car  Patient is      Physical Exam     Patient Vitals for the past 24 hrs:   BP Temp Temp src Pulse Resp SpO2   02/24/22 0930 -- -- -- 75 23 99 %   02/24/22 0915 127/68 -- -- 75 23 100 %   02/24/22 0900 121/70 -- -- 81 17 99 %   02/24/22 0845 119/82 -- -- 98 16 100 %   02/24/22 0830 -- -- -- 85 (!) 38 (!) 89 %   02/24/22 0815 (!) 151/91 -- -- -- -- --   02/24/22 0811 (!) 161/90 97.9  F " (36.6  C) Temporal 101 20 99 %       Physical Exam  Constitutional: Alert, attentive, GCS 15   HENT:    Nose: Nose normal.    Mouth/Throat: Oropharynx is clear, mucous membranes are moist  Eyes: EOM are normal, anicteric, conjugate gaze  CV: Irregularly irregular rhythm, normal rate, no murmur  Chest: Effort normal and breath sounds clear without wheezing or rales, symmetric bilaterally   GI:  non tender. No distension. No guarding or rebound.    MSK: No LE edema, no tenderness to palpation of BLE.  Neurological: Alert, attentive, moving all extremities equally.   Skin: Skin is warm and dry.    Emergency Department Course   ECG  ECG obtained at 0829, ECG read at 0831  Atrial fibrillation    Atrial fibrillation now present as compared to prior, dated 02/11/21.  Rate 86 bpm. DC interval * ms. QRS duration 90 ms. QT/QTc 360/430 ms. P-R-T axes * -22 57.     Laboratory:  Labs Ordered and Resulted from Time of ED Arrival to Time of ED Departure   COMPREHENSIVE METABOLIC PANEL - Abnormal       Result Value    Sodium 139      Potassium 3.9      Chloride 108      Carbon Dioxide (CO2) 31      Anion Gap <1 (*)     Urea Nitrogen 19      Creatinine 0.97      Calcium 8.7      Glucose 138 (*)     Alkaline Phosphatase 107      AST 21      ALT 13      Protein Total 7.4      Albumin 3.4      Bilirubin Total 0.9      GFR Estimate 76     CBC WITH PLATELETS AND DIFFERENTIAL - Abnormal    WBC Count 4.4      RBC Count 4.52      Hemoglobin 14.2      Hematocrit 45.5       (*)     MCH 31.4      MCHC 31.2 (*)     RDW 15.5 (*)     Platelet Count 128 (*)     % Neutrophils 71      % Lymphocytes 15      % Monocytes 8      % Eosinophils 3      % Basophils 2      % Immature Granulocytes 1      NRBCs per 100 WBC 0      Absolute Neutrophils 3.1      Absolute Lymphocytes 0.7 (*)     Absolute Monocytes 0.4      Absolute Eosinophils 0.1      Absolute Basophils 0.1      Absolute Immature Granulocytes 0.0      Absolute NRBCs 0.0     MAGNESIUM -  Normal    Magnesium 2.1          Emergency Department Course:     Reviewed:  I reviewed nursing notes, vitals and past medical history    Assessments:  08 I obtained history and examined the patient as noted above.   950 I rechecked the patient and explained findings.       Interventions:  0848 NS bolus 1L IV  0848 Magnesium sulfate infusion 2g IV  0848 Lopressor injection 2.5 mg IV      Disposition:  The patient was discharged to home.     Impression & Plan   Medical Decision Makin-year-old male past medical history significant for paroxysmal A. fib on Eliquis, hypertension presenting for evaluation of palpitations that he noticed this morning at 7 AM.  He took an extra dose of propranolol that was typically on metoprolol and this resolved his palpitations.  However EKG here confirms he is in slow A. fib.  He has no chest pain chest pressure or shortness of breath.  Electrolytes are within normal limits, with IV fluids mag and extra dose of metoprolol through the IV, he did not spontaneously convert into a sinus rhythm. He had no chest discomfort tightness or heaviness with his rapid ventricular rate, he had a negative stress echo within the last 2 years low concern for ACS. He does not have a history of cardioversion, I did discuss with him at length the risks and benefits of what would be considered an elective cardioversion though appropriate given he is anticoagulated and has paroxysmal A. fib.  However, he has opted against this and preference for increasing his base metoprolol dosing which has been discussed previously and following up with his PCP.  He was ambulatory in the emergency department without any dizziness lightheadedness or shortness of breath.       Diagnosis:    ICD-10-CM    1. Paroxysmal atrial fibrillation (H)  I48.0    2. Atrial fibrillation with rapid ventricular response (H)  I48.91        Discharge Medications:  New Prescriptions    METOPROLOL SUCCINATE ER (TOPROL-XL) 25 MG 24 HR  TABLET    Take 2 tablets (50 mg) by mouth daily     Krishna Sanchez MD  [employer]  10:01 AM 02/24/22     Scribe Disclosure:  I, Colt Melchor, am serving as a scribe at 8:15 AM on 2/24/2022 to document services personally performed by Krishna Sanchez MD based on my observations and the provider's statements to me.           Krishna Sanchez MD  02/24/22 1002

## 2022-02-28 ENCOUNTER — OFFICE VISIT (OUTPATIENT)
Dept: INTERNAL MEDICINE | Facility: CLINIC | Age: 87
End: 2022-02-28
Payer: COMMERCIAL

## 2022-02-28 VITALS
TEMPERATURE: 96.6 F | BODY MASS INDEX: 23.19 KG/M2 | WEIGHT: 162 LBS | HEIGHT: 70 IN | DIASTOLIC BLOOD PRESSURE: 60 MMHG | SYSTOLIC BLOOD PRESSURE: 142 MMHG | HEART RATE: 60 BPM

## 2022-02-28 DIAGNOSIS — Z09 HOSPITAL DISCHARGE FOLLOW-UP: Primary | ICD-10-CM

## 2022-02-28 DIAGNOSIS — I48.0 PAROXYSMAL ATRIAL FIBRILLATION (H): ICD-10-CM

## 2022-02-28 DIAGNOSIS — I10 ESSENTIAL HYPERTENSION: ICD-10-CM

## 2022-02-28 PROCEDURE — 93000 ELECTROCARDIOGRAM COMPLETE: CPT | Performed by: INTERNAL MEDICINE

## 2022-02-28 PROCEDURE — 99214 OFFICE O/P EST MOD 30 MIN: CPT | Performed by: INTERNAL MEDICINE

## 2022-02-28 RX ORDER — METOPROLOL SUCCINATE 50 MG/1
50 TABLET, EXTENDED RELEASE ORAL DAILY
Qty: 90 TABLET | Refills: 3 | Status: SHIPPED | OUTPATIENT
Start: 2022-02-28 | End: 2023-01-01

## 2022-02-28 NOTE — PROGRESS NOTES
Assessment & Plan     Hospital discharge follow-up  Improved, no symptoms currently     Paroxysmal atrial fibrillation (H)  Continue with higher dose of metoprolol , continue AC   - metoprolol succinate ER (TOPROL-XL) 50 MG 24 hr tablet; Take 1 tablet (50 mg) by mouth daily  - EKG 12-lead complete w/read - Clinics    Essential hypertension  Continue treatment, slightly elevated BP, keep low salt diet, monitor   - EKG 12-lead complete w/read - Clinics             See Patient Instructions    Return in about 3 months (around 5/28/2022) for Physical Exam.    Eagle Negrete MD  [facility] MARIELLA Mireles is a 87 year old who presents for the following health issues     HPI     ED/UC Followup:    Facility:  Replaced by [facility]  Date of visit: 02/24/22  Reason for visit: Paroxysmal atrial fibrillation   Current Status: improved        Patient is seen for a follow up visit.  Seen in ED for palpitations, irregular and fast heart rate.   Found to be in atrial fibrillation. Given IV magnesium, metoprolol. Did not convert ,but the rate was down to 86.   No symptoms of chest pain, SOB, dizziness. Has history of paroxysmal atrial fibrillation. On anticoagulation with Eliquis and rate control medications. Asymptonatic - no chest pains , palpitations,  no side effects from medications. Last a fib episode was a year ago.   Increased Metoprolol dose to 50 mg and is converted latera to regular rate.   Has h/o HTN. on medical treatment. BP has been controlled. No side effects from medications. No CP, HA, dizziness. good compliance with medications and low salt diet.  Has had a negative stress test a year ago. No structural heart disease.         Review of Systems   Constitutional, HEENT, cardiovascular, pulmonary, gi and gu systems are negative, except as otherwise noted.      Objective    BP (!) 142/60 (BP Location: Right arm, Patient Position: Sitting, Cuff Size: Adult Regular)   Pulse 60   Temp (!) 96.6  F  "(35.9  C) (Tympanic)   Ht 1.778 m (5' 10\")   Wt 73.5 kg (162 lb)   BMI 23.24 kg/m    Body mass index is 23.24 kg/m .  Physical Exam   GENERAL: healthy, alert and no distress  EYES: Eyes grossly normal to inspection, PERRL and conjunctivae and sclerae normal  HENT: ear canals and TM's normal, nose and mouth without ulcers or lesions  NECK: no adenopathy, no asymmetry, masses, or scars and thyroid normal to palpation  RESP: lungs clear to auscultation - no rales, rhonchi or wheezes  CV: regular rate and rhythm, normal S1 S2, no S3 or S4, no murmur, click or rub, no peripheral edema and peripheral pulses strong  ABDOMEN: soft, nontender, no hepatosplenomegaly, no masses and bowel sounds normal  MS: no gross musculoskeletal defects noted, no edema  NEURO: Normal strength and tone, mentation intact and speech normal    Admission on 02/24/2022, Discharged on 02/24/2022   Component Date Value Ref Range Status     Sodium 02/24/2022 139  133 - 144 mmol/L Final     Potassium 02/24/2022 3.9  3.4 - 5.3 mmol/L Final     Chloride 02/24/2022 108  94 - 109 mmol/L Final     Carbon Dioxide (CO2) 02/24/2022 31  20 - 32 mmol/L Final     Anion Gap 02/24/2022 <1 (A) 3 - 14 mmol/L Final     Urea Nitrogen 02/24/2022 19  7 - 30 mg/dL Final     Creatinine 02/24/2022 0.97  0.66 - 1.25 mg/dL Final     Calcium 02/24/2022 8.7  8.5 - 10.1 mg/dL Final     Glucose 02/24/2022 138 (A) 70 - 99 mg/dL Final     Alkaline Phosphatase 02/24/2022 107  40 - 150 U/L Final     AST 02/24/2022 21  0 - 45 U/L Final     ALT 02/24/2022 13  0 - 70 U/L Final     Protein Total 02/24/2022 7.4  6.8 - 8.8 g/dL Final     Albumin 02/24/2022 3.4  3.4 - 5.0 g/dL Final     Bilirubin Total 02/24/2022 0.9  0.2 - 1.3 mg/dL Final     GFR Estimate 02/24/2022 76  >60 mL/min/1.73m2 Final    Effective December 21, 2021 eGFRcr in adults is calculated using the 2021 CKD-EPI creatinine equation which includes age and gender (Cande et al., NEJM, DOI: 10.1056/FYYUip0850370)     " Magnesium 02/24/2022 2.1  1.6 - 2.3 mg/dL Final     Ventricular Rate 02/24/2022 86  BPM Incomplete     Atrial Rate 02/24/2022 71  BPM Incomplete     QRS Duration 02/24/2022 90  ms Incomplete     QT 02/24/2022 360  ms Incomplete     QTc 02/24/2022 430  ms Incomplete     R AXIS 02/24/2022 -22  degrees Incomplete     T Axis 02/24/2022 57  degrees Incomplete     Interpretation ECG 02/24/2022    Incomplete                    Value:Atrial fibrillation  Abnormal ECG  When compared with ECG of 21-DEC-2020 10:28,  Atrial fibrillation has replaced Sinus rhythm       WBC Count 02/24/2022 4.4  4.0 - 11.0 10e3/uL Final     RBC Count 02/24/2022 4.52  4.40 - 5.90 10e6/uL Final     Hemoglobin 02/24/2022 14.2  13.3 - 17.7 g/dL Final     Hematocrit 02/24/2022 45.5  40.0 - 53.0 % Final     MCV 02/24/2022 101 (A) 78 - 100 fL Final     MCH 02/24/2022 31.4  26.5 - 33.0 pg Final     MCHC 02/24/2022 31.2 (A) 31.5 - 36.5 g/dL Final     RDW 02/24/2022 15.5 (A) 10.0 - 15.0 % Final     Platelet Count 02/24/2022 128 (A) 150 - 450 10e3/uL Final     % Neutrophils 02/24/2022 71  % Final     % Lymphocytes 02/24/2022 15  % Final     % Monocytes 02/24/2022 8  % Final     % Eosinophils 02/24/2022 3  % Final     % Basophils 02/24/2022 2  % Final     % Immature Granulocytes 02/24/2022 1  % Final     NRBCs per 100 WBC 02/24/2022 0  <1 /100 Final     Absolute Neutrophils 02/24/2022 3.1  1.6 - 8.3 10e3/uL Final     Absolute Lymphocytes 02/24/2022 0.7 (A) 0.8 - 5.3 10e3/uL Final     Absolute Monocytes 02/24/2022 0.4  0.0 - 1.3 10e3/uL Final     Absolute Eosinophils 02/24/2022 0.1  0.0 - 0.7 10e3/uL Final     Absolute Basophils 02/24/2022 0.1  0.0 - 0.2 10e3/uL Final     Absolute Immature Granulocytes 02/24/2022 0.0  <=0.4 10e3/uL Final     Absolute NRBCs 02/24/2022 0.0  10e3/uL Final

## 2022-03-10 DIAGNOSIS — J31.0 CHRONIC RHINITIS: ICD-10-CM

## 2022-03-10 RX ORDER — FLUTICASONE PROPIONATE 50 MCG
SPRAY, SUSPENSION (ML) NASAL
Qty: 16 ML | Refills: 2 | Status: SHIPPED | OUTPATIENT
Start: 2022-03-10 | End: 2022-01-01

## 2022-03-29 ENCOUNTER — MYC MEDICAL ADVICE (OUTPATIENT)
Dept: INTERNAL MEDICINE | Facility: CLINIC | Age: 87
End: 2022-03-29
Payer: COMMERCIAL

## 2022-03-29 DIAGNOSIS — I48.0 PAROXYSMAL ATRIAL FIBRILLATION (H): ICD-10-CM

## 2022-03-30 ENCOUNTER — TELEPHONE (OUTPATIENT)
Dept: CARDIOLOGY | Facility: CLINIC | Age: 87
End: 2022-03-30
Payer: COMMERCIAL

## 2022-03-30 DIAGNOSIS — J31.0 CHRONIC RHINITIS: ICD-10-CM

## 2022-03-30 NOTE — TELEPHONE ENCOUNTER
See his last message. Dr Lal would like Dr Negrete's office to take over his Eliquis. He is out today.    Please refill.     CBC RESULTS: Recent Labs   Lab Test 02/24/22  0832   WBC 4.4   RBC 4.52   HGB 14.2   HCT 45.5   *   MCH 31.4   MCHC 31.2*   RDW 15.5*   *

## 2022-03-30 NOTE — TELEPHONE ENCOUNTER
[facility] Call Center    Phone Message    May a detailed message be left on voicemail: yes     Reason for Call: Medication Refill Request    Has the patient contacted the pharmacy for the refill? Yes   Name of medication being requested: apixaban ANTICOAGULANT (ELIQUIS) 5 MG tablet  Provider who prescribed the medication: Dr. Lal  Pharmacy[id number] [address] [facility]  Date medication is needed: 3/30/2022 - Pt is out of the medication.  He reports that [facility] has been trying for a week to get the refill without success - nothing from the pharmacy in patient's chart         Action Taken: Message routed to:  Other: Cardiology    Travel Screening: Not Applicable

## 2022-03-30 NOTE — TELEPHONE ENCOUNTER
Left message for patient and mychart message sent with information that he should contact his PCP for the eliquis refill as he does not need cardiology follow up. Reviewed with pharmacy as well and they will contact PCP urgently for the prescription.

## 2022-03-30 NOTE — TELEPHONE ENCOUNTER
See [employer] message. Chronic rhinitis was discussed at  on 12/22/21.   Started on Flonase.   Any other advise?     [facility]liquis refills [facility] go through [facility]. Will send him message back.

## 2022-03-31 RX ORDER — FLUTICASONE PROPIONATE 50 MCG
SPRAY, SUSPENSION (ML) NASAL
Qty: 48 ML | OUTPATIENT
Start: 2022-03-31

## 2022-03-31 NOTE — TELEPHONE ENCOUNTER
Fluticasone (Flonase) 50 mcg/act  Medication refill sent 3/10/22 16 ml with 2 refills. Request denied.

## 2022-04-04 ENCOUNTER — TRANSFERRED RECORDS (OUTPATIENT)
Dept: HEALTH INFORMATION MANAGEMENT | Facility: CLINIC | Age: 87
End: 2022-04-04
Payer: COMMERCIAL

## 2022-04-16 ENCOUNTER — HEALTH MAINTENANCE LETTER (OUTPATIENT)
Age: 87
End: 2022-04-16

## 2022-06-22 NOTE — TELEPHONE ENCOUNTER
"See [employer] message. Will route to covering providers.   Any advise?     Pt is scheduled on 7/12 with Dr Negrete.     His previous CMGE message:  \"In the past 2 months have had issues with burping, passing gas, unsettled stomach, no vomiting, have tried tums, peptobismol. Suggestions. GERD?\"       "

## 2022-06-29 NOTE — TELEPHONE ENCOUNTER
"See mychart message. Please advise.     Care advise given to decrease spicy foods and not lie down for at least an hour after taking medications, eating.       Per previous message:  \"In the past 2 months have had issues with burping, passing gas, unsettled stomach, no vomiting, have tried tums, peptobismol. Suggestions. GERD?\"    "

## 2022-07-12 NOTE — PROGRESS NOTES
Assessment & Plan     Indigestion  Assess lab work   Continue PPI  Consider GI assessment  - CBC with platelets  - Comprehensive metabolic panel (BMP + Alb, Alk Phos, ALT, AST, Total. Bili, TP)  - Amylase  - Lipase  - UA with Microscopic reflex to Culture - lab collect    Gas bloat syndrome  Keep diet, gluten free trial, lactose free trial   - CBC with platelets  - Comprehensive metabolic panel (BMP + Alb, Alk Phos, ALT, AST, Total. Bili, TP)  - Amylase  - Lipase  - UA with Microscopic reflex to Culture - lab collect    Urine frequency  Assess UA, increase Flomax to 0.8 mg   - UA with Microscopic reflex to Culture - lab collect    DDD (degenerative disc disease), lumbar  Plans for steroid epidural injection for DDD.                See Patient Instructions    Return in about 4 weeks (around 8/9/2022) for Routine Visit, follow up on acute problem if persists.    Eagle Negrete MD  [facility]    Clara Mireles is a 88 year old, presenting for the following health issues:  office visit (GERD symptoms, prostate)      History of Present Illness       Reason for visit:  ? BPH, GI symptoms    He eats 2-3 servings of fruits and vegetables daily.He consumes 1 sweetened beverage(s) daily.He exercises with enough effort to increase his heart rate 10 to 19 minutes per day.  He exercises with enough effort to increase his heart rate 4 days per week.   He is taking medications regularly.       Patient is seen for a follow up visit.    Concern for LBP. Has h/o DDD, post discectomy in the past. Now has increased symptoms with walking, standing, bending. Still able to exercise, goes to [employer].   Has had epidural steroid injection 6 years ago ,had good results. No legs weakness or numbness.   Has history of atrial fibrillation. On anticoagulation with Eliquis and rate control medications. Asymptonatic - no chest pains , palpitations,  no side effects from medications.  Has had indigestion,  "burping, gas. Started Prilosec. Has improved with also diet change. Has 2 bowel movements a day. Takes Colace for constipation.   Concerns for urine frequency and urgency, nocturia. Has improved with change of his diet for the indigestion.          Review of Systems   Constitutional, HEENT, cardiovascular, pulmonary, gi and gu systems are negative, except as otherwise noted.      Objective    /62   Pulse 68   Temp 96.9  F (36.1  C) (Tympanic)   Resp 16   Ht 1.778 m (5' 10\")   Wt 70.4 kg (155 lb 4.8 oz)   SpO2 100%   BMI 22.28 kg/m    Body mass index is 22.28 kg/m .  Physical Exam   GENERAL: healthy, alert and no distress  NECK: no adenopathy, no asymmetry, masses, or scars and thyroid normal to palpation  RESP: lungs clear to auscultation - no rales, rhonchi or wheezes  CV: regular rate and rhythm, normal S1 S2, no S3 or S4, no murmur, click or rub, no peripheral edema and peripheral pulses strong  ABDOMEN: soft, nontender, no hepatosplenomegaly, no masses and bowel sounds normal  RECTAL (male): normal sphincter tone, no rectal masses, prostate normal size, smooth, nontender without nodules or masses  MS: no gross musculoskeletal defects noted, no edema  NEURO: Normal strength and tone, mentation intact and speech normal    Admission on 02/24/2022, Discharged on 02/24/2022   Component Date Value Ref Range Status     Sodium 02/24/2022 139  133 - 144 mmol/L Final     Potassium 02/24/2022 3.9  3.4 - 5.3 mmol/L Final     Chloride 02/24/2022 108  94 - 109 mmol/L Final     Carbon Dioxide (CO2) 02/24/2022 31  20 - 32 mmol/L Final     Anion Gap 02/24/2022 <1 (A) 3 - 14 mmol/L Final     Urea Nitrogen 02/24/2022 19  7 - 30 mg/dL Final     Creatinine 02/24/2022 0.97  0.66 - 1.25 mg/dL Final     Calcium 02/24/2022 8.7  8.5 - 10.1 mg/dL Final     Glucose 02/24/2022 138 (A) 70 - 99 mg/dL Final     Alkaline Phosphatase 02/24/2022 107  40 - 150 U/L Final     AST 02/24/2022 21  0 - 45 U/L Final     ALT 02/24/2022 13  0 " - 70 U/L Final     Protein Total 02/24/2022 7.4  6.8 - 8.8 g/dL Final     Albumin 02/24/2022 3.4  3.4 - 5.0 g/dL Final     Bilirubin Total 02/24/2022 0.9  0.2 - 1.3 mg/dL Final     GFR Estimate 02/24/2022 76  >60 mL/min/1.73m2 Final    Effective December 21, 2021 eGFRcr in adults is calculated using the 2021 CKD-EPI creatinine equation which includes age and gender (Cande et al., NE, DOI: 10.[facility]6/ZPVPvd7251101)     Magnesium 02/24/2022 2.1  1.6 - 2.3 mg/dL Final     Ventricular Rate 02/24/2022 86  BPM Incomplete     Atrial Rate 02/24/2022 71  BPM Incomplete     QRS Duration 02/24/2022 90  ms Incomplete     QT 02/24/2022 360  ms Incomplete     QTc 02/24/2022 430  ms Incomplete     R AXIS 02/24/2022 -22  degrees Incomplete     T Axis 02/24/2022 57  degrees Incomplete     Interpretation ECG 02/24/2022    Incomplete                    Value:Atrial fibrillation  Abnormal ECG  When compared with ECG of 21-DEC-2020 10:28,  Atrial fibrillation has replaced Sinus rhythm       WBC Count 02/24/2022 4.4  4.0 - 11.0 10e3/uL Final     RBC Count 02/24/2022 4.52  4.40 - 5.90 10e6/uL Final     Hemoglobin 02/24/2022 14.2  13.3 - 17.7 g/dL Final     Hematocrit 02/24/2022 45.5  40.0 - 53.0 % Final     MCV 02/24/2022 101 (A) 78 - 100 fL Final     MCH 02/24/2022 31.4  26.5 - 33.0 pg Final     MCHC 02/24/2022 31.2 (A) 31.5 - 36.5 g/dL Final     RDW 02/24/2022 15.5 (A) 10.0 - 15.0 % Final     Platelet Count 02/24/2022 128 (A) 150 - 450 10e3/uL Final     % Neutrophils 02/24/2022 71  % Final     % Lymphocytes 02/24/2022 15  % Final     % Monocytes 02/24/2022 8  % Final     % Eosinophils 02/24/2022 3  % Final     % Basophils 02/24/2022 2  % Final     % Immature Granulocytes 02/24/2022 1  % Final     NRBCs per 100 WBC 02/24/2022 0  <1 /100 Final     Absolute Neutrophils 02/24/2022 3.1  1.6 - 8.3 10e3/uL Final     Absolute Lymphocytes 02/24/2022 0.7 (A) 0.8 - 5.3 10e3/uL Final     Absolute Monocytes 02/24/2022 0.4  0.0 - 1.3 10e3/uL Final      Absolute Eosinophils 02/24/2022 0.1  0.0 - 0.7 10e3/uL Final     Absolute Basophils 02/24/2022 0.1  0.0 - 0.2 10e3/uL Final     Absolute Immature Granulocytes 02/24/2022 0.0  <=0.4 10e3/uL Final     Absolute NRBCs 02/24/2022 0.0  10e3/uL Final                   .  ..

## 2022-08-01 NOTE — TELEPHONE ENCOUNTER
My chart message sent by patient.    My chart message sent to patient.         
See his new message. There is no longer an option for [facility] clinic consult on the referral.   The phone # on the referral is for the [facility] GI clinic     Will route to Referrals to see if they are still partnering with [place]?     Pt doesn't want to wait until February, and he would have to go to [employer] if used [employer].     
Sent email to [facility]  Specialty Director in regards to outside GI clinics not an option for Providers to select.    JONA Morse [facility] Referral Rep    
Spoke with Pt who states he now has a September appt at [facility] in [place].    JONA Morse [facility] Rep    
Statement Selected

## 2022-08-11 NOTE — ED PROVIDER NOTES
History     Chief Complaint:  Sore (Raised red tender lump to mid back Onset 8 days ago. )       HPI   Zbigniew Mendoza is a 88 year old male who presents with 8 days of a rash on his midback.  He states that when this started 8 days ago he was using a little bit.  There was some blood on his shirt.  He is unable to visualize it clearly.  He was told by his primary physician Dr. Terrazas off to come and get checked out.  He states that he is able to lay on it without any difficulty.  He only has pain when he pushes on it really hard.  He has not had any fever or any other systemic symptoms.  He does have history of basal cell carcinoma on his forehead.  He has not seen a dermatologist.  No purulent drainage that he is seeing on his clothes.    ROS:  Review of Systems  Please see HPI. All other systems reviewed and negative.      Allergies:  Ciprofloxacin     Medications:    apixaban ANTICOAGULANT (ELIQUIS) 5 MG tablet  bisacodyl (DULCOLAX) 10 MG suppository  fluticasone (FLONASE) 50 MCG/ACT nasal spray  metoprolol succinate ER (TOPROL-XL) 50 MG 24 hr tablet  Multiple Vitamins-Minerals (MULTIVITAL) TABS  polyethylene glycol (MIRALAX) 17 GM/Dose powder  sulfamethoxazole-trimethoprim (BACTRIM DS) 800-160 MG tablet  tamsulosin (FLOMAX) 0.4 MG capsule        Past Medical History:    Past Medical History:   Diagnosis Date     Atrial fibrillation      Calculus of kidney      Mitral prolapse      Prostate infection        Past Surgical History:    Past Surgical History:   Procedure Laterality Date     ARTHROSCOPY SHOULDER ROTATOR CUFF REPAIR Right 1996     BACK SURGERY       CYSTOSCOPY, TRANSURETHRAL RESECTION (TUR) PROSTATE, COMBINED  04/93    S/P TURP        Family History:    family history includes Coronary Artery Disease in his father; No Known Problems in his mother.    Social History:   reports that he has never smoked. He has never used smokeless tobacco. He reports current alcohol use. He reports that he does not use  drugs.  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  Physical Exam     Patient Vitals for the past 24 hrs:   BP Temp Temp src Pulse Resp SpO2   08/11/22 0804 135/70 99  F (37.2  C) Oral 65 18 100 %        Physical Exam  Constitutional:       Appearance: He is well-developed.   Cardiovascular:      Rate and Rhythm: Normal rate and regular rhythm.      Heart sounds: Normal heart sounds. No murmur heard.    No friction rub. No gallop.   Pulmonary:      Effort: Pulmonary effort is normal. No respiratory distress.      Breath sounds: Normal breath sounds. No wheezing or rales.   Skin:     General: Skin is warm and dry.      Findings: Rash present.      Comments: Slightly raised irregularly shaped rash with a dark eschar and surrounding edge that is erythematous.  It is located in the mid thoracic region.  Slightly tender to palpation without any fluctuance.  No purulent drainage upon palpation.   Neurological:      Mental Status: He is alert.                 Emergency Department Course     Emergency Department Course:     Reviewed:  I reviewed nursing notes, vitals, past medical history, Care Everywhere and [facility]    Assessments:  0920 I obtained history and examined the patient as noted above.       Disposition:  The patient was discharged to home.     Impression & Plan        Medical Decision Making:  Patient presents today for evaluation of a rash on his back.  The rash is parents is slightly worrisome for possible melanoma.  He does have a history of basal cell carcinoma.  There is some redness around the edge and we agreed to go ahead and treat it as an infection to start with.  I will have him take Keflex for 7 days.  I did refer him back to his doctor so he can get into a dermatologist to get a biopsy of this area.  I did voice my concern to the patient and he is in agreement with the treatment plan.  He has an appointment to see his doctor next week and will obtain a dermatology  evaluation in the meantime.  Return precautions provided    Diagnosis:    ICD-10-CM    1. Local infection of wound  T14.8XXA     L08.9         Discharge Medications:  New Prescriptions    CEPHALEXIN (KEFLEX) 500 MG CAPSULE    Take 1 capsule (500 mg) by mouth 3 times daily for 7 days      Scribe Disclosure:  Ravi GARCÍA, am serving as a scribe at 9:30 AM on 8/11/2022 to document services personally performed by Nohemy Campbell MD based on my observations and the provider's statements to me.    8/11/2022   Nohemy Campbell MD Cheng, Wenlan, MD  08/11/22 0968

## 2022-08-11 NOTE — DISCHARGE INSTRUCTIONS
Please start antibiotics today in case this is an infection.  You need to follow-up with a dermatologist right away to make sure this is not melanoma  Please follow-up with your regular doctor on 2 you can get into see a dermatologist  Return if wound is worsening

## 2022-08-11 NOTE — ED TRIAGE NOTES
Pt presents with red raised bump to mid back. Tender. Onset 8 days ago.      Triage Assessment     Row Name 08/11/22 0806       Triage Assessment (Adult)    Airway WDL WDL       Respiratory WDL    Respiratory WDL WDL       Skin Circulation/Temperature WDL    Skin Circulation/Temperature WDL WDL       Cognitive/Neuro/Behavioral WDL    Cognitive/Neuro/Behavioral WDL WDL

## 2022-08-13 NOTE — TELEPHONE ENCOUNTER
Pending Prescriptions:                       Disp   Refills    fluticasone (FLONASE) 50 MCG/ACT nasal sp*48 mL  3            Sig: INSTILL 1 SPRAY INTO BOTH NOSTRILS DAILY    Prescription approved per [facility] Refill Protocol.

## 2022-08-17 NOTE — PROGRESS NOTES
"  Assessment & Plan     Infected sebaceous cyst of skin  Improved erythema, continue Keflex  Sebaceous material from cyst was removed. No underlying abscess noted , no worrisome skin lesions                See Patient Instructions    No follow-ups on file.    Eagle Negrete MD  [facility]les is a 88 year old, presenting for the following health issues:  ER F/U      History of Present Illness       Reason for visit:  ER follow up  Symptom onset:  1-2 weeks ago  Symptom intensity:  Mild  Symptom progression:  Improving  Had these symptoms before:  No  What makes it worse:  No  What makes it better:  No    He eats 2-3 servings of fruits and vegetables daily.He consumes 1 sweetened beverage(s) daily.He exercises with enough effort to increase his heart rate 20 to 29 minutes per day.  He exercises with enough effort to increase his heart rate 4 days per week.   He is taking medications regularly.       ED/UC Followup:    Facility:  [facility]  Date of visit: 08/11/22  Reason for visit: wound infection  Current Status: improving    Seen for follow up.   Has developed swelling and bleeding on his back, assessed in ER and started on Keflex for wound infection.   Symptoms have improved. No significant drainage, no pain. Taking the antibiotic.   Has h/o cyst infections in the past.       Review of Systems   Constitutional, HEENT, cardiovascular, pulmonary, gi and gu systems are negative, except as otherwise noted.      Objective    BP (!) 150/66 (BP Location: Left arm, Cuff Size: Adult Regular)   Pulse 73   Temp 98  F (36.7  C) (Oral)   Resp 18   Ht 1.778 m (5' 10\")   Wt 66.8 kg (147 lb 3.2 oz)   SpO2 98%   BMI 21.12 kg/m    Body mass index is 21.12 kg/m .  Physical Exam   GENERAL: healthy, alert and no distress  MS: no gross musculoskeletal defects noted, no edema  SKIN: open cystic lesion on the back right paravertebral scapular area 1 cm, crusted matter covering " it, mild skin erythema surrounding the lesion, upper crust removed and able to evacuate whitish sebaceous material, no pus. No skin changes of pigmentation , base of the cyst is clear , whitish   Office Visit on 07/12/2022   Component Date Value Ref Range Status     WBC Count 07/12/2022 4.2  4.0 - 11.0 10e3/uL Final     RBC Count 07/12/2022 3.99 (A) 4.40 - 5.90 10e6/uL Final     Hemoglobin 07/12/2022 12.8 (A) 13.3 - 17.7 g/dL Final     Hematocrit 07/12/2022 39.3 (A) 40.0 - 53.0 % Final     MCV 07/12/2022 99  78 - 100 fL Final     MCH 07/12/2022 32.1  26.5 - 33.0 pg Final     MCHC 07/12/2022 32.6  31.5 - 36.5 g/dL Final     RDW 07/12/2022 15.2 (A) 10.0 - 15.0 % Final     Platelet Count 07/12/2022 123 (A) 150 - 450 10e3/uL Final     Sodium 07/12/2022 139  133 - 144 mmol/L Final     Potassium 07/12/2022 3.7  3.4 - 5.3 mmol/L Final     Chloride 07/12/2022 106  94 - 109 mmol/L Final     Carbon Dioxide (CO2) 07/12/2022 27  20 - 32 mmol/L Final     Anion Gap 07/12/2022 6  3 - 14 mmol/L Final     Urea Nitrogen 07/12/2022 13  7 - 30 mg/dL Final     Creatinine 07/12/2022 0.80  0.66 - 1.25 mg/dL Final     Calcium 07/12/2022 8.8  8.5 - 10.1 mg/dL Final     Glucose 07/12/2022 90  70 - 99 mg/dL Final     Alkaline Phosphatase 07/12/2022 83  40 - 150 U/L Final     AST 07/12/2022 17  0 - 45 U/L Final     ALT 07/12/2022 10  0 - 70 U/L Final     Protein Total 07/12/2022 7.0  6.8 - 8.8 g/dL Final     Albumin 07/12/2022 3.8  3.4 - 5.0 g/dL Final     Bilirubin Total 07/12/2022 0.9  0.2 - 1.3 mg/dL Final     GFR Estimate 07/12/2022 85  >60 mL/min/1.73m2 Final    Effective December 21, 2021 eGFRcr in adults is calculated using the 2021 CKD-EPI creatinine equation which includes age and gender (Cande et al., NEJ, DOI: 10.1056/ZWWTxq8917782)     Amylase 07/12/2022 56  30 - 110 U/L Final     Lipase 07/12/2022 70 (A) 13 - 60 U/L Final     Color Urine 07/12/2022 Yellow  Colorless, Straw, Light Yellow, Yellow Final     Appearance Urine  07/12/2022 Slightly Cloudy (A) Clear Final     Glucose Urine 07/12/2022 Negative  Negative mg/dL Final     Bilirubin Urine 07/12/2022 Negative  Negative Final     Ketones Urine 07/12/2022 80  (A) Negative mg/dL Final     Specific Gravity Urine 07/12/2022 >=1.030  1.003 - 1.035 Final     Blood Urine 07/12/2022 Large (A) Negative Final     pH Urine 07/12/2022 6.0  5.0 - 7.0 Final     Protein Albumin Urine 07/12/2022 100  (A) Negative mg/dL Final     Urobilinogen Urine 07/12/2022 1.0  0.2, 1.0 E.U./dL Final     Nitrite Urine 07/12/2022 Negative  Negative Final     Leukocyte Esterase Urine 07/12/2022 Negative  Negative Final     Bacteria Urine 07/12/2022 Moderate (A) None Seen /HPF Final     RBC Urine 07/12/2022  (A) 0-2 /HPF /HPF Final     WBC Urine 07/12/2022 0-5  0-5 /HPF /HPF Final     Squamous Epithelials Urine 07/12/2022 Few (A) None Seen /LPF Final                   .  ..

## 2022-09-03 PROBLEM — R31.0 GROSS HEMATURIA: Status: ACTIVE | Noted: 2022-01-01

## 2022-09-03 NOTE — PROGRESS NOTES
"Assessment & Plan     1. Gross hematuria    - UA macro with reflex to Microscopic and Culture - Clinc Collect  - Urine Microscopic  - Urine Culture  - cefdinir (OMNICEF) 300 MG capsule; Take 1 capsule (300 mg) by mouth 2 times daily for 10 days  Dispense: 20 capsule; Refill: 0     UA with some blood and bacteria.   Follow up in one week in PCP clinic if symptoms not improving and in 2 weeks if they have improved.                 Rafael Manrique PA-C  [facility] [place]    Clara Mireles is a 88 year old male who presents to clinic today for the following health issues:  Chief Complaint   Patient presents with     UTI     3-4 days dark clumps in urine      HPI    Here today for some frequency of urination but increased from the usual rate. No dysuria. Some urgency increase. He was treated for similar symptoms when he also had blood in urine 6 weeks ago. Took bactrim for one week and symptoms got better.  History of afib on apixaban. History of TURP and on tamsulosin for may years. Previous symptoms returned especially with the blood about 4 days ago.           Review of Systems        Objective    /62 (BP Location: Right arm)   Pulse 55   Temp 97  F (36.1  C) (Tympanic)   Ht 1.778 m (5' 10\")   Wt 73.5 kg (162 lb)   SpO2 100%   BMI 23.24 kg/m    Physical Exam  HENT:      Head: Normocephalic.   Musculoskeletal:      Cervical back: Normal range of motion.   Neurological:      Mental Status: He is alert.                    "

## 2022-09-04 NOTE — ED PROVIDER NOTES
History   Chief Complaint:  Hematuria       HPI     Zbigniew Mendoza is a 88 year old male with history of HTN, atrial fibrillation, and BPH who presents with hematuria for over one month. Patient was initially seen for hematuria six weeks ago and was put on a course of Bactrim. This improved his symptoms until the last few days, when the hematuria returned. He contacted his PCP yesterday, who recommended he be seen at urgent care again. He was prescribed Omnicef for gross hematuria at this time. Upon arriving home, patient read about the side effects of this antibiotic and became concerned about antibiotic-associated diarrhea. Because of this, he has not started this antibiotic yet. Patient also endorses a brief, sharp pain in his left flank last night that has since resolved. When he got up to urinate after experiencing this pain, patient was unable to produce a stream. He was also constipated and thinks this may be related. This morning he was able to produce a good urinary stream and did not notice any hematuria. Denies fever, nausea, ad vomiting. Patient reports that his persistent urinary symptoms this summer have been very troublesome, making it hard for him to sleep and causing him to lose about 12 pounds. He does not currently follow with a urologist. Patient is  following with GI in hopes of having a scope for his reflux problems.     Review of Systems   Constitutional: Positive for unexpected weight change. Negative for fever.   Gastrointestinal: Positive for constipation. Negative for nausea and vomiting.   Genitourinary: Positive for difficulty urinating, flank pain (since resolved) and hematuria (since resolved).   All other systems reviewed and are negative.    Allergies:  Ciprofloxacin    Medications:  Eliquis  Omnicef  Toprol  Flomax    Past Medical History:     Generalized osteoarthrosis  HTN  GERD  BPH  Chronic idiopathic constipation    Calculus of kidney  Atrial fibrillation  Mitral prolapse       Past Surgical History:    Arthroscopy shoulder rotator cuff repair, right  Back surgery  TURP     Family History:    Father: CAD    Social History:  The patient presents to the ED alone  PCP: Eagle Negrete MD, Internal Medicine     Physical Exam     Patient Vitals for the past 24 hrs:   BP Temp Pulse Resp SpO2   09/04/22 1000 (!) 150/90 -- 62 -- 97 %   09/04/22 0945 (!) 150/111 -- 63 -- 97 %   09/04/22 0930 (!) 166/86 -- 59 -- 98 %   09/04/22 0915 (!) 159/63 -- 57 -- --   09/04/22 0715 120/69 98.3  F (36.8  C) 80 18 100 %     Physical Exam    Eyes:    Conjunctiva normal  Neck:    Supple, no meningismus.     CV:     Regular rate and rhythm.      No murmurs, rubs or gallops.     No lower extremity edema.  PULM:    Clear to auscultation bilateral.       No respiratory distress.      Good air exchange.  ABD:    Soft, non-distended.       No abdominal tenderness.     No pulsatile masses.       No rebound, guarding or rigidity.     No CVA tenderness.   MSK:     No gross deformity to all four extremities.   LYMPH:   No cervical lymphadenopathy.  NEURO:   Alert.  Good muscular tone, no atrophy.   Skin:    Warm, dry and intact.    Psych:    Mood is good and affect is appropriate.      Emergency Department Course   Laboratory:  Labs Ordered and Resulted from Time of ED Arrival to Time of ED Departure   ROUTINE UA WITH MICROSCOPIC REFLEX TO CULTURE - Abnormal       Result Value    Color Urine Yellow      Appearance Urine Clear      Glucose Urine Negative      Bilirubin Urine Negative      Ketones Urine Trace (*)     Specific Gravity Urine 1.015      Blood Urine Large (*)     pH Urine 6.5      Protein Albumin Urine 30  (*)     Urobilinogen Urine Normal      Nitrite Urine Negative      Leukocyte Esterase Urine Negative      Mucus Urine Present (*)     RBC Urine >182 (*)     WBC Urine 8 (*)         Emergency Department Course:   Reviewed:  I reviewed nursing notes, vitals, past medical history and Care  Everywhere    Assessments:  09 I obtained history and examined the patient as noted above.   1015 I rechecked the patient and explained findings.     Interventions:  954 Keflex  500 mg  PO    Disposition:  The patient was discharged to home.     Impression & Plan   Medical Decision Makin-year-old male presents to the ED with hematuria.  Patient has no evidence of urinary retention based on bedside bladder scan.  Urinalysis reveals hematuria with questionable urinary tract infection.  Previous urinalysis 2 days ago was more convincing for infection.  He was hesitant to start cefdinir out of concern for antibiotic associated diarrhea.  I will initiated him on cephalexin which he is comfortable with.  I informed him that if urine culture returns negative, he may discontinue antibiotics but will need follow-up with urology to evaluate for alternative sources of hematuria including urologic malignancy.  Patient safe for discharge home with close follow-up with urology.    Covid-19  Zbigniew Mendoza was evaluated during a global COVID-19 pandemic, which necessitated consideration that the patient might be at risk for infection with the SARS-CoV-2 virus that causes COVID-19.   Applicable protocols for evaluation were followed during the patient's care.   COVID-19 was considered as part of the patient's evaluation.       Diagnosis:    ICD-10-CM    1. Hematuria, unspecified type  R31.9    2. Abnormal urinalysis  R82.90        Discharge Medications:  New Prescriptions    CEPHALEXIN (KEFLEX) 500 MG CAPSULE    Take 1 capsule (500 mg) by mouth 3 times daily for 10 days     Scribe Disclosure:  Selene GARCÍA, am serving as a scribe at 9:21 AM on 2022 to document services personally performed by Cyril Sharp MD based on my observations and the provider's statements to me.            Cyril Sharp MD  [phone]

## 2022-09-04 NOTE — ED TRIAGE NOTES
Patient presents with complaints of blood in the urine for the past month. Patient was put on antibiotics and symptoms improved but now have returned. Patient also states some abdomen pain and trouble with urination. ABC intact without need for intervention at this time.

## 2022-09-06 NOTE — TELEPHONE ENCOUNTER
Patient calls to see if Dr. Negrete has seen the three LetMeGot messages. Please read and address.Patient was in the ED on 09/04/22 and 08/11/22.

## 2022-09-07 NOTE — TELEPHONE ENCOUNTER
Pt send this message        Sorry, said I had a good result with zolpidem but on review this was an error, may have reacted poorly.  Ammon LESLIE RN, BSN

## 2022-09-23 NOTE — LETTER
9/23/2022         RE: Zbigniew Mendoza  [id number] [address] [facility] [id number]        Dear Colleague,    Thank you for referring your patient, Zbigniew Mendoza, to the [facility]. Please see a copy of my visit note below.    ASSESSMENT:  Encounter Diagnoses   Name Primary?     Ingrowing left great toenail, medial edge Yes     Toe pain, left      MEDICAL DECISION MAKING:  Chi's problem is consistent with an ingrown toenail involving the medial edge of the left hallux.  No clinical signs of infection.    Conservative care is reviewed:  Avoiding tight footwear  Soaking in lukewarm soap water  Keeping the nail trimmed    I also discussed the option of attempting to cut the offending nail edge back at a slant versus a partial nail avulsion.  Both procedures were discussed.    Chi elected to proceed with the slant back cut of the nail plate.  He understands that if this provides relief, he will be temporary.  He was instructed that we do not offer routine nail trimming or debridement.  A list of alternatives for nail care was provided for future assistance.    Is also welcome to return for a partial nail avulsion.  The procedure and post procedure recommendations were reviewed.    He reported pain relief when he put his shoe back on.    Disclaimer: This note consists of symbols derived from keyboarding, dictation and/or voice recognition software. As a result, there may be errors in the script that have gone undetected. Please consider this when interpreting information found in this chart.    Rafael Cedeno, SUSAN, FACFAS, [facility] Department of Podiatry/Foot & Ankle Surgery      ____________________________________________________________________    HPI:       Chi presents today with pain along the medial edge of his left hallux nail unit.  He has dealt with this for approximately 3 weeks.  He experiences an aching that comes and goes.  Pain rating at worst is a 4 out of 10.   He has some degree of pain on a daily basis.  Pressure on the toe increases the pain.  He has applied topical antibiotic.    He exercises at a fitness facility every other day.  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  *  Past Medical History:   Diagnosis Date     Atrial fibrillation      Calculus of kidney      Mitral prolapse     mild, with mild MR     Prostate infection    *  *  Past Surgical History:   Procedure Laterality Date     ARTHROSCOPY SHOULDER ROTATOR CUFF REPAIR Right 1996     BACK SURGERY       CYSTOSCOPY, TRANSURETHRAL RESECTION (TUR) PROSTATE, COMBINED  04/93    S/P TURP   *  *  Current Outpatient Medications   Medication Sig Dispense Refill     apixaban ANTICOAGULANT (ELIQUIS) 5 MG tablet Take 1 tablet (5 mg) by mouth 2 times daily 180 tablet 3     fluticasone (FLONASE) 50 MCG/ACT nasal spray INSTILL 1 SPRAY INTO BOTH NOSTRILS DAILY 48 mL 3     gabapentin (NEURONTIN) 100 MG capsule Take 2 capsules (200 mg) by mouth nightly as needed for other (insomnia) 60 capsule 1     metoprolol succinate ER (TOPROL-XL) 50 MG 24 hr tablet Take 1 tablet (50 mg) by mouth daily 90 tablet 3     Multiple Vitamins-Minerals (MULTIVITAL) TABS Take  by mouth daily.       tamsulosin (FLOMAX) 0.4 MG capsule Take 2 capsules (0.8 mg) by mouth daily 180 capsule 3     bisacodyl (DULCOLAX) 10 MG suppository Place 1 suppository (10 mg) rectally daily as needed for constipation (Patient not taking: Reported on 9/3/2022) 10 suppository 0     polyethylene glycol (MIRALAX) 17 GM/Dose powder Take 17 g by mouth daily (Patient not taking: No sig reported) 510 g 0     sulfamethoxazole-trimethoprim (BACTRIM DS) 800-160 MG tablet Take 1 tablet by mouth 2 times daily 14 tablet 0         EXAM:    Vitals: BP (!) 132/90   Wt 73.5 kg (162 lb)   BMI 23.24 kg/m    BMI: Body mass index is 23.24 kg/m .    Constitutional:  Zbigniew Mendoza is in no apparent distress, appears well-nourished.   Cooperative with history and physical exam.    Vascular:  Pedal pulses are palpable for both the DP and PT arteries.  CFT < 3 sec.  No edema.      Neuro: Light touch sensation is intact to the L4, L5, S1 distributions  No evidence of weakness, spasticity, or contracture in the lower extremities.     Derm: Normal texture and turgor.  No erythema, ecchymosis, or cyanosis.  No open lesions.      Tenderness on palpation to more of the distal aspect, medial skin fold of the left hallux.  No associated edema or erythema.  There is some incurvation of the nail medially.    Musculoskeletal:    Lower extremity muscle strength is normal.  Digital contractures of the left foot.              Again, thank you for allowing me to participate in the care of your patient.        Sincerely,        Rafael Cedeno DPM

## 2022-09-23 NOTE — PROGRESS NOTES
ASSESSMENT:  Encounter Diagnoses   Name Primary?     Ingrowing left great toenail, medial edge Yes     Toe pain, left      MEDICAL DECISION MAKING:  Chi's problem is consistent with an ingrown toenail involving the medial edge of the left hallux.  No clinical signs of infection.    Conservative care is reviewed:  Avoiding tight footwear  Soaking in lukewarm soap water  Keeping the nail trimmed    I also discussed the option of attempting to cut the offending nail edge back at a slant versus a partial nail avulsion.  Both procedures were discussed.    Chi elected to proceed with the slant back cut of the nail plate.  He understands that if this provides relief, he will be temporary.  He was instructed that we do not offer routine nail trimming or debridement.  A list of alternatives for nail care was provided for future assistance.    Is also welcome to return for a partial nail avulsion.  The procedure and post procedure recommendations were reviewed.    He reported pain relief when he put his shoe back on.    Disclaimer: This note consists of symbols derived from keyboarding, dictation and/or voice recognition software. As a result, there may be errors in the script that have gone undetected. Please consider this when interpreting information found in this chart.    Rafael Cedeno DPM, FACFAS, MS    [place] [facility] of Podiatry/Foot & Ankle Surgery      ____________________________________________________________________    HPI:       Chi presents today with pain along the medial edge of his left hallux nail unit.  He has dealt with this for approximately 3 weeks.  He experiences an aching that comes and goes.  Pain rating at worst is a 4 out of 10.  He has some degree of pain on a daily basis.  Pressure on the toe increases the pain.  He has applied topical antibiotic.    He exercises at a fitness facility every other day.  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  *  Past Medical History:   Diagnosis Date     Atrial fibrillation      Calculus of kidney      Mitral prolapse     mild, with mild MR     Prostate infection    *  *  Past Surgical History:   Procedure Laterality Date     ARTHROSCOPY SHOULDER ROTATOR CUFF REPAIR Right 1996     BACK SURGERY       CYSTOSCOPY, TRANSURETHRAL RESECTION (TUR) PROSTATE, COMBINED  04/93    S/P TURP   *  *  Current Outpatient Medications   Medication Sig Dispense Refill     apixaban ANTICOAGULANT (ELIQUIS) 5 MG tablet Take 1 tablet (5 mg) by mouth 2 times daily 180 tablet 3     fluticasone (FLONASE) 50 MCG/ACT nasal spray INSTILL 1 SPRAY INTO BOTH NOSTRILS DAILY 48 mL 3     gabapentin (NEURONTIN) 100 MG capsule Take 2 capsules (200 mg) by mouth nightly as needed for other (insomnia) 60 capsule 1     metoprolol succinate ER (TOPROL-XL) 50 MG 24 hr tablet Take 1 tablet (50 mg) by mouth daily 90 tablet 3     Multiple Vitamins-Minerals (MULTIVITAL) TABS Take  by mouth daily.       tamsulosin (FLOMAX) 0.4 MG capsule Take 2 capsules (0.8 mg) by mouth daily 180 capsule 3     bisacodyl (DULCOLAX) 10 MG suppository Place 1 suppository (10 mg) rectally daily as needed for constipation (Patient not taking: Reported on 9/3/2022) 10 suppository 0     polyethylene glycol (MIRALAX) 17 GM/Dose powder Take 17 g by mouth daily (Patient not taking: No sig reported) 510 g 0     sulfamethoxazole-trimethoprim (BACTRIM DS) 800-160 MG tablet Take 1 tablet by mouth 2 times daily 14 tablet 0         EXAM:    Vitals: BP (!) 132/90   Wt 73.5 kg (162 lb)   BMI 23.24 kg/m    BMI: Body mass index is 23.24 kg/m .    Constitutional:  Zbigniew Mendoza is in no apparent distress, appears well-nourished.  Cooperative with history and physical exam.    Vascular:  Pedal pulses are palpable for both the DP and PT arteries.  CFT < 3 sec.  No edema.      Neuro: Light touch sensation is intact to the L4, L5, S1 distributions  No evidence of weakness,  spasticity, or contracture in the lower extremities.     Derm: Normal texture and turgor.  No erythema, ecchymosis, or cyanosis.  No open lesions.      Tenderness on palpation to more of the distal aspect, medial skin fold of the left hallux.  No associated edema or erythema.  There is some incurvation of the nail medially.    Musculoskeletal:    Lower extremity muscle strength is normal.  Digital contractures of the left foot.

## 2022-09-23 NOTE — PATIENT INSTRUCTIONS
Thank you for choosing [facility] / Foot & Ankle Surgery!    DR. MO'S CLINIC LOCATIONS:     [facility] TRIAGE LINE: [phone]   [address] APPOINTMENTS: [phone]   [place] [address] RADIOLOGY: [phone]   (Every other Tues - Wed - Fri PM) SET UP SURGERY: [phone]    BILLING QUESTIONS: [phone]   [facility] [facility] FAX: [phone] [id number]    [place] [address]    (Thurs & Fri AM)      Follow up: 30 minute appointment if desired    Next steps:

## 2022-09-28 NOTE — PROGRESS NOTES
Assessment & Plan     Hematuria, unspecified type  UA showed hematuria and RBC. It also show crystals which would make sense given non-obstructing stones seen on CT on 9/15. Hematuria has been present for several months without symptoms and patient should follow up with urology consult. Discussed with patient and he is in agreement.   - UA with Microscopic reflex to Culture - lab collect; Future  - Adult Urology  Referral; Future  - UA with Microscopic reflex to Culture - lab collect  - Urine Microscopic    Need for prophylactic vaccination and inoculation against influenza  noted  - INFLUENZA, QUAD, HIGH DOSE, PF, 65YR + (FLUZONE HD)      Return for Routine preventive.    Ben Hoffman NP  [facility] MARIELLA Mireles is a 88 year old, presenting for the following health issues:  RECHECK, Urinary Problem, and Imm/Inj (Flu Shot)      History of Present Illness       Reason for visit:  Hematuria    He eats 2-3 servings of fruits and vegetables daily.He consumes 1 sweetened beverage(s) daily.He exercises with enough effort to increase his heart rate 20 to 29 minutes per day.    He is taking medications regularly.       Follow up hematuria.  Patient has had blood in urine since at least July. He was having gross hematuria at that time. He is now having microscopic  Hematuria according to him as he can not see blood in urine.     From  it states that he was having clumps of dark on 9/3/2022. Patient says that may have been dramatized but he still should see urology.    Patient recently has had changes in bowel movements and has lower and upper endoscopy scheduled for 12/1/2022.    Patient complains of constipation and has needed to use fleet enema in recent months. Will ask that he starts to take miralax daily.    Review of Systems   Constitutional, HEENT, cardiovascular, pulmonary, GI, , musculoskeletal, neuro, skin, endocrine and psych systems are negative, except as  "otherwise noted.      Objective    /66   Pulse 60   Temp 97  F (36.1  C) (Oral)   Resp 16   Ht 1.778 m (5' 10\")   Wt 64.7 kg (142 lb 11.2 oz)   SpO2 100%   BMI 20.48 kg/m    Body mass index is 20.48 kg/m .  Physical Exam   GENERAL:alert and no distress  EYES: Eyes grossly normal to inspection, PERRL and conjunctivae and sclerae normal  NECK: no adenopathy, no asymmetry, masses, or scars and thyroid normal to palpation  RESP: lungs clear to auscultation - no rales, rhonchi or wheezes  CV: regular rate and rhythm, normal S1 S2, no S3 or S4, no murmur, click or rub, no peripheral edema and peripheral pulses strong  MS: no gross musculoskeletal defects noted, no edema  SKIN: no suspicious lesions or rashes  NEURO: Normal strength and tone, mentation intact and speech normal  PSYCH: mentation appears normal, affect normal/bright            "

## 2022-10-14 NOTE — PROGRESS NOTES
"ASSESSMENT:  Encounter Diagnoses   Name Primary?     Ingrowing left great toenail, medial edge Yes     Toe pain, left      MEDICAL DECISION MAKING:  No new complaints.  No new clinical finding.    I think a partial nail avulsion is reasonable, given his ongoing pain.  There are no clinical signs of infection.  We discussed the partial avulsion procedure versus a partial chemical matrixectomy procedure.  He elects to proceed with partial avulsion.    Nail Avulsion Procedure  (non permanent removal)    The procedure was discussed with the Zbigniew Mendoza, including risk of infection, abnormal nail regrowth, and possible need for an additional future nail procedure.  Post-procedure home cares were reviewed. These cares are important for preventing infection and aiding in timely healing.   Verbal and written consent was obtained.   The site was marked and the \"Time Out\" called.     The base of the left hallux was injected with 2 cc of  2% Lidocaine plain.  The toe was then prepped with betadine solution.  A tourniquet was applied around the base of the toe for hemostasis.   Next the toe was checked for adequate anesthesia.      The medial nail was freed from the nail bed and marginal soft tissue attachments with a blunt instrument. A nail splitter was then used to make a longitudinal cut 2mm from the medial nail fold.  It was completed, atraumatically, under the eponychium with a [place] blade. Next, the nail edge was firmly grasped with a hemostat and removed in total.  The underlying nail bed was inspected and no abnormalities seen.    The tourniquet was removed. Bacitracin ointment was applied to the nail bed, followed by a compressive dressing.  Zbigniew Mendoza tolerated the procedure well.      Zbigniew Mendoza is instructed to watch for, and call if,  increasing redness, drainage, and pain after 2-3 days. Post procedure instructions were provided in the After Visit Summary.       Disclaimer: This note consists of " symbols derived from keyboarding, dictation and/or voice recognition software. As a result, there may be errors in the script that have gone undetected. Please consider this when interpreting information found in this chart.    Rafael Cedeno DPM, FACFAS, MS    [place] Department of Podiatry/Foot & Ankle Surgery      ____________________________________________________________________    HPI:       Chi returns with recurrent pain along the medial nail unit, left hallux.  I last evaluated him on 9/23/2022.  At that time he elected to have a slant back nail cut down.  He reports that this provided temporary relief.  However the pain has returned and is affecting his enjoyment of weightbearing activities.  He is interested in the partial nail avulsion that we discussed at his last visit.    *  Past Medical History:   Diagnosis Date     Atrial fibrillation      Calculus of kidney      Mitral prolapse     mild, with mild MR     Prostate infection    *  *  Past Surgical History:   Procedure Laterality Date     ARTHROSCOPY SHOULDER ROTATOR CUFF REPAIR Right 1996     BACK SURGERY       CYSTOSCOPY, TRANSURETHRAL RESECTION (TUR) PROSTATE, COMBINED  04/93    S/P TURP   *  *  Current Outpatient Medications   Medication Sig Dispense Refill     apixaban ANTICOAGULANT (ELIQUIS) 5 MG tablet Take 1 tablet (5 mg) by mouth 2 times daily 180 tablet 3     fluticasone (FLONASE) 50 MCG/ACT nasal spray INSTILL 1 SPRAY INTO BOTH NOSTRILS DAILY 48 mL 3     gabapentin (NEURONTIN) 100 MG capsule Take 2 capsules (200 mg) by mouth nightly as needed for other (insomnia) 60 capsule 1     metoprolol succinate ER (TOPROL-XL) 50 MG 24 hr tablet Take 1 tablet (50 mg) by mouth daily 90 tablet 3     Multiple Vitamins-Minerals (MULTIVITAL) TABS Take  by mouth daily.       polyethylene glycol (MIRALAX) 17 GM/Dose powder Take 17 g by mouth daily 510 g 0     tamsulosin (FLOMAX) 0.4 MG capsule Take 2 capsules (0.8 mg) by mouth daily 180 capsule 3          EXAM:    Vitals: /68   Wt 73.5 kg (162 lb)   BMI 23.24 kg/m    BMI: Body mass index is 23.24 kg/m .      Vascular:  Pedal pulses are palpable for both the DP and PT arteries.  CFT < 3 sec.  No edema.       Derm: tenderness on palpation to the medial skin fold of the left hallux. No erythema or edema. No wound.

## 2022-10-14 NOTE — LETTER
"    10/14/2022         RE: Zbigniew Mendoza  [address]  [place] [address]        Dear Colleague,    Thank you for referring your patient, Zbigniew Joinern, to the [facility] POD[facility]. Please see a copy of my visit note below.    ASSESSMENT:  Encounter Diagnoses   Name Primary?     Ingrowing left great toenail, medial edge Yes     Toe pain, left      MEDICAL DECISION MAKING:  No new complaints.  No new clinical finding.    I think a partial nail avulsion is reasonable, given his ongoing pain.  There are no clinical signs of infection.  We discussed the partial avulsion procedure versus a partial chemical matrixectomy procedure.  He elects to proceed with partial avulsion.    Nail Avulsion Procedure  (non permanent removal)    The procedure was discussed with the Zbigniew Mendoza, including risk of infection, abnormal nail regrowth, and possible need for an additional future nail procedure.  Post-procedure home cares were reviewed. These cares are important for preventing infection and aiding in timely healing.   Verbal and written consent was obtained.   The site was marked and the \"Time Out\" called.     The base of the left hallux was injected with 2 cc of  2% Lidocaine plain.  The toe was then prepped with betadine solution.  A tourniquet was applied around the base of the toe for hemostasis.   Next the toe was checked for adequate anesthesia.      The medial nail was freed from the nail bed and marginal soft tissue attachments with a blunt instrument. A nail splitter was then used to make a longitudinal cut 2mm from the medial nail fold.  It was completed, atraumatically, under the eponychium with a [place] blade. Next, the nail edge was firmly grasped with a hemostat and removed in total.  The underlying nail bed was inspected and no abnormalities seen.    The tourniquet was removed. Bacitracin ointment was applied to the nail bed, followed by a compressive dressing.  Zbigniew Mendoza " tolerated the procedure well.      Zbigniew Mendoza is instructed to watch for, and call if,  increasing redness, drainage, and pain after 2-3 days. Post procedure instructions were provided in the After Visit Summary.       Disclaimer: This note consists of symbols derived from keyboarding, dictation and/or voice recognition software. As a result, there may be errors in the script that have gone undetected. Please consider this when interpreting information found in this chart.    Rafael Cedeno, SUSAN, FACFAS, MS    [place] Department of Podiatry/Foot & Ankle Surgery      ____________________________________________________________________    HPI:       Chi returns with recurrent pain along the medial nail unit, left hallux.  I last evaluated him on 9/23/2022.  At that time he elected to have a slant back nail cut down.  He reports that this provided temporary relief.  However the pain has returned and is affecting his enjoyment of weightbearing activities.  He is interested in the partial nail avulsion that we discussed at his last visit.    *  Past Medical History:   Diagnosis Date     Atrial fibrillation      Calculus of kidney      Mitral prolapse     mild, with mild MR     Prostate infection    *  *  Past Surgical History:   Procedure Laterality Date     ARTHROSCOPY SHOULDER ROTATOR CUFF REPAIR Right 1996     BACK SURGERY       CYSTOSCOPY, TRANSURETHRAL RESECTION (TUR) PROSTATE, COMBINED  04/93    S/P TURP   *  *  Current Outpatient Medications   Medication Sig Dispense Refill     apixaban ANTICOAGULANT (ELIQUIS) 5 MG tablet Take 1 tablet (5 mg) by mouth 2 times daily 180 tablet 3     fluticasone (FLONASE) 50 MCG/ACT nasal spray INSTILL 1 SPRAY INTO BOTH NOSTRILS DAILY 48 mL 3     gabapentin (NEURONTIN) 100 MG capsule Take 2 capsules (200 mg) by mouth nightly as needed for other (insomnia) 60 capsule 1     metoprolol succinate ER (TOPROL-XL) 50 MG 24 hr tablet Take 1 tablet (50 mg) by mouth daily 90 tablet 3      Multiple Vitamins-Minerals (MULTIVITAL) TABS Take  by mouth daily.       polyethylene glycol (MIRALAX) 17 GM/Dose powder Take 17 g by mouth daily 510 g 0     tamsulosin (FLOMAX) 0.4 MG capsule Take 2 capsules (0.8 mg) by mouth daily 180 capsule 3         EXAM:    Vitals: /68   Wt 73.5 kg (162 lb)   BMI 23.24 kg/m    BMI: Body mass index is 23.24 kg/m .      Vascular:  Pedal pulses are palpable for both the DP and PT arteries.  CFT < 3 sec.  No edema.       Derm: tenderness on palpation to the medial skin fold of the left hallux. No erythema or edema. No wound.             Again, thank you for allowing me to participate in the care of your patient.        Sincerely,        Rafael Cedeno DPM

## 2022-10-14 NOTE — PATIENT INSTRUCTIONS
Thank you for choosing [facility] Podiatry / Foot & Ankle Surgery!    DR. MO'S CLINIC LOCATIONS:     [facility] TRIAGE LINE: [phone]   [address] APPOINTMENTS: [phone]   [place] [address] RADIOLOGY: [phone]   (Every other Tues - Wed - Fri PM) SET UP SURGERY: [phone]    BILLING QUESTIONS: [phone]   [place] [facility] FAX: [phone] [address]    [place] [address]    (Thurs & Fri AM)      Follow up: As needed    Next steps:       [place] TOENAIL REMOVAL HOME CARE  1. Keep bandage on until that evening or the day after your procedure. If the bandage falls off, start the soaking process.    2. Some bleeding is normal. If bleeding seems excessive to you, place ice on top of your foot for 15-20 minutes and elevate your foot above heart level.    3. Over the counter pain medication (tylenol / ibuprofen), elevating your foot and ice application is all you will need for pain control.    4. If the bandage feels too tight and your toe is throbbing it is ok to remove the bandage and start soaking.     5. For one to two weeks, soak your foot twice a day in mild skin friendly soap (dish or hand soap) and warm water for 15 minutes. It is ok to soak your foot for a few minutes to loosen the dressing applied in the clinic. After soaking, blot dry and apply a regular band aid.    6. It is normal to experience some discomfort and redness around the nail for several days following the procedure. Drainage will likely appear a red - yellow. This is normal. If your toe is still draining a red - yellow fluid after 2 weeks keep continuing to soak foot another few days.    7. Initial discomfort might last for 2-3 days. You may resume with regular activity as soon as you are comfortable, as long as you keep the wound clean and dry and follow the soaking instruction. It is recommended that you do not enter public swimming pools/hot tubs while your toe is draining.    8.  If you are experiencing worsening pain and redness or notice pus after 2-3 days please contact the clinic. Ask to speak with a triage nurse and they will inform our team of your symptoms and we can advise if a follow up is needed.

## 2022-11-09 NOTE — TELEPHONE ENCOUNTER
Aissatou, from [facility], calling in for Verbal Orders:     3 day hold on medication Eliquis, bridging as needed.      Patient is having a Colonoscopy on 12/01/22. If provider would like less than a three day hold, [facility] would need the most recent CREATININE lab results and when it was drawn.     Phone#: [phone]  Okay to [facility].

## 2022-11-17 NOTE — PROGRESS NOTES
ASSESSMENT:  Encounter Diagnosis   Name Primary?     Great toe pain, left Yes     MEDICAL DECISION MAKING:  I personally reviewed the left great toe x-ray images with Chi.  There is a small, sharp bony eminence on the medial aspect of the distal phalanx.  However this is proximal to his region of pain and unlikely contributing.    Nothing found clinically to explain his pain.    Recommendations:  Continue with current activities  Continue with current shoes  Reevaluate in 1 month  His pain persists at a level that is affecting quality of life and activities, I have offered to take more nail.    I anticipate things will improve given more time.    Disclaimer: This note consists of symbols derived from keyboarding, dictation and/or voice recognition software. As a result, there may be errors in the script that have gone undetected. Please consider this when interpreting information found in this chart.    Rafael Cedeno DPM, FACFAS, MS    [place] Department of Podiatry/Foot & Ankle Surgery      ____________________________________________________________________    HPI:       Chi returns today reporting some ongoing pain at the distal medial aspect of his left great toe.  He had a partial nail avulsion, medial edge, left hallux on 10/14/2022.  He purchased some new shoes with a wider toe box.  This provides some relief.  He is able to walk on the treadmill for exercise.  In general, he is better than prior to the procedure.  *  Past Medical History:   Diagnosis Date     Atrial fibrillation      Calculus of kidney      Mitral prolapse     mild, with mild MR     Prostate infection    *  *  Past Surgical History:   Procedure Laterality Date     ARTHROSCOPY SHOULDER ROTATOR CUFF REPAIR Right 1996     BACK SURGERY       CYSTOSCOPY, TRANSURETHRAL RESECTION (TUR) PROSTATE, COMBINED  04/93    S/P TURP   *  *  Current Outpatient Medications   Medication Sig Dispense Refill     apixaban ANTICOAGULANT (ELIQUIS) 5 MG  tablet Take 1 tablet (5 mg) by mouth 2 times daily 180 tablet 3     fluticasone (FLONASE) 50 MCG/ACT nasal spray INSTILL 1 SPRAY INTO BOTH NOSTRILS DAILY 48 mL 3     gabapentin (NEURONTIN) 100 MG capsule Take 2 capsules (200 mg) by mouth nightly as needed for other (insomnia) 60 capsule 1     metoprolol succinate ER (TOPROL-XL) 50 MG 24 hr tablet Take 1 tablet (50 mg) by mouth daily 90 tablet 3     Multiple Vitamins-Minerals (MULTIVITAL) TABS Take  by mouth daily.       polyethylene glycol (MIRALAX) 17 GM/Dose powder Take 17 g by mouth daily 510 g 0     tamsulosin (FLOMAX) 0.4 MG capsule Take 2 capsules (0.8 mg) by mouth daily 180 capsule 3         EXAM:    Vitals: Wt 63.5 kg (140 lb)   BMI 20.09 kg/m    BMI: Body mass index is 20.09 kg/m .    Constitutional:  Zbigniew Mendoza is in no apparent distress, appears well-nourished.  Cooperative with history and physical exam.    Derm: There is some tenderness at the distal medial left hallux.  No erythema, edema or wound.  The area has healed after partial nail avulsion of the medial edge.    XR Left Great Toe: please see comments above

## 2022-11-17 NOTE — LETTER
11/17/2022         RE: Zbigniew Joinern  [address]  [facility] [address]        Dear Colleague,    Thank you for referring your patient, Zbigniew Mendoza, to the [facility] PODIATRY. Please see a copy of my visit note below.    ASSESSMENT:  Encounter Diagnosis   Name Primary?     Great toe pain, left Yes     MEDICAL DECISION MAKING:  I personally reviewed the left great toe x-ray images with Chi.  There is a small, sharp bony eminence on the medial aspect of the distal phalanx.  However this is proximal to his region of pain and unlikely contributing.    Nothing found clinically to explain his pain.    Recommendations:  Continue with current activities  Continue with current shoes  Reevaluate in 1 month  His pain persists at a level that is affecting quality of life and activities, I have offered to take more nail.    I anticipate things will improve given more time.    Disclaimer: This note consists of symbols derived from keyboarding, dictation and/or voice recognition software. As a result, there may be errors in the script that have gone undetected. Please consider this when interpreting information found in this chart.    Rafael Cedeno, SUSAN, FACFAS, [facility] Department of Podiatry/Foot & Ankle Surgery      ____________________________________________________________________    HPI:       Chi returns today reporting some ongoing pain at the distal medial aspect of his left great toe.  He had a partial nail avulsion, medial edge, left hallux on 10/14/2022.  He purchased some new shoes with a wider toe box.  This provides some relief.  He is able to walk on the treadmill for exercise.  In general, he is better than prior to the procedure.  *  Past Medical History:   Diagnosis Date     Atrial fibrillation      Calculus of kidney      Mitral prolapse     mild, with mild MR     Prostate infection    *  *  Past Surgical History:   Procedure Laterality Date     ARTHROSCOPY  SHOULDER ROTATOR CUFF REPAIR Right 1996     BACK SURGERY       CYSTOSCOPY, TRANSURETHRAL RESECTION (TUR) PROSTATE, COMBINED  04/93    S/P TURP   *  *  Current Outpatient Medications   Medication Sig Dispense Refill     apixaban ANTICOAGULANT (ELIQUIS) 5 MG tablet Take 1 tablet (5 mg) by mouth 2 times daily 180 tablet 3     fluticasone (FLONASE) 50 MCG/ACT nasal spray INSTILL 1 SPRAY INTO BOTH NOSTRILS DAILY 48 mL 3     gabapentin (NEURONTIN) 100 MG capsule Take 2 capsules (200 mg) by mouth nightly as needed for other (insomnia) 60 capsule 1     metoprolol succinate ER (TOPROL-XL) 50 MG 24 hr tablet Take 1 tablet (50 mg) by mouth daily 90 tablet 3     Multiple Vitamins-Minerals (MULTIVITAL) TABS Take  by mouth daily.       polyethylene glycol (MIRALAX) 17 GM/Dose powder Take 17 g by mouth daily 510 g 0     tamsulosin (FLOMAX) 0.4 MG capsule Take 2 capsules (0.8 mg) by mouth daily 180 capsule 3         EXAM:    Vitals: Wt 63.5 kg (140 lb)   BMI 20.09 kg/m    BMI: Body mass index is 20.09 kg/m .    Constitutional:  Zbingiew Mendoza is in no apparent distress, appears well-nourished.  Cooperative with history and physical exam.    Derm: There is some tenderness at the distal medial left hallux.  No erythema, edema or wound.  The area has healed after partial nail avulsion of the medial edge.    XR Left Great Toe: please see comments above        Again, thank you for allowing me to participate in the care of your patient.        Sincerely,        Rafael Cedeno, SUSAN

## 2022-11-23 NOTE — PATIENT INSTRUCTIONS
Hold Eliquis for 3 days prior to procedure  Take 1/2 Metoprolol on the day prior to and the day of procedure.

## 2022-12-01 NOTE — ANESTHESIA PREPROCEDURE EVALUATION
Anesthesia Pre-Procedure Evaluation    Patient: Zbigniew Mendoza   MRN: [id number] : 1934        Procedure : Procedure(s):  ESOPHAGOGASTRODUODENOSCOPY  COLONOSCOPY          Past Medical History:   Diagnosis Date     Arrhythmia      Atrial fibrillation      Calculus of kidney      Mitral prolapse     mild, with mild MR     Prostate infection       Past Surgical History:   Procedure Laterality Date     ARTHROSCOPY SHOULDER ROTATOR CUFF REPAIR Right 1996     BACK SURGERY       CYSTOSCOPY, TRANSURETHRAL RESECTION (TUR) PROSTATE, COMBINED      S/P TURP      Allergies   Allergen Reactions     Ciprofloxacin      Acute confusion     Sulfamethizole Unknown     Confusion      Social History     Tobacco Use     Smoking status: Never     Smokeless tobacco: Never   Substance Use Topics     Alcohol use: Yes     Comment: very rare if at all      Wt Readings from Last 1 Encounters:   22 63.7 kg (140 lb 6.4 oz)        Anesthesia Evaluation   Pt has had prior anesthetic. Type: General and MAC.        ROS/MED HX  ENT/Pulmonary:  - neg pulmonary ROS     Neurologic:  - neg neurologic ROS     Cardiovascular: Comment: Mitral prolapse   mild, with mild MR      (+) hypertension-----dysrhythmias, a-fib,     METS/Exercise Tolerance:     Hematologic: Comments: Lab Test        11/23/22     07/12/22     02/24/22     06/21/16     02/25/15                       1411          1407          0832          1026          1320          WBC          2.5*         4.2          4.4            < >        6.6           HGB          11.0*        12.8*        14.2           < >        15.7          MCV          103*         99           101*           < >        93            PLT          125*         123*         128*           < >        133*          INR           --           --           --           --          1.08           < > = values in this interval not displayed.                  Lab Test        11/23/22     09/15/22     07/12/22      02/24/22                       1411          1458          1407          0832          NA           137           --          139          139           POTASSIUM    4.4           --          3.7          3.9           CHLORIDE     102           --          106          108           CO2          22            --          27           31            BUN          19.1          --          13           19            CR           0.93         1.0          0.80         0.97          ANIONGAP     13            --          6            <1*           RACHANA          8.8           --          8.8          8.7           GLC          87            --          90           138*                Musculoskeletal:  - neg musculoskeletal ROS     GI/Hepatic:  - neg GI/hepatic ROS     Renal/Genitourinary:     (+) renal disease,     Endo:  - neg endo ROS     Psychiatric/Substance Use:  - neg psychiatric ROS     Infectious Disease:  - neg infectious disease ROS     Malignancy:  - neg malignancy ROS     Other:  - neg other ROS          Physical Exam    Airway        Mallampati: II   TM distance: > 3 FB   Neck ROM: full   Mouth opening: > 3 cm    Respiratory Devices and Support         Dental  no notable dental history         Cardiovascular   cardiovascular exam normal          Pulmonary   pulmonary exam normal                OUTSIDE LABS:  CBC:   Lab Results   Component Value Date    WBC 2.5 (L) 11/23/2022    WBC 4.2 07/12/2022    HGB 11.0 (L) 11/23/2022    HGB 12.8 (L) 07/12/2022    HCT 33.3 (L) 11/23/2022    HCT 39.3 (L) 07/12/2022     (L) 11/23/2022     (L) 07/12/2022     BMP:   Lab Results   Component Value Date     11/23/2022     07/12/2022    POTASSIUM 4.4 11/23/2022    POTASSIUM 3.7 07/12/2022    CHLORIDE 102 11/23/2022    CHLORIDE 106 07/12/2022    CO2 22 11/23/2022    CO2 27 07/12/2022    BUN 19.1 11/23/2022    BUN 13 07/12/2022    CR 0.93 11/23/2022    CR 1.0 09/15/2022    GLC 87 11/23/2022    GLC 90  07/12/2022     COAGS:   Lab Results   Component Value Date    INR 1.08 02/25/2015     POC: No results found for: BGM, HCG, HCGS  HEPATIC:   Lab Results   Component Value Date    ALBUMIN 4.3 11/23/2022    PROTTOTAL 6.9 11/23/2022    ALT 5 (L) 11/23/2022    AST 45 11/23/2022    ALKPHOS 85 11/23/2022    BILITOTAL 0.7 11/23/2022     OTHER:   Lab Results   Component Value Date    RACHANA 8.8 11/23/2022    MAG 2.1 02/24/2022    LIPASE 70 (H) 07/12/2022    AMYLASE 56 07/12/2022    TSH 2.62 01/26/2021    SED 5 09/28/2005       Anesthesia Plan    ASA Status:  2      Anesthesia Type: MAC.     - Reason for MAC: immobility needed   Induction: Propofol.   Maintenance: Balanced.        Consents    Anesthesia Plan(s) and associated risks, benefits, and realistic alternatives discussed. Questions answered and patient/representative(s) expressed understanding.    - Discussed:     - Discussed with:  Patient      - Extended Intubation/Ventilatory Support Discussed: No.      - Patient is DNR/DNI Status: No    Use of blood products discussed: Yes.     - Discussed with: Patient.     - Consented: consented to blood products            Reason for refusal: other.     Postoperative Care    Pain management: IV analgesics.   PONV prophylaxis: Ondansetron (or other 5HT-3), Dexamethasone or Solumedrol     Comments:                Wesley Light MD

## 2022-12-01 NOTE — ANESTHESIA CARE TRANSFER NOTE
Patient: Zbigniew Mendoza    Procedure: Procedure(s):  ESOPHAGOGASTRODUODENOSCOPY  COLONOSCOPY       Diagnosis: Abdominal pain [R10.9]  Epigastric pain [R10.13]  Change in bowel habit [R19.4]  Weight loss [R63.4]  Diagnosis Additional Information: No value filed.    Anesthesia Type:   MAC     Note:    Oropharynx: oropharynx clear of all foreign objects  Level of Consciousness: drowsy  Oxygen Supplementation: room air    Independent Airway: airway patency satisfactory and stable  Dentition: dentition unchanged  Vital Signs Stable: post-procedure vital signs reviewed and stable  Report to RN Given: handoff report given  Patient transferred to: Phase II    Handoff Report: Identifed the Patient, Identified the Reponsible Provider, Reviewed the pertinent medical history, Discussed the surgical course, Reviewed Intra-OP anesthesia mangement and issues during anesthesia, Set expectations for post-procedure period and Allowed opportunity for questions and acknowledgement of understanding      Vitals:  Vitals Value Taken Time   BP     Temp     Pulse     Resp     SpO2         Electronically Signed By: JOSE MIGUEL Sanderson CRNA  December 1, 2022  10:36 AM

## 2022-12-01 NOTE — ANESTHESIA POSTPROCEDURE EVALUATION
Patient: Zbigniew Mendoza    Procedure: Procedure(s):  ESOPHAGOGASTRODUODENOSCOPY  COLONOSCOPY       Anesthesia Type:  MAC    Note:  Disposition: Outpatient   Postop Pain Control: Uneventful            Sign Out: Well controlled pain   PONV: No   Neuro/Psych: Uneventful            Sign Out: Acceptable/Baseline neuro status   Airway/Respiratory: Uneventful            Sign Out: Acceptable/Baseline resp. status   CV/Hemodynamics: Uneventful            Sign Out: Acceptable CV status; No obvious hypovolemia; No obvious fluid overload   Other NRE: NONE   DID A NON-ROUTINE EVENT OCCUR? No           Last vitals:  Vitals Value Taken Time   /60 12/01/22 1050   Temp 99.2  F (37.3  C) 12/01/22 1033   Pulse 74 12/01/22 1050   Resp 16 12/01/22 1050   SpO2 100 % 12/01/22 1053   Vitals shown include unvalidated device data.    Electronically Signed By: Wesley Light MD  December 1, 2022  4:33 PM

## 2022-12-19 NOTE — NURSING NOTE
Chief Complaint   Patient presents with     Gross Hematuria     Pt states he saw blood in urine a couple months ago, has not seen it since that time  Pt states he is not having any other issues with urination    Ayaka Nielsen, CMA

## 2022-12-19 NOTE — LETTER
12/19/2022       RE: Zbigniew Mendoza  [address]  [facility] [address]     Dear Colleague,    Thank you for referring your patient, Zbigniew Mendoza, to the [facility] [place] [facility]. Please see a copy of my visit note below.    Subjective       REQUESTING PROVIDER   Ben Hoffman     REASON FOR CONSULT   Gross hematuria, persistent microscopic hematuria    HISTORY OF PRESENT ILLNESS   Mr. Mendoza is a 88 year old male seen today in the urology clinic in consultation for evaluation of gross hematuria and persistent microscopic hematuria.  He had gross hematuria several months ago.  He has had several urinalysis where he has had persistent microscopic hematuria, but no gross hematuria.  Patient denies significant urinary symptoms including dysuria, urgency, frequency, incomplete emptying, or urinary incontinence.  He has nocturia on occasion.  He has a history of undergoing TURP in 1993.  He currently takes Flomax 0.4 mg.    Patient notes a history of prostatitis 1 time many years ago as well as likely a case of pyelonephritis in the 1980s.  He has had nephrolithiasis twice.    Patient had CT of the abdomen and pelvis without contrast which showed right renal cysts as well as nonobstructing left renal stones.  There was some questionable prostate enhancement.    Of note, patient has also had anemia that has been progressing over the last year.  He initially started with a hemoglobin around 14 in February 2022.  Most recent hemoglobin is 9.6.  He has undergone evaluation with colonoscopy as well as MRI imaging to try to find a possible cause for his anemia.  It appears that he may be scheduled to see a hematologist.    He gets breast exams was primary care provider and did not have any concerning findings.    PSA up to date: 1.14 1/26/21    Blood thinners: Eliquis    Retention or clots: Neither    Hematuria Risk Factors:  Age >40:  Yes   Smoking history: No  Occupational exposure to chemicals or dyes (ie, benzenes, aromatic amines): no  History of urologic disorder or disease: no  History of irritative voiding symptoms: no  History of urinary tract infection: no  Analgesic abuse: no  History of pelvic irradiation: no    The following portions of the patient's history were reviewed and updated as appropriate: allergies, current medications, past family history, past medical history, past social history, past surgical history and problem list.     REVIEW OF SYSTEMS   Review of Systems   Constitutional: Negative for chills and fever.   HENT: Positive for hearing loss.    Respiratory: Negative for shortness of breath.    Cardiovascular: Negative for chest pain.   Gastrointestinal: Positive for constipation. Negative for nausea and vomiting.   Genitourinary: Positive for hematuria. Negative for dysuria, frequency and urgency.      Per HPI.     Patient Active Problem List   Diagnosis     Generalized osteoarthrosis, unspecified site     Essential hypertension     Hypertrophy of prostate without urinary obstruction     Backache     CARDIOVASCULAR SCREENING; LDL GOAL LESS THAN 130     Advanced directives, counseling/discussion     Chronic idiopathic constipation     Gross hematuria      Past Medical History:   Diagnosis Date     Arrhythmia      Atrial fibrillation      Calculus of kidney      Mitral prolapse     mild, with mild MR     Prostate infection      STD (sexually transmitted disease)       Past Surgical History:   Procedure Laterality Date     ARTHROSCOPY SHOULDER ROTATOR CUFF REPAIR Right 1996     BACK SURGERY       COLONOSCOPY N/A 12/01/2022    Procedure: COLONOSCOPY;  Surgeon: Karthik Mcmanus MD;  Location: [facility] OR     CYSTOSCOPY       CYSTOSCOPY, TRANSURETHRAL RESECTION (TUR) PROSTATE, COMBINED  04/1993    S/P TURP     ESOPHAGOSCOPY, GASTROSCOPY, DUODENOSCOPY (EGD), COMBINED N/A 12/01/2022    Procedure:  "ESOPHAGOGASTRODUODENOSCOPY;  Surgeon: Karthik Mcmanus MD;  Location: [facility] OR     PROSTATE SURGERY        Social History:   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      Family History:   No known family history of nephrolithiasis or  malignancy.    Objective       PHYSICAL EXAM   /56   Ht 1.778 m (5' 10\")   Wt 63.5 kg (140 lb)   BMI 20.09 kg/m     Physical Exam  Constitutional:       Appearance: Normal appearance.   HENT:      Head: Normocephalic.      Nose: Nose normal.   Eyes:      General: No scleral icterus.  Pulmonary:      Effort: Pulmonary effort is normal.   Abdominal:      General: There is no distension.   Musculoskeletal:         General: Normal range of motion.      Cervical back: Normal range of motion.   Skin:     General: Skin is warm and dry.   Neurological:      General: No focal deficit present.      Mental Status: He is alert and oriented to person, place, and time.   Psychiatric:         Mood and Affect: Mood normal.         Behavior: Behavior normal.          LABORATORY   Recent Labs   Lab Test 09/28/22  1736   COLOR Yellow   APPEARANCE Clear   URINEGLC Negative   URINEBILI Negative   URINEKETONE Negative   SG >=1.030   UBLD Large*   URINEPH 6.5   PROTEIN 30*   UROBILINOGEN 1.0   NITRITE Negative   LEUKEST Trace*   RBCU >100*   WBCU 0-5     Lab Results   Component Value Date    PSA 1.14 01/26/2021    PSA 2.17 09/05/2019    PSA 1.28 09/25/2018    PSA 1.13 05/23/2012      IMAGING     I personally reviewed the images.     CT ABDOMEN AND PELVIS WITH CONTRAST 9/15/2022 3:12 PM     CLINICAL HISTORY: Abdominal pain. Change in bowel habits. Weight loss.  Gastroesophageal reflux.     TECHNIQUE: CT scan of the abdomen and pelvis was performed following  injection of IV contrast. Multiplanar reformats were obtained. Dose  reduction techniques were used.  CONTRAST: 73mL Isovue-370     COMPARISON: CT abdomen and pelvis 10/31/2008, CT chest 2/25/2015.     FINDINGS:   LOWER CHEST: Stable " small bibasilar pulmonary nodules compared to a CT  chest from 2/25/2015 indicating a nonaggressive etiology.     HEPATOBILIARY: Normal.     PANCREAS: No acute abnormality. Small 0.4 cm hypodense nodule medial right pancreatic head series 3 image 70. This is not visible on the CT from 10/31/2008. It may be faintly demonstrated on the CT chest from  2/25/2015.     SPLEEN: Normal.     ADRENAL GLANDS: Normal.     KIDNEYS/BLADDER: Multiple renal cysts without specific imaging  follow-up recommended. These do not show any worrisome features. Nonobstructing stones within the left kidney. Example at the left mid  kidney is 0.7 cm series 3 image 72. These are newly seen. No  hydronephrosis. Bladder appears unremarkable.     BOWEL: No obstruction or acute inflammatory change. Moderate stool in  the proximal colon.     PELVIC ORGANS: Heterogenous prostate measuring 4.6 cm.     ADDITIONAL FINDINGS: None.     MUSCULOSKELETAL: Spine degenerative changes.                                                         IMPRESSION:   1.  No acute abnormality identified.  2.  Heterogeneous prostate is noted. Correlate with any evidence for  underlying prostate disease.  3.  A few nonobstructing stones within the left kidney. No  hydronephrosis.  4.  A tiny hypodense nodule at the pancreas head is identified and is  indeterminant. This may have been faintly present on the older CT from 2/25/2015. Suggest correlation with the pancreas MRI.    Assessment & Plan    1. Gross hematuria    2. Microscopic hematuria    3. Benign prostatic hyperplasia with lower urinary tract symptoms, symptom details unspecified        I had the pleasure today of meeting with Mr. Mendoza to discuss his gross hematuria and persistent hematuria.  The differential diagnosis at this point includes stone disease, infection, BPH, prostatitis, urothelial malignancy, renal disorder versus another yet unknown diagnosis.  Many times, we do not find a cause, but we should rule  out bad causes.    We did discuss that microscopic hematuria could be due to to his stones.  Patient also has a history of TURP and may have friable prostatic regrowth.  However, I think it would be prudent to do CT urogram given hematuria's persistence and worsening anemia    At this time, recommend proceeding with comprehensive hematuria evaluation to include:    - Urine cytology to look for cells concerning for malignancy. (Urine sample today does not have enough to obtain this.  We will plan on having this obtained at cystoscopy).  - CT urogram for upper tract imaging.  - Cystoscopy with the first available urologist to evaluate the interior of the bladder. Follow up for hematuria as recommended by urologist performing cystoscopic evaluation.    Signed by:     Tasneem Mercer PA-C 12/19/2022 2:18 PM

## 2022-12-19 NOTE — PROGRESS NOTES
Subjective      REQUESTING PROVIDER   Ben Hoffman     REASON FOR CONSULT   Gross hematuria, persistent microscopic hematuria    HISTORY OF PRESENT ILLNESS   Mr. Mendoza is a 88 year old male seen today in the urology clinic in consultation for evaluation of gross hematuria and persistent microscopic hematuria.  He had gross hematuria several months ago.  He has had several urinalysis where he has had persistent microscopic hematuria, but no gross hematuria.  Patient denies significant urinary symptoms including dysuria, urgency, frequency, incomplete emptying, or urinary incontinence.  He has nocturia on occasion.  He has a history of undergoing TURP in 1993.  He currently takes Flomax 0.4 mg.    Patient notes a history of prostatitis 1 time many years ago as well as likely a case of pyelonephritis in the 1980s.  He has had nephrolithiasis twice.    Patient had CT of the abdomen and pelvis without contrast which showed right renal cysts as well as nonobstructing left renal stones.  There was some questionable prostate enhancement.    Of note, patient has also had anemia that has been progressing over the last year.  He initially started with a hemoglobin around 14 in February 2022.  Most recent hemoglobin is 9.6.  He has undergone evaluation with colonoscopy as well as MRI imaging to try to find a possible cause for his anemia.  It appears that he may be scheduled to see a hematologist.    He gets breast exams was primary care provider and did not have any concerning findings.    PSA up to date: 1.14 1/26/21    Blood thinners: Eliquis    Retention or clots: Neither    Hematuria Risk Factors:  Age >40: Yes   Smoking history: No  Occupational exposure to chemicals or dyes (ie, benzenes, aromatic amines): no  History of urologic disorder or disease: no  History of irritative voiding symptoms: no  History of urinary tract infection: no  Analgesic abuse: no  History of pelvic irradiation: no    The following portions of  "the patient's history were reviewed and updated as appropriate: allergies, current medications, past family history, past medical history, past social history, past surgical history and problem list.     REVIEW OF SYSTEMS   Review of Systems   Constitutional: Negative for chills and fever.   HENT: Positive for hearing loss.    Respiratory: Negative for shortness of breath.    Cardiovascular: Negative for chest pain.   Gastrointestinal: Positive for constipation. Negative for nausea and vomiting.   Genitourinary: Positive for hematuria. Negative for dysuria, frequency and urgency.      Per HPI.     Patient Active Problem List   Diagnosis     Generalized osteoarthrosis, unspecified site     Essential hypertension     Hypertrophy of prostate without urinary obstruction     Backache     CARDIOVASCULAR SCREENING; LDL GOAL LESS THAN 130     Advanced directives, counseling/discussion     Chronic idiopathic constipation     Gross hematuria      Past Medical History:   Diagnosis Date     Arrhythmia      Atrial fibrillation      Calculus of kidney      Mitral prolapse     mild, with mild MR     Prostate infection      STD (sexually transmitted disease)       Past Surgical History:   Procedure Laterality Date     ARTHROSCOPY SHOULDER ROTATOR CUFF REPAIR Right 1996     BACK SURGERY       COLONOSCOPY N/A 12/01/2022    Procedure: COLONOSCOPY;  Surgeon: Karthik Mcmanus MD;  Location: [facility] OR     CYSTOSCOPY       CYSTOSCOPY, TRANSURETHRAL RESECTION (TUR) PROSTATE, COMBINED  04/1993    S/P TURP     ESOPHAGOSCOPY, GASTROSCOPY, DUODENOSCOPY (EGD), COMBINED N/A 12/01/2022    Procedure: ESOPHAGOGASTRODUODENOSCOPY;  Surgeon: Karthik Mcmanus MD;  Location: [facility] OR     PROSTATE SURGERY        Social History:   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      Family History:   No known family history of nephrolithiasis or  malignancy.    Objective      PHYSICAL EXAM   /56   Ht 1.778 m (5' 10\")   Wt 63.5 kg (140 lb)  "  BMI 20.09 kg/m     Physical Exam  Constitutional:       Appearance: Normal appearance.   HENT:      Head: Normocephalic.      Nose: Nose normal.   Eyes:      General: No scleral icterus.  Pulmonary:      Effort: Pulmonary effort is normal.   Abdominal:      General: There is no distension.   Musculoskeletal:         General: Normal range of motion.      Cervical back: Normal range of motion.   Skin:     General: Skin is warm and dry.   Neurological:      General: No focal deficit present.      Mental Status: He is alert and oriented to person, place, and time.   Psychiatric:         Mood and Affect: Mood normal.         Behavior: Behavior normal.          LABORATORY   Recent Labs   Lab Test 09/28/22  1736   COLOR Yellow   APPEARANCE Clear   URINEGLC Negative   URINEBILI Negative   URINEKETONE Negative   SG >=1.030   UBLD Large*   URINEPH 6.5   PROTEIN 30*   UROBILINOGEN 1.0   NITRITE Negative   LEUKEST Trace*   RBCU >100*   WBCU 0-5     Lab Results   Component Value Date    PSA 1.14 01/26/2021    PSA 2.17 09/05/2019    PSA 1.28 09/25/2018    PSA 1.13 05/23/2012      IMAGING     I personally reviewed the images.     CT ABDOMEN AND PELVIS WITH CONTRAST 9/15/2022 3:12 PM     CLINICAL HISTORY: Abdominal pain. Change in bowel habits. Weight loss.  Gastroesophageal reflux.     TECHNIQUE: CT scan of the abdomen and pelvis was performed following  injection of IV contrast. Multiplanar reformats were obtained. Dose  reduction techniques were used.  CONTRAST: 73mL Isovue-370     COMPARISON: CT abdomen and pelvis 10/31/2008, CT chest 2/25/2015.     FINDINGS:   LOWER CHEST: Stable small bibasilar pulmonary nodules compared to a CT  chest from 2/25/2015 indicating a nonaggressive etiology.     HEPATOBILIARY: Normal.     PANCREAS: No acute abnormality. Small 0.4 cm hypodense nodule medial right pancreatic head series 3 image 70. This is not visible on the CT from 10/31/2008. It may be faintly demonstrated on the CT chest  from  2/25/2015.     SPLEEN: Normal.     ADRENAL GLANDS: Normal.     KIDNEYS/BLADDER: Multiple renal cysts without specific imaging  follow-up recommended. These do not show any worrisome features. Nonobstructing stones within the left kidney. Example at the left mid  kidney is 0.7 cm series 3 image 72. These are newly seen. No  hydronephrosis. Bladder appears unremarkable.     BOWEL: No obstruction or acute inflammatory change. Moderate stool in  the proximal colon.     PELVIC ORGANS: Heterogenous prostate measuring 4.6 cm.     ADDITIONAL FINDINGS: None.     MUSCULOSKELETAL: Spine degenerative changes.                                                         IMPRESSION:   1.  No acute abnormality identified.  2.  Heterogeneous prostate is noted. Correlate with any evidence for  underlying prostate disease.  3.  A few nonobstructing stones within the left kidney. No  hydronephrosis.  4.  A tiny hypodense nodule at the pancreas head is identified and is  indeterminant. This may have been faintly present on the older CT from 2/25/2015. Suggest correlation with the pancreas MRI.    Assessment & Plan    1. Gross hematuria    2. Microscopic hematuria    3. Benign prostatic hyperplasia with lower urinary tract symptoms, symptom details unspecified        I had the pleasure today of meeting with Mr. Mendoza to discuss his gross hematuria and persistent hematuria.  The differential diagnosis at this point includes stone disease, infection, BPH, prostatitis, urothelial malignancy, renal disorder versus another yet unknown diagnosis.  Many times, we do not find a cause, but we should rule out bad causes.    We did discuss that microscopic hematuria could be due to to his stones.  Patient also has a history of TURP and may have friable prostatic regrowth.  However, I think it would be prudent to do CT urogram given hematuria's persistence and worsening anemia    At this time, recommend proceeding with comprehensive hematuria  evaluation to include:    - Urine cytology to look for cells concerning for malignancy. (Urine sample today does not have enough to obtain this.  We will plan on having this obtained at cystoscopy).  - CT urogram for upper tract imaging.  - Cystoscopy with the first available urologist to evaluate the interior of the bladder. Follow up for hematuria as recommended by urologist performing cystoscopic evaluation.    Signed by:     Tasneem Mercer PA-C 12/19/2022 2:18 PM

## 2022-12-19 NOTE — PATIENT INSTRUCTIONS
- Urine cytology to look for abnormal cells.  - CT Urogram scan: Please call [phone] to schedule this at the [facility] ([place]) or [facility] ([place]).   - Cystoscopy with the urologist to evaluate the interior of the bladder. Follow up as recommended by the urologist.

## 2022-12-21 NOTE — TELEPHONE ENCOUNTER
RECORDS STATUS - ALL OTHER DIAGNOSIS      RECORDS RECEIVED FROM: [facility]   DATE RECEIVED: 12/21   NOTES STATUS DETAILS   OFFICE NOTE from referring provider Eagle Richardson MD in [place] IM: 11/23/22   DISCHARGE SUMMARY from hospital [facility] 12/1/22   DISCHARGE REPORT from the ER [facility] 9/4/22, 8/11/22   OPERATIVE REPORT Epic 12/1/22: EGD   MEDICATION LIST [facility]    LABS     ANYTHING RELATED TO DIAGNOSIS Epic 12/19/22   IMAGING (NEED IMAGES & REPORT)     CT SCANS PACS [facility]   MRI PACS [facility]

## 2023-01-01 ENCOUNTER — PATIENT OUTREACH (OUTPATIENT)
Dept: ONCOLOGY | Facility: CLINIC | Age: 88
End: 2023-01-01

## 2023-01-01 ENCOUNTER — ONCOLOGY VISIT (OUTPATIENT)
Dept: ONCOLOGY | Facility: CLINIC | Age: 88
End: 2023-01-01
Attending: INTERNAL MEDICINE
Payer: COMMERCIAL

## 2023-01-01 ENCOUNTER — ONCOLOGY VISIT (OUTPATIENT)
Dept: ONCOLOGY | Facility: CLINIC | Age: 88
End: 2023-01-01
Attending: PHYSICIAN ASSISTANT
Payer: COMMERCIAL

## 2023-01-01 ENCOUNTER — LAB (OUTPATIENT)
Dept: INFUSION THERAPY | Facility: CLINIC | Age: 88
End: 2023-01-01
Attending: PHYSICIAN ASSISTANT
Payer: COMMERCIAL

## 2023-01-01 ENCOUNTER — MYC MEDICAL ADVICE (OUTPATIENT)
Dept: ONCOLOGY | Facility: CLINIC | Age: 88
End: 2023-01-01
Payer: COMMERCIAL

## 2023-01-01 ENCOUNTER — PRE VISIT (OUTPATIENT)
Dept: ONCOLOGY | Facility: CLINIC | Age: 88
End: 2023-01-01

## 2023-01-01 ENCOUNTER — TELEPHONE (OUTPATIENT)
Dept: ONCOLOGY | Facility: CLINIC | Age: 88
End: 2023-01-01

## 2023-01-01 ENCOUNTER — LAB (OUTPATIENT)
Dept: LAB | Facility: CLINIC | Age: 88
End: 2023-01-01
Payer: COMMERCIAL

## 2023-01-01 ENCOUNTER — PATIENT OUTREACH (OUTPATIENT)
Dept: ONCOLOGY | Facility: CLINIC | Age: 88
End: 2023-01-01
Payer: COMMERCIAL

## 2023-01-01 ENCOUNTER — DOCUMENTATION ONLY (OUTPATIENT)
Dept: INFUSION THERAPY | Facility: CLINIC | Age: 88
End: 2023-01-01

## 2023-01-01 ENCOUNTER — NURSE TRIAGE (OUTPATIENT)
Dept: ONCOLOGY | Facility: CLINIC | Age: 88
End: 2023-01-01

## 2023-01-01 ENCOUNTER — INFUSION THERAPY VISIT (OUTPATIENT)
Dept: INFUSION THERAPY | Facility: CLINIC | Age: 88
End: 2023-01-01
Attending: INTERNAL MEDICINE
Payer: COMMERCIAL

## 2023-01-01 ENCOUNTER — HEALTH MAINTENANCE LETTER (OUTPATIENT)
Age: 88
End: 2023-01-01

## 2023-01-01 ENCOUNTER — TELEPHONE (OUTPATIENT)
Dept: NURSING | Facility: CLINIC | Age: 88
End: 2023-01-01
Payer: COMMERCIAL

## 2023-01-01 ENCOUNTER — HOSPITAL ENCOUNTER (OUTPATIENT)
Facility: CLINIC | Age: 88
Setting detail: OBSERVATION
Discharge: HOME | End: 2023-06-05
Attending: EMERGENCY MEDICINE | Admitting: INTERNAL MEDICINE
Payer: COMMERCIAL

## 2023-01-01 ENCOUNTER — MYC MEDICAL ADVICE (OUTPATIENT)
Dept: ONCOLOGY | Facility: CLINIC | Age: 88
End: 2023-01-01

## 2023-01-01 ENCOUNTER — APPOINTMENT (OUTPATIENT)
Dept: CT IMAGING | Facility: CLINIC | Age: 88
End: 2023-01-01
Attending: EMERGENCY MEDICINE
Payer: COMMERCIAL

## 2023-01-01 ENCOUNTER — TELEPHONE (OUTPATIENT)
Dept: INTERNAL MEDICINE | Facility: CLINIC | Age: 88
End: 2023-01-01
Payer: COMMERCIAL

## 2023-01-01 ENCOUNTER — PATIENT OUTREACH (OUTPATIENT)
Dept: CARE COORDINATION | Facility: CLINIC | Age: 88
End: 2023-01-01
Payer: COMMERCIAL

## 2023-01-01 ENCOUNTER — DOCUMENTATION ONLY (OUTPATIENT)
Dept: OTHER | Facility: CLINIC | Age: 88
End: 2023-01-01
Payer: COMMERCIAL

## 2023-01-01 ENCOUNTER — HOSPITAL ENCOUNTER (OUTPATIENT)
Dept: CT IMAGING | Facility: CLINIC | Age: 88
Discharge: HOME | End: 2023-01-31
Attending: PHYSICIAN ASSISTANT | Admitting: PHYSICIAN ASSISTANT
Payer: COMMERCIAL

## 2023-01-01 ENCOUNTER — ONCOLOGY VISIT (OUTPATIENT)
Dept: ONCOLOGY | Facility: CLINIC | Age: 88
End: 2023-01-01
Attending: STUDENT IN AN ORGANIZED HEALTH CARE EDUCATION/TRAINING PROGRAM
Payer: COMMERCIAL

## 2023-01-01 ENCOUNTER — MYC REFILL (OUTPATIENT)
Dept: INTERNAL MEDICINE | Facility: CLINIC | Age: 88
End: 2023-01-01
Payer: COMMERCIAL

## 2023-01-01 ENCOUNTER — OFFICE VISIT (OUTPATIENT)
Dept: UROLOGY | Facility: CLINIC | Age: 88
End: 2023-01-01
Payer: COMMERCIAL

## 2023-01-01 ENCOUNTER — APPOINTMENT (OUTPATIENT)
Dept: GENERAL RADIOLOGY | Facility: CLINIC | Age: 88
End: 2023-01-01
Attending: EMERGENCY MEDICINE
Payer: COMMERCIAL

## 2023-01-01 ENCOUNTER — NURSE TRIAGE (OUTPATIENT)
Dept: NURSING | Facility: CLINIC | Age: 88
End: 2023-01-01
Payer: COMMERCIAL

## 2023-01-01 VITALS
SYSTOLIC BLOOD PRESSURE: 107 MMHG | HEART RATE: 61 BPM | DIASTOLIC BLOOD PRESSURE: 49 MMHG | OXYGEN SATURATION: 98 % | RESPIRATION RATE: 16 BRPM | TEMPERATURE: 97.6 F

## 2023-01-01 VITALS
TEMPERATURE: 97 F | SYSTOLIC BLOOD PRESSURE: 110 MMHG | BODY MASS INDEX: 19.61 KG/M2 | OXYGEN SATURATION: 100 % | DIASTOLIC BLOOD PRESSURE: 48 MMHG | HEART RATE: 68 BPM | RESPIRATION RATE: 16 BRPM | WEIGHT: 137 LBS | HEIGHT: 70 IN

## 2023-01-01 VITALS
DIASTOLIC BLOOD PRESSURE: 73 MMHG | HEART RATE: 54 BPM | OXYGEN SATURATION: 99 % | TEMPERATURE: 97.3 F | SYSTOLIC BLOOD PRESSURE: 119 MMHG

## 2023-01-01 VITALS
WEIGHT: 131.4 LBS | TEMPERATURE: 98.6 F | HEIGHT: 70 IN | OXYGEN SATURATION: 99 % | DIASTOLIC BLOOD PRESSURE: 67 MMHG | HEART RATE: 74 BPM | RESPIRATION RATE: 18 BRPM | SYSTOLIC BLOOD PRESSURE: 105 MMHG | BODY MASS INDEX: 18.81 KG/M2

## 2023-01-01 VITALS
HEART RATE: 71 BPM | HEIGHT: 70 IN | OXYGEN SATURATION: 100 % | TEMPERATURE: 98.1 F | WEIGHT: 130.1 LBS | BODY MASS INDEX: 18.62 KG/M2 | DIASTOLIC BLOOD PRESSURE: 48 MMHG | SYSTOLIC BLOOD PRESSURE: 98 MMHG | RESPIRATION RATE: 16 BRPM

## 2023-01-01 VITALS
BODY MASS INDEX: 19.9 KG/M2 | TEMPERATURE: 96 F | WEIGHT: 139 LBS | SYSTOLIC BLOOD PRESSURE: 110 MMHG | RESPIRATION RATE: 18 BRPM | HEART RATE: 60 BPM | DIASTOLIC BLOOD PRESSURE: 49 MMHG | HEIGHT: 70 IN

## 2023-01-01 VITALS
RESPIRATION RATE: 16 BRPM | HEART RATE: 55 BPM | TEMPERATURE: 97 F | WEIGHT: 134.8 LBS | SYSTOLIC BLOOD PRESSURE: 129 MMHG | HEART RATE: 50 BPM | TEMPERATURE: 97.6 F | DIASTOLIC BLOOD PRESSURE: 64 MMHG | HEIGHT: 70 IN | SYSTOLIC BLOOD PRESSURE: 107 MMHG | RESPIRATION RATE: 16 BRPM | BODY MASS INDEX: 19.3 KG/M2 | DIASTOLIC BLOOD PRESSURE: 36 MMHG | OXYGEN SATURATION: 99 % | OXYGEN SATURATION: 100 %

## 2023-01-01 VITALS
HEIGHT: 70 IN | DIASTOLIC BLOOD PRESSURE: 42 MMHG | WEIGHT: 137.3 LBS | TEMPERATURE: 97 F | HEART RATE: 70 BPM | BODY MASS INDEX: 19.66 KG/M2 | SYSTOLIC BLOOD PRESSURE: 83 MMHG | RESPIRATION RATE: 16 BRPM | OXYGEN SATURATION: 97 %

## 2023-01-01 VITALS
DIASTOLIC BLOOD PRESSURE: 42 MMHG | HEIGHT: 70 IN | WEIGHT: 136.1 LBS | RESPIRATION RATE: 16 BRPM | OXYGEN SATURATION: 100 % | TEMPERATURE: 97.6 F | SYSTOLIC BLOOD PRESSURE: 114 MMHG | HEART RATE: 58 BPM | BODY MASS INDEX: 19.48 KG/M2

## 2023-01-01 VITALS
SYSTOLIC BLOOD PRESSURE: 94 MMHG | TEMPERATURE: 97.8 F | OXYGEN SATURATION: 99 % | OXYGEN SATURATION: 98 % | HEART RATE: 62 BPM | SYSTOLIC BLOOD PRESSURE: 98 MMHG | HEART RATE: 60 BPM | TEMPERATURE: 97.6 F | WEIGHT: 133.5 LBS | WEIGHT: 134.6 LBS | HEIGHT: 70 IN | RESPIRATION RATE: 16 BRPM | RESPIRATION RATE: 16 BRPM | DIASTOLIC BLOOD PRESSURE: 53 MMHG | BODY MASS INDEX: 19.27 KG/M2 | BODY MASS INDEX: 19.11 KG/M2 | DIASTOLIC BLOOD PRESSURE: 45 MMHG | HEIGHT: 70 IN

## 2023-01-01 VITALS
DIASTOLIC BLOOD PRESSURE: 52 MMHG | HEIGHT: 70 IN | WEIGHT: 143 LBS | SYSTOLIC BLOOD PRESSURE: 114 MMHG | BODY MASS INDEX: 20.47 KG/M2

## 2023-01-01 VITALS
SYSTOLIC BLOOD PRESSURE: 127 MMHG | BODY MASS INDEX: 19.59 KG/M2 | DIASTOLIC BLOOD PRESSURE: 65 MMHG | OXYGEN SATURATION: 100 % | HEART RATE: 50 BPM | TEMPERATURE: 97.7 F | RESPIRATION RATE: 16 BRPM | WEIGHT: 136.5 LBS

## 2023-01-01 VITALS
OXYGEN SATURATION: 100 % | TEMPERATURE: 97 F | SYSTOLIC BLOOD PRESSURE: 113 MMHG | RESPIRATION RATE: 16 BRPM | HEART RATE: 58 BPM | DIASTOLIC BLOOD PRESSURE: 61 MMHG

## 2023-01-01 VITALS
WEIGHT: 137.8 LBS | BODY MASS INDEX: 19.77 KG/M2 | OXYGEN SATURATION: 92 % | HEART RATE: 72 BPM | RESPIRATION RATE: 16 BRPM | DIASTOLIC BLOOD PRESSURE: 69 MMHG | SYSTOLIC BLOOD PRESSURE: 109 MMHG

## 2023-01-01 VITALS
RESPIRATION RATE: 16 BRPM | BODY MASS INDEX: 19.04 KG/M2 | OXYGEN SATURATION: 100 % | WEIGHT: 133 LBS | TEMPERATURE: 97.4 F | HEART RATE: 61 BPM | HEIGHT: 70 IN | DIASTOLIC BLOOD PRESSURE: 54 MMHG | SYSTOLIC BLOOD PRESSURE: 110 MMHG

## 2023-01-01 VITALS
HEIGHT: 70 IN | SYSTOLIC BLOOD PRESSURE: 99 MMHG | TEMPERATURE: 96.9 F | RESPIRATION RATE: 16 BRPM | DIASTOLIC BLOOD PRESSURE: 53 MMHG | OXYGEN SATURATION: 98 % | WEIGHT: 132.2 LBS | BODY MASS INDEX: 18.92 KG/M2 | HEART RATE: 67 BPM

## 2023-01-01 VITALS
DIASTOLIC BLOOD PRESSURE: 62 MMHG | BODY MASS INDEX: 18.69 KG/M2 | TEMPERATURE: 99.3 F | SYSTOLIC BLOOD PRESSURE: 131 MMHG | RESPIRATION RATE: 18 BRPM | OXYGEN SATURATION: 99 % | HEART RATE: 73 BPM | WEIGHT: 130.29 LBS

## 2023-01-01 VITALS
OXYGEN SATURATION: 99 % | HEIGHT: 70 IN | SYSTOLIC BLOOD PRESSURE: 122 MMHG | DIASTOLIC BLOOD PRESSURE: 54 MMHG | BODY MASS INDEX: 20.47 KG/M2 | TEMPERATURE: 97 F | HEART RATE: 68 BPM | WEIGHT: 143 LBS | RESPIRATION RATE: 16 BRPM

## 2023-01-01 VITALS
HEART RATE: 59 BPM | BODY MASS INDEX: 19.58 KG/M2 | WEIGHT: 136.8 LBS | RESPIRATION RATE: 16 BRPM | OXYGEN SATURATION: 100 % | TEMPERATURE: 98 F | DIASTOLIC BLOOD PRESSURE: 58 MMHG | HEIGHT: 70 IN | SYSTOLIC BLOOD PRESSURE: 112 MMHG

## 2023-01-01 VITALS
HEART RATE: 55 BPM | BODY MASS INDEX: 19.47 KG/M2 | SYSTOLIC BLOOD PRESSURE: 123 MMHG | TEMPERATURE: 97.7 F | WEIGHT: 136 LBS | HEIGHT: 70 IN | OXYGEN SATURATION: 100 % | RESPIRATION RATE: 16 BRPM | DIASTOLIC BLOOD PRESSURE: 57 MMHG

## 2023-01-01 VITALS
DIASTOLIC BLOOD PRESSURE: 72 MMHG | TEMPERATURE: 97.7 F | HEART RATE: 52 BPM | RESPIRATION RATE: 16 BRPM | SYSTOLIC BLOOD PRESSURE: 134 MMHG | OXYGEN SATURATION: 100 %

## 2023-01-01 VITALS
HEART RATE: 51 BPM | DIASTOLIC BLOOD PRESSURE: 69 MMHG | TEMPERATURE: 97.5 F | OXYGEN SATURATION: 98 % | SYSTOLIC BLOOD PRESSURE: 134 MMHG | RESPIRATION RATE: 16 BRPM

## 2023-01-01 VITALS — SYSTOLIC BLOOD PRESSURE: 128 MMHG | DIASTOLIC BLOOD PRESSURE: 56 MMHG

## 2023-01-01 VITALS
TEMPERATURE: 97.1 F | BODY MASS INDEX: 18.85 KG/M2 | HEART RATE: 87 BPM | HEIGHT: 70 IN | OXYGEN SATURATION: 94 % | WEIGHT: 131.7 LBS | DIASTOLIC BLOOD PRESSURE: 53 MMHG | SYSTOLIC BLOOD PRESSURE: 105 MMHG | RESPIRATION RATE: 16 BRPM

## 2023-01-01 DIAGNOSIS — D72.829 LEUKOCYTOSIS, UNSPECIFIED TYPE: ICD-10-CM

## 2023-01-01 DIAGNOSIS — C92.00 ACUTE MYELOID LEUKEMIA NOT HAVING ACHIEVED REMISSION (H): Primary | ICD-10-CM

## 2023-01-01 DIAGNOSIS — I95.9 HYPOTENSION, UNSPECIFIED HYPOTENSION TYPE: ICD-10-CM

## 2023-01-01 DIAGNOSIS — D61.818 PANCYTOPENIA (H): ICD-10-CM

## 2023-01-01 DIAGNOSIS — E53.8 FOLIC ACID DEFICIENCY: ICD-10-CM

## 2023-01-01 DIAGNOSIS — C92.00 ACUTE MYELOID LEUKEMIA NOT HAVING ACHIEVED REMISSION (H): ICD-10-CM

## 2023-01-01 DIAGNOSIS — D69.6 THROMBOCYTOPENIA (H): ICD-10-CM

## 2023-01-01 DIAGNOSIS — D61.818 PANCYTOPENIA (H): Primary | ICD-10-CM

## 2023-01-01 DIAGNOSIS — R53.0 NEOPLASTIC MALIGNANT RELATED FATIGUE: ICD-10-CM

## 2023-01-01 DIAGNOSIS — I48.0 PAROXYSMAL ATRIAL FIBRILLATION (H): ICD-10-CM

## 2023-01-01 DIAGNOSIS — Z71.89 GOALS OF CARE, COUNSELING/DISCUSSION: ICD-10-CM

## 2023-01-01 DIAGNOSIS — M54.50 LOW BACK PAIN, UNSPECIFIED BACK PAIN LATERALITY, UNSPECIFIED CHRONICITY, UNSPECIFIED WHETHER SCIATICA PRESENT: ICD-10-CM

## 2023-01-01 DIAGNOSIS — R50.81 FEBRILE NEUTROPENIA (H): ICD-10-CM

## 2023-01-01 DIAGNOSIS — R63.0 DECREASED APPETITE: ICD-10-CM

## 2023-01-01 DIAGNOSIS — Z71.89 ADVANCED CARE PLANNING/COUNSELING DISCUSSION: ICD-10-CM

## 2023-01-01 DIAGNOSIS — D70.9 FEBRILE NEUTROPENIA (H): ICD-10-CM

## 2023-01-01 DIAGNOSIS — R19.7 DIARRHEA, UNSPECIFIED TYPE: Primary | ICD-10-CM

## 2023-01-01 DIAGNOSIS — K21.00 GASTROESOPHAGEAL REFLUX DISEASE WITH ESOPHAGITIS, UNSPECIFIED WHETHER HEMORRHAGE: Primary | ICD-10-CM

## 2023-01-01 DIAGNOSIS — M62.81 GENERALIZED MUSCLE WEAKNESS: ICD-10-CM

## 2023-01-01 DIAGNOSIS — R31.0 GROSS HEMATURIA: ICD-10-CM

## 2023-01-01 DIAGNOSIS — R31.0 GROSS HEMATURIA: Primary | ICD-10-CM

## 2023-01-01 DIAGNOSIS — Z51.5 ENCOUNTER FOR PALLIATIVE CARE: ICD-10-CM

## 2023-01-01 DIAGNOSIS — R63.4 WEIGHT LOSS: ICD-10-CM

## 2023-01-01 DIAGNOSIS — D64.9 ANEMIA, UNSPECIFIED TYPE: ICD-10-CM

## 2023-01-01 LAB
ABO/RH(D): NORMAL
ALBUMIN SERPL BCG-MCNC: 3.4 G/DL (ref 3.5–5.2)
ALBUMIN SERPL BCG-MCNC: 3.9 G/DL (ref 3.5–5.2)
ALBUMIN SERPL BCG-MCNC: 4.1 G/DL (ref 3.5–5.2)
ALBUMIN UR-MCNC: 100 MG/DL
ALBUMIN UR-MCNC: 20 MG/DL
ALBUMIN UR-MCNC: 30 MG/DL
ALP SERPL-CCNC: 114 U/L (ref 40–129)
ALP SERPL-CCNC: 142 U/L (ref 40–129)
ALP SERPL-CCNC: 81 U/L (ref 40–129)
ALT SERPL W P-5'-P-CCNC: 11 U/L (ref 10–50)
ALT SERPL W P-5'-P-CCNC: 5 U/L (ref 10–50)
ALT SERPL W P-5'-P-CCNC: <5 U/L (ref 10–50)
ANION GAP SERPL CALCULATED.3IONS-SCNC: 8 MMOL/L (ref 7–15)
ANION GAP SERPL CALCULATED.3IONS-SCNC: 9 MMOL/L (ref 7–15)
ANION GAP SERPL CALCULATED.3IONS-SCNC: 9 MMOL/L (ref 7–15)
ANTIBODY SCREEN: NEGATIVE
APPEARANCE UR: ABNORMAL
APPEARANCE UR: CLEAR
APPEARANCE UR: CLEAR
APTT PPP: 37 SECONDS (ref 22–38)
AST SERPL W P-5'-P-CCNC: 16 U/L (ref 10–50)
AST SERPL W P-5'-P-CCNC: 21 U/L (ref 10–50)
AST SERPL W P-5'-P-CCNC: 26 U/L (ref 10–50)
ATRIAL RATE - MUSE: 88 BPM
BACTERIA #/AREA URNS HPF: ABNORMAL /HPF
BACTERIA BLD CULT: NO GROWTH
BASOPHILS # BLD AUTO: 0 10E3/UL (ref 0–0.2)
BASOPHILS # BLD MANUAL: 0 10E3/UL (ref 0–0.2)
BASOPHILS NFR BLD AUTO: 1 %
BASOPHILS NFR BLD AUTO: 1 %
BASOPHILS NFR BLD AUTO: 2 %
BASOPHILS NFR BLD MANUAL: 0 %
BASOPHILS NFR BLD MANUAL: 1 %
BASOPHILS NFR BLD MANUAL: 1 %
BASOPHILS NFR BLD MANUAL: 2 %
BASOPHILS NFR BLD MANUAL: 2 %
BILIRUB SERPL-MCNC: 0.3 MG/DL
BILIRUB SERPL-MCNC: 0.6 MG/DL
BILIRUB SERPL-MCNC: 0.6 MG/DL
BILIRUB UR QL STRIP: NEGATIVE
BLASTS # BLD MANUAL: 0 10E3/UL
BLASTS # BLD MANUAL: 0.1 10E3/UL
BLASTS # BLD MANUAL: 14.2 10E3/UL
BLASTS # BLD MANUAL: 31.1 10E3/UL
BLASTS # BLD MANUAL: 47.7 10E3/UL
BLASTS # BLD MANUAL: 7.1 10E3/UL
BLASTS NFR BLD MANUAL: 0 %
BLASTS NFR BLD MANUAL: 2 %
BLASTS NFR BLD MANUAL: 3 %
BLASTS NFR BLD MANUAL: 5 %
BLASTS NFR BLD MANUAL: 64 %
BLASTS NFR BLD MANUAL: 66 %
BLASTS NFR BLD MANUAL: 8 %
BLASTS NFR BLD MANUAL: 80 %
BLASTS NFR BLD MANUAL: 81 %
BLD PROD TYP BPU: NORMAL
BLOOD COMPONENT TYPE: NORMAL
BUN SERPL-MCNC: 15.1 MG/DL (ref 8–23)
BUN SERPL-MCNC: 16.7 MG/DL (ref 8–23)
BUN SERPL-MCNC: 16.8 MG/DL (ref 8–23)
CALCIUM SERPL-MCNC: 8.6 MG/DL (ref 8.8–10.2)
CALCIUM SERPL-MCNC: 8.9 MG/DL (ref 8.8–10.2)
CALCIUM SERPL-MCNC: 9 MG/DL (ref 8.8–10.2)
CAOX CRY #/AREA URNS HPF: ABNORMAL /HPF
CAOX CRY #/AREA URNS HPF: ABNORMAL /HPF
CHLORIDE SERPL-SCNC: 100 MMOL/L (ref 98–107)
CHLORIDE SERPL-SCNC: 105 MMOL/L (ref 98–107)
CHLORIDE SERPL-SCNC: 107 MMOL/L (ref 98–107)
CODING SYSTEM: NORMAL
COLOR UR AUTO: YELLOW
CREAT SERPL-MCNC: 0.88 MG/DL (ref 0.67–1.17)
CREAT SERPL-MCNC: 0.9 MG/DL (ref 0.67–1.17)
CREAT SERPL-MCNC: 0.91 MG/DL (ref 0.67–1.17)
CROSSMATCH: NORMAL
CULTURE HARVEST COMPLETE DATE: NORMAL
CULTURE HARVEST COMPLETE DATE: NORMAL
DAT POLY: NEGATIVE
DEPRECATED HCO3 PLAS-SCNC: 25 MMOL/L (ref 22–29)
DEPRECATED HCO3 PLAS-SCNC: 25 MMOL/L (ref 22–29)
DEPRECATED HCO3 PLAS-SCNC: 27 MMOL/L (ref 22–29)
DIASTOLIC BLOOD PRESSURE - MUSE: NORMAL MMHG
ELLIPTOCYTES BLD QL SMEAR: SLIGHT
EOSINOPHIL # BLD AUTO: 0 10E3/UL (ref 0–0.7)
EOSINOPHIL # BLD AUTO: 0 10E3/UL (ref 0–0.7)
EOSINOPHIL # BLD AUTO: 0.1 10E3/UL (ref 0–0.7)
EOSINOPHIL # BLD MANUAL: 0 10E3/UL (ref 0–0.7)
EOSINOPHIL # BLD MANUAL: 0.1 10E3/UL (ref 0–0.7)
EOSINOPHIL NFR BLD AUTO: 2 %
EOSINOPHIL NFR BLD AUTO: 2 %
EOSINOPHIL NFR BLD AUTO: 3 %
EOSINOPHIL NFR BLD MANUAL: 0 %
EOSINOPHIL NFR BLD MANUAL: 1 %
EOSINOPHIL NFR BLD MANUAL: 2 %
EOSINOPHIL NFR BLD MANUAL: 3 %
EOSINOPHIL NFR BLD MANUAL: 4 %
EOSINOPHIL NFR BLD MANUAL: 4 %
EPO SERPL-ACNC: 178 MU/ML
ERYTHROCYTE [DISTWIDTH] IN BLOOD BY AUTOMATED COUNT: 16.6 % (ref 10–15)
ERYTHROCYTE [DISTWIDTH] IN BLOOD BY AUTOMATED COUNT: 16.7 % (ref 10–15)
ERYTHROCYTE [DISTWIDTH] IN BLOOD BY AUTOMATED COUNT: 18.8 % (ref 10–15)
ERYTHROCYTE [DISTWIDTH] IN BLOOD BY AUTOMATED COUNT: 19.3 % (ref 10–15)
ERYTHROCYTE [DISTWIDTH] IN BLOOD BY AUTOMATED COUNT: 19.5 % (ref 10–15)
ERYTHROCYTE [DISTWIDTH] IN BLOOD BY AUTOMATED COUNT: 19.8 % (ref 10–15)
ERYTHROCYTE [DISTWIDTH] IN BLOOD BY AUTOMATED COUNT: 19.9 % (ref 10–15)
ERYTHROCYTE [DISTWIDTH] IN BLOOD BY AUTOMATED COUNT: 20.7 % (ref 10–15)
ERYTHROCYTE [DISTWIDTH] IN BLOOD BY AUTOMATED COUNT: 21 % (ref 10–15)
ERYTHROCYTE [DISTWIDTH] IN BLOOD BY AUTOMATED COUNT: 22 % (ref 10–15)
ERYTHROCYTE [DISTWIDTH] IN BLOOD BY AUTOMATED COUNT: 22.1 % (ref 10–15)
ERYTHROCYTE [DISTWIDTH] IN BLOOD BY AUTOMATED COUNT: 22.1 % (ref 10–15)
ERYTHROCYTE [DISTWIDTH] IN BLOOD BY AUTOMATED COUNT: 22.3 % (ref 10–15)
ERYTHROCYTE [DISTWIDTH] IN BLOOD BY AUTOMATED COUNT: 22.3 % (ref 10–15)
ERYTHROCYTE [DISTWIDTH] IN BLOOD BY AUTOMATED COUNT: 22.4 % (ref 10–15)
ERYTHROCYTE [DISTWIDTH] IN BLOOD BY AUTOMATED COUNT: 22.9 % (ref 10–15)
ERYTHROCYTE [DISTWIDTH] IN BLOOD BY AUTOMATED COUNT: 23 % (ref 10–15)
ERYTHROCYTE [DISTWIDTH] IN BLOOD BY AUTOMATED COUNT: 23.4 % (ref 10–15)
ERYTHROCYTE [DISTWIDTH] IN BLOOD BY AUTOMATED COUNT: 23.7 % (ref 10–15)
FERRITIN SERPL-MCNC: 356 NG/ML (ref 31–409)
FIBRINOGEN PPP-MCNC: 300 MG/DL (ref 170–490)
FLUAV RNA SPEC QL NAA+PROBE: NEGATIVE
FLUAV RNA SPEC QL NAA+PROBE: NEGATIVE
FLUBV RNA RESP QL NAA+PROBE: NEGATIVE
FLUBV RNA RESP QL NAA+PROBE: NEGATIVE
FOLATE SERPL-MCNC: 2 NG/ML (ref 4.6–34.8)
FRAGMENTS BLD QL SMEAR: SLIGHT
GFR SERPL CREATININE-BSD FRML MDRD: 81 ML/MIN/1.73M2
GFR SERPL CREATININE-BSD FRML MDRD: 82 ML/MIN/1.73M2
GFR SERPL CREATININE-BSD FRML MDRD: 83 ML/MIN/1.73M2
GLUCOSE SERPL-MCNC: 109 MG/DL (ref 70–99)
GLUCOSE SERPL-MCNC: 124 MG/DL (ref 70–99)
GLUCOSE SERPL-MCNC: 99 MG/DL (ref 70–99)
GLUCOSE UR STRIP-MCNC: NEGATIVE MG/DL
HAPTOGLOB SERPL-MCNC: 115 MG/DL (ref 32–197)
HCT VFR BLD AUTO: 18.7 % (ref 40–53)
HCT VFR BLD AUTO: 19.1 % (ref 40–53)
HCT VFR BLD AUTO: 21.1 % (ref 40–53)
HCT VFR BLD AUTO: 21.8 % (ref 40–53)
HCT VFR BLD AUTO: 22.4 % (ref 40–53)
HCT VFR BLD AUTO: 22.5 % (ref 40–53)
HCT VFR BLD AUTO: 22.7 % (ref 40–53)
HCT VFR BLD AUTO: 22.9 % (ref 40–53)
HCT VFR BLD AUTO: 23.2 % (ref 40–53)
HCT VFR BLD AUTO: 23.3 % (ref 40–53)
HCT VFR BLD AUTO: 24.1 % (ref 40–53)
HCT VFR BLD AUTO: 24.3 % (ref 40–53)
HCT VFR BLD AUTO: 25.2 % (ref 40–53)
HCT VFR BLD AUTO: 25.8 % (ref 40–53)
HCT VFR BLD AUTO: 26.1 % (ref 40–53)
HCT VFR BLD AUTO: 26.9 % (ref 40–53)
HCT VFR BLD AUTO: 27.3 % (ref 40–53)
HCT VFR BLD AUTO: 28.6 % (ref 40–53)
HCT VFR BLD AUTO: 31 % (ref 40–53)
HGB BLD-MCNC: 5.9 G/DL (ref 13.3–17.7)
HGB BLD-MCNC: 6.1 G/DL (ref 13.3–17.7)
HGB BLD-MCNC: 6.8 G/DL (ref 13.3–17.7)
HGB BLD-MCNC: 7 G/DL (ref 13.3–17.7)
HGB BLD-MCNC: 7.1 G/DL (ref 13.3–17.7)
HGB BLD-MCNC: 7.3 G/DL (ref 13.3–17.7)
HGB BLD-MCNC: 7.3 G/DL (ref 13.3–17.7)
HGB BLD-MCNC: 7.4 G/DL (ref 13.3–17.7)
HGB BLD-MCNC: 7.6 G/DL (ref 13.3–17.7)
HGB BLD-MCNC: 7.7 G/DL (ref 13.3–17.7)
HGB BLD-MCNC: 7.9 G/DL (ref 13.3–17.7)
HGB BLD-MCNC: 8 G/DL (ref 13.3–17.7)
HGB BLD-MCNC: 8.2 G/DL (ref 13.3–17.7)
HGB BLD-MCNC: 8.5 G/DL (ref 13.3–17.7)
HGB BLD-MCNC: 8.6 G/DL (ref 13.3–17.7)
HGB BLD-MCNC: 9.1 G/DL (ref 13.3–17.7)
HGB BLD-MCNC: 9.5 G/DL (ref 13.3–17.7)
HGB UR QL STRIP: ABNORMAL
HGB UR QL STRIP: NEGATIVE
HGB UR QL STRIP: NEGATIVE
HOLD SPECIMEN: NORMAL
HOLD SPECIMEN: NORMAL
HYALINE CASTS: 1 /LPF
IMM GRANULOCYTES # BLD: 0 10E3/UL
IMM GRANULOCYTES NFR BLD: 0 %
IMM GRANULOCYTES NFR BLD: 1 %
IMM GRANULOCYTES NFR BLD: 1 %
INR PPP: 1.21 (ref 0.85–1.15)
INR PPP: 1.55 (ref 0.85–1.15)
INTERPRETATION ECG - MUSE: NORMAL
INTERPRETATION: NORMAL
INTERPRETATION: NORMAL
IRON BINDING CAPACITY (ROCHE): 222 UG/DL (ref 240–430)
IRON SATN MFR SERPL: 90 % (ref 15–46)
IRON SERPL-MCNC: 200 UG/DL (ref 61–157)
ISCN: NORMAL
ISSUE DATE AND TIME: NORMAL
KETONES UR STRIP-MCNC: ABNORMAL MG/DL
KETONES UR STRIP-MCNC: NEGATIVE MG/DL
KETONES UR STRIP-MCNC: NEGATIVE MG/DL
LAB DIRECTOR COMMENTS: NORMAL
LAB DIRECTOR DISCLAIMER: NORMAL
LAB DIRECTOR INTERPRETATION: NORMAL
LAB DIRECTOR METHODOLOGY: NORMAL
LAB DIRECTOR RESULTS: NORMAL
LACTATE SERPL-SCNC: 1.1 MMOL/L (ref 0.7–2)
LACTATE SERPL-SCNC: 1.2 MMOL/L (ref 0.7–2)
LACTATE SERPL-SCNC: 1.9 MMOL/L (ref 0.7–2)
LDH SERPL L TO P-CCNC: 160 U/L (ref 0–250)
LEUKOCYTE ESTERASE UR QL STRIP: NEGATIVE
LYMPHOCYTES # BLD AUTO: 0.6 10E3/UL (ref 0.8–5.3)
LYMPHOCYTES # BLD AUTO: 0.7 10E3/UL (ref 0.8–5.3)
LYMPHOCYTES # BLD AUTO: 0.8 10E3/UL (ref 0.8–5.3)
LYMPHOCYTES # BLD MANUAL: 0.7 10E3/UL (ref 0.8–5.3)
LYMPHOCYTES # BLD MANUAL: 0.7 10E3/UL (ref 0.8–5.3)
LYMPHOCYTES # BLD MANUAL: 0.9 10E3/UL (ref 0.8–5.3)
LYMPHOCYTES # BLD MANUAL: 0.9 10E3/UL (ref 0.8–5.3)
LYMPHOCYTES # BLD MANUAL: 1 10E3/UL (ref 0.8–5.3)
LYMPHOCYTES # BLD MANUAL: 1 10E3/UL (ref 0.8–5.3)
LYMPHOCYTES # BLD MANUAL: 1.3 10E3/UL (ref 0.8–5.3)
LYMPHOCYTES # BLD MANUAL: 2.7 10E3/UL (ref 0.8–5.3)
LYMPHOCYTES # BLD MANUAL: 3 10E3/UL (ref 0.8–5.3)
LYMPHOCYTES # BLD MANUAL: 4.7 10E3/UL (ref 0.8–5.3)
LYMPHOCYTES # BLD MANUAL: 5.8 10E3/UL (ref 0.8–5.3)
LYMPHOCYTES # BLD MANUAL: 7.1 10E3/UL (ref 0.8–5.3)
LYMPHOCYTES # BLD MANUAL: 7.8 10E3/UL (ref 0.8–5.3)
LYMPHOCYTES NFR BLD AUTO: 29 %
LYMPHOCYTES NFR BLD AUTO: 50 %
LYMPHOCYTES NFR BLD AUTO: 56 %
LYMPHOCYTES NFR BLD MANUAL: 12 %
LYMPHOCYTES NFR BLD MANUAL: 15 %
LYMPHOCYTES NFR BLD MANUAL: 28 %
LYMPHOCYTES NFR BLD MANUAL: 35 %
LYMPHOCYTES NFR BLD MANUAL: 55 %
LYMPHOCYTES NFR BLD MANUAL: 55 %
LYMPHOCYTES NFR BLD MANUAL: 57 %
LYMPHOCYTES NFR BLD MANUAL: 68 %
LYMPHOCYTES NFR BLD MANUAL: 69 %
LYMPHOCYTES NFR BLD MANUAL: 71 %
LYMPHOCYTES NFR BLD MANUAL: 72 %
LYMPHOCYTES NFR BLD MANUAL: 75 %
LYMPHOCYTES NFR BLD MANUAL: 81 %
LYMPHOCYTES NFR BLD MANUAL: 92 %
LYMPHOCYTES NFR BLD MANUAL: 94 %
MCH RBC QN AUTO: 29.7 PG (ref 26.5–33)
MCH RBC QN AUTO: 30 PG (ref 26.5–33)
MCH RBC QN AUTO: 30.9 PG (ref 26.5–33)
MCH RBC QN AUTO: 30.9 PG (ref 26.5–33)
MCH RBC QN AUTO: 31 PG (ref 26.5–33)
MCH RBC QN AUTO: 31.6 PG (ref 26.5–33)
MCH RBC QN AUTO: 32.7 PG (ref 26.5–33)
MCH RBC QN AUTO: 33.2 PG (ref 26.5–33)
MCH RBC QN AUTO: 33.5 PG (ref 26.5–33)
MCH RBC QN AUTO: 34.3 PG (ref 26.5–33)
MCH RBC QN AUTO: 34.5 PG (ref 26.5–33)
MCH RBC QN AUTO: 34.6 PG (ref 26.5–33)
MCH RBC QN AUTO: 34.8 PG (ref 26.5–33)
MCH RBC QN AUTO: 35.2 PG (ref 26.5–33)
MCH RBC QN AUTO: 35.2 PG (ref 26.5–33)
MCH RBC QN AUTO: 35.3 PG (ref 26.5–33)
MCH RBC QN AUTO: 35.9 PG (ref 26.5–33)
MCH RBC QN AUTO: 36 PG (ref 26.5–33)
MCH RBC QN AUTO: 36 PG (ref 26.5–33)
MCHC RBC AUTO-ENTMCNC: 30.6 G/DL (ref 31.5–36.5)
MCHC RBC AUTO-ENTMCNC: 30.7 G/DL (ref 31.5–36.5)
MCHC RBC AUTO-ENTMCNC: 31.3 G/DL (ref 31.5–36.5)
MCHC RBC AUTO-ENTMCNC: 31.3 G/DL (ref 31.5–36.5)
MCHC RBC AUTO-ENTMCNC: 31.5 G/DL (ref 31.5–36.5)
MCHC RBC AUTO-ENTMCNC: 31.5 G/DL (ref 31.5–36.5)
MCHC RBC AUTO-ENTMCNC: 31.6 G/DL (ref 31.5–36.5)
MCHC RBC AUTO-ENTMCNC: 31.6 G/DL (ref 31.5–36.5)
MCHC RBC AUTO-ENTMCNC: 31.7 G/DL (ref 31.5–36.5)
MCHC RBC AUTO-ENTMCNC: 31.8 G/DL (ref 31.5–36.5)
MCHC RBC AUTO-ENTMCNC: 31.8 G/DL (ref 31.5–36.5)
MCHC RBC AUTO-ENTMCNC: 31.9 G/DL (ref 31.5–36.5)
MCHC RBC AUTO-ENTMCNC: 32 G/DL (ref 31.5–36.5)
MCHC RBC AUTO-ENTMCNC: 32.1 G/DL (ref 31.5–36.5)
MCHC RBC AUTO-ENTMCNC: 32.2 G/DL (ref 31.5–36.5)
MCHC RBC AUTO-ENTMCNC: 32.2 G/DL (ref 31.5–36.5)
MCHC RBC AUTO-ENTMCNC: 32.3 G/DL (ref 31.5–36.5)
MCHC RBC AUTO-ENTMCNC: 32.6 G/DL (ref 31.5–36.5)
MCHC RBC AUTO-ENTMCNC: 33.8 G/DL (ref 31.5–36.5)
MCV RBC AUTO: 100 FL (ref 78–100)
MCV RBC AUTO: 104 FL (ref 78–100)
MCV RBC AUTO: 108 FL (ref 78–100)
MCV RBC AUTO: 109 FL (ref 78–100)
MCV RBC AUTO: 109 FL (ref 78–100)
MCV RBC AUTO: 111 FL (ref 78–100)
MCV RBC AUTO: 112 FL (ref 78–100)
MCV RBC AUTO: 112 FL (ref 78–100)
MCV RBC AUTO: 113 FL (ref 78–100)
MCV RBC AUTO: 113 FL (ref 78–100)
MCV RBC AUTO: 114 FL (ref 78–100)
MCV RBC AUTO: 115 FL (ref 78–100)
MCV RBC AUTO: 89 FL (ref 78–100)
MCV RBC AUTO: 94 FL (ref 78–100)
MCV RBC AUTO: 96 FL (ref 78–100)
MCV RBC AUTO: 97 FL (ref 78–100)
MCV RBC AUTO: 97 FL (ref 78–100)
METAMYELOCYTES # BLD MANUAL: 0 10E3/UL
METAMYELOCYTES NFR BLD MANUAL: 0 %
METAMYELOCYTES NFR BLD MANUAL: 1 %
METAMYELOCYTES NFR BLD MANUAL: 1 %
METHODS: NORMAL
MONOCYTES # BLD AUTO: 0.1 10E3/UL (ref 0–1.3)
MONOCYTES # BLD MANUAL: 0 10E3/UL (ref 0–1.3)
MONOCYTES # BLD MANUAL: 0.1 10E3/UL (ref 0–1.3)
MONOCYTES # BLD MANUAL: 0.2 10E3/UL (ref 0–1.3)
MONOCYTES # BLD MANUAL: 0.4 10E3/UL (ref 0–1.3)
MONOCYTES # BLD MANUAL: 1.6 10E3/UL (ref 0–1.3)
MONOCYTES # BLD MANUAL: 4.1 10E3/UL (ref 0–1.3)
MONOCYTES NFR BLD AUTO: 3 %
MONOCYTES NFR BLD AUTO: 4 %
MONOCYTES NFR BLD AUTO: 5 %
MONOCYTES NFR BLD MANUAL: 0 %
MONOCYTES NFR BLD MANUAL: 1 %
MONOCYTES NFR BLD MANUAL: 2 %
MONOCYTES NFR BLD MANUAL: 2 %
MONOCYTES NFR BLD MANUAL: 3 %
MONOCYTES NFR BLD MANUAL: 3 %
MONOCYTES NFR BLD MANUAL: 4 %
MONOCYTES NFR BLD MANUAL: 6 %
MONOCYTES NFR BLD MANUAL: 7 %
MUCOUS THREADS #/AREA URNS LPF: PRESENT /LPF
MUCOUS THREADS #/AREA URNS LPF: PRESENT /LPF
NEUTROPHILS # BLD AUTO: 0.5 10E3/UL (ref 1.6–8.3)
NEUTROPHILS # BLD AUTO: 0.6 10E3/UL (ref 1.6–8.3)
NEUTROPHILS # BLD AUTO: 1.2 10E3/UL (ref 1.6–8.3)
NEUTROPHILS # BLD MANUAL: 0 10E3/UL (ref 1.6–8.3)
NEUTROPHILS # BLD MANUAL: 0.2 10E3/UL (ref 1.6–8.3)
NEUTROPHILS # BLD MANUAL: 0.3 10E3/UL (ref 1.6–8.3)
NEUTROPHILS # BLD MANUAL: 0.4 10E3/UL (ref 1.6–8.3)
NEUTROPHILS # BLD MANUAL: 0.5 10E3/UL (ref 1.6–8.3)
NEUTROPHILS # BLD MANUAL: 0.5 10E3/UL (ref 1.6–8.3)
NEUTROPHILS NFR BLD AUTO: 37 %
NEUTROPHILS NFR BLD AUTO: 41 %
NEUTROPHILS NFR BLD AUTO: 62 %
NEUTROPHILS NFR BLD MANUAL: 0 %
NEUTROPHILS NFR BLD MANUAL: 11 %
NEUTROPHILS NFR BLD MANUAL: 18 %
NEUTROPHILS NFR BLD MANUAL: 2 %
NEUTROPHILS NFR BLD MANUAL: 20 %
NEUTROPHILS NFR BLD MANUAL: 22 %
NEUTROPHILS NFR BLD MANUAL: 22 %
NEUTROPHILS NFR BLD MANUAL: 27 %
NEUTROPHILS NFR BLD MANUAL: 3 %
NEUTROPHILS NFR BLD MANUAL: 30 %
NEUTROPHILS NFR BLD MANUAL: 34 %
NEUTROPHILS NFR BLD MANUAL: 44 %
NEUTROPHILS NFR BLD MANUAL: 6 %
NITRATE UR QL: NEGATIVE
NRBC # BLD AUTO: 0 10E3/UL
NRBC # BLD AUTO: 0.1 10E3/UL
NRBC BLD AUTO-RTO: 0 /100
NRBC BLD MANUAL-RTO: 1 %
NRBC BLD MANUAL-RTO: 1 %
P AXIS - MUSE: 167 DEGREES
PATH REPORT.ADDENDUM SPEC: ABNORMAL
PATH REPORT.COMMENTS IMP SPEC: ABNORMAL
PATH REPORT.COMMENTS IMP SPEC: NORMAL
PATH REPORT.COMMENTS IMP SPEC: YES
PATH REPORT.COMMENTS IMP SPEC: YES
PATH REPORT.FINAL DX SPEC: ABNORMAL
PATH REPORT.FINAL DX SPEC: ABNORMAL
PATH REPORT.FINAL DX SPEC: NORMAL
PATH REPORT.GROSS SPEC: NORMAL
PATH REPORT.MICROSCOPIC SPEC OTHER STN: ABNORMAL
PATH REPORT.MICROSCOPIC SPEC OTHER STN: ABNORMAL
PATH REPORT.MICROSCOPIC SPEC OTHER STN: NORMAL
PATH REPORT.RELEVANT HX SPEC: ABNORMAL
PATH REPORT.RELEVANT HX SPEC: ABNORMAL
PATH REPORT.RELEVANT HX SPEC: NORMAL
PATH REPORT.RELEVANT HX SPEC: NORMAL
PATH REPORT.SUPPLEMENTAL REPORTS SPEC: ABNORMAL
PATH REV: ABNORMAL
PH UR STRIP: 5.5 [PH] (ref 5–7)
PH UR STRIP: 6 [PH] (ref 5–7)
PH UR STRIP: 7.5 [PH] (ref 5–7)
PLASMA CELLS # BLD MANUAL: 0.4 10E3/UL
PLASMA CELLS NFR BLD MANUAL: 1 %
PLAT MORPH BLD: ABNORMAL
PLATELET # BLD AUTO: 104 10E3/UL (ref 150–450)
PLATELET # BLD AUTO: 123 10E3/UL (ref 150–450)
PLATELET # BLD AUTO: 134 10E3/UL (ref 150–450)
PLATELET # BLD AUTO: 27 10E3/UL (ref 150–450)
PLATELET # BLD AUTO: 29 10E3/UL (ref 150–450)
PLATELET # BLD AUTO: 31 10E3/UL (ref 150–450)
PLATELET # BLD AUTO: 38 10E3/UL (ref 150–450)
PLATELET # BLD AUTO: 38 10E3/UL (ref 150–450)
PLATELET # BLD AUTO: 41 10E3/UL (ref 150–450)
PLATELET # BLD AUTO: 42 10E3/UL (ref 150–450)
PLATELET # BLD AUTO: 55 10E3/UL (ref 150–450)
PLATELET # BLD AUTO: 64 10E3/UL (ref 150–450)
PLATELET # BLD AUTO: 75 10E3/UL (ref 150–450)
PLATELET # BLD AUTO: 82 10E3/UL (ref 150–450)
PLATELET # BLD AUTO: 83 10E3/UL (ref 150–450)
PLATELET # BLD AUTO: 86 10E3/UL (ref 150–450)
PLATELET # BLD AUTO: 95 10E3/UL (ref 150–450)
POTASSIUM SERPL-SCNC: 3.9 MMOL/L (ref 3.4–5.3)
POTASSIUM SERPL-SCNC: 4 MMOL/L (ref 3.4–5.3)
POTASSIUM SERPL-SCNC: 4.3 MMOL/L (ref 3.4–5.3)
PR INTERVAL - MUSE: 210 MS
PROT SERPL-MCNC: 6.3 G/DL (ref 6.4–8.3)
PROT SERPL-MCNC: 6.4 G/DL (ref 6.4–8.3)
PROT SERPL-MCNC: 6.7 G/DL (ref 6.4–8.3)
QRS DURATION - MUSE: 90 MS
QT - MUSE: 356 MS
QTC - MUSE: 430 MS
R AXIS - MUSE: -28 DEGREES
RBC # BLD AUTO: 1.84 10E6/UL (ref 4.4–5.9)
RBC # BLD AUTO: 1.87 10E6/UL (ref 4.4–5.9)
RBC # BLD AUTO: 1.97 10E6/UL (ref 4.4–5.9)
RBC # BLD AUTO: 2.01 10E6/UL (ref 4.4–5.9)
RBC # BLD AUTO: 2.06 10E6/UL (ref 4.4–5.9)
RBC # BLD AUTO: 2.11 10E6/UL (ref 4.4–5.9)
RBC # BLD AUTO: 2.16 10E6/UL (ref 4.4–5.9)
RBC # BLD AUTO: 2.18 10E6/UL (ref 4.4–5.9)
RBC # BLD AUTO: 2.2 10E6/UL (ref 4.4–5.9)
RBC # BLD AUTO: 2.23 10E6/UL (ref 4.4–5.9)
RBC # BLD AUTO: 2.24 10E6/UL (ref 4.4–5.9)
RBC # BLD AUTO: 2.32 10E6/UL (ref 4.4–5.9)
RBC # BLD AUTO: 2.37 10E6/UL (ref 4.4–5.9)
RBC # BLD AUTO: 2.44 10E6/UL (ref 4.4–5.9)
RBC # BLD AUTO: 2.49 10E6/UL (ref 4.4–5.9)
RBC # BLD AUTO: 2.53 10E6/UL (ref 4.4–5.9)
RBC # BLD AUTO: 2.77 10E6/UL (ref 4.4–5.9)
RBC # BLD AUTO: 2.86 10E6/UL (ref 4.4–5.9)
RBC # BLD AUTO: 2.94 10E6/UL (ref 4.4–5.9)
RBC MORPH BLD: ABNORMAL
RBC URINE: 16 /HPF
RBC URINE: 3 /HPF
RETICS # AUTO: 0.02 10E6/UL (ref 0.03–0.1)
RETICS # AUTO: 0.04 10E6/UL (ref 0.03–0.1)
RETICS/RBC NFR AUTO: 1 % (ref 0.5–2)
RETICS/RBC NFR AUTO: 1.4 % (ref 0.5–2)
RSV RNA SPEC NAA+PROBE: NEGATIVE
RSV RNA SPEC NAA+PROBE: NEGATIVE
SARS-COV-2 RNA RESP QL NAA+PROBE: NEGATIVE
SARS-COV-2 RNA RESP QL NAA+PROBE: NEGATIVE
SIGNIFICANT RESULTS: NORMAL
SODIUM SERPL-SCNC: 133 MMOL/L (ref 136–145)
SODIUM SERPL-SCNC: 141 MMOL/L (ref 136–145)
SODIUM SERPL-SCNC: 141 MMOL/L (ref 136–145)
SP GR UR STRIP: 1.02 (ref 1–1.03)
SP GR UR STRIP: 1.02 (ref 1–1.03)
SP GR UR STRIP: >=1.03 (ref 1–1.03)
SPECIMEN DESCRIPTION: NORMAL
SPECIMEN DESCRIPTION: NORMAL
SPECIMEN EXPIRATION DATE: NORMAL
SQUAMOUS EPITHELIAL: 1 /HPF
SQUAMOUS EPITHELIAL: <1 /HPF
STOMATOCYTES BLD QL SMEAR: SLIGHT
SYSTOLIC BLOOD PRESSURE - MUSE: NORMAL MMHG
T AXIS - MUSE: 139 DEGREES
TEST DETAILS, MDL: NORMAL
TSH SERPL DL<=0.005 MIU/L-ACNC: 1.85 UIU/ML (ref 0.3–4.2)
UNIT ABO/RH: NORMAL
UNIT NUMBER: NORMAL
UNIT STATUS: NORMAL
UNIT TYPE ISBT: 9500
UROBILINOGEN UR STRIP-ACNC: 1 E.U./DL
UROBILINOGEN UR STRIP-MCNC: 4 MG/DL
UROBILINOGEN UR STRIP-MCNC: NORMAL MG/DL
VENTRICULAR RATE- MUSE: 88 BPM
VIT B12 SERPL-MCNC: 374 PG/ML (ref 232–1245)
WBC # BLD AUTO: 1.2 10E3/UL (ref 4–11)
WBC # BLD AUTO: 1.3 10E3/UL (ref 4–11)
WBC # BLD AUTO: 1.3 10E3/UL (ref 4–11)
WBC # BLD AUTO: 1.4 10E3/UL (ref 4–11)
WBC # BLD AUTO: 1.5 10E3/UL (ref 4–11)
WBC # BLD AUTO: 1.6 10E3/UL (ref 4–11)
WBC # BLD AUTO: 1.7 10E3/UL (ref 4–11)
WBC # BLD AUTO: 1.8 10E3/UL (ref 4–11)
WBC # BLD AUTO: 1.9 10E3/UL (ref 4–11)
WBC # BLD AUTO: 10.8 10E3/UL (ref 4–11)
WBC # BLD AUTO: 2.9 10E3/UL (ref 4–11)
WBC # BLD AUTO: 22.2 10E3/UL (ref 4–11)
WBC # BLD AUTO: 38.9 10E3/UL (ref 4–11)
WBC # BLD AUTO: 5.1 10E3/UL (ref 4–11)
WBC # BLD AUTO: 58.9 10E3/UL (ref 4–11)
WBC URINE: 1 /HPF
WBC URINE: 4 /HPF

## 2023-01-01 PROCEDURE — 96360 HYDRATION IV INFUSION INIT: CPT

## 2023-01-01 PROCEDURE — 85007 BL SMEAR W/DIFF WBC COUNT: CPT | Performed by: INTERNAL MEDICINE

## 2023-01-01 PROCEDURE — 96372 THER/PROPH/DIAG INJ SC/IM: CPT | Mod: XS | Performed by: INTERNAL MEDICINE

## 2023-01-01 PROCEDURE — 88237 TISSUE CULTURE BONE MARROW: CPT | Performed by: INTERNAL MEDICINE

## 2023-01-01 PROCEDURE — 36415 COLL VENOUS BLD VENIPUNCTURE: CPT | Performed by: INTERNAL MEDICINE

## 2023-01-01 PROCEDURE — 86850 RBC ANTIBODY SCREEN: CPT | Performed by: PHYSICIAN ASSISTANT

## 2023-01-01 PROCEDURE — 85025 COMPLETE CBC W/AUTO DIFF WBC: CPT | Performed by: INTERNAL MEDICINE

## 2023-01-01 PROCEDURE — 85097 BONE MARROW INTERPRETATION: CPT | Performed by: PATHOLOGY

## 2023-01-01 PROCEDURE — 36415 COLL VENOUS BLD VENIPUNCTURE: CPT

## 2023-01-01 PROCEDURE — 250N000011 HC RX IP 250 OP 636: Performed by: INTERNAL MEDICINE

## 2023-01-01 PROCEDURE — 96372 THER/PROPH/DIAG INJ SC/IM: CPT | Performed by: INTERNAL MEDICINE

## 2023-01-01 PROCEDURE — 88305 TISSUE EXAM BY PATHOLOGIST: CPT | Mod: 26 | Performed by: PATHOLOGY

## 2023-01-01 PROCEDURE — 85045 AUTOMATED RETICULOCYTE COUNT: CPT | Performed by: INTERNAL MEDICINE

## 2023-01-01 PROCEDURE — 82668 ASSAY OF ERYTHROPOIETIN: CPT | Performed by: INTERNAL MEDICINE

## 2023-01-01 PROCEDURE — 83615 LACTATE (LD) (LDH) ENZYME: CPT | Performed by: INTERNAL MEDICINE

## 2023-01-01 PROCEDURE — 99212 OFFICE O/P EST SF 10 MIN: CPT | Performed by: PHYSICIAN ASSISTANT

## 2023-01-01 PROCEDURE — 96376 TX/PRO/DX INJ SAME DRUG ADON: CPT

## 2023-01-01 PROCEDURE — P9016 RBC LEUKOCYTES REDUCED: HCPCS | Performed by: INTERNAL MEDICINE

## 2023-01-01 PROCEDURE — G0378 HOSPITAL OBSERVATION PER HR: HCPCS

## 2023-01-01 PROCEDURE — 88311 DECALCIFY TISSUE: CPT | Mod: 26 | Performed by: PATHOLOGY

## 2023-01-01 PROCEDURE — 99212 OFFICE O/P EST SF 10 MIN: CPT | Performed by: INTERNAL MEDICINE

## 2023-01-01 PROCEDURE — 88342 IMHCHEM/IMCYTCHM 1ST ANTB: CPT | Mod: 26 | Performed by: PATHOLOGY

## 2023-01-01 PROCEDURE — 85730 THROMBOPLASTIN TIME PARTIAL: CPT | Performed by: INTERNAL MEDICINE

## 2023-01-01 PROCEDURE — 99213 OFFICE O/P EST LOW 20 MIN: CPT | Performed by: PHYSICIAN ASSISTANT

## 2023-01-01 PROCEDURE — 36430 TRANSFUSION BLD/BLD COMPNT: CPT

## 2023-01-01 PROCEDURE — 99232 SBSQ HOSP IP/OBS MODERATE 35: CPT | Performed by: STUDENT IN AN ORGANIZED HEALTH CARE EDUCATION/TRAINING PROGRAM

## 2023-01-01 PROCEDURE — 52000 CYSTOURETHROSCOPY: CPT | Performed by: STUDENT IN AN ORGANIZED HEALTH CARE EDUCATION/TRAINING PROGRAM

## 2023-01-01 PROCEDURE — 99214 OFFICE O/P EST MOD 30 MIN: CPT | Performed by: INTERNAL MEDICINE

## 2023-01-01 PROCEDURE — 99213 OFFICE O/P EST LOW 20 MIN: CPT | Mod: 25 | Performed by: PHYSICIAN ASSISTANT

## 2023-01-01 PROCEDURE — C9803 HOPD COVID-19 SPEC COLLECT: HCPCS

## 2023-01-01 PROCEDURE — 70450 CT HEAD/BRAIN W/O DYE: CPT

## 2023-01-01 PROCEDURE — 87040 BLOOD CULTURE FOR BACTERIA: CPT | Performed by: PHYSICIAN ASSISTANT

## 2023-01-01 PROCEDURE — 258N000003 HC RX IP 258 OP 636: Performed by: EMERGENCY MEDICINE

## 2023-01-01 PROCEDURE — 85027 COMPLETE CBC AUTOMATED: CPT | Performed by: INTERNAL MEDICINE

## 2023-01-01 PROCEDURE — 88275 CYTOGENETICS 100-300: CPT | Performed by: INTERNAL MEDICINE

## 2023-01-01 PROCEDURE — 99497 ADVNCD CARE PLAN 30 MIN: CPT | Performed by: NURSE PRACTITIONER

## 2023-01-01 PROCEDURE — 36430 TRANSFUSION BLD/BLD COMPNT: CPT | Mod: XU

## 2023-01-01 PROCEDURE — 86850 RBC ANTIBODY SCREEN: CPT | Performed by: INTERNAL MEDICINE

## 2023-01-01 PROCEDURE — 99214 OFFICE O/P EST MOD 30 MIN: CPT | Performed by: PHYSICIAN ASSISTANT

## 2023-01-01 PROCEDURE — 85610 PROTHROMBIN TIME: CPT | Performed by: INTERNAL MEDICINE

## 2023-01-01 PROCEDURE — 86901 BLOOD TYPING SEROLOGIC RH(D): CPT | Performed by: EMERGENCY MEDICINE

## 2023-01-01 PROCEDURE — 99214 OFFICE O/P EST MOD 30 MIN: CPT | Mod: 25 | Performed by: PHYSICIAN ASSISTANT

## 2023-01-01 PROCEDURE — 86923 COMPATIBILITY TEST ELECTRIC: CPT | Performed by: PHYSICIAN ASSISTANT

## 2023-01-01 PROCEDURE — 85027 COMPLETE CBC AUTOMATED: CPT | Performed by: PHYSICIAN ASSISTANT

## 2023-01-01 PROCEDURE — 81003 URINALYSIS AUTO W/O SCOPE: CPT | Mod: QW | Performed by: STUDENT IN AN ORGANIZED HEALTH CARE EDUCATION/TRAINING PROGRAM

## 2023-01-01 PROCEDURE — 99215 OFFICE O/P EST HI 40 MIN: CPT | Performed by: INTERNAL MEDICINE

## 2023-01-01 PROCEDURE — 74178 CT ABD&PLV WO CNTR FLWD CNTR: CPT

## 2023-01-01 PROCEDURE — 84443 ASSAY THYROID STIM HORMONE: CPT | Performed by: INTERNAL MEDICINE

## 2023-01-01 PROCEDURE — 88311 DECALCIFY TISSUE: CPT | Mod: TC | Performed by: INTERNAL MEDICINE

## 2023-01-01 PROCEDURE — 99214 OFFICE O/P EST MOD 30 MIN: CPT | Mod: 95 | Performed by: INTERNAL MEDICINE

## 2023-01-01 PROCEDURE — 82607 VITAMIN B-12: CPT | Performed by: INTERNAL MEDICINE

## 2023-01-01 PROCEDURE — 71046 X-RAY EXAM CHEST 2 VIEWS: CPT

## 2023-01-01 PROCEDURE — 99254 IP/OBS CNSLTJ NEW/EST MOD 60: CPT | Performed by: INTERNAL MEDICINE

## 2023-01-01 PROCEDURE — 99212 OFFICE O/P EST SF 10 MIN: CPT | Performed by: STUDENT IN AN ORGANIZED HEALTH CARE EDUCATION/TRAINING PROGRAM

## 2023-01-01 PROCEDURE — 85025 COMPLETE CBC W/AUTO DIFF WBC: CPT

## 2023-01-01 PROCEDURE — 99204 OFFICE O/P NEW MOD 45 MIN: CPT | Performed by: INTERNAL MEDICINE

## 2023-01-01 PROCEDURE — 93005 ELECTROCARDIOGRAM TRACING: CPT

## 2023-01-01 PROCEDURE — 81450 HL NEO GSAP 5-50DNA/DNA&RNA: CPT | Performed by: INTERNAL MEDICINE

## 2023-01-01 PROCEDURE — 96361 HYDRATE IV INFUSION ADD-ON: CPT

## 2023-01-01 PROCEDURE — 258N000003 HC RX IP 258 OP 636: Performed by: PHYSICIAN ASSISTANT

## 2023-01-01 PROCEDURE — 99214 OFFICE O/P EST MOD 30 MIN: CPT | Mod: 25 | Performed by: NURSE PRACTITIONER

## 2023-01-01 PROCEDURE — 81001 URINALYSIS AUTO W/SCOPE: CPT | Performed by: EMERGENCY MEDICINE

## 2023-01-01 PROCEDURE — 85027 COMPLETE CBC AUTOMATED: CPT

## 2023-01-01 PROCEDURE — 85014 HEMATOCRIT: CPT | Performed by: PHYSICIAN ASSISTANT

## 2023-01-01 PROCEDURE — P9016 RBC LEUKOCYTES REDUCED: HCPCS | Performed by: PHYSICIAN ASSISTANT

## 2023-01-01 PROCEDURE — 86901 BLOOD TYPING SEROLOGIC RH(D): CPT | Performed by: INTERNAL MEDICINE

## 2023-01-01 PROCEDURE — 88305 TISSUE EXAM BY PATHOLOGIST: CPT | Mod: TC | Performed by: INTERNAL MEDICINE

## 2023-01-01 PROCEDURE — 85027 COMPLETE CBC AUTOMATED: CPT | Performed by: EMERGENCY MEDICINE

## 2023-01-01 PROCEDURE — 88341 IMHCHEM/IMCYTCHM EA ADD ANTB: CPT | Mod: 26 | Performed by: PATHOLOGY

## 2023-01-01 PROCEDURE — 86923 COMPATIBILITY TEST ELECTRIC: CPT | Performed by: INTERNAL MEDICINE

## 2023-01-01 PROCEDURE — 99213 OFFICE O/P EST LOW 20 MIN: CPT | Mod: 25 | Performed by: INTERNAL MEDICINE

## 2023-01-01 PROCEDURE — 86850 RBC ANTIBODY SCREEN: CPT | Performed by: EMERGENCY MEDICINE

## 2023-01-01 PROCEDURE — 38222 DX BONE MARROW BX & ASPIR: CPT

## 2023-01-01 PROCEDURE — 250N000011 HC RX IP 250 OP 636: Mod: JW | Performed by: INTERNAL MEDICINE

## 2023-01-01 PROCEDURE — 99417 PROLNG OP E/M EACH 15 MIN: CPT | Performed by: STUDENT IN AN ORGANIZED HEALTH CARE EDUCATION/TRAINING PROGRAM

## 2023-01-01 PROCEDURE — 88189 FLOWCYTOMETRY/READ 16 & >: CPT | Performed by: PATHOLOGY

## 2023-01-01 PROCEDURE — 250N000013 HC RX MED GY IP 250 OP 250 PS 637: Performed by: INTERNAL MEDICINE

## 2023-01-01 PROCEDURE — 250N000011 HC RX IP 250 OP 636: Performed by: PHYSICIAN ASSISTANT

## 2023-01-01 PROCEDURE — 83550 IRON BINDING TEST: CPT | Performed by: INTERNAL MEDICINE

## 2023-01-01 PROCEDURE — 83605 ASSAY OF LACTIC ACID: CPT | Performed by: EMERGENCY MEDICINE

## 2023-01-01 PROCEDURE — 85060 BLOOD SMEAR INTERPRETATION: CPT | Performed by: PATHOLOGY

## 2023-01-01 PROCEDURE — 81003 URINALYSIS AUTO W/O SCOPE: CPT | Performed by: PHYSICIAN ASSISTANT

## 2023-01-01 PROCEDURE — 85045 AUTOMATED RETICULOCYTE COUNT: CPT | Performed by: PHYSICIAN ASSISTANT

## 2023-01-01 PROCEDURE — 96374 THER/PROPH/DIAG INJ IV PUSH: CPT

## 2023-01-01 PROCEDURE — 85007 BL SMEAR W/DIFF WBC COUNT: CPT

## 2023-01-01 PROCEDURE — 38222 DX BONE MARROW BX & ASPIR: CPT | Performed by: PHYSICIAN ASSISTANT

## 2023-01-01 PROCEDURE — 88291 CYTO/MOLECULAR REPORT: CPT | Performed by: MEDICAL GENETICS

## 2023-01-01 PROCEDURE — 86880 COOMBS TEST DIRECT: CPT | Performed by: INTERNAL MEDICINE

## 2023-01-01 PROCEDURE — 83010 ASSAY OF HAPTOGLOBIN QUANT: CPT | Performed by: INTERNAL MEDICINE

## 2023-01-01 PROCEDURE — 83605 ASSAY OF LACTIC ACID: CPT | Performed by: PHYSICIAN ASSISTANT

## 2023-01-01 PROCEDURE — 87040 BLOOD CULTURE FOR BACTERIA: CPT | Performed by: EMERGENCY MEDICINE

## 2023-01-01 PROCEDURE — 99213 OFFICE O/P EST LOW 20 MIN: CPT | Mod: 25 | Performed by: STUDENT IN AN ORGANIZED HEALTH CARE EDUCATION/TRAINING PROGRAM

## 2023-01-01 PROCEDURE — 85007 BL SMEAR W/DIFF WBC COUNT: CPT | Performed by: EMERGENCY MEDICINE

## 2023-01-01 PROCEDURE — 36415 COLL VENOUS BLD VENIPUNCTURE: CPT | Performed by: EMERGENCY MEDICINE

## 2023-01-01 PROCEDURE — 85014 HEMATOCRIT: CPT | Performed by: INTERNAL MEDICINE

## 2023-01-01 PROCEDURE — 99285 EMERGENCY DEPT VISIT HI MDM: CPT | Mod: 25

## 2023-01-01 PROCEDURE — 80053 COMPREHEN METABOLIC PANEL: CPT | Performed by: EMERGENCY MEDICINE

## 2023-01-01 PROCEDURE — 85007 BL SMEAR W/DIFF WBC COUNT: CPT | Performed by: PHYSICIAN ASSISTANT

## 2023-01-01 PROCEDURE — 82746 ASSAY OF FOLIC ACID SERUM: CPT

## 2023-01-01 PROCEDURE — 250N000011 HC RX IP 250 OP 636: Performed by: EMERGENCY MEDICINE

## 2023-01-01 PROCEDURE — 99213 OFFICE O/P EST LOW 20 MIN: CPT | Performed by: INTERNAL MEDICINE

## 2023-01-01 PROCEDURE — G0452 MOLECULAR PATHOLOGY INTERPR: HCPCS | Mod: 26 | Performed by: STUDENT IN AN ORGANIZED HEALTH CARE EDUCATION/TRAINING PROGRAM

## 2023-01-01 PROCEDURE — 80053 COMPREHEN METABOLIC PANEL: CPT | Performed by: INTERNAL MEDICINE

## 2023-01-01 PROCEDURE — G0463 HOSPITAL OUTPT CLINIC VISIT: HCPCS | Mod: PN,TEL | Performed by: INTERNAL MEDICINE

## 2023-01-01 PROCEDURE — 88313 SPECIAL STAINS GROUP 2: CPT | Mod: 26 | Performed by: PATHOLOGY

## 2023-01-01 PROCEDURE — 85384 FIBRINOGEN ACTIVITY: CPT | Performed by: INTERNAL MEDICINE

## 2023-01-01 PROCEDURE — G0452 MOLECULAR PATHOLOGY INTERPR: HCPCS | Mod: 26 | Performed by: PATHOLOGY

## 2023-01-01 PROCEDURE — 99215 OFFICE O/P EST HI 40 MIN: CPT | Performed by: PHYSICIAN ASSISTANT

## 2023-01-01 PROCEDURE — 99205 OFFICE O/P NEW HI 60 MIN: CPT | Performed by: STUDENT IN AN ORGANIZED HEALTH CARE EDUCATION/TRAINING PROGRAM

## 2023-01-01 PROCEDURE — 87637 SARSCOV2&INF A&B&RSV AMP PRB: CPT | Performed by: PHYSICIAN ASSISTANT

## 2023-01-01 PROCEDURE — 82728 ASSAY OF FERRITIN: CPT | Performed by: INTERNAL MEDICINE

## 2023-01-01 PROCEDURE — 99223 1ST HOSP IP/OBS HIGH 75: CPT | Mod: AI | Performed by: INTERNAL MEDICINE

## 2023-01-01 PROCEDURE — 87637 SARSCOV2&INF A&B&RSV AMP PRB: CPT | Performed by: EMERGENCY MEDICINE

## 2023-01-01 PROCEDURE — 85610 PROTHROMBIN TIME: CPT | Performed by: EMERGENCY MEDICINE

## 2023-01-01 PROCEDURE — 99498 ADVNCD CARE PLAN ADDL 30 MIN: CPT | Performed by: NURSE PRACTITIONER

## 2023-01-01 PROCEDURE — 88185 FLOWCYTOMETRY/TC ADD-ON: CPT | Performed by: INTERNAL MEDICINE

## 2023-01-01 PROCEDURE — 80053 COMPREHEN METABOLIC PANEL: CPT | Performed by: PHYSICIAN ASSISTANT

## 2023-01-01 PROCEDURE — 88184 FLOWCYTOMETRY/ TC 1 MARKER: CPT | Performed by: PATHOLOGY

## 2023-01-01 RX ORDER — HEPARIN SODIUM,PORCINE 10 UNIT/ML
5 VIAL (ML) INTRAVENOUS
Status: CANCELLED | OUTPATIENT
Start: 2023-01-01

## 2023-01-01 RX ORDER — METOPROLOL SUCCINATE 25 MG/1
25 TABLET, EXTENDED RELEASE ORAL DAILY
Status: DISCONTINUED | OUTPATIENT
Start: 2023-01-01 | End: 2023-01-01 | Stop reason: HOSPADM

## 2023-01-01 RX ORDER — HEPARIN SODIUM (PORCINE) LOCK FLUSH IV SOLN 100 UNIT/ML 100 UNIT/ML
5 SOLUTION INTRAVENOUS
Status: CANCELLED | OUTPATIENT
Start: 2023-01-01

## 2023-01-01 RX ORDER — FLUCONAZOLE 200 MG/1
200 TABLET ORAL DAILY
Qty: 30 TABLET | Refills: 1 | Status: SHIPPED | OUTPATIENT
Start: 2023-01-01

## 2023-01-01 RX ORDER — ONDANSETRON 2 MG/ML
4 INJECTION INTRAMUSCULAR; INTRAVENOUS EVERY 6 HOURS PRN
Status: DISCONTINUED | OUTPATIENT
Start: 2023-01-01 | End: 2023-01-01 | Stop reason: HOSPADM

## 2023-01-01 RX ORDER — POLYETHYLENE GLYCOL 3350 17 G/17G
17 POWDER, FOR SOLUTION ORAL DAILY PRN
Status: DISCONTINUED | OUTPATIENT
Start: 2023-01-01 | End: 2023-01-01 | Stop reason: HOSPADM

## 2023-01-01 RX ORDER — CEFPODOXIME PROXETIL 200 MG/1
200 TABLET, FILM COATED ORAL 2 TIMES DAILY
Qty: 60 TABLET | Refills: 1 | Status: SHIPPED | OUTPATIENT
Start: 2023-01-01 | End: 2023-01-01

## 2023-01-01 RX ORDER — PANTOPRAZOLE SODIUM 40 MG/1
40 TABLET, DELAYED RELEASE ORAL DAILY
Qty: 90 TABLET | Refills: 1 | Status: SHIPPED | OUTPATIENT
Start: 2023-01-01 | End: 2023-01-01

## 2023-01-01 RX ORDER — GABAPENTIN 100 MG/1
200 CAPSULE ORAL
Qty: 60 CAPSULE | Refills: 1 | Status: SHIPPED | OUTPATIENT
Start: 2023-01-01 | End: 2023-01-01 | Stop reason: DRUGHIGH

## 2023-01-01 RX ORDER — POLYETHYLENE GLYCOL 3350 17 G/17G
1 POWDER, FOR SOLUTION ORAL DAILY PRN
COMMUNITY

## 2023-01-01 RX ORDER — LIDOCAINE HYDROCHLORIDE 20 MG/ML
JELLY TOPICAL ONCE
Status: COMPLETED | OUTPATIENT
Start: 2023-01-01 | End: 2023-01-01

## 2023-01-01 RX ORDER — ACYCLOVIR 400 MG/1
400 TABLET ORAL 2 TIMES DAILY
Qty: 60 TABLET | Refills: 1 | Status: SHIPPED | OUTPATIENT
Start: 2023-01-01 | End: 2023-01-01

## 2023-01-01 RX ORDER — FLUCONAZOLE 200 MG/1
200 TABLET ORAL DAILY
Qty: 30 TABLET | Refills: 1 | Status: SHIPPED | OUTPATIENT
Start: 2023-01-01 | End: 2023-01-01

## 2023-01-01 RX ORDER — CEFEPIME HYDROCHLORIDE 2 G/1
2 INJECTION, POWDER, FOR SOLUTION INTRAVENOUS ONCE
Status: COMPLETED | OUTPATIENT
Start: 2023-01-01 | End: 2023-01-01

## 2023-01-01 RX ORDER — OXYCODONE HYDROCHLORIDE 5 MG/1
5 TABLET ORAL EVERY 6 HOURS PRN
Qty: 12 TABLET | Refills: 0 | Status: SHIPPED | OUTPATIENT
Start: 2023-01-01 | End: 2023-01-01

## 2023-01-01 RX ORDER — CEFPODOXIME PROXETIL 200 MG/1
200 TABLET, FILM COATED ORAL 2 TIMES DAILY
Qty: 60 TABLET | Refills: 1 | Status: SHIPPED | OUTPATIENT
Start: 2023-01-01

## 2023-01-01 RX ORDER — CEFEPIME HYDROCHLORIDE 2 G/1
2 INJECTION, POWDER, FOR SOLUTION INTRAVENOUS EVERY 12 HOURS
Status: DISCONTINUED | OUTPATIENT
Start: 2023-01-01 | End: 2023-01-01 | Stop reason: HOSPADM

## 2023-01-01 RX ORDER — METOPROLOL SUCCINATE 50 MG/1
TABLET, EXTENDED RELEASE ORAL
Qty: 90 TABLET | Refills: 2 | Status: SHIPPED | OUTPATIENT
Start: 2023-01-01 | End: 2023-01-01

## 2023-01-01 RX ORDER — IOPAMIDOL 755 MG/ML
500 INJECTION, SOLUTION INTRAVASCULAR ONCE
Status: COMPLETED | OUTPATIENT
Start: 2023-01-01 | End: 2023-01-01

## 2023-01-01 RX ORDER — APIXABAN 5 MG/1
TABLET, FILM COATED ORAL
Qty: 180 TABLET | Refills: 3 | Status: SHIPPED | OUTPATIENT
Start: 2023-01-01 | End: 2023-01-01

## 2023-01-01 RX ORDER — ACYCLOVIR 400 MG/1
400 TABLET ORAL 2 TIMES DAILY
Qty: 60 TABLET | Refills: 1 | Status: SHIPPED | OUTPATIENT
Start: 2023-01-01

## 2023-01-01 RX ORDER — FOLIC ACID 1 MG/1
2 TABLET ORAL DAILY
Qty: 180 TABLET | Refills: 3 | Status: CANCELLED | OUTPATIENT
Start: 2023-01-01 | End: 2023-01-01

## 2023-01-01 RX ORDER — GABAPENTIN 100 MG/1
200 CAPSULE ORAL
Qty: 60 CAPSULE | Refills: 1 | Status: CANCELLED
Start: 2023-01-01

## 2023-01-01 RX ORDER — METOPROLOL SUCCINATE 25 MG/1
25 TABLET, EXTENDED RELEASE ORAL DAILY
Qty: 30 TABLET | Refills: 3 | Status: SHIPPED | OUTPATIENT
Start: 2023-01-01

## 2023-01-01 RX ORDER — ONDANSETRON 4 MG/1
4 TABLET, ORALLY DISINTEGRATING ORAL EVERY 6 HOURS PRN
Status: DISCONTINUED | OUTPATIENT
Start: 2023-01-01 | End: 2023-01-01 | Stop reason: HOSPADM

## 2023-01-01 RX ORDER — AMOXICILLIN 250 MG
1 CAPSULE ORAL 2 TIMES DAILY PRN
Status: DISCONTINUED | OUTPATIENT
Start: 2023-01-01 | End: 2023-01-01 | Stop reason: HOSPADM

## 2023-01-01 RX ORDER — TAMSULOSIN HYDROCHLORIDE 0.4 MG/1
0.8 CAPSULE ORAL DAILY
Status: DISCONTINUED | OUTPATIENT
Start: 2023-01-01 | End: 2023-01-01 | Stop reason: HOSPADM

## 2023-01-01 RX ORDER — HEPARIN SODIUM (PORCINE) LOCK FLUSH IV SOLN 100 UNIT/ML 100 UNIT/ML
5 SOLUTION INTRAVENOUS
Status: DISCONTINUED | OUTPATIENT
Start: 2023-01-01 | End: 2023-01-01 | Stop reason: HOSPADM

## 2023-01-01 RX ORDER — ONDANSETRON 4 MG/1
4 TABLET, ORALLY DISINTEGRATING ORAL EVERY 6 HOURS PRN
Qty: 30 TABLET | Refills: 0 | Status: SHIPPED | OUTPATIENT
Start: 2023-01-01

## 2023-01-01 RX ORDER — AMOXICILLIN 250 MG
2 CAPSULE ORAL 2 TIMES DAILY PRN
Status: DISCONTINUED | OUTPATIENT
Start: 2023-01-01 | End: 2023-01-01 | Stop reason: HOSPADM

## 2023-01-01 RX ORDER — OMEPRAZOLE 20 MG/1
20 TABLET, DELAYED RELEASE ORAL DAILY PRN
COMMUNITY

## 2023-01-01 RX ORDER — GABAPENTIN 100 MG/1
100 CAPSULE ORAL
COMMUNITY

## 2023-01-01 RX ADMIN — ROMIPLOSTIM 60 MCG: 125 INJECTION, POWDER, LYOPHILIZED, FOR SOLUTION SUBCUTANEOUS at 10:15

## 2023-01-01 RX ADMIN — LIDOCAINE HYDROCHLORIDE 5 ML: 20 JELLY TOPICAL at 15:04

## 2023-01-01 RX ADMIN — SODIUM CHLORIDE 500 ML: 9 INJECTION, SOLUTION INTRAVENOUS at 10:51

## 2023-01-01 RX ADMIN — SODIUM CHLORIDE 1000 ML: 9 INJECTION, SOLUTION INTRAVENOUS at 10:05

## 2023-01-01 RX ADMIN — ROMIPLOSTIM 185 MCG: 250 INJECTION, POWDER, LYOPHILIZED, FOR SOLUTION SUBCUTANEOUS at 10:12

## 2023-01-01 RX ADMIN — ROMIPLOSTIM 125 MCG: 125 INJECTION, POWDER, LYOPHILIZED, FOR SOLUTION SUBCUTANEOUS at 11:56

## 2023-01-01 RX ADMIN — SODIUM CHLORIDE 1000 ML: 9 INJECTION, SOLUTION INTRAVENOUS at 19:48

## 2023-01-01 RX ADMIN — TAMSULOSIN HYDROCHLORIDE 0.8 MG: 0.4 CAPSULE ORAL at 08:24

## 2023-01-01 RX ADMIN — SODIUM CHLORIDE 60 ML: 9 INJECTION, SOLUTION INTRAVENOUS at 13:06

## 2023-01-01 RX ADMIN — ROMIPLOSTIM 60 MCG: 125 INJECTION, POWDER, LYOPHILIZED, FOR SOLUTION SUBCUTANEOUS at 13:39

## 2023-01-01 RX ADMIN — CEFEPIME 2 G: 2 INJECTION, POWDER, FOR SOLUTION INTRAVENOUS at 08:24

## 2023-01-01 RX ADMIN — ROMIPLOSTIM 250 MCG: 250 INJECTION, POWDER, LYOPHILIZED, FOR SOLUTION SUBCUTANEOUS at 09:35

## 2023-01-01 RX ADMIN — ROMIPLOSTIM 370 MCG: 125 INJECTION, POWDER, LYOPHILIZED, FOR SOLUTION SUBCUTANEOUS at 10:28

## 2023-01-01 RX ADMIN — ROMIPLOSTIM 310 MCG: 250 INJECTION, POWDER, LYOPHILIZED, FOR SOLUTION SUBCUTANEOUS at 14:16

## 2023-01-01 RX ADMIN — IOPAMIDOL 72 ML: 755 INJECTION, SOLUTION INTRAVENOUS at 13:06

## 2023-01-01 RX ADMIN — CEFEPIME 2 G: 2 INJECTION, POWDER, FOR SOLUTION INTRAVENOUS at 21:48

## 2023-01-01 RX ADMIN — METOPROLOL SUCCINATE 25 MG: 25 TABLET, EXTENDED RELEASE ORAL at 08:24

## 2023-01-01 ASSESSMENT — ACTIVITIES OF DAILY LIVING (ADL)
ADLS_ACUITY_SCORE: 37
ADLS_ACUITY_SCORE: 37
ADLS_ACUITY_SCORE: 35
ADLS_ACUITY_SCORE: 37
ADLS_ACUITY_SCORE: 35
ADLS_ACUITY_SCORE: 37
ADLS_ACUITY_SCORE: 35
ADLS_ACUITY_SCORE: 37

## 2023-01-01 ASSESSMENT — PAIN SCALES - GENERAL
PAINLEVEL: NO PAIN (0)

## 2023-01-02 NOTE — PROGRESS NOTES
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 PMH is significant for Afib on eliquis, mitral prolapse with MR, renal calculus.     On 12/6/22, WBC 2.1, ANC 1.5, Hb 9.6, , PLT 118K. He has had chronic, mild thrombocytopenia since 2015. Neutropenia and anemia have been new over this last year.     He is noted to be followed by urology for gross hematuria. CT urogram/cystoscopy pending.    He underwent EGD and colonoscopy 12/2022. EGD was normal with erosive gastropathy without bleed. Z-line was irregular. He had grade C reflux esophagitis with bleeding. Colon was normal with a 2 mm polyp removed from the ascending colon.     He has 10-15 lb weight loss. He has good appetite. No LAD. No drenching night sweats, unexplained fever.     He is a retired psyciatrist, but continues to publish. He served in the  and was stationed in [place] following the [place] War. He then worked at [employer], [employer], and the [employer].     Patient lives at home alone and is able to perform all ADLs without issue. He has six children who are doing well. His twins are in gastroenterology and palliative care. His eldest son suffers from chronic alcoholism.        REVIEW OF SYSTEMS:   A 14 point ROS was reviewed with pertinent positives and negatives in the HPI.        HOME MEDICATIONS:  Current Outpatient Medications   Medication Sig Dispense Refill     apixaban ANTICOAGULANT (ELIQUIS) 5 MG tablet Take 1 tablet (5 mg) by mouth 2 times daily 180 tablet 3     fluticasone (FLONASE) 50 MCG/ACT nasal spray INSTILL 1 SPRAY INTO BOTH NOSTRILS DAILY 48 mL 3     gabapentin (NEURONTIN) 100 MG capsule Take 2 capsules (200 mg) by mouth nightly as needed for other (insomnia) 60 capsule 1      methylcellulose (CITRUCEL) 500 MG TABS tablet Take 500 mg by mouth daily       metoprolol succinate ER (TOPROL-XL) 50 MG 24 hr tablet Take 1 tablet (50 mg) by mouth daily 90 tablet 3     Multiple Vitamins-Minerals (MULTIVITAL) TABS Take  by mouth daily.       pantoprazole (PROTONIX) 40 MG EC tablet Take 40 mg by mouth daily       polyethylene glycol (MIRALAX) 17 GM/Dose powder Take 17 g by mouth daily 510 g 0     tamsulosin (FLOMAX) 0.4 MG capsule Take 2 capsules (0.8 mg) by mouth daily 180 capsule 3         ALLERGIES:  Allergies   Allergen Reactions     Ciprofloxacin      Acute confusion     Sulfamethizole Unknown     Confusion         PAST MEDICAL HISTORY:  Past Medical History:   Diagnosis Date     Arrhythmia      Atrial fibrillation      Calculus of kidney      Mitral prolapse     mild, with mild MR     Prostate infection      STD (sexually transmitted disease)          PAST SURGICAL HISTORY:  Past Surgical History:   Procedure Laterality Date     ARTHROSCOPY SHOULDER ROTATOR CUFF REPAIR Right 1996     BACK SURGERY       COLONOSCOPY N/A 12/01/2022    Procedure: COLONOSCOPY;  Surgeon: Karthik Mcmanus MD;  Location: [facility] OR     CYSTOSCOPY       CYSTOSCOPY, TRANSURETHRAL RESECTION (TUR) PROSTATE, COMBINED  04/1993    S/P TURP     ESOPHAGOSCOPY, GASTROSCOPY, DUODENOSCOPY (EGD), COMBINED N/A 12/01/2022    Procedure: ESOPHAGOGASTRODUODENOSCOPY;  Surgeon: Karthik Mcmanus MD;  Location: [facility] OR     PROSTATE SURGERY           SOCIAL HISTORY:  Social History     Socioeconomic History     Marital status:      Spouse name: Not on file     Number of children: Not on file     Years of education: Not on file     Highest education level: Not on file   Occupational History     Not on file   Tobacco Use     Smoking status: Never     Smokeless tobacco: Never   Vaping Use     Vaping Use: Never used   Substance and Sexual Activity     Alcohol use: Yes     Comment: very rare if at all     Drug use: No      "Sexual activity: Not Currently   Other Topics Concern     Parent/sibling w/ CABG, MI or angioplasty before 65F 55M? Not Asked   Social History Narrative     Not on file     Social Determinants of Health     Financial Resource Strain: Not on file   Food Insecurity: Not on file   Transportation Needs: Not on file   Physical Activity: Not on file   Stress: Not on file   Social Connections: Not on file   Intimate Partner Violence: Not on file   Housing Stability: Not on file         FAMILY HISTORY:  Family History   Problem Relation Age of Onset     Coronary Artery Disease Father      No Known Problems Mother          PHYSICAL EXAM:  Vital signs:  /54 (Cuff Size: Adult Regular)   Pulse 68   Temp 97  F (36.1  C) (Tympanic)   Resp 16   Ht 1.778 m (5' 10\")   Wt 64.9 kg (143 lb)   SpO2 99%   BMI 20.52 kg/m     ECO  GENERAL/CONSTITUTIONAL: No acute distress. Thin. Appears stated age.  EYES: Pupils are equal, round, and react to light and accommodation. Extraocular movements intact.  No scleral icterus.  ENT/MOUTH: Neck supple. Oropharynx clear, no mucositis.  LYMPH: No anterior cervical, posterior cervical, supraclavicular, axillary or inguinal adenopathy.   RESPIRATORY: Clear to auscultation bilaterally. No crackles or wheezing.   CARDIOVASCULAR: Regular rate and rhythm. +2/6 holosystolic murmur.   GASTROINTESTINAL: No hepatosplenomegaly, masses, or tenderness. The patient has normal bowel sounds. No guarding.  No distention.  MUSCULOSKELETAL: Warm and well-perfused, no cyanosis, clubbing, or edema.  NEUROLOGIC: Cranial nerves II-XII are intact. Alert, oriented, answers questions appropriately.  INTEGUMENTARY: No rashes or jaundice.  GAIT: Steady, does not use assistive device.      LABS:   Latest Reference Range & Units 22 14:44   WBC 4.0 - 11.0 10e3/uL 2.1 (L)   Hemoglobin 13.3 - 17.7 g/dL 9.6 (L)   Hematocrit 40.0 - 53.0 % 31.1 (L)   Platelet Count 150 - 450 10e3/uL 118 (L)   RBC Count 4.40 - 5.90 " 10e6/uL 2.88 (L)   MCV 78 - 100 fL 108 (H)   MCH 26.5 - 33.0 pg 33.3 (H)   MCHC 31.5 - 36.5 g/dL 30.9 (L)   RDW 10.0 - 15.0 % 17.8 (H)   % Neutrophils % 55   % Lymphocytes % 36   % Monocytes % 4   % Eosinophils % 3   % Basophils % 2   Absolute Basophils 0.0 - 0.2 10e3/uL 0.0   Absolute Eosinophils 0.0 - 0.7 10e3/uL 0.1   Absolute Immature Granulocytes <=0.4 10e3/uL 0.0   Absolute Lymphocytes 0.8 - 5.3 10e3/uL 0.8   Absolute Monocytes 0.0 - 1.3 10e3/uL 0.1   % Immature Granulocytes % 0   Absolute Neutrophils 1.6 - 8.3 10e3/uL 1.2 (L)   Absolute NRBCs 10e3/uL 0.0   NRBCs per 100 WBC <1 /100 0       Recent Labs   Lab Test 11/23/22  1411 09/15/22  1458 07/12/22  1407     --  139   POTASSIUM 4.4  --  3.7   CHLORIDE 102  --  106   CO2 22  --  27   ANIONGAP 13  --  6   GLC 87  --  90   BUN 19.1  --  13   CR 0.93 1.0 0.80   RACHANA 8.8  --  8.8         PATHOLOGY:  Case Report   Surgical Pathology Report                         Case: [id number]                                   Authorizing Provider:  Karthik Mcmanus         Collected:           12/01/2022 10:08 AM                                  MD Manny                                                                   Ordering Location:     [facility]   Received:            12/01/2022 10:47 AM                                  [facility] OR                                                                       Pathologist:           Barbara Hawkins MD                                                            Specimens:   A) - Stomach, RANDOM STOMACH BIOPSY - GASTRITIS                                                      B) - Large Intestine, Colon, RANDOM COLON BIOPSIES                                                   C) - Large Intestine, Colon, COLON ASCENDING POLYP                                                   D) - Large Intestine, Colon, COLOON TRANSVERSE POLYP                                       Addendum   This addendum is issued to include  findings of Helicobacter pylori immunoperoxidase stain performed on gastric biopsy (specimen A) for further evaluation, using appropriate controls.  The H. pylori stain is negative.  All previously reported findings remain unchanged.   Addendum electronically signed by Barbara Hawkins MD on 12/9/2022 at 12:05 AM   Final Diagnosis   A.  Stomach, biopsy-  - Mild chronic gastritis.  - Negative for active gastritis, intestinal metaplasia, gastric epithelial dysplasia, or malignancy.  - Pending Helicobacter pylori stain (see forthcoming addendum for results).  - Sampling includes: Gastric antrum and fundus/body-type mucosa.     B.  Colon, random biopsies:  - Negative for colitis, dysplasia, and malignancy.     C.  Ascending colon, polyp, polypectomy:  - Consistent with diminutive tubular adenoma.  - Polyp size: 2 mm (per endoscopy report).  - Negative for high-grade dysplasia and malignancy.  - Complete resection and retrieval (per endoscopy report).     D.  Transverse colon, polyp, polypectomy:  - Consistent with inflammatory-type benign mucosal polyp (predominantly denuded mucosal surface precludes accurate assessment).  - Polyp size: 8 mm (per endoscopy report).  - Negative for dysplasia and malignancy.             IMAGING:  CT A/P 9/15/22:  IMPRESSION:   1.  No acute abnormality identified.  2.  Heterogeneous prostate is noted. Correlate with any evidence for  underlying prostate disease.  3.  A few nonobstructing stones within the left kidney. No  hydronephrosis.  4.  A tiny hypodense nodule at the pancreas head is identified and is  indeterminant. This may have been faintly present on the older CT from  2/25/2015. Suggest correlation with the pancreas MRI.      MRCP 10/29/22:  IMPRESSION:  No pancreatic abnormalities are identified. The previously noted small hypodense pancreatic head lesion is not visualized on today's MRI study.        ASSESSMENT/PLAN:  Zbigniew Mendoza is an 88 year old male who is referred for  pancytopenia. PMH is significant for Afib on eliquis, mitral prolapse with MR, renal calculus.     1) Chronic, intermittent thrombocytopenia with new onset macrocytic anemia and neutropenia over the latter part of 2022  -I reviewed the patient's medical history, medication list, and laboratory findings.   -Currently, ANC is 1.2, Hb 9.6, , and .   -He underwent EGD and colonoscopy 12/2022. EGD was normal with erosive gastropathy without bleed. Z-line was irregular. He had grade C reflux esophagitis with bleeding. Colon was normal with a 2 mm polyp removed from the ascending colon.   -He is undergoing workup with urology for gross hematuria (see below).  -He has not been started on any new recent medications nor does have concerning symptoms for underlying infection. I reviewed the possibilities of nutritional deficiency, but given his age, there is a possibility of an underlying marrow disorder such as myelodysplastic syndrome.   -Will check CBC, CMP, coag studies, JOSE C, LDH, hapto, iron studies, B12, folate, TSH, EPO, and peripheral smear.   -I reviewed the possibility of a bone marrow biopsy, but patient would prefer to hold on this until following his workup for gross hematuria.     2) Gross hematuria  -Followed by urology and soon to have CT urogram on 1/10 and cystoscopy on 1/30.    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 Follow up next month after workup above and to discuss possible bone marrow biopsy.         Jennifer Morrissey DO  Hematology/Oncology  [facility]

## 2023-01-05 NOTE — NURSING NOTE
"Oncology Rooming Note    January 5, 2023 9:59 AM   Zbigniew Mendoza is a 88 year old male who presents for:    Chief Complaint   Patient presents with     Oncology Clinic Visit     NEW PATIENT       Initial Vitals: /54 (Cuff Size: Adult Regular)   Pulse 68   Temp 97  F (36.1  C) (Tympanic)   Resp 16   Ht 1.778 m (5' 10\")   Wt 64.9 kg (143 lb)   SpO2 99%   BMI 20.52 kg/m   Estimated body mass index is 20.52 kg/m  as calculated from the following:    Height as of this encounter: 1.778 m (5' 10\").    Weight as of this encounter: 64.9 kg (143 lb). Body surface area is 1.79 meters squared.  No Pain (0) Comment: Data Unavailable   No LMP for male patient.  Allergies reviewed: Yes  Medications reviewed: Yes    Medications: Medication refills not needed today.  Pharmacy name entered into [facility]: [id number] - [place], [place] - [address]    Clinical concerns: new patient- Pancytopenia         Lore Dale, JEAN PIERRE              "

## 2023-01-05 NOTE — PROGRESS NOTES
Medical Assistant Note:  Zbigniew Mendoza presents today for blood draw.    Patient seen by provider today: Yes: Dr Morrissey.   present during visit today: Not Applicable.    Concerns: No Concerns.    Procedure:  Lab draw site: rt antecub, Needle type: butterfly, Gauge: 23.    Post Assessment:  Labs drawn without difficulty: Yes.    Discharge Plan:  Departure Mode: Ambulatory.    Face to Face Time: 10 mins.    Lore Dale CMA

## 2023-01-05 NOTE — LETTER
1/5/2023         RE: Zbigniew Mendoza  [address]  [facility] [address]        Dear Colleague,    Thank you for referring your patient, Zbigniew Mendoza, to the [facility]. Please see a copy of my visit note below.    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 PMH is significant for Afib on eliquis, mitral prolapse with MR, renal calculus.     On 12/6/22, WBC 2.1, ANC 1.5, Hb 9.6, , PLT 118K. He has had chronic, mild thrombocytopenia since 2015. Neutropenia and anemia have been new over this last year.     He is noted to be followed by urology for gross hematuria. CT urogram/cystoscopy pending.    He underwent EGD and colonoscopy 12/2022. EGD was normal with erosive gastropathy without bleed. Z-line was irregular. He had grade C reflux esophagitis with bleeding. Colon was normal with a 2 mm polyp removed from the ascending colon.     He has 10-15 lb weight loss. He has good appetite. No LAD. No drenching night sweats, unexplained fever.     He is a retired psyciatrist, but continues to publish. He served in the  and was stationed in [place] following the [place] War. He then worked at [employer], [employer], and the [employer].     Patient lives at home alone and is able to perform all ADLs without issue. He has six children who are doing well. His twins are in gastroenterology and palliative care. His eldest son suffers from chronic alcoholism.        REVIEW OF SYSTEMS:   A 14 point ROS was reviewed with pertinent positives and negatives in the HPI.        HOME MEDICATIONS:  Current Outpatient Medications   Medication Sig Dispense Refill     apixaban ANTICOAGULANT (ELIQUIS) 5 MG tablet Take 1 tablet (5 mg) by mouth 2 times  daily 180 tablet 3     fluticasone (FLONASE) 50 MCG/ACT nasal spray INSTILL 1 SPRAY INTO BOTH NOSTRILS DAILY 48 mL 3     gabapentin (NEURONTIN) 100 MG capsule Take 2 capsules (200 mg) by mouth nightly as needed for other (insomnia) 60 capsule 1     methylcellulose (CITRUCEL) 500 MG TABS tablet Take 500 mg by mouth daily       metoprolol succinate ER (TOPROL-XL) 50 MG 24 hr tablet Take 1 tablet (50 mg) by mouth daily 90 tablet 3     Multiple Vitamins-Minerals (MULTIVITAL) TABS Take  by mouth daily.       pantoprazole (PROTONIX) 40 MG EC tablet Take 40 mg by mouth daily       polyethylene glycol (MIRALAX) 17 GM/Dose powder Take 17 g by mouth daily 510 g 0     tamsulosin (FLOMAX) 0.4 MG capsule Take 2 capsules (0.8 mg) by mouth daily 180 capsule 3         ALLERGIES:  Allergies   Allergen Reactions     Ciprofloxacin      Acute confusion     Sulfamethizole Unknown     Confusion         PAST MEDICAL HISTORY:  Past Medical History:   Diagnosis Date     Arrhythmia      Atrial fibrillation      Calculus of kidney      Mitral prolapse     mild, with mild MR     Prostate infection      STD (sexually transmitted disease)          PAST SURGICAL HISTORY:  Past Surgical History:   Procedure Laterality Date     ARTHROSCOPY SHOULDER ROTATOR CUFF REPAIR Right 1996     BACK SURGERY       COLONOSCOPY N/A 12/01/2022    Procedure: COLONOSCOPY;  Surgeon: Karthik Mcmanus MD;  Location: [facility] OR     CYSTOSCOPY       CYSTOSCOPY, TRANSURETHRAL RESECTION (TUR) PROSTATE, COMBINED  04/1993    S/P TURP     ESOPHAGOSCOPY, GASTROSCOPY, DUODENOSCOPY (EGD), COMBINED N/A 12/01/2022    Procedure: ESOPHAGOGASTRODUODENOSCOPY;  Surgeon: Karthik Mcmanus MD;  Location: [facility] OR     PROSTATE SURGERY           SOCIAL HISTORY:  Social History     Socioeconomic History     Marital status:      Spouse name: Not on file     Number of children: Not on file     Years of education: Not on file     Highest education level: Not on file  "  Occupational History     Not on file   Tobacco Use     Smoking status: Never     Smokeless tobacco: Never   Vaping Use     Vaping Use: Never used   Substance and Sexual Activity     Alcohol use: Yes     Comment: very rare if at all     Drug use: No     Sexual activity: Not Currently   Other Topics Concern     Parent/sibling w/ CABG, MI or angioplasty before 65F 55M? Not Asked   Social History Narrative     Not on file     Social Determinants of Health     Financial Resource Strain: Not on file   Food Insecurity: Not on file   Transportation Needs: Not on file   Physical Activity: Not on file   Stress: Not on file   Social Connections: Not on file   Intimate Partner Violence: Not on file   Housing Stability: Not on file         FAMILY HISTORY:  Family History   Problem Relation Age of Onset     Coronary Artery Disease Father      No Known Problems Mother          PHYSICAL EXAM:  Vital signs:  /54 (Cuff Size: Adult Regular)   Pulse 68   Temp 97  F (36.1  C) (Tympanic)   Resp 16   Ht 1.778 m (5' 10\")   Wt 64.9 kg (143 lb)   SpO2 99%   BMI 20.52 kg/m     ECO  GENERAL/CONSTITUTIONAL: No acute distress. Thin. Appears stated age.  EYES: Pupils are equal, round, and react to light and accommodation. Extraocular movements intact.  No scleral icterus.  ENT/MOUTH: Neck supple. Oropharynx clear, no mucositis.  LYMPH: No anterior cervical, posterior cervical, supraclavicular, axillary or inguinal adenopathy.   RESPIRATORY: Clear to auscultation bilaterally. No crackles or wheezing.   CARDIOVASCULAR: Regular rate and rhythm. +2/6 holosystolic murmur.   GASTROINTESTINAL: No hepatosplenomegaly, masses, or tenderness. The patient has normal bowel sounds. No guarding.  No distention.  MUSCULOSKELETAL: Warm and well-perfused, no cyanosis, clubbing, or edema.  NEUROLOGIC: Cranial nerves II-XII are intact. Alert, oriented, answers questions appropriately.  INTEGUMENTARY: No rashes or jaundice.  GAIT: Steady, does not " use assistive device.      LABS:   Latest Reference Range & Units 12/06/22 14:44   WBC 4.0 - 11.0 10e3/uL 2.1 (L)   Hemoglobin 13.3 - 17.7 g/dL 9.6 (L)   Hematocrit 40.0 - 53.0 % 31.1 (L)   Platelet Count 150 - 450 10e3/uL 118 (L)   RBC Count 4.40 - 5.90 10e6/uL 2.88 (L)   MCV 78 - 100 fL 108 (H)   MCH 26.5 - 33.0 pg 33.3 (H)   MCHC 31.5 - 36.5 g/dL 30.9 (L)   RDW 10.0 - 15.0 % 17.8 (H)   % Neutrophils % 55   % Lymphocytes % 36   % Monocytes % 4   % Eosinophils % 3   % Basophils % 2   Absolute Basophils 0.0 - 0.2 10e3/uL 0.0   Absolute Eosinophils 0.0 - 0.7 10e3/uL 0.1   Absolute Immature Granulocytes <=0.4 10e3/uL 0.0   Absolute Lymphocytes 0.8 - 5.3 10e3/uL 0.8   Absolute Monocytes 0.0 - 1.3 10e3/uL 0.1   % Immature Granulocytes % 0   Absolute Neutrophils 1.6 - 8.3 10e3/uL 1.2 (L)   Absolute NRBCs 10e3/uL 0.0   NRBCs per 100 WBC <1 /100 0       Recent Labs   Lab Test 11/23/22  1411 09/15/22  1458 07/12/22  1407     --  139   POTASSIUM 4.4  --  3.7   CHLORIDE 102  --  106   CO2 22  --  27   ANIONGAP 13  --  6   GLC 87  --  90   BUN 19.1  --  13   CR 0.93 1.0 0.80   RACHANA 8.8  --  8.8         PATHOLOGY:  Case Report   Surgical Pathology Report                         Case: [id number]                                   Authorizing Provider:  Karthik Mcmanus         Collected:           12/01/2022 10:08 AM                                  MD Manny                                                                   Ordering Location:     [facility]   Received:            12/01/2022 10:47 AM                                  [facility] OR                                                                       Pathologist:           Barbara Hawkins MD                                                            Specimens:   A) - Stomach, RANDOM STOMACH BIOPSY - GASTRITIS                                                      B) - Large Intestine, Colon, RANDOM COLON BIOPSIES                                                    C) - Large Intestine, Colon, COLON ASCENDING POLYP                                                   D) - Large Intestine, Colon, COLOON TRANSVERSE POLYP                                       Addendum   This addendum is issued to include findings of Helicobacter pylori immunoperoxidase stain performed on gastric biopsy (specimen A) for further evaluation, using appropriate controls.  The H. pylori stain is negative.  All previously reported findings remain unchanged.   Addendum electronically signed by Barbara Hawkins MD on 12/9/2022 at 12:05 AM   Final Diagnosis   A.  Stomach, biopsy-  - Mild chronic gastritis.  - Negative for active gastritis, intestinal metaplasia, gastric epithelial dysplasia, or malignancy.  - Pending Helicobacter pylori stain (see forthcoming addendum for results).  - Sampling includes: Gastric antrum and fundus/body-type mucosa.     B.  Colon, random biopsies:  - Negative for colitis, dysplasia, and malignancy.     C.  Ascending colon, polyp, polypectomy:  - Consistent with diminutive tubular adenoma.  - Polyp size: 2 mm (per endoscopy report).  - Negative for high-grade dysplasia and malignancy.  - Complete resection and retrieval (per endoscopy report).     D.  Transverse colon, polyp, polypectomy:  - Consistent with inflammatory-type benign mucosal polyp (predominantly denuded mucosal surface precludes accurate assessment).  - Polyp size: 8 mm (per endoscopy report).  - Negative for dysplasia and malignancy.             IMAGING:  CT A/P 9/15/22:  IMPRESSION:   1.  No acute abnormality identified.  2.  Heterogeneous prostate is noted. Correlate with any evidence for  underlying prostate disease.  3.  A few nonobstructing stones within the left kidney. No  hydronephrosis.  4.  A tiny hypodense nodule at the pancreas head is identified and is  indeterminant. This may have been faintly present on the older CT from  2/25/2015. Suggest correlation with the pancreas MRI.      MRCP  10/29/22:  IMPRESSION:  No pancreatic abnormalities are identified. The previously noted small hypodense pancreatic head lesion is not visualized on today's MRI study.        ASSESSMENT/PLAN:  Zbigniew Mendoza is an 88 year old male who is referred for pancytopenia. PMH is significant for Afib on eliquis, mitral prolapse with MR, renal calculus.     1) Chronic, intermittent thrombocytopenia with new onset macrocytic anemia and neutropenia over the latter part of 2022  -I reviewed the patient's medical history, medication list, and laboratory findings.   -Currently, ANC is 1.2, Hb 9.6, , and .   -He underwent EGD and colonoscopy 12/2022. EGD was normal with erosive gastropathy without bleed. Z-line was irregular. He had grade C reflux esophagitis with bleeding. Colon was normal with a 2 mm polyp removed from the ascending colon.   -He is undergoing workup with urology for gross hematuria (see below).  -He has not been started on any new recent medications nor does have concerning symptoms for underlying infection. I reviewed the possibilities of nutritional deficiency, but given his age, there is a possibility of an underlying marrow disorder such as myelodysplastic syndrome.   -Will check CBC, CMP, coag studies, JOSE C, LDH, hapto, iron studies, B12, folate, TSH, EPO, and peripheral smear.   -I reviewed the possibility of a bone marrow biopsy, but patient would prefer to hold on this until following his workup for gross hematuria.     2) Gross hematuria  -Followed by urology and soon to have CT urogram on 1/10 and cystoscopy on 1/30.    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 Follow up next month after workup above and to discuss possible bone marrow biopsy.         Jennifer Morrissey, DO  Hematology/Oncology  [facility]        Again, thank you for allowing me to participate in the care of your patient.         Sincerely,        Jennifer Morrissey DO

## 2023-01-05 NOTE — PROGRESS NOTES
Writer received a call from lab stating that they were unable to process Folate. Writer tried calling Chi to discuss getting scheduled for a lab redraw.     Gavi Lacy RN on 1/5/2023 at 2:10 PM

## 2023-01-06 NOTE — PROGRESS NOTES
Jennifer Morrissey DO Billmark, Kayla, RN  He can get them drawn at our next visit or whenever he has labs due. That's too bad.       Jennifer Morrissey DO Billmark, Kayla, RN  Hi Gavi,     Iron studies, B12, folate all WNL. No need for supplementation. We will see Dr. Mendoza after his urologic workup next month.       Gavi Lacy RN on 1/6/2023 at 12:15 PM

## 2023-01-14 NOTE — PATIENT INSTRUCTIONS
Chi is scheduled for a follow up with Dr. Morrissey on 03/01/23.    Gavi Lacy RN on 1/14/2023 at 11:43 AM

## 2023-01-30 NOTE — PROGRESS NOTES
"CHIEF COMPLAINT   It was my pleasure to see Zbigniew Mendoza who is a 88 year old male for follow-up of gross hematuria.      HPI:  Zbigniew Mendoza is a 88 year old male being seen for follow-up and cystoscopy.  Duration of problem: Intermittent for few months  Previous treatments: Prior history of TURP      Reviewed previous notes from Tasneem Harris PA-C  No current issues though he does have some difficulty in emptying his bladder  Prior history of TURP  Intermittent gross hematuria    Exam:  /52   Ht 1.778 m (5' 10\")   Wt 64.9 kg (143 lb)   BMI 20.52 kg/m    General: age-appropriate appearing male in NAD sitting in an exam chair  Resp: no respiratory distress  CV: heart rate regular  Abdomen: Degree of obesity is mild. Abdomen is soft and nontender. No organomegaly. : Deferred to cystoscopy  Neuro: grossly non focal. Normal reflexes  Motor: excellent strength throughout              CYSTOSCOPY        Pre-procedure diagnosis: Gross hematuria  Post procedure diagnosis: normal cystoscopy  Procedure performed: cystoscopy  Surgeon: Ronni Babcock MD  Anesthesia: local    Indications for procedure: Patient is a 88 year old year old male with a history of hematuria.  Here today for cysto    Description of procedure: After fully informed voluntary consent was obtained patient was brought into the procedure room, identified and placed in a supine position on the cysto table.  The groin/scrotum were prepped and draped in a sterile fashion with betadine.  A 15F flexible cystoscope was inserted into the urethra and the bladder and urethra examined in a systematic manner.  There were no tumor stones or diverticula.  Ureteric orifices were normal in position and number and effluxing clear urine.  The prostate was 4 cm long and did not show bilobar hypertrophy but a small turp defect.  Therewas no median lobe.  Distal urethra was normal.  The patient tolerated the procedure well and there were no complications.  "     Assessment/Plan: Patient with a history of hematuria with negative cystoscopy.    Please review the Detailed notes.  Review of Imaging:  The following imaging exams were independently viewed and interpreted by me and discussed with patient:      Review of Labs:  The following labs were reviewed by me and discussed with the patient:  UA: Abnormal:    Latest Reference Range & Units 01/30/23 14:48   Color Urine Colorless, Straw, Light Yellow, Yellow  Yellow   Appearance Urine Clear  Clear   Glucose Urine Negative mg/dL Negative   Bilirubin Urine Negative  Negative   Ketones Urine Negative mg/dL Trace !   Specific Gravity Urine 1.003 - 1.035  >=1.030   pH Urine 5.0 - 7.0  5.5   Protein Albumin Urine Negative mg/dL 100 !   Urobilinogen Urine 0.2, 1.0 E.U./dL 1.0   Nitrite Urine Negative  Negative   Blood Urine Negative  Large !   Leukocyte Esterase Urine Negative  Negative   !: Data is abnormal    Assessment & Plan     Gross hematuria  No obvious cause in the bladder for hematuria  Recommended urine cytology with FISH  Scheduled to get CT tomorrow  We will update him once the results are back  - UA without Microscopic [[id number]]; Future  - lidocaine (XYLOCAINE) 2 % external gel  - UA without Microscopic [[id number]]  - CYSTOURETHROSCOPY  - Cytology non gyn [[id number]]      Ronni Babcock MD  [facility] [place]      ==========================    Additional Billing and Coding Information:  Review of external notes as documented above   Review of the result(s) of each unique test - UA    Independent interpretation of a test performed by another physician/other qualified health care professional (not separately reported) -       Discussion of management or test interpretation with external physician/other qualified healthcare professional/appropriate source -           10 minutes spent on the date of the encounter doing chart review, review of test results, interpretation of tests, patient visit and  documentation  Apart from time spent at cystoscopy    ==========================

## 2023-01-30 NOTE — TELEPHONE ENCOUNTER
Routing refill request to provider for review/approval because:  Labs out of range:    Platelet Count   Date Value Ref Range Status   01/05/2023 134 (L) 150 - 450 10e3/uL Final   01/26/2021 126 (L) 150 - 450 10e9/L Final     Antoine Velazquez, RN

## 2023-01-30 NOTE — NURSING NOTE
Chief Complaint   Patient presents with     Gross Hematuria     Pt here for in office cystoscopy     Prior to the start of the procedure and with procedural staff participation, I verbally confirmed the patient s identity using two indicators, relevant allergies, that the procedure was appropriate and matched the consent or emergent situation, and that the correct equipment/implants were available. Immediately prior to starting the procedure I conducted the Time Out with the procedural staff and re-confirmed the patient s name, procedure, and site/side. I have wiped the patient off with the povidone-Iodine solution, draped them,  used Lidocaine hydrochloride jelly, and instilled sterile water into the bladder. (The Joint Commission universal protocol was followed.)  Yes    Sedation (Moderate or Deep): None    5mL 2% lidocaine hydrochloride Urojet instilled into urethra.    NDC# [address]-8  Lot #: [id number]  Expiration Date:  07/24    Ayaka Nielsen CMA

## 2023-01-30 NOTE — LETTER
"1/30/2023       RE: Zbigniew Mendoza  [address]  [facility] [address]     Dear Colleague,    Thank you for referring your patient, Zbigniew Mendoza, to the [facility] [place] at [facility]. Please see a copy of my visit note below.    CHIEF COMPLAINT   It was my pleasure to see Zbigniew Mendoza who is a 88 year old male for follow-up of gross hematuria.      HPI:  Zbigniew Mendoza is a 88 year old male being seen for follow-up and cystoscopy.  Duration of problem: Intermittent for few months  Previous treatments: Prior history of TURP      Reviewed previous notes from Tasneem Harris PA-C  No current issues though he does have some difficulty in emptying his bladder  Prior history of TURP  Intermittent gross hematuria    Exam:  /52   Ht 1.778 m (5' 10\")   Wt 64.9 kg (143 lb)   BMI 20.52 kg/m    General: age-appropriate appearing male in NAD sitting in an exam chair  Resp: no respiratory distress  CV: heart rate regular  Abdomen: Degree of obesity is mild. Abdomen is soft and nontender. No organomegaly. : Deferred to cystoscopy  Neuro: grossly non focal. Normal reflexes  Motor: excellent strength throughout    Review of Imaging:  The following imaging exams were independently viewed and interpreted by me and discussed with patient:      Review of Labs:  The following labs were reviewed by me and discussed with the patient:  UA: Abnormal:    Latest Reference Range & Units 01/30/23 14:48   Color Urine Colorless, Straw, Light Yellow, Yellow  Yellow   Appearance Urine Clear  Clear   Glucose Urine Negative mg/dL Negative   Bilirubin Urine Negative  Negative   Ketones Urine Negative mg/dL Trace !   Specific Gravity Urine 1.003 - 1.035  >=1.030   pH Urine 5.0 - 7.0  5.5   Protein Albumin Urine Negative mg/dL 100 !   Urobilinogen Urine 0.2, 1.0 E.U./dL 1.0   Nitrite Urine Negative  Negative   Blood Urine Negative  Large !   Leukocyte Esterase " Urine Negative  Negative   !: Data is abnormal    Assessment & Plan     Gross hematuria  No obvious cause in the bladder function  Recommended urine cytology with FISH  Scheduled to get CT tomorrow  We will update him once the results are back  - UA without Microscopic [[id number]]; Future  - lidocaine (XYLOCAINE) 2 % external gel  - UA without Microscopic [[id number]]  - CYSTOURETHROSCOPY  - Cytology non gyn [[id number]]      Ronni Babcock MD  [facility] [place]      ==========================    Additional Billing and Coding Information:  Review of external notes as documented above   Review of the result(s) of each unique test - UA    Independent interpretation of a test performed by another physician/other qualified health care professional (not separately reported) -       Discussion of management or test interpretation with external physician/other qualified healthcare professional/appropriate source -           10 minutes spent on the date of the encounter doing chart review, review of test results, interpretation of tests, patient visit and documentation  Apart from time spent at cystoscopy

## 2023-01-30 NOTE — PATIENT INSTRUCTIONS
"AFTER YOUR CYSTOSCOPY  ?  ?  You have just completed a cystoscopy, or \"cysto\", which allowed your physician to learn more about your bladder (or to remove a stent placed after surgery). We suggest that you continue to avoid caffeine, fruit juice, and alcohol for the next 24 hours, however, you are encouraged to return to your normal activities.  ?  ?  A few things that are considered normal after your cystoscopy:  ?  * small amount of bleeding (or spotting) that clears within the next 24 hours  ?  * slight burning sensation with urination  ?  * sensation of needing to void (urinate) more frequently  ?  * the feeling of \"air\" in your urine  ?  * mild discomfort that is relieved with Tylenol    * bladder spasms  ?  ?  ?  Please contact our office promptly if you:  ?  * develop a fever above 101 degrees  ?  * are unable to urinate  ?  * develop bright red blood that does not stop  ?  * experience severe pain or swelling  ?  ?  ?  And of course, please contact our office with any concerns or questions [phone].  ?   AFTER YOUR CYSTOSCOPY        You have just completed a cystoscopy, or \"cysto\", which allowed your physician to learn more about your bladder (or to remove a stent placed after surgery). We suggest that you continue to avoid caffeine, fruit juice, and alcohol for the next 24 hours, however, you are encouraged to return to your normal activities.         A few things that are considered normal after your cystoscopy:     * Small amount of bleeding (or spotting) that clears within the next 24 hours     * Slight burning sensation with urination     * Sensation to of needing to avoid more frequently     * The feeling of \"air\" in your urine     * Mild discomfort that is relieved with Tylenol        Please contact our office promptly if you:     * Develop a fever above 101 degrees     * Are unable to urinate     * Develop bright red blood that does not stop     * Severe pain or swelling         Please contact " our office with any concerns or questions @[facility].    We will update him the results of CT urogram  Recommend urine cytology and FISH

## 2023-02-03 NOTE — TELEPHONE ENCOUNTER
Patient Quality Outreach    Patient is due for the following:   Hypertension -  Hypertension follow-up visit  Physical Annual Wellness Visit    Next Steps:   Schedule a Annual Wellness Visit    Type of outreach:    Sent [employer] message.      Questions for provider review:         Eugenia Appiah MA

## 2023-02-17 NOTE — PROGRESS NOTES
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 PMH is significant for Afib on eliquis, mitral prolapse with MR, renal calculus.     On 12/6/22, WBC 2.1, ANC 1.5, Hb 9.6, , PLT 118K. He has had chronic, mild thrombocytopenia since 2015. Neutropenia and anemia have been new over this last year.     He is noted to be followed by urology for gross hematuria. CT urogram/cystoscopy pending.    He underwent EGD and colonoscopy 12/2022. EGD was normal with erosive gastropathy without bleed. Z-line was irregular. He had grade C reflux esophagitis with bleeding. Colon was normal with a 2 mm polyp removed from the ascending colon.     He has 10-15 lb weight loss. He has good appetite. No LAD. No drenching night sweats, unexplained fever.     He is a retired psyciatrist, but continues to publish. He served in the  and was stationed in [place] following the [place] War. He then worked at [employer] [employer], and the [employer].     Patient lives at home alone and is able to perform all ADLs without issue. He has six children who are doing well. His twins are in gastroenterology and palliative care. His eldest son suffers from chronic alcoholism.        INTERIM HISTORY:  Patient underwent CT urogram and cystoscopy. He denies gross evidence of bleed currently. He has felt weak. No light-headedness or dizziness.       REVIEW OF SYSTEMS:   A 14 point ROS was reviewed with pertinent positives and negatives in the HPI.        HOME MEDICATIONS:  Current Outpatient Medications   Medication Sig Dispense Refill     ELIQUIS ANTICOAGULANT 5 MG tablet TAKE 1 TABLET BY MOUTH TWICE A  tablet 3     fluticasone (FLONASE) 50 MCG/ACT nasal spray INSTILL 1 SPRAY INTO BOTH NOSTRILS  DAILY 48 mL 3     gabapentin (NEURONTIN) 100 MG capsule Take 2 capsules (200 mg) by mouth nightly as needed for other (insomnia) 60 capsule 1     methylcellulose (CITRUCEL) 500 MG TABS tablet Take 500 mg by mouth daily       metoprolol succinate ER (TOPROL XL) 50 MG 24 hr tablet TAKE 1 TABLET BY MOUTH EVERY DAY 90 tablet 2     Multiple Vitamins-Minerals (MULTIVITAL) TABS Take  by mouth daily.       pantoprazole (PROTONIX) 40 MG EC tablet Take 40 mg by mouth daily       polyethylene glycol (MIRALAX) 17 GM/Dose powder Take 17 g by mouth daily 510 g 0     tamsulosin (FLOMAX) 0.4 MG capsule Take 2 capsules (0.8 mg) by mouth daily 180 capsule 3         ALLERGIES:  Allergies   Allergen Reactions     Ciprofloxacin      Acute confusion     Sulfamethizole Unknown     Confusion         PAST MEDICAL HISTORY:  Past Medical History:   Diagnosis Date     Arrhythmia      Atrial fibrillation      Calculus of kidney      Mitral prolapse     mild, with mild MR     Prostate infection      STD (sexually transmitted disease)          PAST SURGICAL HISTORY:  Past Surgical History:   Procedure Laterality Date     ARTHROSCOPY SHOULDER ROTATOR CUFF REPAIR Right 1996     BACK SURGERY       COLONOSCOPY N/A 12/01/2022    Procedure: COLONOSCOPY;  Surgeon: Karthik Mcmanus MD;  Location: [facility] OR     CYSTOSCOPY       CYSTOSCOPY, TRANSURETHRAL RESECTION (TUR) PROSTATE, COMBINED  04/1993    S/P TURP     ESOPHAGOSCOPY, GASTROSCOPY, DUODENOSCOPY (EGD), COMBINED N/A 12/01/2022    Procedure: ESOPHAGOGASTRODUODENOSCOPY;  Surgeon: Karthik Mcmanus MD;  Location: [facility] OR     PROSTATE SURGERY           SOCIAL HISTORY:  Social History     Socioeconomic History     Marital status:      Spouse name: Not on file     Number of children: Not on file     Years of education: Not on file     Highest education level: Not on file   Occupational History     Not on file   Tobacco Use     Smoking status: Never     Smokeless tobacco: Never  "  Vaping Use     Vaping Use: Never used   Substance and Sexual Activity     Alcohol use: Yes     Comment: very rare if at all     Drug use: No     Sexual activity: Not Currently   Other Topics Concern     Parent/sibling w/ CABG, MI or angioplasty before 65F 55M? Not Asked   Social History Narrative     Not on file     Social Determinants of Health     Financial Resource Strain: Not on file   Food Insecurity: Not on file   Transportation Needs: Not on file   Physical Activity: Not on file   Stress: Not on file   Social Connections: Not on file   Intimate Partner Violence: Not At Risk     Fear of Current or Ex-Partner: No     Emotionally Abused: No     Physically Abused: No     Sexually Abused: No   Housing Stability: Not on file         FAMILY HISTORY:  Family History   Problem Relation Age of Onset     Coronary Artery Disease Father      No Known Problems Mother          PHYSICAL EXAM:  Vital signs:  /49 (Cuff Size: Adult Regular)   Pulse 60   Temp (!) 96  F (35.6  C) (Tympanic)   Resp 18   Ht 1.778 m (5' 10\")   Wt 63 kg (139 lb)   BMI 19.94 kg/m     ECO  GENERAL/CONSTITUTIONAL: No acute distress. Thin. Appears stated age.  EYES: Pupils are equal, round, and react to light and accommodation. Extraocular movements intact.  No scleral icterus.  RESPIRATORY: Clear to auscultation bilaterally. No crackles or wheezing.   CARDIOVASCULAR: Regular rate and rhythm. +2/6 holosystolic murmur.   GASTROINTESTINAL: No hepatosplenomegaly, masses, or tenderness. The patient has normal bowel sounds. No guarding.  No distention.  MUSCULOSKELETAL: Warm and well-perfused, no cyanosis, clubbing, or edema.  NEUROLOGIC: Cranial nerves II-XII are intact. Alert, oriented, answers questions appropriately.  INTEGUMENTARY: No rashes or jaundice.  GAIT: Steady, does not use assistive device.      LABS:   Latest Reference Range & Units 23 11:52   Folate 4.6 - 34.8 ng/mL 2.0 (L)   WBC 4.0 - 11.0 10e3/uL 1.6 (L)   Hemoglobin " 13.3 - 17.7 g/dL 8.6 (L)   Hematocrit 40.0 - 53.0 % 27.3 (L)   Platelet Count 150 - 450 10e3/uL 123 (L)   RBC Count 4.40 - 5.90 10e6/uL 2.44 (L)   MCV 78 - 100 fL 112 (H)   MCH 26.5 - 33.0 pg 35.2 (H)   MCHC 31.5 - 36.5 g/dL 31.5   RDW 10.0 - 15.0 % 19.8 (H)      [facility] Reference Range & Units 01/05/23 10:51   Sodium 136 - 145 mmol/L 141   Potassium 3.4 - 5.3 mmol/L 3.9   Chloride 98 - 107 mmol/L 105   Carbon Dioxide (CO2) 22 - 29 mmol/L 27   Urea Nitrogen 8.0 - 23.0 mg/dL 16.8   Creatinine 0.67 - 1.17 mg/dL 0.88   GFR Estimate >60 mL/min/1.73m2 83   Calcium 8.8 - 10.2 mg/dL 9.0   Anion Gap 7 - 15 mmol/L 9   Albumin 3.5 - 5.2 g/dL 4.1   Protein Total 6.4 - 8.3 g/dL 6.7   Alkaline Phosphatase 40 - 129 U/L 114   ALT 10 - 50 U/L 5 (L)   AST 10 - 50 U/L 16   Bilirubin Total <=1.2 mg/dL 0.6   Erythropoietin 4 - 27 mU/mL 178 (H)   Ferritin 31 - 409 ng/mL 356   Glucose 70 - 99 mg/dL 99   Iron 61 - 157 ug/dL 200 (H)   Iron Binding Capacity 240 - 430 ug/dL 222 (L)   Iron Sat Index 15 - 46 % 90 (H)   Lactate Dehydrogenase 0 - 250 U/L 160   TSH 0.30 - 4.20 uIU/mL 1.85   Vitamin B12 232 - 1,245 pg/mL 374   WBC 4.0 - 11.0 10e3/uL 1.9 (L)   Hemoglobin 13.3 - 17.7 g/dL 9.5 (L)   Hematocrit 40.0 - 53.0 % 31.0 (L)   Platelet Count 150 - 450 10e3/uL 134 (L)   RBC Count 4.40 - 5.90 10e6/uL 2.77 (L)   MCV 78 - 100 fL 112 (H)   MCH 26.5 - 33.0 pg 34.3 (H)   MCHC 31.5 - 36.5 g/dL 30.6 (L)   RDW 10.0 - 15.0 % 19.9 (H)   % Neutrophils % 62   % Lymphocytes % 29   % Monocytes % 3   % Eosinophils % 3   % Basophils % 2   Absolute Basophils 0.0 - 0.2 10e3/uL 0.0   Absolute Eosinophils 0.0 - 0.7 10e3/uL 0.1   Absolute Immature Granulocytes <=0.4 10e3/uL 0.0   Absolute Lymphocytes 0.8 - 5.3 10e3/uL 0.6 (L)   Absolute Monocytes 0.0 - 1.3 10e3/uL 0.1   % Immature Granulocytes % 1   Absolute Neutrophils 1.6 - 8.3 10e3/uL 1.2 (L)   Absolute NRBCs 10e3/uL 0.0   NRBCs per 100 WBC <1 /100 0   RBC Morphology  Confirmed RBC Indices   Platelet Morphology  Automated Count Confirmed. Platelet morphology is normal.  Automated Count Confirmed. Platelet morphology is normal.   RBC Fragments None Seen  Slight !   % Retic 0.5 - 2.0 % 1.4   Absolute Retic 0.025 - 0.095 10e6/uL 0.039   INR 0.85 - 1.15  1.55 (H)   PTT 22 - 38 Seconds 37   Fibrinogen 170 - 490 mg/dL 300   SPECIMEN EXPIRATION DATE  [id number]   JOSE C Anti-IgG,-C3d  Negative      Latest Reference Range & Units 01/05/23 10:51   Haptoglobin 32 - 197 mg/dL 115       PATHOLOGY:  Case Report   Surgical Pathology Report                         Case: [id number]                                   Authorizing Provider:  Karthik Mcmanus         Collected:           12/01/2022 10:08 AM                                  MD Manny                                                                   Ordering Location:     [facility]   Received:            12/01/2022 10:47 AM                                  [facility]                                                                       Pathologist:           Barbara Hawkins MD                                                            Specimens:   A) - Stomach, RANDOM STOMACH BIOPSY - GASTRITIS                                                      B) - Large Intestine, Colon, RANDOM COLON BIOPSIES                                                   C) - Large Intestine, Colon, COLON ASCENDING POLYP                                                   D) - Large Intestine, Colon, COLOON TRANSVERSE POLYP                                       Addendum   This addendum is issued to include findings of Helicobacter pylori immunoperoxidase stain performed on gastric biopsy (specimen A) for further evaluation, using appropriate controls.  The H. pylori stain is negative.  All previously reported findings remain unchanged.   Addendum electronically signed by Barbara Hawkins MD on 12/9/2022 at 12:05 AM   Final Diagnosis   A.  Stomach, biopsy-  - Mild chronic gastritis.  -  Negative for active gastritis, intestinal metaplasia, gastric epithelial dysplasia, or malignancy.  - Pending Helicobacter pylori stain (see forthcoming addendum for results).  - Sampling includes: Gastric antrum and fundus/body-type mucosa.     B.  Colon, random biopsies:  - Negative for colitis, dysplasia, and malignancy.     C.  Ascending colon, polyp, polypectomy:  - Consistent with diminutive tubular adenoma.  - Polyp size: 2 mm (per endoscopy report).  - Negative for high-grade dysplasia and malignancy.  - Complete resection and retrieval (per endoscopy report).     D.  Transverse colon, polyp, polypectomy:  - Consistent with inflammatory-type benign mucosal polyp (predominantly denuded mucosal surface precludes accurate assessment).  - Polyp size: 8 mm (per endoscopy report).  - Negative for dysplasia and malignancy.         Final Diagnosis 1/5/23:   Peripheral blood for morphology:  -Pancytopenia including:  -Moderate normochromic, macrocytic anemia with evidence of red cell regeneration  -Moderate leukopenia with absolute neutropenia and lymphopenia; leukocytes without morphologic abnormalities  -Mild thrombocytopenia            IMAGING:  CT A/P 9/15/22:  IMPRESSION:   1.  No acute abnormality identified.  2.  Heterogeneous prostate is noted. Correlate with any evidence for  underlying prostate disease.  3.  A few nonobstructing stones within the left kidney. No  hydronephrosis.  4.  A tiny hypodense nodule at the pancreas head is identified and is  indeterminant. This may have been faintly present on the older CT from  2/25/2015. Suggest correlation with the pancreas MRI.      MRCP 10/29/22:  IMPRESSION:  No pancreatic abnormalities are identified. The previously noted small hypodense pancreatic head lesion is not visualized on today's MRI study.    CT urogram 1/31/23:  IMPRESSION:   1.  Nonobstructing stones within the left kidney again noted. One of  these has moved into the left renal pelvis. No  hydronephrosis.  2.  No acute abnormality identified.  3.  Previously noted tiny hypodensity at the pancreas head appears  stable compared to the CT from 9/15/2022. This is unable to be  visualized on the MRI from 10/29/2022. This could just be a tiny  cystic lesion. Suggest 1-year follow-up CT to assess for stability.  4.  Mildly prominent prostate.        ASSESSMENT/PLAN:  Zbigniew Mendoza is an 88 year old male who is referred for pancytopenia. PMH is significant for Afib on eliquis, mitral prolapse with MR, renal calculus.     1) Chronic, intermittent thrombocytopenia with new onset macrocytic anemia and neutropenia over the latter part of 2022  -I reviewed the patient's medical history, medication list, and laboratory findings.   -Currently, ANC is 1.2, Hb 9.6, , and .   -He underwent EGD and colonoscopy 12/2022. EGD was normal with erosive gastropathy without bleed. Z-line was irregular. He had grade C reflux esophagitis with bleeding. Colon was normal with a 2 mm polyp removed from the ascending colon.   -He is undergoing workup with urology for gross hematuria (see below).  -He has not been started on any new recent medications nor does have concerning symptoms for underlying infection. I reviewed the possibilities of nutritional deficiency, but given his age, there is a possibility of an underlying marrow disorder such as myelodysplastic syndrome or myelofibrosis.   -Patient is not hemolyzing. Iron stores, B12 WNL. Folate low at 2 and he has started on supplementation.  -TSH 1.85.  -I reviewed the possibility of a bone marrow biopsy, but patient would prefer to hold on this until following his workup for gross hematuria. He has now undergone CT urogram and cystoscopy. We re-discussed bone marrow biopsy. We discussed the risk of bleed and infection. He wishes to move forward with a bone marrow biopsy at this time.     2) Gross hematuria  -Followed by urology.  -CT urogram was  negative.  -Cystoscopy without obvious cause of hematuria in the bladder.   -It appears urine cytology was sent and was not able to send for FISH analysis due to scant cellularity.    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 MRCP was negative.   -He can have repeat CT A/P in 1 year.     5) -Continue folic acid supplementation daily.  -Schedule patient for bone marrow biopsy. Eliquis will need to be held for 48 hours prior to the procedure.   -Follow up with me in 4-6 weeks to review.         Jennifer Morrissey DO  Hematology/Oncology  [facility]

## 2023-02-17 NOTE — NURSING NOTE
"Oncology Rooming Note    February 17, 2023 12:03 PM   Zbigniew Mendoza is a 88 year old male who presents for:    Chief Complaint   Patient presents with     Oncology Clinic Visit     Pancytopenia     Initial Vitals: /49 (Cuff Size: Adult Regular)   Pulse 60   Temp (!) 96  F (35.6  C) (Tympanic)   Resp 18   Ht 1.778 m (5' 10\")   Wt 63 kg (139 lb)   BMI 19.94 kg/m   Estimated body mass index is 19.94 kg/m  as calculated from the following:    Height as of this encounter: 1.778 m (5' 10\").    Weight as of this encounter: 63 kg (139 lb). Body surface area is 1.76 meters squared.  No Pain (0) Comment: Data Unavailable   No LMP for male patient.  Allergies reviewed: Yes  Medications reviewed: Yes    Medications: Medication refills not needed today.  Pharmacy name entered into [employer]: [id number] - [place], [place] - [id number]    Clinical concerns: f/u       Lore Dale CMA              "

## 2023-02-17 NOTE — LETTER
2/17/2023         RE: Zbigniew Mendoza  [address]  [facility] [place] [address]        Dear Colleague,    Thank you for referring your patient, Zbigniew Mendoza, to the [facility]. Please see a copy of my visit note below.    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 PMH is significant for Afib on eliquis, mitral prolapse with MR, renal calculus.     On 12/6/22, WBC 2.1, ANC 1.5, Hb 9.6, , PLT 118K. He has had chronic, mild thrombocytopenia since 2015. Neutropenia and anemia have been new over this last year.     He is noted to be followed by urology for gross hematuria. CT urogram/cystoscopy pending.    He underwent EGD and colonoscopy 12/2022. EGD was normal with erosive gastropathy without bleed. Z-line was irregular. He had grade C reflux esophagitis with bleeding. Colon was normal with a 2 mm polyp removed from the ascending colon.     He has 10-15 lb weight loss. He has good appetite. No LAD. No drenching night sweats, unexplained fever.     He is a retired psyciatrist, but continues to publish. He served in the  and was stationed in [place] following the [place] War. He then worked at [employer], [employer], and the [employer].     Patient lives at home alone and is able to perform all ADLs without issue. He has six children who are doing well. His twins are in gastroenterology and palliative care. His eldest son suffers from chronic alcoholism.        INTERIM HISTORY:  Patient underwent CT urogram and cystoscopy. He denies gross evidence of bleed currently. He has felt weak. No light-headedness or dizziness.       REVIEW OF SYSTEMS:   A 14 point ROS was reviewed with pertinent positives and negatives in the  HPI.        HOME MEDICATIONS:  Current Outpatient Medications   Medication Sig Dispense Refill     ELIQUIS ANTICOAGULANT 5 MG tablet TAKE 1 TABLET BY MOUTH TWICE A  tablet 3     fluticasone (FLONASE) 50 MCG/ACT nasal spray INSTILL 1 SPRAY INTO BOTH NOSTRILS DAILY 48 mL 3     gabapentin (NEURONTIN) 100 MG capsule Take 2 capsules (200 mg) by mouth nightly as needed for other (insomnia) 60 capsule 1     methylcellulose (CITRUCEL) 500 MG TABS tablet Take 500 mg by mouth daily       metoprolol succinate ER (TOPROL XL) 50 MG 24 hr tablet TAKE 1 TABLET BY MOUTH EVERY DAY 90 tablet 2     Multiple Vitamins-Minerals (MULTIVITAL) TABS Take  by mouth daily.       pantoprazole (PROTONIX) 40 MG EC tablet Take 40 mg by mouth daily       polyethylene glycol (MIRALAX) 17 GM/Dose powder Take 17 g by mouth daily 510 g 0     tamsulosin (FLOMAX) 0.4 MG capsule Take 2 capsules (0.8 mg) by mouth daily 180 capsule 3         ALLERGIES:  Allergies   Allergen Reactions     Ciprofloxacin      Acute confusion     Sulfamethizole Unknown     Confusion         PAST MEDICAL HISTORY:  Past Medical History:   Diagnosis Date     Arrhythmia      Atrial fibrillation      Calculus of kidney      Mitral prolapse     mild, with mild MR     Prostate infection      STD (sexually transmitted disease)          PAST SURGICAL HISTORY:  Past Surgical History:   Procedure Laterality Date     ARTHROSCOPY SHOULDER ROTATOR CUFF REPAIR Right 1996     BACK SURGERY       COLONOSCOPY N/A 12/01/2022    Procedure: COLONOSCOPY;  Surgeon: Karthik Mcmanus MD;  Location: [facility] OR     CYSTOSCOPY       CYSTOSCOPY, TRANSURETHRAL RESECTION (TUR) PROSTATE, COMBINED  04/1993    S/P TURP     ESOPHAGOSCOPY, GASTROSCOPY, DUODENOSCOPY (EGD), COMBINED N/A 12/01/2022    Procedure: ESOPHAGOGASTRODUODENOSCOPY;  Surgeon: Karthik Mcmanus MD;  Location: [facility] OR     PROSTATE SURGERY           SOCIAL HISTORY:  Social History     Socioeconomic History     Marital status:  "     Spouse name: Not on file     Number of children: Not on file     Years of education: Not on file     Highest education level: Not on file   Occupational History     Not on file   Tobacco Use     Smoking status: Never     Smokeless tobacco: Never   Vaping Use     Vaping Use: Never used   Substance and Sexual Activity     Alcohol use: Yes     Comment: very rare if at all     Drug use: No     Sexual activity: Not Currently   Other Topics Concern     Parent/sibling w/ CABG, MI or angioplasty before 65F 55M? Not Asked   Social History Narrative     Not on file     Social Determinants of Health     Financial Resource Strain: Not on file   Food Insecurity: Not on file   Transportation Needs: Not on file   Physical Activity: Not on file   Stress: Not on file   Social Connections: Not on file   Intimate Partner Violence: Not At Risk     Fear of Current or Ex-Partner: No     Emotionally Abused: No     Physically Abused: No     Sexually Abused: No   Housing Stability: Not on file         FAMILY HISTORY:  Family History   Problem Relation Age of Onset     Coronary Artery Disease Father      No Known Problems Mother          PHYSICAL EXAM:  Vital signs:  /49 (Cuff Size: Adult Regular)   Pulse 60   Temp (!) 96  F (35.6  C) (Tympanic)   Resp 18   Ht 1.778 m (5' 10\")   Wt 63 kg (139 lb)   BMI 19.94 kg/m     ECO  GENERAL/CONSTITUTIONAL: No acute distress. Thin. Appears stated age.  EYES: Pupils are equal, round, and react to light and accommodation. Extraocular movements intact.  No scleral icterus.  RESPIRATORY: Clear to auscultation bilaterally. No crackles or wheezing.   CARDIOVASCULAR: Regular rate and rhythm. +2/6 holosystolic murmur.   GASTROINTESTINAL: No hepatosplenomegaly, masses, or tenderness. The patient has normal bowel sounds. No guarding.  No distention.  MUSCULOSKELETAL: Warm and well-perfused, no cyanosis, clubbing, or edema.  NEUROLOGIC: Cranial nerves II-XII are intact. Alert, " oriented, answers questions appropriately.  INTEGUMENTARY: No rashes or jaundice.  GAIT: Steady, does not use assistive device.      LABS:   Latest Reference Range & Units 02/01/23 11:52   Folate 4.6 - 34.8 ng/mL 2.0 (L)   WBC 4.0 - 11.0 10e3/uL 1.6 (L)   Hemoglobin 13.3 - 17.7 g/dL 8.6 (L)   Hematocrit 40.0 - 53.0 % 27.3 (L)   Platelet Count 150 - 450 10e3/uL 123 (L)   RBC Count 4.40 - 5.90 10e6/uL 2.44 (L)   MCV 78 - 100 fL 112 (H)   MCH 26.5 - 33.0 pg 35.2 (H)   MCHC 31.5 - 36.5 g/dL 31.5   RDW 10.0 - 15.0 % 19.8 (H)      Latest Reference Range & Units 01/05/23 10:51   Sodium 136 - 145 mmol/L 141   Potassium 3.4 - 5.3 mmol/L 3.9   Chloride 98 - 107 mmol/L 105   Carbon Dioxide (CO2) 22 - 29 mmol/L 27   Urea Nitrogen 8.0 - 23.0 mg/dL 16.8   Creatinine 0.67 - 1.17 mg/dL 0.88   GFR Estimate >60 mL/min/1.73m2 83   Calcium 8.8 - 10.2 mg/dL 9.0   Anion Gap 7 - 15 mmol/L 9   Albumin 3.5 - 5.2 g/dL 4.1   Protein Total 6.4 - 8.3 g/dL 6.7   Alkaline Phosphatase 40 - 129 U/L 114   ALT 10 - 50 U/L 5 (L)   AST 10 - 50 U/L 16   Bilirubin Total <=1.2 mg/dL 0.6   Erythropoietin 4 - 27 mU/mL 178 (H)   Ferritin 31 - 409 ng/mL 356   Glucose 70 - 99 mg/dL 99   Iron 61 - 157 ug/dL 200 (H)   Iron Binding Capacity 240 - 430 ug/dL 222 (L)   Iron Sat Index 15 - 46 % 90 (H)   Lactate Dehydrogenase 0 - 250 U/L 160   TSH 0.30 - 4.20 uIU/mL 1.85   Vitamin B12 232 - 1,245 pg/mL 374   WBC 4.0 - 11.0 10e3/uL 1.9 (L)   Hemoglobin 13.3 - 17.7 g/dL 9.5 (L)   Hematocrit 40.0 - 53.0 % 31.0 (L)   Platelet Count 150 - 450 10e3/uL 134 (L)   RBC Count 4.40 - 5.90 10e6/uL 2.77 (L)   MCV 78 - 100 fL 112 (H)   MCH 26.5 - 33.0 pg 34.3 (H)   MCHC 31.5 - 36.5 g/dL 30.6 (L)   RDW 10.0 - 15.0 % 19.9 (H)   % Neutrophils % 62   % Lymphocytes % 29   % Monocytes % 3   % Eosinophils % 3   % Basophils % 2   Absolute Basophils 0.0 - 0.2 10e3/uL 0.0   Absolute Eosinophils 0.0 - 0.7 10e3/uL 0.1   Absolute Immature Granulocytes <=0.4 10e3/uL 0.0   Absolute Lymphocytes 0.8  - 5.3 10e3/uL 0.6 (L)   Absolute Monocytes 0.0 - 1.3 10e3/uL 0.1   % Immature Granulocytes % 1   Absolute Neutrophils 1.6 - 8.3 10e3/uL 1.2 (L)   Absolute NRBCs 10e3/uL 0.0   NRBCs per 100 WBC <1 /100 0   RBC Morphology  Confirmed RBC Indices   Platelet Morphology Automated Count Confirmed. Platelet morphology is normal.  Automated Count Confirmed. Platelet morphology is normal.   RBC Fragments None Seen  Slight !   % Retic 0.5 - 2.0 % 1.4   Absolute Retic 0.025 - 0.095 10e6/uL 0.039   INR 0.85 - 1.15  1.55 (H)   PTT 22 - 38 Seconds 37   Fibrinogen 170 - 490 mg/dL 300   SPECIMEN EXPIRATION DATE  [id number]   JOSE C Anti-IgG,-C3d  Negative      Latest Reference Range & Units 01/05/23 10:51   Haptoglobin 32 - 197 mg/dL 115       PATHOLOGY:  Case Report   Surgical Pathology Report                         Case: [id number]                                   Authorizing Provider:  Karthik Mcmanus         Collected:           12/01/2022 10:08 AM                                  MD Manny                                                                   Ordering Location:     [facility]   Received:            12/01/2022 10:47 AM                                  Main OR                                                                       Pathologist:           Barbara Hawkins MD                                                            Specimens:   A) - Stomach, RANDOM STOMACH BIOPSY - GASTRITIS                                                      B) - Large Intestine, Colon, RANDOM COLON BIOPSIES                                                   C) - Large Intestine, Colon, COLON ASCENDING POLYP                                                   D) - Large Intestine, Colon, COLOON TRANSVERSE POLYP                                       Addendum   This addendum is issued to include findings of Helicobacter pylori immunoperoxidase stain performed on gastric biopsy (specimen A) for further evaluation, using  appropriate controls.  The H. pylori stain is negative.  All previously reported findings remain unchanged.   Addendum electronically signed by Barbara Hawkins MD on 12/9/2022 at 12:05 AM   Final Diagnosis   A.  Stomach, biopsy-  - Mild chronic gastritis.  - Negative for active gastritis, intestinal metaplasia, gastric epithelial dysplasia, or malignancy.  - Pending Helicobacter pylori stain (see forthcoming addendum for results).  - Sampling includes: Gastric antrum and fundus/body-type mucosa.     B.  Colon, random biopsies:  - Negative for colitis, dysplasia, and malignancy.     C.  Ascending colon, polyp, polypectomy:  - Consistent with diminutive tubular adenoma.  - Polyp size: 2 mm (per endoscopy report).  - Negative for high-grade dysplasia and malignancy.  - Complete resection and retrieval (per endoscopy report).     D.  Transverse colon, polyp, polypectomy:  - Consistent with inflammatory-type benign mucosal polyp (predominantly denuded mucosal surface precludes accurate assessment).  - Polyp size: 8 mm (per endoscopy report).  - Negative for dysplasia and malignancy.         Final Diagnosis 1/5/23:   Peripheral blood for morphology:  -Pancytopenia including:  -Moderate normochromic, macrocytic anemia with evidence of red cell regeneration  -Moderate leukopenia with absolute neutropenia and lymphopenia; leukocytes without morphologic abnormalities  -Mild thrombocytopenia            IMAGING:  CT A/P 9/15/22:  IMPRESSION:   1.  No acute abnormality identified.  2.  Heterogeneous prostate is noted. Correlate with any evidence for  underlying prostate disease.  3.  A few nonobstructing stones within the left kidney. No  hydronephrosis.  4.  A tiny hypodense nodule at the pancreas head is identified and is  indeterminant. This may have been faintly present on the older CT from  2/25/2015. Suggest correlation with the pancreas MRI.      MRCP 10/29/22:  IMPRESSION:  No pancreatic abnormalities are identified. The  previously noted small hypodense pancreatic head lesion is not visualized on today's MRI study.    CT urogram 1/31/23:  IMPRESSION:   1.  Nonobstructing stones within the left kidney again noted. One of  these has moved into the left renal pelvis. No hydronephrosis.  2.  No acute abnormality identified.  3.  Previously noted tiny hypodensity at the pancreas head appears  stable compared to the CT from 9/15/2022. This is unable to be  visualized on the MRI from 10/29/2022. This could just be a tiny  cystic lesion. Suggest 1-year follow-up CT to assess for stability.  4.  Mildly prominent prostate.        ASSESSMENT/PLAN:  Zbigniew Mendoza is an 88 year old male who is referred for pancytopenia. PMH is significant for Afib on eliquis, mitral prolapse with MR, renal calculus.     1) Chronic, intermittent thrombocytopenia with new onset macrocytic anemia and neutropenia over the latter part of 2022  -I reviewed the patient's medical history, medication list, and laboratory findings.   -Currently, ANC is 1.2, Hb 9.6, , and .   -He underwent EGD and colonoscopy 12/2022. EGD was normal with erosive gastropathy without bleed. Z-line was irregular. He had grade C reflux esophagitis with bleeding. Colon was normal with a 2 mm polyp removed from the ascending colon.   -He is undergoing workup with urology for gross hematuria (see below).  -He has not been started on any new recent medications nor does have concerning symptoms for underlying infection. I reviewed the possibilities of nutritional deficiency, but given his age, there is a possibility of an underlying marrow disorder such as myelodysplastic syndrome or myelofibrosis.   -Patient is not hemolyzing. Iron stores, B12 WNL. Folate low at 2 and he has started on supplementation.  -TSH 1.85.  -I reviewed the possibility of a bone marrow biopsy, but patient would prefer to hold on this until following his workup for gross hematuria. He has now undergone CT  urogram and cystoscopy. We re-discussed bone marrow biopsy. We discussed the risk of bleed and infection. He wishes to move forward with a bone marrow biopsy at this time.     2) Gross hematuria  -Followed by urology.  -CT urogram was negative.  -Cystoscopy without obvious cause of hematuria in the bladder.   -It appears urine cytology was sent and was not able to send for FISH analysis due to scant cellularity.    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 MRCP was negative.   -He can have repeat CT A/P in 1 year.     5) -Continue folic acid supplementation daily.  -Schedule patient for bone marrow biopsy. Eliquis will need to be held for 48 hours prior to the procedure.   -Follow up with me in 4-6 weeks to review.         Jennifer Morrissey DO  Hematology/Oncology  [facility]        Again, thank you for allowing me to participate in the care of your patient.        Sincerely,        Jennifer Morrissey DO

## 2023-02-20 PROBLEM — D61.818 PANCYTOPENIA (H): Status: ACTIVE | Noted: 2023-01-01

## 2023-02-20 NOTE — LETTER
2/20/2023         RE: Zbigniew Mendoza  [address]  [place] [address]        Dear Colleague,    Thank you for referring your patient, Zbigniew Mendoza, to the [facility] [place]. Please see a copy of my visit note below.    Oncology/Hematology Visit Note  Feb 20, 2023    Reason for Visit: Follow up of pancytopenia, add on weakness     History of Present Illness: Zbigniew Mendoza is a 88 year old male with PMH is significant for Afib on eliquis, mitral prolapse with MR, renal calculus. He is seeing Dr. Morrissey for pancytopenia, currently on folate supplementation and plans for bone marrow biopsy in near future. I was asked to see him today as an add on for weakness.     Interval History:  Chi is here unaccompanied. He has noted progressive fatigue and weakness over the last few weeks to months. No acute change. Did have one episode of diarrhea recently (has had this on and off for quite some time). No fevers, breathing concerns, dizziness. No falls except he did slide down to his knees while holding his garbage can recently. Weakness is mainly having a hard time standing and walking for any period of time as he feels like his legs won't support him.     He also has ongoing hematuria. He is still taking Eliquis. No other bleeding.     Current Outpatient Medications   Medication Sig Dispense Refill     ELIQUIS ANTICOAGULANT 5 MG tablet TAKE 1 TABLET BY MOUTH TWICE A  tablet 3     fluticasone (FLONASE) 50 MCG/ACT nasal spray INSTILL 1 SPRAY INTO BOTH NOSTRILS DAILY 48 mL 3     gabapentin (NEURONTIN) 100 MG capsule Take 2 capsules (200 mg) by mouth nightly as needed for other (insomnia) 60 capsule 1     methylcellulose (CITRUCEL) 500 MG TABS tablet Take 500 mg by mouth daily       metoprolol succinate ER (TOPROL XL) 50 MG 24 hr tablet TAKE 1 TABLET BY MOUTH EVERY DAY 90 tablet 2     Multiple Vitamins-Minerals (MULTIVITAL) TABS Take  by mouth daily.       pantoprazole (PROTONIX) 40 MG  "EC tablet Take 40 mg by mouth daily       polyethylene glycol (MIRALAX) 17 GM/Dose powder Take 17 g by mouth daily 510 g 0     tamsulosin (FLOMAX) 0.4 MG capsule Take 2 capsules (0.8 mg) by mouth daily 180 capsule 3       Past Medical History  Past Medical History:   Diagnosis Date     Arrhythmia      Atrial fibrillation      Calculus of kidney      Mitral prolapse     mild, with mild MR     Prostate infection      STD (sexually transmitted disease)      Past Surgical History:   Procedure Laterality Date     ARTHROSCOPY SHOULDER ROTATOR CUFF REPAIR Right 1996     BACK SURGERY       COLONOSCOPY N/A 12/01/2022    Procedure: COLONOSCOPY;  Surgeon: Karthik Mcmanus MD;  Location: [facility] OR     CYSTOSCOPY       CYSTOSCOPY, TRANSURETHRAL RESECTION (TUR) PROSTATE, COMBINED  04/1993    S/P TURP     ESOPHAGOSCOPY, GASTROSCOPY, DUODENOSCOPY (EGD), COMBINED N/A 12/01/2022    Procedure: ESOPHAGOGASTRODUODENOSCOPY;  Surgeon: Karthik Mcmanus MD;  Location: [facility] OR     PROSTATE SURGERY       Allergies   Allergen Reactions     Ciprofloxacin      Acute confusion     Sulfamethizole Unknown     Confusion     Social History   Social History     Tobacco Use     Smoking status: Never     Smokeless tobacco: Never   Vaping Use     Vaping Use: Never used   Substance Use Topics     Alcohol use: Yes     Comment: very rare if at all     Drug use: No      Past medical history and social history were reviewed.    Physical Examination:  /58   Pulse 59   Temp 98  F (36.7  C) (Tympanic)   Resp 16   Ht 1.778 m (5' 10\")   Wt 62.1 kg (136 lb 12.8 oz)   SpO2 100%   BMI 19.63 kg/m    Wt Readings from Last 10 Encounters:   02/17/23 63 kg (139 lb)   01/30/23 64.9 kg (143 lb)   01/05/23 64.9 kg (143 lb)   12/19/22 63.5 kg (140 lb)   12/01/22 63.7 kg (140 lb 6.4 oz)   11/23/22 63.4 kg (139 lb 11.2 oz)   11/17/22 63.5 kg (140 lb)   10/14/22 73.5 kg (162 lb)   09/28/22 64.7 kg (142 lb 11.2 oz)   09/23/22 73.5 kg (162 lb) "     Constitutional: Well-appearing, thin male in no acute distress.  Eyes: No scleral icterus.  ENT: Oral mucosa is moist without lesions or thrush.   Lymphatic: Neck is supple without cervical or supraclavicular lymphadenopathy.   Cardiovascular: Regular rate and rhythm. No murmurs, gallops, or rubs. No peripheral edema.  Respiratory: Clear to auscultation bilaterally. No wheezes or crackles.  Gastrointestinal: Bowel sounds present. Abdomen soft, non-tender.   Neurologic: Cranial nerves II through XII are grossly intact.  Skin: No rashes, petechiae, or bruising noted on exposed skin.    Laboratory Data:   Latest Reference Range & Units 02/21/23 08:40   Sodium 136 - 145 mmol/L 141   Potassium 3.4 - 5.3 mmol/L 4.0   Chloride 98 - 107 mmol/L 107   Carbon Dioxide (CO2) 22 - 29 mmol/L 25   Urea Nitrogen 8.0 - 23.0 mg/dL 16.7   Creatinine 0.67 - 1.17 mg/dL 0.90   GFR Estimate >60 mL/min/1.73m2 82   Calcium 8.8 - 10.2 mg/dL 8.9   Anion Gap 7 - 15 mmol/L 9   Albumin 3.5 - 5.2 g/dL 3.9   Protein Total 6.4 - 8.3 g/dL 6.4   Alkaline Phosphatase 40 - 129 U/L 81   ALT 10 - 50 U/L <5 (L)   AST 10 - 50 U/L 21   Bilirubin Total <=1.2 mg/dL 0.6   Glucose 70 - 99 mg/dL 109 (H)   WBC 4.0 - 11.0 10e3/uL 1.2 (L)   Hemoglobin 13.3 - 17.7 g/dL 7.1 (L)   Hematocrit 40.0 - 53.0 % 22.4 (L)   Platelet Count 150 - 450 10e3/uL 95 (L)   RBC Count 4.40 - 5.90 10e6/uL 1.97 (L)   MCV 78 - 100 fL 114 (H)   MCH 26.5 - 33.0 pg 36.0 (H)   MCHC 31.5 - 36.5 g/dL 31.7   RDW 10.0 - 15.0 % 20.7 (H)   (L): Data is abnormally low  (H): Data is abnormally high      Assessment and Plan:  # Pancytopenia, progressive  # Weakness, progressive  # Gross hematuria, progressive  Chi has had ongoing progressive pancytopenia over the last few months. Today all counts worse- hgb to 7.6, plt 86, ANC 0.6. He is symptomatic with weakness and fatigue. Also having ongoing hematuria. Reviewed with Dr. Morrissey.    - 1 unit pRBC for hgb 7.6. Consent signed  - HOLD Eliquis with  progressive cytopenias and hematuria. He is at risk of stroke given paroxysmal afib, however he is in NSR today and risk of bleeding outweigh benefits of anticoagulation at this time  - Peripheral smear ordered  - BMBx ASAP-able to schedule 2/22/23. He will have been holding his Eliquis for slightly less than 48 hours however given urgency of marrow OK to procedure after review with Dr. Morrissey  - Continue folic acid for known folate deficiency     Reviewed going to ED with fevers or bleeding.    40 minutes spent on the date of the encounter doing review of outside records, review of test results, interpretation of tests and patient visit. Discussion with Dr. Morrissey and documentation performed after encounter date.      Edgar Vu PA-C  Department of Hematology and Oncology  [facility]         Again, thank you for allowing me to participate in the care of your patient.        Sincerely,        MARYJANE Hunt

## 2023-02-20 NOTE — PATIENT INSTRUCTIONS
- Blood transfusion tomorrow at 8:00am. You can eat breakfast before.   - Do not take Eliquis tonight and tomorrow morning.  - I will talk to Dr. Morrissey about next steps and come talk to you during your blood transfusion  - We will check some additional labs tomorrow when you are here  - Continue folic acid  - Will likely need to schedule the bone marrow biopsy sooner and I will let you know about this as well

## 2023-02-20 NOTE — PROGRESS NOTES
Oncology/Hematology Visit Note  Feb 20, 2023    Reason for Visit: Follow up of pancytopenia, add on weakness     History of Present Illness: Zbigniew Mendoza is a 88 year old male with PMH is significant for Afib on eliquis, mitral prolapse with MR, renal calculus. He is seeing Dr. Morrissey for pancytopenia, currently on folate supplementation and plans for bone marrow biopsy in near future. I was asked to see him today as an add on for weakness.     Interval History:  Chi is here unaccompanied. He has noted progressive fatigue and weakness over the last few weeks to months. No acute change. Did have one episode of diarrhea recently (has had this on and off for quite some time). No fevers, breathing concerns, dizziness. No falls except he did slide down to his knees while holding his garbage can recently. Weakness is mainly having a hard time standing and walking for any period of time as he feels like his legs won't support him.     He also has ongoing hematuria. He is still taking Eliquis. No other bleeding.     Current Outpatient Medications   Medication Sig Dispense Refill     ELIQUIS ANTICOAGULANT 5 MG tablet TAKE 1 TABLET BY MOUTH TWICE A  tablet 3     fluticasone (FLONASE) 50 MCG/ACT nasal spray INSTILL 1 SPRAY INTO BOTH NOSTRILS DAILY 48 mL 3     gabapentin (NEURONTIN) 100 MG capsule Take 2 capsules (200 mg) by mouth nightly as needed for other (insomnia) 60 capsule 1     methylcellulose (CITRUCEL) 500 MG TABS tablet Take 500 mg by mouth daily       metoprolol succinate ER (TOPROL XL) 50 MG 24 hr tablet TAKE 1 TABLET BY MOUTH EVERY DAY 90 tablet 2     Multiple Vitamins-Minerals (MULTIVITAL) TABS Take  by mouth daily.       pantoprazole (PROTONIX) 40 MG EC tablet Take 40 mg by mouth daily       polyethylene glycol (MIRALAX) 17 GM/Dose powder Take 17 g by mouth daily 510 g 0     tamsulosin (FLOMAX) 0.4 MG capsule Take 2 capsules (0.8 mg) by mouth daily 180 capsule 3       Past Medical History  Past Medical  "History:   Diagnosis Date     Arrhythmia      Atrial fibrillation      Calculus of kidney      Mitral prolapse     mild, with mild MR     Prostate infection      STD (sexually transmitted disease)      Past Surgical History:   Procedure Laterality Date     ARTHROSCOPY SHOULDER ROTATOR CUFF REPAIR Right 1996     BACK SURGERY       COLONOSCOPY N/A 12/01/2022    Procedure: COLONOSCOPY;  Surgeon: Karthik Mcmanus MD;  Location: [facility] OR     CYSTOSCOPY       CYSTOSCOPY, TRANSURETHRAL RESECTION (TUR) PROSTATE, COMBINED  04/1993    S/P TURP     ESOPHAGOSCOPY, GASTROSCOPY, DUODENOSCOPY (EGD), COMBINED N/A 12/01/2022    Procedure: ESOPHAGOGASTRODUODENOSCOPY;  Surgeon: Karthik Mcmanus MD;  Location: [facility] OR     PROSTATE SURGERY       Allergies   Allergen Reactions     Ciprofloxacin      Acute confusion     Sulfamethizole Unknown     Confusion     Social History   Social History     Tobacco Use     Smoking status: Never     Smokeless tobacco: Never   Vaping Use     Vaping Use: Never used   Substance Use Topics     Alcohol use: Yes     Comment: very rare if at all     Drug use: No      Past medical history and social history were reviewed.    Physical Examination:  /58   Pulse 59   Temp 98  F (36.7  C) (Tympanic)   Resp 16   Ht 1.778 m (5' 10\")   Wt 62.1 kg (136 lb 12.8 oz)   SpO2 100%   BMI 19.63 kg/m    Wt Readings from Last 10 Encounters:   02/17/23 63 kg (139 lb)   01/30/23 64.9 kg (143 lb)   01/05/23 64.9 kg (143 lb)   12/19/22 63.5 kg (140 lb)   12/01/22 63.7 kg (140 lb 6.4 oz)   11/23/22 63.4 kg (139 lb 11.2 oz)   11/17/22 63.5 kg (140 lb)   10/14/22 73.5 kg (162 lb)   09/28/22 64.7 kg (142 lb 11.2 oz)   09/23/22 73.5 kg (162 lb)     Constitutional: Well-appearing, thin male in no acute distress.  Eyes: No scleral icterus.  ENT: Oral mucosa is moist without lesions or thrush.   Lymphatic: Neck is supple without cervical or supraclavicular lymphadenopathy.   Cardiovascular: Regular rate and " rhythm. No murmurs, gallops, or rubs. No peripheral edema.  Respiratory: Clear to auscultation bilaterally. No wheezes or crackles.  Gastrointestinal: Bowel sounds present. Abdomen soft, non-tender.   Neurologic: Cranial nerves II through XII are grossly intact.  Skin: No rashes, petechiae, or bruising noted on exposed skin.    Laboratory Data:   Latest Reference Range & Units 02/21/23 08:40   Sodium 136 - 145 mmol/L 141   Potassium 3.4 - 5.3 mmol/L 4.0   Chloride 98 - 107 mmol/L 107   Carbon Dioxide (CO2) 22 - 29 mmol/L 25   Urea Nitrogen 8.0 - 23.0 mg/dL 16.7   Creatinine 0.67 - 1.17 mg/dL 0.90   GFR Estimate >60 mL/min/1.73m2 82   Calcium 8.8 - 10.2 mg/dL 8.9   Anion Gap 7 - 15 mmol/L 9   Albumin 3.5 - 5.2 g/dL 3.9   Protein Total 6.4 - 8.3 g/dL 6.4   Alkaline Phosphatase 40 - 129 U/L 81   ALT 10 - 50 U/L <5 (L)   AST 10 - 50 U/L 21   Bilirubin Total <=1.2 mg/dL 0.6   Glucose 70 - 99 mg/dL 109 (H)   WBC 4.0 - 11.0 10e3/uL 1.2 (L)   Hemoglobin 13.3 - 17.7 g/dL 7.1 (L)   Hematocrit 40.0 - 53.0 % 22.4 (L)   Platelet Count 150 - 450 10e3/uL 95 (L)   RBC Count 4.40 - 5.90 10e6/uL 1.97 (L)   MCV 78 - 100 fL 114 (H)   MCH 26.5 - 33.0 pg 36.0 (H)   MCHC 31.5 - 36.5 g/dL 31.7   RDW 10.0 - 15.0 % 20.7 (H)   (L): Data is abnormally low  (H): Data is abnormally high      Assessment and Plan:  # Pancytopenia, progressive  # Weakness, progressive  # Gross hematuria, progressive  Chi has had ongoing progressive pancytopenia over the last few months. Today all counts worse- hgb to 7.6, plt 86, ANC 0.6. He is symptomatic with weakness and fatigue. Also having ongoing hematuria. Reviewed with Dr. Morrissey.    - 1 unit pRBC for hgb 7.6. Consent signed  - HOLD Eliquis with progressive cytopenias and hematuria. He is at risk of stroke given paroxysmal afib, however he is in NSR today and risk of bleeding outweigh benefits of anticoagulation at this time  - Peripheral smear ordered  - BMBx ASAP-able to schedule 2/22/23. He will have  been holding his Eliquis for slightly less than 48 hours however given urgency of marrow OK to procedure after review with Dr. Morrissey  - Continue folic acid for known folate deficiency     Reviewed going to ED with fevers or bleeding.    40 minutes spent on the date of the encounter doing review of outside records, review of test results, interpretation of tests and patient visit. Discussion with Dr. Morrissey and documentation performed after encounter date.      Edgar Vu PA-C  Department of Hematology and Oncology  [facility]

## 2023-02-20 NOTE — NURSING NOTE
"Oncology Rooming Note    February 20, 2023 3:11 PM   Zbigniew Mendoza is a 88 year old male who presents for:    Chief Complaint   Patient presents with     Oncology Clinic Visit     Pancytopenia     Initial Vitals: /58   Pulse 59   Temp 98  F (36.7  C) (Tympanic)   Resp 16   Ht 1.778 m (5' 10\")   Wt 62.1 kg (136 lb 12.8 oz)   SpO2 100%   BMI 19.63 kg/m   Estimated body mass index is 19.63 kg/m  as calculated from the following:    Height as of this encounter: 1.778 m (5' 10\").    Weight as of this encounter: 62.1 kg (136 lb 12.8 oz). Body surface area is 1.75 meters squared.  No Pain (0) Comment: Data Unavailable   No LMP for male patient.  Allergies reviewed: Yes  Medications reviewed: Yes    Medications: Medication refills not needed today.  Pharmacy name entered into [facility]: [id number] - [place], [place] - [address]    Clinical concerns: follow up       Gege Ashton CMA              "

## 2023-02-20 NOTE — TELEPHONE ENCOUNTER
Writer spoke to Edgar about Chi's symptoms. He is going to come into clinic today to have labs drawn and a provider visit.     Gavi Lacy RN on 2/20/2023 at 2:05 PM

## 2023-02-20 NOTE — PROGRESS NOTES
Medical Assistant Note:  Zbigniew Mendoza presents today for blood draw.    Patient seen by provider today: Yes: Edgar Velez.   present during visit today: Not Applicable.    Concerns: No Concerns.    Procedure:  Labs drawn    Post Assessment:  Labs drawn without difficulty: Yes.    Discharge Plan:  Departure Mode: Ambulatory.    Face to Face Time: 10 min.    May Sutherland [facility]

## 2023-02-21 NOTE — PROGRESS NOTES
Infusion Nursing Note:  Zbigniew Joinern presents today for 1 unit RBC.    Patient seen by provider today: No, seen yesterday by MARYJANE Hunt   present during visit today: Not Applicable.    Note: N/A.    Intravenous Access:  Peripheral IV placed after multiple attempts.    Treatment Conditions:  Lab Results   Component Value Date    HGB 7.1 (L) 02/21/2023    WBC 1.2 (L) 02/21/2023    ANEU 6.1 12/21/2020    ANEUTAUTO 0.6 (L) 02/20/2023    PLT 95 (L) 02/21/2023      Blood transfusion consent signed 2/20/2023.    Post Infusion Assessment:  Patient tolerated infusion without incident.  Site patent and intact, free from redness, edema or discomfort.  No evidence of extravasations.  Access discontinued per protocol.     Discharge Plan:   Discharge instructions reviewed with: Patient.  Patient and/or family verbalized understanding of discharge instructions and all questions answered.  AVS to patient via [employer].  Patient will return 2/22 for bone marrow biopsy.   Patient discharged in stable condition accompanied by: self.  Departure Mode: Ambulatory.      Vanessa Stout RN

## 2023-02-22 NOTE — PROGRESS NOTES
Writer received a call from Chi this morning needing to cancel his bone marrow biopsy appointment today due to having some abdominal cramping. He states that he took Pepto bismol which is helping. Chi is also worried about the weather.     Chi is rescheduled for a bone marrow biopsy in [place] on 03/13/23. Dr. Morrissey would like him to have this done sooner if possible. Chi is wiling to go to [facility], but not willing to go to the  for this procedure.     Melanier will discuss getting an earlier appointment with our scheduling team. Chi is also scheduled for a follow up with JAIMEE Hernández on 03/01/23. He will have labs drawn at the hospital prior to his appointment.     Gavi Lacy RN on 2/22/2023 at 0805 am

## 2023-02-22 NOTE — PROGRESS NOTES
Writer met with Chi in infusion on 02/21/23 to discuss education related to the bone marrow biopsy he was scheduled for on 02/22/23.     Writer discussed what a bone marrow biopsy is, why it's needed, getting ready for the biopsy, what happens during the biopsy, when to expect results, self care at home, when to contact the doctor, and possibly needing a .     - Chi reports that he stopped Eliquis 48 hours ago. He was told by [employer] to discontinue the medication until further notice.     - Chi reports that he will not use Versed. Therefore, doesn't need a  after the procedure.     - Writer discussed Roles of RNCC/Triage teams. Contact information given.     - Your Bone Marrow Biopsy information sheet given to him.     Gavi Lacy RN on 2/22/2023 at 3:41 PM

## 2023-02-23 NOTE — PROGRESS NOTES
Writer spoke with our scheduling staff. Per John Almazan does not have any earlier available appointments. We will keep BMBX as scheduled. Writer notified Dr. Morrissey and MARYJANE Hernández.     Melanier left a message on [employer]'s phone with this information.    Gavi Lacy RN on 2/23/2023 at 9:54 AM'

## 2023-03-01 NOTE — PROGRESS NOTES
Oncology/Hematology Visit Note  Mar 1, 2023    Reason for Visit: Follow up of pancytopenia     History of Present Illness: Zbigniew Mendoza is a 88 year old male with PMH is significant for Afib on eliquis, mitral prolapse with MR, renal calculus. He is seeing Dr. Morrissey for pancytopenia, currently on folate supplementation and plans for bone marrow biopsy in near future. I am seeing him for close follow-up after noting worsening cytopenias the last month.    Interval History:  Chi is seen in clinic and his daughter called on the phone. He states that after the transfusion last week he felt better though still not back to where he was a few months ago. Continues to have low energy and weakness, though no falls. No breathing concerns or dizziness. Denies any signs of bleeding including no further gross hematuria. He has had on and off diarrhea but plans to remove gluten from diet as this has helped in the past. Denies any fevers.     Current Outpatient Medications   Medication Sig Dispense Refill     ELIQUIS ANTICOAGULANT 5 MG tablet TAKE 1 TABLET BY MOUTH TWICE A  tablet 3     fluticasone (FLONASE) 50 MCG/ACT nasal spray INSTILL 1 SPRAY INTO BOTH NOSTRILS DAILY 48 mL 3     gabapentin (NEURONTIN) 100 MG capsule Take 2 capsules (200 mg) by mouth nightly as needed for other (insomnia) 60 capsule 1     methylcellulose (CITRUCEL) 500 MG TABS tablet Take 500 mg by mouth daily       metoprolol succinate ER (TOPROL XL) 50 MG 24 hr tablet TAKE 1 TABLET BY MOUTH EVERY DAY 90 tablet 2     Multiple Vitamins-Minerals (MULTIVITAL) TABS Take  by mouth daily.       pantoprazole (PROTONIX) 40 MG EC tablet Take 40 mg by mouth daily       polyethylene glycol (MIRALAX) 17 GM/Dose powder Take 17 g by mouth daily 510 g 0     tamsulosin (FLOMAX) 0.4 MG capsule Take 2 capsules (0.8 mg) by mouth daily 180 capsule 3       Past Medical History  Past Medical History:   Diagnosis Date     Arrhythmia      Atrial fibrillation      Calculus  "of kidney      Mitral prolapse     mild, with mild MR     Prostate infection      STD (sexually transmitted disease)      Past Surgical History:   Procedure Laterality Date     ARTHROSCOPY SHOULDER ROTATOR CUFF REPAIR Right 1996     BACK SURGERY       COLONOSCOPY N/A 12/01/2022    Procedure: COLONOSCOPY;  Surgeon: Karthik Mcmanus MD;  Location: [facility] OR     CYSTOSCOPY       CYSTOSCOPY, TRANSURETHRAL RESECTION (TUR) PROSTATE, COMBINED  04/1993    S/P TURP     ESOPHAGOSCOPY, GASTROSCOPY, DUODENOSCOPY (EGD), COMBINED N/A 12/01/2022    Procedure: ESOPHAGOGASTRODUODENOSCOPY;  Surgeon: Karthik Mcmanus MD;  Location: [facility] OR     PROSTATE SURGERY       Allergies   Allergen Reactions     Ciprofloxacin      Acute confusion     Sulfamethizole Unknown     Confusion     Social History   Social History     Tobacco Use     Smoking status: Never     Smokeless tobacco: Never   Vaping Use     Vaping Use: Never used   Substance Use Topics     Alcohol use: Yes     Comment: very rare if at all     Drug use: No      Past medical history and social history were reviewed.    Physical Examination:  /48 (Cuff Size: Adult Regular)   Pulse 68   Temp 97  F (36.1  C) (Tympanic)   Resp 16   Ht 1.778 m (5' 10\")   Wt 62.1 kg (137 lb)   SpO2 100%   BMI 19.66 kg/m    Wt Readings from Last 10 Encounters:   02/20/23 62.1 kg (136 lb 12.8 oz)   02/17/23 63 kg (139 lb)   01/30/23 64.9 kg (143 lb)   01/05/23 64.9 kg (143 lb)   12/19/22 63.5 kg (140 lb)   12/01/22 63.7 kg (140 lb 6.4 oz)   11/23/22 63.4 kg (139 lb 11.2 oz)   11/17/22 63.5 kg (140 lb)   10/14/22 73.5 kg (162 lb)   09/28/22 64.7 kg (142 lb 11.2 oz)     Constitutional: Well-appearing, thin, elderly male in no acute distress.  Eyes:  No scleral icterus.  Cardiovascular: No peripheral edema.  Respiratory: Non-labored breathing   Neurologic: Cranial nerves II through XII are grossly intact.  Skin: No rashes, petechiae, or bruising noted on exposed " skin.    Laboratory Data:   Latest Reference Range & Units 02/21/23 08:40   WBC 4.0 - 11.0 10e3/uL 1.2 (L)   Hemoglobin 13.3 - 17.7 g/dL 7.1 (L)   Hematocrit 40.0 - 53.0 % 22.4 (L)   Platelet Count 150 - 450 10e3/uL 95 (L)   RBC Count 4.40 - 5.90 10e6/uL 1.97 (L)   MCV 78 - 100 fL 114 (H)   MCH 26.5 - 33.0 pg 36.0 (H)   MCHC 31.5 - 36.5 g/dL 31.7   RDW 10.0 - 15.0 % 20.7 (H)   % Neutrophils % 44   % Lymphocytes % 55   % Monocytes % 1   % Eosinophils % 0   % Basophils % 0   % Metamyelocytes % 0   % Blasts % 0   Absolute Basophils 0.0 - 0.2 10e3/uL 0.0   Absolute Neutrophil 1.6 - 8.3 10e3/uL 0.5 (L)   Absolute Lymphocytes 0.8 - 5.3 10e3/uL 0.7 (L)   Absolute Monocytes 0.0 - 1.3 10e3/uL 0.0   Absolute Eosinophils 0.0 - 0.7 10e3/uL 0.0   Absolute Metamyelocytes <=0.0 10e3/uL 0.0   Absolute Blasts <=0.0 10e3/uL 0.0   Absolute NRBCs 10e3/uL 0.0   NRBCs per 100 WBC <1 /100 0   RBC Morphology  Confirmed RBC Indices   Platelet Morphology Automated Count Confirmed. Platelet morphology is normal.  Automated Count Confirmed. Platelet morphology is normal.   RBC Fragments None Seen  Slight !   % Retic 0.5 - 2.0 % 1.0   Absolute Retic 0.025 - 0.095 10e6/uL 0.020 (L)   (L): Data is abnormally low  (H): Data is abnormally high  !: Data is abnormal  A.  Peripheral blood smear for morphology:  - Pancytopenia associated with:       - Marked dimorphic macrocytic, normochromic and microcytic hypochromic anemia without morphologic evidence of hemolysis or increased red cell regeneration.       - Marked leukopenia with absolute neutropenia without neutrophilic left shift as well as borderline lymphopenia.       - Moderate thrombocytopenia.   Electronically signed by Prabhakar Conti MD on 2/24/2023 at  6:26 PM   Comment    In the absence of clear reactive etiologies for the patient's pancytopenia, such as medications or infection, the findings are suggestive of a possible primary disorder of myelopoiesis such as a myelodysplastic  neoplasm.  Clinical correlation is required to determine the need for a follow-up bone marrow biopsy with cytogenetics and molecular NGS panel for myelodysplastic neoplasms for further evaluation.     Prelim CBC today per discussion with lab  -WBC 1.2  -ANC 0.45  -Hgb 8.2  -Plt 78    Assessment and Plan:  # Pancytopenia, progressive  # Weakness, progressive  # Gross hematuria, improved    Chi has had ongoing progressive pancytopenia over the last few months. He required pRBC 2/21/23 for hgb 7.6. Today counts remain low though no transfusions needed.     - Repeat CBC on 3/6/23 for ongoing close monitoring. He will do walk in lab.   - HOLD Eliquis with progressive cytopenias and hematuria (improved)  - Peripheral smear with no signs of hemolysis or blasts, highest concern for MDS/myelofibrosis  - BMBx scheduled 3/13/23 (unable to schedule earlier in [place])  - Continue folic acid for known folate deficiency   - Reviewed calling or going to ED with any bleeding or fevers  - Hold on ppx with neutropenia given risk of worsening diarrhea and no current infectious concerns  - Did update Dr. Morrissey     # Diarrhea, chronic  Has had thorough work-up with GI including upper and lower endoscopy in December 2022.    - He feels better on gluten free diet and will continue this  - Do not feel pancytopenia and diarrhea are related  - Will likely need GI follow-up once acute heme issues resolved     35 minutes spent on the date of the encounter doing chart review, review of test results, interpretation of tests, patient visit, documentation and discussion with other provider(s)     Edgar Vu PA-C  Department of Hematology and Oncology  [facility]

## 2023-03-01 NOTE — LETTER
3/1/2023         RE: Zbigniew Mendoza  [id number]  [facility] [address]        Dear Colleague,    Thank you for referring your patient, Zbigniew Mendoza, to the [facility]. Please see a copy of my visit note below.    Oncology/Hematology Visit Note  Mar 1, 2023    Reason for Visit: Follow up of pancytopenia     History of Present Illness: Zbigniew Mendoza is a 88 year old male with PMH is significant for Afib on eliquis, mitral prolapse with MR, renal calculus. He is seeing Dr. Morrissey for pancytopenia, currently on folate supplementation and plans for bone marrow biopsy in near future. I am seeing him for close follow-up after noting worsening cytopenias the last month.    Interval History:  Chi is seen in clinic and his daughter called on the phone. He states that after the transfusion last week he felt better though still not back to where he was a few months ago. Continues to have low energy and weakness, though no falls. No breathing concerns or dizziness. Denies any signs of bleeding including no further gross hematuria. He has had on and off diarrhea but plans to remove gluten from diet as this has helped in the past. Denies any fevers.     Current Outpatient Medications   Medication Sig Dispense Refill     ELIQUIS ANTICOAGULANT 5 MG tablet TAKE 1 TABLET BY MOUTH TWICE A  tablet 3     fluticasone (FLONASE) 50 MCG/ACT nasal spray INSTILL 1 SPRAY INTO BOTH NOSTRILS DAILY 48 mL 3     gabapentin (NEURONTIN) 100 MG capsule Take 2 capsules (200 mg) by mouth nightly as needed for other (insomnia) 60 capsule 1     methylcellulose (CITRUCEL) 500 MG TABS tablet Take 500 mg by mouth daily       metoprolol succinate ER (TOPROL XL) 50 MG 24 hr tablet TAKE 1 TABLET BY MOUTH EVERY DAY 90 tablet 2     Multiple Vitamins-Minerals (MULTIVITAL) TABS Take  by mouth daily.       pantoprazole (PROTONIX) 40 MG EC tablet Take 40 mg by mouth daily       polyethylene glycol (MIRALAX) 17 GM/Dose  "powder Take 17 g by mouth daily 510 g 0     tamsulosin (FLOMAX) 0.4 MG capsule Take 2 capsules (0.8 mg) by mouth daily 180 capsule 3       Past Medical History  Past Medical History:   Diagnosis Date     Arrhythmia      Atrial fibrillation      Calculus of kidney      Mitral prolapse     mild, with mild MR     Prostate infection      STD (sexually transmitted disease)      Past Surgical History:   Procedure Laterality Date     ARTHROSCOPY SHOULDER ROTATOR CUFF REPAIR Right 1996     BACK SURGERY       COLONOSCOPY N/A 12/01/2022    Procedure: COLONOSCOPY;  Surgeon: Karthik Mcmanus MD;  Location: [facility] OR     CYSTOSCOPY       CYSTOSCOPY, TRANSURETHRAL RESECTION (TUR) PROSTATE, COMBINED  04/1993    S/P TURP     ESOPHAGOSCOPY, GASTROSCOPY, DUODENOSCOPY (EGD), COMBINED N/A 12/01/2022    Procedure: ESOPHAGOGASTRODUODENOSCOPY;  Surgeon: Karthik Mcmanus MD;  Location: [facility] OR     PROSTATE SURGERY       Allergies   Allergen Reactions     Ciprofloxacin      Acute confusion     Sulfamethizole Unknown     Confusion     Social History   Social History     Tobacco Use     Smoking status: Never     Smokeless tobacco: Never   Vaping Use     Vaping Use: Never used   Substance Use Topics     Alcohol use: Yes     Comment: very rare if at all     Drug use: No      Past medical history and social history were reviewed.    Physical Examination:  /48 (Cuff Size: Adult Regular)   Pulse 68   Temp 97  F (36.1  C) (Tympanic)   Resp 16   Ht 1.778 m (5' 10\")   Wt 62.1 kg (137 lb)   SpO2 100%   BMI 19.66 kg/m    Wt Readings from Last 10 Encounters:   02/20/23 62.1 kg (136 lb 12.8 oz)   02/17/23 63 kg (139 lb)   01/30/23 64.9 kg (143 lb)   01/05/23 64.9 kg (143 lb)   12/19/22 63.5 kg (140 lb)   12/01/22 63.7 kg (140 lb 6.4 oz)   11/23/22 63.4 kg (139 lb 11.2 oz)   11/17/22 63.5 kg (140 lb)   10/14/22 73.5 kg (162 lb)   09/28/22 64.7 kg (142 lb 11.2 oz)     Constitutional: Well-appearing, thin, elderly male in no " acute distress.  Eyes:  No scleral icterus.  Cardiovascular: No peripheral edema.  Respiratory: Non-labored breathing   Neurologic: Cranial nerves II through XII are grossly intact.  Skin: No rashes, petechiae, or bruising noted on exposed skin.    Laboratory Data:   Latest Reference Range & Units 02/21/23 08:40   WBC 4.0 - 11.0 10e3/uL 1.2 (L)   Hemoglobin 13.3 - 17.7 g/dL 7.1 (L)   Hematocrit 40.0 - 53.0 % 22.4 (L)   Platelet Count 150 - 450 10e3/uL 95 (L)   RBC Count 4.40 - 5.90 10e6/uL 1.97 (L)   MCV 78 - 100 fL 114 (H)   MCH 26.5 - 33.0 pg 36.0 (H)   MCHC 31.5 - 36.5 g/dL 31.7   RDW 10.0 - 15.0 % 20.7 (H)   % Neutrophils % 44   % Lymphocytes % 55   % Monocytes % 1   % Eosinophils % 0   % Basophils % 0   % Metamyelocytes % 0   % Blasts % 0   Absolute Basophils 0.0 - 0.2 10e3/uL 0.0   Absolute Neutrophil 1.6 - 8.3 10e3/uL 0.5 (L)   Absolute Lymphocytes 0.8 - 5.3 10e3/uL 0.7 (L)   Absolute Monocytes 0.0 - 1.3 10e3/uL 0.0   Absolute Eosinophils 0.0 - 0.7 10e3/uL 0.0   Absolute Metamyelocytes <=0.0 10e3/uL 0.0   Absolute Blasts <=0.0 10e3/uL 0.0   Absolute NRBCs 10e3/uL 0.0   NRBCs per 100 WBC <1 /100 0   RBC Morphology  Confirmed RBC Indices   Platelet Morphology Automated Count Confirmed. Platelet morphology is normal.  Automated Count Confirmed. Platelet morphology is normal.   RBC Fragments None Seen  Slight !   % Retic 0.5 - 2.0 % 1.0   Absolute Retic 0.025 - 0.095 10e6/uL 0.020 (L)   (L): Data is abnormally low  (H): Data is abnormally high  !: Data is abnormal  A.  Peripheral blood smear for morphology:  - Pancytopenia associated with:       - Marked dimorphic macrocytic, normochromic and microcytic hypochromic anemia without morphologic evidence of hemolysis or increased red cell regeneration.       - Marked leukopenia with absolute neutropenia without neutrophilic left shift as well as borderline lymphopenia.       - Moderate thrombocytopenia.   Electronically signed by Prabhakar Conti MD on 2/24/2023  at  6:26 PM   Comment    In the absence of clear reactive etiologies for the patient's pancytopenia, such as medications or infection, the findings are suggestive of a possible primary disorder of myelopoiesis such as a myelodysplastic neoplasm.  Clinical correlation is required to determine the need for a follow-up bone marrow biopsy with cytogenetics and molecular NGS panel for myelodysplastic neoplasms for further evaluation.     Prelim CBC today per discussion with lab  -WBC 1.2  -ANC 0.45  -Hgb 8.2  -Plt 78    Assessment and Plan:  # Pancytopenia, progressive  # Weakness, progressive  # Gross hematuria, improved    Chi has had ongoing progressive pancytopenia over the last few months. He required pRBC 2/21/23 for hgb 7.6. Today counts remain low though no transfusions needed.     - Repeat CBC on 3/6/23 for ongoing close monitoring. He will do walk in lab.   - HOLD Eliquis with progressive cytopenias and hematuria (improved)  - Peripheral smear with no signs of hemolysis or blasts, highest concern for MDS/myelofibrosis  - BMBx scheduled 3/13/23 (unable to schedule earlier in [place])  - Continue folic acid for known folate deficiency   - Reviewed calling or going to ED with any bleeding or fevers  - Hold on ppx with neutropenia given risk of worsening diarrhea and no current infectious concerns  - Did update Dr. Morrissey     # Diarrhea, chronic  Has had thorough work-up with GI including upper and lower endoscopy in December 2022.    - He feels better on gluten free diet and will continue this  - Do not feel pancytopenia and diarrhea are related  - Will likely need GI follow-up once acute heme issues resolved     35 minutes spent on the date of the encounter doing chart review, review of test results, interpretation of tests, patient visit, documentation and discussion with other provider(s)     Edgar Vu PA-C  Department of Hematology and Oncology  [facility]         Again,  thank you for allowing me to participate in the care of your patient.        Sincerely,        MARYJANE Hunt

## 2023-03-01 NOTE — TELEPHONE ENCOUNTER
DATE:  3/1/2023   TIME OF RECEIPT FROM LAB:  1434  LAB TEST/VALUES:  WBC preliminary 1.2- will send off for slide review  TIME LAB VALUE REPORTED TO PROVIDER:     Mignon Vu at 1437 and provided April's call back number if additional preliminary results are needed.  1453 acknowledged by Edgar.  No further action required from Triage at this time.

## 2023-03-01 NOTE — NURSING NOTE
"Oncology Rooming Note    March 1, 2023 2:24 PM   Zbigniew Mendoza is a 88 year old male who presents for:    Chief Complaint   Patient presents with     Oncology Clinic Visit     Pancytopenia     Initial Vitals: /44 (Cuff Size: Adult Regular)   Pulse 68   Temp 97  F (36.1  C) (Tympanic)   Resp 16   Wt 62.1 kg (137 lb)   BMI 19.66 kg/m   Estimated body mass index is 19.66 kg/m  as calculated from the following:    Height as of 2/20/23: 1.778 m (5' 10\").    Weight as of this encounter: 62.1 kg (137 lb). Body surface area is 1.75 meters squared.  No Pain (0) Comment: Data Unavailable   No LMP for male patient.  Allergies reviewed: Yes  Medications reviewed: Yes    Medications: Medication refills not needed today.  Pharmacy name entered into [facility]: [id number] - [place], [place] - [address]    Clinical concerns: f/u       Lore Dale, JEAN PIERRE              "

## 2023-03-04 NOTE — PATIENT INSTRUCTIONS
Chi is scheduled for a BMBX on 03/13/23 and a follow up with Dr. Morrissey on 03/28/23.    Gavi Lacy RN on 3/4/2023 at 10:44 AM

## 2023-03-06 NOTE — TELEPHONE ENCOUNTER
Writer received a return call from Chi. He reports seeing his white blood count and is extremely concerned. He denies any fever or cough. He does continue to have intermittent diarrhea, however it is managed by Imodium.     In general, Chi reports not feeling the best. He feels weak specifically in his legs. He is looking forward to getting more answers regarding how he's feeling after his bone marrow biopsy has resulted. Chi is also aware that he will have repeat labs prior to his bone marrow biopsy.     Writer will route to Dr. Morrissey for an update.     Gavi Lacy RN on 3/6/2023 at 4:43 PM

## 2023-03-06 NOTE — TELEPHONE ENCOUNTER
Zoe Hernandes, MATHEUS  You;  Cancer Clinic Rn Pool 38 minutes ago (11:25 AM)     AK  We will just have to repeat CBC prior to the marrow. If he has no bleeding and no fever, then he can just monitor. Please call and explain neutropenic precautions to him.       Gavi Lacy RN on 3/6/2023 at 12:05 PM

## 2023-03-06 NOTE — TELEPHONE ENCOUNTER
DATE: 3/6/23   TIME OF RECEIPT FROM LAB:  1000  SITUATION: LAB TEST & VALUE: WBC 1.2, ANC 0.44  Other labs: HGB 8, Plt 91  Previous Labs from: 2/21/23: WBC 1.2, ANC 0.5, HGB 7.1, Plt 95.  RESULTS PAGED TO (PROVIDER):  Pt sees Dr. Morrissey and Edgar Vu in [place]. Edgar is out today. Will update [place] triage for follow up.  Oliver RN confirmed he will relay message to necessary person.

## 2023-03-07 NOTE — TELEPHONE ENCOUNTER
Jennifer Morrissey, DO  You;  Cancer Clinic Rn Pool; Zoe Hernandes, MATHEUS; Edgar Vu PA 3 hours ago (8:57 AM)     STEFAN Gatica,     I can imagine he is feeling unwell. Edgar, on date of marrow, please start him on prophy: acyclovir, fluconazole, and levaquin.     Thank you      Gavi Lacy RN on 3/7/2023 at 12:50 PM

## 2023-03-11 NOTE — PROGRESS NOTES
BMT ONC Adult Bone Marrow Biopsy Procedure Note  March 13, 2023  /49   Pulse 61   Temp 97.6  F (36.4  C) (Oral)   Resp 16   SpO2 98%      Learning needs assessment complete within 12 months? YES    DIAGNOSIS: Pancytopenia      PROCEDURE: Unilateral Bone Marrow Biopsy and Unilateral Aspirate    LOCATION: [facility]     Patient s identification was positively verified by verbal identification and invasive procedure safety checklist was completed. Informed consent was obtained. Following the administration of no pre-medication, patient was placed in the prone position and prepped and draped in a sterile manner. Approximately 10 cc of 1% Lidocaine was used over the left posterior iliac spine. Following this a 3 mm incision was made. Trephine bone marrow core(s) was (were) obtained from the [facility]. Bone marrow aspirates were obtained from the [facility]. Aspirates were sent for morphology, immunophenotyping, cytogenetics and molecular diagnostics NGS. A total of approximately 18 ml of marrow was aspirated. Following this procedure a sterile dressing was applied to the bone marrow biopsy site(s). The patient was placed in the supine position to maintain pressure on the biopsy site. Post-procedure wound care instructions were given.     Complications: NO    Pre-procedural pain: 0 out of 10 on the numeric pain rating scale.     Procedural pain: 6 out of 10 on the numeric pain rating scale.     Post-procedural pain assessment: 0 out of 10 on the numeric pain rating scale.     Interventions: NO    Length of procedure:20 minutes or less      Procedure performed by: Edgar Vu PA-C assisted by Zoe Tom PA-C    *Per Dr. Morrissey will start ppx medications for neutropenia. Pt has Cipro allergy. Will do Vantin BID, Fluconazole daily, ACV BID. Reviewed neutropenic precautions. Repeat CBC in one week.    *1 unit pRBC to be given today for hgb 7.4

## 2023-03-13 NOTE — PROGRESS NOTES
Infusion Nursing Note:  Zbigniew Mendoza presents today for Bone Marrow Biopsy + Blood Transfusion.    Patient seen by provider today: Yes: Edgar Vu PA-C   present during visit today: Not Applicable.    Note: Chi reports that he continues to have fatigue and weakness due to pancytopenia.  He states he stopped working out at [employer] due to the risk for infection.    Hgb 7.4--one unit PRBC administered per standing order after BMBx procedure was complete.    Critical Value:  ANC 0.4, consistent with previous critical results and known pancytopenia.  Edgar Vu PA-C notified.  She discussed with patient and ordered patient to start prophylactic medications for neutropenia (including Acyclovir, Vantin, and Fluconazole).  Neutropenic precautions reviewed.    -----    Procedure:  Bone Marrow Biopsy  Consent:  Informed Consent completed by Edgar Vu PA-C  Pain pre-procedure:  0/10  Time out:  Prior to the start of the procedure and with procedural staff participation the following steps were followed. Using two patient identifiers confirmation of right patient, right allergies, right procedure, right consent and verification of right equipement and supplies were verbally discussed.  Medication:  N/A    -----    Upon discharge, patient was weak upon getting out of bed.  He stated this is typical after a long period of inactivity.  He declined wheelchair assistance.  Ambulated with stand by assist to the lobby.  Patient stated he would sit in the lobby for a period of time prior to leaving, then would take breaks on the way to his car.    Intravenous Access:  Labs drawn without difficulty.  Peripheral IV placed.    Treatment Conditions:  Lab Results   Component Value Date    HGB 7.4 (L) 03/13/2023    WBC 1.4 (L) 03/13/2023    ANEU 0.4 (LL) 03/06/2023    ANEUTAUTO 0.5 (L) 03/01/2023    PLT 82 (L) 03/13/2023      Results reviewed, labs MET treatment parameters, ok to proceed with  treatment.    Blood transfusion consent signed 2/20/2023.    Post Procedure Assessment:  Patient tolerated procedure without incident.  Patient was supine for 30 minutes post-procedure.  Pain post-procedure: 0/10  Dressing clean dry and intact prior to discharge.  VS remained stable and WNL.    Post Infusion Assessment:  Patient tolerated infusion without incident.  Blood return noted pre and post infusion.  Site patent and intact, free from redness, edema or discomfort.  No evidence of extravasations.  Access discontinued per protocol.     Discharge Plan:   Discharge instructions reviewed with: Patient.  Patient and/or family verbalized understanding of discharge instructions and all questions answered.  Copy of AVS reviewed with patient.  Patient will return 3/28 for next appointment with Dr. Morrissey.  Patient discharged in stable condition accompanied by: self.  Departure Mode: Ambulatory.      Robbie Dallas RN

## 2023-03-13 NOTE — PATIENT INSTRUCTIONS
Start taking Acyclovir, Vantin, and Fluconazole as directed.  Prescriptions were sent to your [facility] in [place], [place].

## 2023-03-14 NOTE — PROGRESS NOTES
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 PMH is significant for Afib on eliquis, mitral prolapse with MR, renal calculus.     On 12/6/22, WBC 2.1, ANC 1.5, Hb 9.6, , PLT 118K. He has had chronic, mild thrombocytopenia since 2015. Neutropenia and anemia have been new over this last year.     He is noted to be followed by urology for gross hematuria. CT urogram/cystoscopy pending.    He underwent EGD and colonoscopy 12/2022. EGD was normal with erosive gastropathy without bleed. Z-line was irregular. He had grade C reflux esophagitis with bleeding. Colon was normal with a 2 mm polyp removed from the ascending colon.     He has 10-15 lb weight loss. He has good appetite. No LAD. No drenching night sweats, unexplained fever.     He is a retired psyciatrist, but continues to publish. He served in the  and was stationed in [place] following the [place] War. He then worked at [employer], [employer], and the [employer].     Patient lives at home alone and is able to perform all ADLs without issue. He has six children who are doing well. His twins are in gastroenterology and palliative care. His eldest son suffers from chronic alcoholism.        INTERIM HISTORY:  Patient has completed bone marrow biopsy. 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 He has been started on prophylaxis.       REVIEW OF SYSTEMS:   A 14 point ROS was reviewed with pertinent positives and negatives in the HPI.        HOME MEDICATIONS:  Current Outpatient Medications   Medication Sig Dispense Refill     acyclovir (ZOVIRAX) 400 MG tablet Take 1 tablet (400 mg) by mouth 2 times daily 60 tablet 1     cefpodoxime (VANTIN) 200 MG tablet Take 1 tablet (200 mg) by mouth 2 times daily 60 tablet 1     ELIQUIS  ANTICOAGULANT 5 MG tablet TAKE 1 TABLET BY MOUTH TWICE A DAY (Patient not taking: Reported on 3/1/2023) 180 tablet 3     fluconazole (DIFLUCAN) 200 MG tablet Take 1 tablet (200 mg) by mouth daily 30 tablet 1     fluticasone (FLONASE) 50 MCG/ACT nasal spray INSTILL 1 SPRAY INTO BOTH NOSTRILS DAILY 48 mL 3     gabapentin (NEURONTIN) 100 MG capsule Take 2 capsules (200 mg) by mouth nightly as needed for other (insomnia) 60 capsule 1     methylcellulose (CITRUCEL) 500 MG TABS tablet Take 500 mg by mouth daily       metoprolol succinate ER (TOPROL XL) 50 MG 24 hr tablet TAKE 1 TABLET BY MOUTH EVERY DAY 90 tablet 2     Multiple Vitamins-Minerals (MULTIVITAL) TABS Take  by mouth daily.       pantoprazole (PROTONIX) 40 MG EC tablet Take 40 mg by mouth daily       polyethylene glycol (MIRALAX) 17 GM/Dose powder Take 17 g by mouth daily (Patient not taking: Reported on 3/13/2023) 510 g 0     tamsulosin (FLOMAX) 0.4 MG capsule Take 2 capsules (0.8 mg) by mouth daily 180 capsule 3     vitamin B complex with vitamin C (VITAMIN  B COMPLEX) tablet Take 1 tablet by mouth daily           ALLERGIES:  Allergies   Allergen Reactions     Ciprofloxacin      Acute confusion     Sulfamethizole Unknown     Confusion    3/13/2023 Pt says this is not an allergy         PAST MEDICAL HISTORY:  Past Medical History:   Diagnosis Date     Arrhythmia      Atrial fibrillation      Calculus of kidney      Mitral prolapse     mild, with mild MR     Prostate infection      STD (sexually transmitted disease)          PAST SURGICAL HISTORY:  Past Surgical History:   Procedure Laterality Date     ARTHROSCOPY SHOULDER ROTATOR CUFF REPAIR Right 1996     BACK SURGERY       COLONOSCOPY N/A 12/01/2022    Procedure: COLONOSCOPY;  Surgeon: Karthik Mcmanus MD;  Location: [facility] OR     CYSTOSCOPY       CYSTOSCOPY, TRANSURETHRAL RESECTION (TUR) PROSTATE, COMBINED  04/1993    S/P TURP     ESOPHAGOSCOPY, GASTROSCOPY, DUODENOSCOPY (EGD), COMBINED N/A  "2022    Procedure: ESOPHAGOGASTRODUODENOSCOPY;  Surgeon: Karthik Mcmanus MD;  Location: [facility] OR     PROSTATE SURGERY           SOCIAL HISTORY:  Social History     Socioeconomic History     Marital status:      Spouse name: Not on file     Number of children: Not on file     Years of education: Not on file     Highest education level: Not on file   Occupational History     Not on file   Tobacco Use     Smoking status: Never     Smokeless tobacco: Never   Vaping Use     Vaping Use: Never used   Substance and Sexual Activity     Alcohol use: Yes     Comment: very rare if at all     Drug use: No     Sexual activity: Not Currently   Other Topics Concern     Parent/sibling w/ CABG, MI or angioplasty before 65F 55M? Not Asked   Social History Narrative     Not on file     Social Determinants of Health     Financial Resource Strain: Not on file   Food Insecurity: Not on file   Transportation Needs: Not on file   Physical Activity: Not on file   Stress: Not on file   Social Connections: Not on file   Intimate Partner Violence: Not At Risk     Fear of Current or Ex-Partner: No     Emotionally Abused: No     Physically Abused: No     Sexually Abused: No   Housing Stability: Not on file         FAMILY HISTORY:  Family History   Problem Relation Age of Onset     Coronary Artery Disease Father      No Known Problems Mother          PHYSICAL EXAM:  Vital signs:  /42   Pulse 58   Temp 97.6  F (36.4  C) (Tympanic)   Resp 16   Ht 1.778 m (5' 10\")   Wt 61.7 kg (136 lb 1.6 oz)   SpO2 100%   BMI 19.53 kg/m     ECO-1.5  GENERAL/CONSTITUTIONAL: No acute distress. Thin. Appears stated age.  EYES: Pupils are equal, round, and react to light and accommodation. Extraocular movements intact.  No scleral icterus.  RESPIRATORY: Clear to auscultation bilaterally. No crackles or wheezing.   CARDIOVASCULAR: Regular rate and rhythm. +2/6 holosystolic murmur.   GASTROINTESTINAL: No hepatosplenomegaly, masses, " or tenderness. The patient has normal bowel sounds. No guarding.  No distention.  MUSCULOSKELETAL: Warm and well-perfused, no cyanosis, clubbing, or edema.  NEUROLOGIC: Cranial nerves II-XII are intact. Alert, oriented, answers questions appropriately.  INTEGUMENTARY: No rashes or jaundice.  GAIT: Steady, does not use assistive device.      LABS:   Latest Reference Range & Units 03/13/23 08:43   WBC 4.0 - 11.0 10e3/uL 1.4 (L)   Hemoglobin 13.3 - 17.7 g/dL 7.4 (L)   Hematocrit 40.0 - 53.0 % 22.9 (L)   Platelet Count 150 - 450 10e3/uL 82 (L)   RBC Count 4.40 - 5.90 10e6/uL 2.06 (L)   MCV 78 - 100 fL 111 (H)   MCH 26.5 - 33.0 pg 35.9 (H)   MCHC 31.5 - 36.5 g/dL 32.3   RDW 10.0 - 15.0 % 22.1 (H)   % Neutrophils % 27 (P)   % Lymphocytes % 68 (P)   % Monocytes % 0 (P)   % Eosinophils % 2 (P)   % Basophils % 2 (P)   % Metamyelocytes % 1 (P)   Absolute Basophils 0.0 - 0.2 10e3/uL 0.0 (P)   Absolute Neutrophil 1.6 - 8.3 10e3/uL 0.4 (LL) (P)   Absolute Lymphocytes 0.8 - 5.3 10e3/uL 1.0 (P)   Absolute Monocytes 0.0 - 1.3 10e3/uL 0.0 (P)   Absolute Eosinophils 0.0 - 0.7 10e3/uL 0.0 (P)   Absolute Metamyelocytes <=0.0 10e3/uL 0.0 (P)   RBC Morphology  Confirmed RBC Indices (P)   Platelet Morphology Automated Count Confirmed. Platelet morphology is normal.  Automated Count Confirmed. Platelet morphology is normal. (P)   RBC Fragments None Seen  Slight ! (P)      Latest Reference Range & Units 01/05/23 10:51   Erythropoietin 4 - 27 mU/mL 178 (H)   Ferritin 31 - 409 ng/mL 356   Iron 61 - 157 ug/dL 200 (H)   Iron Binding Capacity 240 - 430 ug/dL 222 (L)   Iron Sat Index 15 - 46 % 90 (H)   Lactate Dehydrogenase 0 - 250 U/L 160   TSH 0.30 - 4.20 uIU/mL 1.85   Vitamin B12 232 - 1,245 pg/mL 374   % Retic 0.5 - 2.0 % 1.4   Absolute Retic 0.025 - 0.095 10e6/uL 0.039   INR 0.85 - 1.15  1.55 (H)   PTT 22 - 38 Seconds 37   Fibrinogen 170 - 490 mg/dL 300   SPECIMEN EXPIRATION DATE  [id number]   JOSE C Anti-IgG,-C3d  Negative      Latest  Reference Range & Units 01/05/23 10:51   Haptoglobin 32 - 197 mg/dL 115       PATHOLOGY:  Case Report   Surgical Pathology Report                         Case: [id number]                                   Authorizing Provider:  Karthik Mcmanus         Collected:           12/01/2022 10:08 AM                                  MD Manny                                                                   Ordering Location:     [facility]   Received:            12/01/2022 10:47 AM                                  Main OR                                                                       Pathologist:           Barbara Hawkins MD                                                            Specimens:   A) - Stomach, RANDOM STOMACH BIOPSY - GASTRITIS                                                      B) - Large Intestine, Colon, RANDOM COLON BIOPSIES                                                   C) - Large Intestine, Colon, COLON ASCENDING POLYP                                                   D) - Large Intestine, Colon, COLOON TRANSVERSE POLYP                                       Addendum   This addendum is issued to include findings of Helicobacter pylori immunoperoxidase stain performed on gastric biopsy (specimen A) for further evaluation, using appropriate controls.  The H. pylori stain is negative.  All previously reported findings remain unchanged.   Addendum electronically signed by Barbara Hawkins MD on 12/9/2022 at 12:05 AM   Final Diagnosis   A.  Stomach, biopsy-  - Mild chronic gastritis.  - Negative for active gastritis, intestinal metaplasia, gastric epithelial dysplasia, or malignancy.  - Pending Helicobacter pylori stain (see forthcoming addendum for results).  - Sampling includes: Gastric antrum and fundus/body-type mucosa.     B.  Colon, random biopsies:  - Negative for colitis, dysplasia, and malignancy.     C.  Ascending colon, polyp, polypectomy:  - Consistent with diminutive  tubular adenoma.  - Polyp size: 2 mm (per endoscopy report).  - Negative for high-grade dysplasia and malignancy.  - Complete resection and retrieval (per endoscopy report).     D.  Transverse colon, polyp, polypectomy:  - Consistent with inflammatory-type benign mucosal polyp (predominantly denuded mucosal surface precludes accurate assessment).  - Polyp size: 8 mm (per endoscopy report).  - Negative for dysplasia and malignancy.         Final Diagnosis 1/5/23:   Peripheral blood for morphology:  -Pancytopenia including:  -Moderate normochromic, macrocytic anemia with evidence of red cell regeneration  -Moderate leukopenia with absolute neutropenia and lymphopenia; leukocytes without morphologic abnormalities  -Mild thrombocytopenia          Bone Marrow Biopsy 3/13/23:  Component Ref Range & Units  Resulting Agency   Final Diagnosis   Peripheral blood for morphology:  -Pancytopenia including:  -Marked normochromic, macrocytic anemia without evidence of red cell regeneration  -Marked leukopenia with marked absolute neutropenia without overt dysplastic change and without circulating blasts  -Moderate thrombocytopenia     Bone marrow, biopsy with aspirate and special studies for evaluation:  -Acute myelogenous leukemia, see comment, dysplastic myeloid precursors also noted  -Completed immunophenotyping studies show 69% blasts consistent with myeloid lineage, small blast percentage (<1%, showing B-cell lineage also noted, please see synopsis within this report and separate full completed report)  -Cytogenetic studies in process with results to be reported separately by the performing laboratory when completed  -Acute myeloid leukemia focused panel in process with results be reported by the performing laboratory when completed     Comment       With the expression of CD13 and  (although partial) acute myeloid leukemia (AML) is favored, this immunophenotype may be consistent with AML, NOS, minimally differentiated  depending on the results of genetic studies. Of note, a very small population appears to have some B-lineage differentiation; however this population accounts for <1% of leukocytes.          Case Report   Flow Cytometry Report                             Case: [id number]                                   Authorizing Provider:  Jennifer Morrissey DO         Collected:           03/13/2023 10:44 AM           Ordering Location:     [facility]   Received:            03/13/2023 11:06 AM                                  [facility]                                                             Pathologist:           Mary Middleton MD                                                            Specimen:    Iliac Crest, Bone Marrow Aspirate, Left                                                    Flow Interpretation   A. Iliac Crest, Bone Marrow Aspirate, Left:  -Increased blasts (69%)  See comment      Electronically signed by Mary Middleton MD on 3/14/2023 at  1:58 PM   Comment     With the expression of CD13 and  (although partial) acute myeloid leukemia (AML) is favored, this immunophenotype may be consistent with AML, NOS, minimally differentiated depending on the results of genetic studies. Of note, a very small population appears to have some B-lineage differentiation; however this population accounts for <1% of leukocytes.              IMAGING:  CT A/P 9/15/22:  IMPRESSION:   1.  No acute abnormality identified.  2.  Heterogeneous prostate is noted. Correlate with any evidence for  underlying prostate disease.  3.  A few nonobstructing stones within the left kidney. No  hydronephrosis.  4.  A tiny hypodense nodule at the pancreas head is identified and is  indeterminant. This may have been faintly present on the older CT from  2/25/2015. Suggest correlation with the pancreas MRI.      MRCP 10/29/22:  IMPRESSION:  No pancreatic abnormalities are identified. The previously noted small hypodense  pancreatic head lesion is not visualized on today's MRI study.    CT urogram 1/31/23:  IMPRESSION:   1.  Nonobstructing stones within the left kidney again noted. One of  these has moved into the left renal pelvis. No hydronephrosis.  2.  No acute abnormality identified.  3.  Previously noted tiny hypodensity at the pancreas head appears  stable compared to the CT from 9/15/2022. This is unable to be  visualized on the MRI from 10/29/2022. This could just be a tiny  cystic lesion. Suggest 1-year follow-up CT to assess for stability.  4.  Mildly prominent prostate.        ASSESSMENT/PLAN:  Zbigniew Mendoza is an 88 year old male who is referred for pancytopenia. PMH is significant for Afib on eliquis, mitral prolapse with MR, renal calculus.     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   -Currently, ANC is 1.2, Hb 9.6, , and .   -He underwent EGD and colonoscopy 12/2022. EGD was normal with erosive gastropathy without bleed. Z-line was irregular. He had grade C reflux esophagitis with bleeding. Colon was normal with a 2 mm polyp removed from the ascending colon.   -He is undergoing workup with urology for gross hematuria (see below).  -He has not been started on any new recent medications nor does have concerning symptoms for underlying infection. I reviewed the possibilities of nutritional deficiency, but given his age, there is a possibility of an underlying marrow disorder such as myelodysplastic syndrome or myelofibrosis.   -Patient is not hemolyzing. Iron stores, B12 WNL. Folate low at 2 and he has started on supplementation.  -TSH 1.85.  -Patient underwent bone marrow biopsy with FLOW cytometry returning with ~70% blasts. IHC positive for CD13 and , although partial. He also has evidence of dysplasia. Cytogenetics are pending.   -We held a long discussion today in  regards to the diagnosis of AML. Patient is elderly at the age of 88. We had reviewed five year overall survival for >75 is 2.6% and >80 is 0%. He has good functional status and we are awaiting his final molecular/mutational studies for further risk stratification and prognostication. Patient is not fit for intensive induction therapy nor would he be an allogeneic transplant candidate. As such, treatment with 7+3 or vyxeos would not be indicated. We had discussed reduced intensity induction with HMA and venetoclax. I reviewed the VIALE-A trial in which venetoclax and azacytidine had improved CR and overall survival from 10 mths to 15 months vs azaycytidine alone. However, we reviewed significant risk of adverse effect including febrile neutropenia with increased morbidity and mortality. I discussed I would only be comfortable initiating this treatment on an inpatient basis due to risk of cytopenias and TLS. Patient expresses his understanding of the above. He will discuss with his children and we will revisit on Friday with plans for treatment vs best supportive care. Continue antimicrobial prophylaxis at this time.    2) Gross hematuria  -Followed by urology.  -CT urogram was negative.  -Cystoscopy without obvious cause of hematuria in the bladder.   -It appears urine cytology was sent and was not able to send for FISH analysis due to scant cellularity.    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     4) Pancreatic head lesion  -Seen on CT A/P and urogram. MRCP was negative.   -He can have repeat CT A/P in 1 year.     5) Follow up on Friday 3/17/23 at 4:30 PM.         Jennifer Morrissey DO  Hematology/Oncology  [facility]

## 2023-03-15 NOTE — NURSING NOTE
"Oncology Rooming Note    March 15, 2023 3:06 PM   Zbigniew Mendoza is a 88 year old male who presents for:    Chief Complaint   Patient presents with     Oncology Clinic Visit     Pancytopenia      Initial Vitals: /42   Pulse 58   Temp 97.6  F (36.4  C) (Tympanic)   Resp 16   Ht 1.778 m (5' 10\")   Wt 61.7 kg (136 lb 1.6 oz)   SpO2 100%   BMI 19.53 kg/m   Estimated body mass index is 19.53 kg/m  as calculated from the following:    Height as of this encounter: 1.778 m (5' 10\").    Weight as of this encounter: 61.7 kg (136 lb 1.6 oz). Body surface area is 1.75 meters squared.  No Pain (0) Comment: Data Unavailable   No LMP for male patient.  Allergies reviewed: Yes  Medications reviewed: Yes    Medications: Medication refills not needed today.  Pharmacy name entered into [facility]: [id number] - [place], [place] - [address]    Clinical concerns: f/u       Kylie Cardoso CMA              "

## 2023-03-15 NOTE — LETTER
3/15/2023         RE: Zbigniew Mendoza  [address]  [facility] [address]        Dear Colleague,    Thank you for referring your patient, Zbigniew Mendoza, to the [facility]. Please see a copy of my visit note below.    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 PMH is significant for Afib on eliquis, mitral prolapse with MR, renal calculus.     On 12/6/22, WBC 2.1, ANC 1.5, Hb 9.6, , PLT 118K. He has had chronic, mild thrombocytopenia since 2015. Neutropenia and anemia have been new over this last year.     He is noted to be followed by urology for gross hematuria. CT urogram/cystoscopy pending.    He underwent EGD and colonoscopy 12/2022. EGD was normal with erosive gastropathy without bleed. Z-line was irregular. He had grade C reflux esophagitis with bleeding. Colon was normal with a 2 mm polyp removed from the ascending colon.     He has 10-15 lb weight loss. He has good appetite. No LAD. No drenching night sweats, unexplained fever.     He is a retired psyciatrist, but continues to publish. He served in the  and was stationed in [place] following the [place] War. He then worked at [employer], [employer], and the [employer].     Patient lives at home alone and is able to perform all ADLs without issue. He has six children who are doing well. His twins are in gastroenterology and palliative care. His eldest son suffers from chronic alcoholism.        INTERIM HISTORY:  Patient has completed bone marrow biopsy. 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 He has been started on prophylaxis.       REVIEW OF SYSTEMS:   A 14 point ROS was reviewed with pertinent positives and negatives in the HPI.        HOME MEDICATIONS:  Current  Outpatient Medications   Medication Sig Dispense Refill     acyclovir (ZOVIRAX) 400 MG tablet Take 1 tablet (400 mg) by mouth 2 times daily 60 tablet 1     cefpodoxime (VANTIN) 200 MG tablet Take 1 tablet (200 mg) by mouth 2 times daily 60 tablet 1     ELIQUIS ANTICOAGULANT 5 MG tablet TAKE 1 TABLET BY MOUTH TWICE A DAY (Patient not taking: Reported on 3/1/2023) 180 tablet 3     fluconazole (DIFLUCAN) 200 MG tablet Take 1 tablet (200 mg) by mouth daily 30 tablet 1     fluticasone (FLONASE) 50 MCG/ACT nasal spray INSTILL 1 SPRAY INTO BOTH NOSTRILS DAILY 48 mL 3     gabapentin (NEURONTIN) 100 MG capsule Take 2 capsules (200 mg) by mouth nightly as needed for other (insomnia) 60 capsule 1     methylcellulose (CITRUCEL) 500 MG TABS tablet Take 500 mg by mouth daily       metoprolol succinate ER (TOPROL XL) 50 MG 24 hr tablet TAKE 1 TABLET BY MOUTH EVERY DAY 90 tablet 2     Multiple Vitamins-Minerals (MULTIVITAL) TABS Take  by mouth daily.       pantoprazole (PROTONIX) 40 MG EC tablet Take 40 mg by mouth daily       polyethylene glycol (MIRALAX) 17 GM/Dose powder Take 17 g by mouth daily (Patient not taking: Reported on 3/13/2023) 510 g 0     tamsulosin (FLOMAX) 0.4 MG capsule Take 2 capsules (0.8 mg) by mouth daily 180 capsule 3     vitamin B complex with vitamin C (VITAMIN  B COMPLEX) tablet Take 1 tablet by mouth daily           ALLERGIES:  Allergies   Allergen Reactions     Ciprofloxacin      Acute confusion     Sulfamethizole Unknown     Confusion    3/13/2023 Pt says this is not an allergy         PAST MEDICAL HISTORY:  Past Medical History:   Diagnosis Date     Arrhythmia      Atrial fibrillation      Calculus of kidney      Mitral prolapse     mild, with mild MR     Prostate infection      STD (sexually transmitted disease)          PAST SURGICAL HISTORY:  Past Surgical History:   Procedure Laterality Date     ARTHROSCOPY SHOULDER ROTATOR CUFF REPAIR Right 1996     BACK SURGERY       COLONOSCOPY N/A 12/01/2022     "Procedure: COLONOSCOPY;  Surgeon: Karthik Mcmanus MD;  Location: [facility] OR     CYSTOSCOPY       CYSTOSCOPY, TRANSURETHRAL RESECTION (TUR) PROSTATE, COMBINED  1993    S/P TURP     ESOPHAGOSCOPY, GASTROSCOPY, DUODENOSCOPY (EGD), COMBINED N/A 2022    Procedure: ESOPHAGOGASTRODUODENOSCOPY;  Surgeon: Karthik Mcmanus MD;  Location: [facility] OR     PROSTATE SURGERY           SOCIAL HISTORY:  Social History     Socioeconomic History     Marital status:      Spouse name: Not on file     Number of children: Not on file     Years of education: Not on file     Highest education level: Not on file   Occupational History     Not on file   Tobacco Use     Smoking status: Never     Smokeless tobacco: Never   Vaping Use     Vaping Use: Never used   Substance and Sexual Activity     Alcohol use: Yes     Comment: very rare if at all     Drug use: No     Sexual activity: Not Currently   Other Topics Concern     Parent/sibling w/ CABG, MI or angioplasty before 65F 55M? Not Asked   Social History Narrative     Not on file     Social Determinants of Health     Financial Resource Strain: Not on file   Food Insecurity: Not on file   Transportation Needs: Not on file   Physical Activity: Not on file   Stress: Not on file   Social Connections: Not on file   Intimate Partner Violence: Not At Risk     Fear of Current or Ex-Partner: No     Emotionally Abused: No     Physically Abused: No     Sexually Abused: No   Housing Stability: Not on file         FAMILY HISTORY:  Family History   Problem Relation Age of Onset     Coronary Artery Disease Father      No Known Problems Mother          PHYSICAL EXAM:  Vital signs:  /42   Pulse 58   Temp 97.6  F (36.4  C) (Tympanic)   Resp 16   Ht 1.778 m (5' 10\")   Wt 61.7 kg (136 lb 1.6 oz)   SpO2 100%   BMI 19.53 kg/m     ECO-1.5  GENERAL/CONSTITUTIONAL: No acute distress. Thin. Appears stated age.  EYES: Pupils are equal, round, and react to light and " accommodation. Extraocular movements intact.  No scleral icterus.  RESPIRATORY: Clear to auscultation bilaterally. No crackles or wheezing.   CARDIOVASCULAR: Regular rate and rhythm. +2/6 holosystolic murmur.   GASTROINTESTINAL: No hepatosplenomegaly, masses, or tenderness. The patient has normal bowel sounds. No guarding.  No distention.  MUSCULOSKELETAL: Warm and well-perfused, no cyanosis, clubbing, or edema.  NEUROLOGIC: Cranial nerves II-XII are intact. Alert, oriented, answers questions appropriately.  INTEGUMENTARY: No rashes or jaundice.  GAIT: Steady, does not use assistive device.      LABS:   Latest Reference Range & Units 03/13/23 08:43   WBC 4.0 - 11.0 10e3/uL 1.4 (L)   Hemoglobin 13.3 - 17.7 g/dL 7.4 (L)   Hematocrit 40.0 - 53.0 % 22.9 (L)   Platelet Count 150 - 450 10e3/uL 82 (L)   RBC Count 4.40 - 5.90 10e6/uL 2.06 (L)   MCV 78 - 100 fL 111 (H)   MCH 26.5 - 33.0 pg 35.9 (H)   MCHC 31.5 - 36.5 g/dL 32.3   RDW 10.0 - 15.0 % 22.1 (H)   % Neutrophils % 27 (P)   % Lymphocytes % 68 (P)   % Monocytes % 0 (P)   % Eosinophils % 2 (P)   % Basophils % 2 (P)   % Metamyelocytes % 1 (P)   Absolute Basophils 0.0 - 0.2 10e3/uL 0.0 (P)   Absolute Neutrophil 1.6 - 8.3 10e3/uL 0.4 (LL) (P)   Absolute Lymphocytes 0.8 - 5.3 10e3/uL 1.0 (P)   Absolute Monocytes 0.0 - 1.3 10e3/uL 0.0 (P)   Absolute Eosinophils 0.0 - 0.7 10e3/uL 0.0 (P)   Absolute Metamyelocytes <=0.0 10e3/uL 0.0 (P)   RBC Morphology  Confirmed RBC Indices (P)   Platelet Morphology Automated Count Confirmed. Platelet morphology is normal.  Automated Count Confirmed. Platelet morphology is normal. (P)   RBC Fragments None Seen  Slight ! (P)      Latest Reference Range & Units 01/05/23 10:51   Erythropoietin 4 - 27 mU/mL 178 (H)   Ferritin 31 - 409 ng/mL 356   Iron 61 - 157 ug/dL 200 (H)   Iron Binding Capacity 240 - 430 ug/dL 222 (L)   Iron Sat Index 15 - 46 % 90 (H)   Lactate Dehydrogenase 0 - 250 U/L 160   TSH 0.30 - 4.20 uIU/mL 1.85   Vitamin B12 232 -  1,245 pg/mL 374   % Retic 0.5 - 2.0 % 1.4   Absolute Retic 0.025 - 0.095 10e6/uL 0.039   INR 0.85 - 1.15  1.55 (H)   PTT 22 - 38 Seconds 37   Fibrinogen 170 - 490 mg/dL 300   SPECIMEN EXPIRATION DATE  [id number]   JOSE C Anti-IgG,-C3d  Negative      Latest Reference Range & Units 01/05/23 10:51   Haptoglobin 32 - 197 mg/dL 115       PATHOLOGY:  Case Report   Surgical Pathology Report                         Case: [id number]                                   Authorizing Provider:  Karthik Mcmanus         Collected:           12/01/2022 10:08 AM                                  MD Manny                                                                   Ordering Location:     [facility]   Received:            12/01/2022 10:47 AM                                  Main OR                                                                       Pathologist:           Barbara Hawkins MD                                                            Specimens:   A) - Stomach, RANDOM STOMACH BIOPSY - GASTRITIS                                                      B) - Large Intestine, Colon, RANDOM COLON BIOPSIES                                                   C) - Large Intestine, Colon, COLON ASCENDING POLYP                                                   D) - Large Intestine, Colon, COLOON TRANSVERSE POLYP                                       Addendum   This addendum is issued to include findings of Helicobacter pylori immunoperoxidase stain performed on gastric biopsy (specimen A) for further evaluation, using appropriate controls.  The H. pylori stain is negative.  All previously reported findings remain unchanged.   Addendum electronically signed by Barbara Hawkins MD on 12/9/2022 at 12:05 AM   Final Diagnosis   A.  Stomach, biopsy-  - Mild chronic gastritis.  - Negative for active gastritis, intestinal metaplasia, gastric epithelial dysplasia, or malignancy.  - Pending Helicobacter pylori stain (see  forthcoming addendum for results).  - Sampling includes: Gastric antrum and fundus/body-type mucosa.     B.  Colon, random biopsies:  - Negative for colitis, dysplasia, and malignancy.     C.  Ascending colon, polyp, polypectomy:  - Consistent with diminutive tubular adenoma.  - Polyp size: 2 mm (per endoscopy report).  - Negative for high-grade dysplasia and malignancy.  - Complete resection and retrieval (per endoscopy report).     D.  Transverse colon, polyp, polypectomy:  - Consistent with inflammatory-type benign mucosal polyp (predominantly denuded mucosal surface precludes accurate assessment).  - Polyp size: 8 mm (per endoscopy report).  - Negative for dysplasia and malignancy.         Final Diagnosis 1/5/23:   Peripheral blood for morphology:  -Pancytopenia including:  -Moderate normochromic, macrocytic anemia with evidence of red cell regeneration  -Moderate leukopenia with absolute neutropenia and lymphopenia; leukocytes without morphologic abnormalities  -Mild thrombocytopenia          Bone Marrow Biopsy 3/13/23:  Component Ref Range & Units  Resulting Agency   Final Diagnosis   Peripheral blood for morphology:  -Pancytopenia including:  -Marked normochromic, macrocytic anemia without evidence of red cell regeneration  -Marked leukopenia with marked absolute neutropenia without overt dysplastic change and without circulating blasts  -Moderate thrombocytopenia     Bone marrow, biopsy with aspirate and special studies for evaluation:  -Acute myelogenous leukemia, see comment, dysplastic myeloid precursors also noted  -Completed immunophenotyping studies show 69% blasts consistent with myeloid lineage, small blast percentage (<1%, showing B-cell lineage also noted, please see synopsis within this report and separate full completed report)  -Cytogenetic studies in process with results to be reported separately by the performing laboratory when completed  -Acute myeloid leukemia focused panel in process with  results be reported by the performing laboratory when completed     Comment       With the expression of CD13 and  (although partial) acute myeloid leukemia (AML) is favored, this immunophenotype may be consistent with AML, NOS, minimally differentiated depending on the results of genetic studies. Of note, a very small population appears to have some B-lineage differentiation; however this population accounts for <1% of leukocytes.          Case Report   Flow Cytometry Report                             Case: [id number]                                   Authorizing Provider:  Jennifer Morrissey DO         Collected:           03/13/2023 10:44 AM           Ordering Location:     [facility]   Received:            03/13/2023 11:06 AM                                  [facility]                                                             Pathologist:           Mary Middleton MD                                                            Specimen:    Iliac Crest, Bone Marrow Aspirate, Left                                                    Flow Interpretation   A. Iliac Crest, Bone Marrow Aspirate, Left:  -Increased blasts (69%)  See comment      Electronically signed by Mary Middleton MD on 3/14/2023 at  1:58 PM   Comment     With the expression of CD13 and  (although partial) acute myeloid leukemia (AML) is favored, this immunophenotype may be consistent with AML, NOS, minimally differentiated depending on the results of genetic studies. Of note, a very small population appears to have some B-lineage differentiation; however this population accounts for <1% of leukocytes.              IMAGING:  CT A/P 9/15/22:  IMPRESSION:   1.  No acute abnormality identified.  2.  Heterogeneous prostate is noted. Correlate with any evidence for  underlying prostate disease.  3.  A few nonobstructing stones within the left kidney. No  hydronephrosis.  4.  A tiny hypodense nodule at the pancreas head is  identified and is  indeterminant. This may have been faintly present on the older CT from  2/25/2015. Suggest correlation with the pancreas MRI.      MRCP 10/29/22:  IMPRESSION:  No pancreatic abnormalities are identified. The previously noted small hypodense pancreatic head lesion is not visualized on today's MRI study.    CT urogram 1/31/23:  IMPRESSION:   1.  Nonobstructing stones within the left kidney again noted. One of  these has moved into the left renal pelvis. No hydronephrosis.  2.  No acute abnormality identified.  3.  Previously noted tiny hypodensity at the pancreas head appears  stable compared to the CT from 9/15/2022. This is unable to be  visualized on the MRI from 10/29/2022. This could just be a tiny  cystic lesion. Suggest 1-year follow-up CT to assess for stability.  4.  Mildly prominent prostate.        ASSESSMENT/PLAN:  Zbigniew Mendoza is an 88 year old male who is referred for pancytopenia. PMH is significant for Afib on eliquis, mitral prolapse with MR, renal calculus.     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   -Currently, ANC is 1.2, Hb 9.6, , and .   -He underwent EGD and colonoscopy 12/2022. EGD was normal with erosive gastropathy without bleed. Z-line was irregular. He had grade C reflux esophagitis with bleeding. Colon was normal with a 2 mm polyp removed from the ascending colon.   -He is undergoing workup with urology for gross hematuria (see below).  -He has not been started on any new recent medications nor does have concerning symptoms for underlying infection. I reviewed the possibilities of nutritional deficiency, but given his age, there is a possibility of an underlying marrow disorder such as myelodysplastic syndrome or myelofibrosis.   -Patient is not hemolyzing. Iron stores, B12 WNL. Folate low at 2 and he has started on  supplementation.  -TSH 1.85.  -Patient underwent bone marrow biopsy with FLOW cytometry returning with ~70% blasts. IHC positive for CD13 and , although partial. He also has evidence of dysplasia. Cytogenetics are pending.   -We held a long discussion today in regards to the diagnosis of AML. Patient is elderly at the age of 88. We had reviewed five year overall survival for >75 is 2.6% and >80 is 0%. He has good functional status and we are awaiting his final molecular/mutational studies for further risk stratification and prognostication. Patient is not fit for intensive induction therapy nor would he be an allogeneic transplant candidate. As such, treatment with 7+3 or vyxeos would not be indicated. We had discussed reduced intensity induction with HMA and venetoclax. I reviewed the VIALE-A trial in which venetoclax and azacytidine had improved CR and overall survival from 10 mths to 15 months vs azaycytidine alone. However, we reviewed significant risk of adverse effect including febrile neutropenia with increased morbidity and mortality. I discussed I would only be comfortable initiating this treatment on an inpatient basis due to risk of cytopenias and TLS. Patient expresses his understanding of the above. He will discuss with his children and we will revisit on Friday with plans for treatment vs best supportive care. Continue antimicrobial prophylaxis at this time.    2) Gross hematuria  -Followed by urology.  -CT urogram was negative.  -Cystoscopy without obvious cause of hematuria in the bladder.   -It appears urine cytology was sent and was not able to send for FISH analysis due to scant cellularity.    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     4) Pancreatic head lesion  -Seen on CT A/P and urogram. MRCP was negative.   -He can have repeat CT A/P in 1 year.     5) Follow up on Friday 3/17/23 at 4:30 PM.         Jennifer Morrissey,   Hematology/Oncology  [facility]        Again, thank you for allowing me to participate in the care of your patient.        Sincerely,        Jennifer Morrissey, DO

## 2023-03-16 NOTE — PROGRESS NOTES
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 PMH is significant for Afib on eliquis, mitral prolapse with MR, renal calculus.     On 12/6/22, WBC 2.1, ANC 1.5, Hb 9.6, , PLT 118K. He has had chronic, mild thrombocytopenia since 2015. Neutropenia and anemia have been new over this last year.     He is noted to be followed by urology for gross hematuria. CT urogram/cystoscopy pending.    He underwent EGD and colonoscopy 12/2022. EGD was normal with erosive gastropathy without bleed. Z-line was irregular. He had grade C reflux esophagitis with bleeding. Colon was normal with a 2 mm polyp removed from the ascending colon.     He has 10-15 lb weight loss. He has good appetite. No LAD. No drenching night sweats, unexplained fever.     He is a retired psyciatrist, but continues to publish. He served in the  and was stationed in [place] following the [place] War. He then worked at [employer], [employer], and the [employer].     Patient lives at home alone and is able to perform all ADLs without issue. He has six children who are doing well. His twins are in gastroenterology and palliative care. His eldest son suffers from chronic alcoholism.        INTERIM HISTORY:  Patient has completed bone marrow biopsy. 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 He has been started on prophylaxis.       REVIEW OF SYSTEMS:   A 14 point ROS was reviewed with pertinent positives and negatives in the HPI.        HOME MEDICATIONS:  Current Outpatient Medications   Medication Sig Dispense Refill     acyclovir (ZOVIRAX) 400 MG tablet Take 1 tablet (400 mg) by mouth 2 times daily 60 tablet 1     cefpodoxime (VANTIN) 200 MG tablet Take 1 tablet (200 mg) by mouth 2 times daily 60 tablet 1     ELIQUIS  ANTICOAGULANT 5 MG tablet TAKE 1 TABLET BY MOUTH TWICE A DAY (Patient not taking: Reported on 3/1/2023) 180 tablet 3     fluconazole (DIFLUCAN) 200 MG tablet Take 1 tablet (200 mg) by mouth daily 30 tablet 1     fluticasone (FLONASE) 50 MCG/ACT nasal spray INSTILL 1 SPRAY INTO BOTH NOSTRILS DAILY 48 mL 3     gabapentin (NEURONTIN) 100 MG capsule Take 2 capsules (200 mg) by mouth nightly as needed for other (insomnia) 60 capsule 1     methylcellulose (CITRUCEL) 500 MG TABS tablet Take 500 mg by mouth daily       metoprolol succinate ER (TOPROL XL) 50 MG 24 hr tablet TAKE 1 TABLET BY MOUTH EVERY DAY 90 tablet 2     Multiple Vitamins-Minerals (MULTIVITAL) TABS Take  by mouth daily.       pantoprazole (PROTONIX) 40 MG EC tablet Take 40 mg by mouth daily       polyethylene glycol (MIRALAX) 17 GM/Dose powder Take 17 g by mouth daily (Patient not taking: Reported on 3/13/2023) 510 g 0     tamsulosin (FLOMAX) 0.4 MG capsule Take 2 capsules (0.8 mg) by mouth daily 180 capsule 3     vitamin B complex with vitamin C (STRESS TAB) tablet Take 1 tablet by mouth daily           ALLERGIES:  Allergies   Allergen Reactions     Ciprofloxacin      Acute confusion     Sulfamethizole Unknown     Confusion    3/13/2023 Pt says this is not an allergy         PAST MEDICAL HISTORY:  Past Medical History:   Diagnosis Date     Arrhythmia      Atrial fibrillation      Calculus of kidney      Mitral prolapse     mild, with mild MR     Prostate infection      STD (sexually transmitted disease)          PAST SURGICAL HISTORY:  Past Surgical History:   Procedure Laterality Date     ARTHROSCOPY SHOULDER ROTATOR CUFF REPAIR Right 1996     BACK SURGERY       COLONOSCOPY N/A 12/01/2022    Procedure: COLONOSCOPY;  Surgeon: Karthik Mcmanus MD;  Location: [facility] OR     CYSTOSCOPY       CYSTOSCOPY, TRANSURETHRAL RESECTION (TUR) PROSTATE, COMBINED  04/1993    S/P TURP     ESOPHAGOSCOPY, GASTROSCOPY, DUODENOSCOPY (EGD), COMBINED N/A 12/01/2022     "Procedure: ESOPHAGOGASTRODUODENOSCOPY;  Surgeon: Karthik Mcmanus MD;  Location: [facility] OR     PROSTATE SURGERY           SOCIAL HISTORY:  Social History     Socioeconomic History     Marital status:      Spouse name: Not on file     Number of children: Not on file     Years of education: Not on file     Highest education level: Not on file   Occupational History     Not on file   Tobacco Use     Smoking status: Never     Smokeless tobacco: Never   Vaping Use     Vaping Use: Never used   Substance and Sexual Activity     Alcohol use: Yes     Comment: very rare if at all     Drug use: No     Sexual activity: Not Currently   Other Topics Concern     Parent/sibling w/ CABG, MI or angioplasty before 65F 55M? Not Asked   Social History Narrative     Not on file     Social Determinants of Health     Financial Resource Strain: Not on file   Food Insecurity: Not on file   Transportation Needs: Not on file   Physical Activity: Not on file   Stress: Not on file   Social Connections: Not on file   Intimate Partner Violence: Not At Risk     Fear of Current or Ex-Partner: No     Emotionally Abused: No     Physically Abused: No     Sexually Abused: No   Housing Stability: Not on file         FAMILY HISTORY:  Family History   Problem Relation Age of Onset     Coronary Artery Disease Father      No Known Problems Mother          PHYSICAL EXAM:  Vital signs:  /57   Pulse 55   Temp 97.7  F (36.5  C) (Oral)   Resp 16   Ht 1.778 m (5' 10\")   Wt 61.7 kg (136 lb)   SpO2 100%   BMI 19.51 kg/m     ECO-1.5  GENERAL/CONSTITUTIONAL: No acute distress. Thin. Appears stated age.  EYES: Pupils are equal, round, and react to light and accommodation. Extraocular movements intact.  No scleral icterus.  RESPIRATORY: Clear to auscultation bilaterally. No crackles or wheezing.   CARDIOVASCULAR: Regular rate and rhythm. +2/6 holosystolic murmur.   GASTROINTESTINAL: No hepatosplenomegaly, masses, or tenderness. The " patient has normal bowel sounds. No guarding.  No distention.  MUSCULOSKELETAL: Warm and well-perfused, no cyanosis, clubbing, or edema.  NEUROLOGIC: Cranial nerves II-XII are intact. Alert, oriented, answers questions appropriately.  INTEGUMENTARY: No rashes or jaundice.  GAIT: Steady, does not use assistive device.      LABS:   Latest Reference Range & Units 03/13/23 08:43   WBC 4.0 - 11.0 10e3/uL 1.4 (L)   Hemoglobin 13.3 - 17.7 g/dL 7.4 (L)   Hematocrit 40.0 - 53.0 % 22.9 (L)   Platelet Count 150 - 450 10e3/uL 82 (L)   RBC Count 4.40 - 5.90 10e6/uL 2.06 (L)   MCV 78 - 100 fL 111 (H)   MCH 26.5 - 33.0 pg 35.9 (H)   MCHC 31.5 - 36.5 g/dL 32.3   RDW 10.0 - 15.0 % 22.1 (H)   % Neutrophils % 27 (P)   % Lymphocytes % 68 (P)   % Monocytes % 0 (P)   % Eosinophils % 2 (P)   % Basophils % 2 (P)   % Metamyelocytes % 1 (P)   Absolute Basophils 0.0 - 0.2 10e3/uL 0.0 (P)   Absolute Neutrophil 1.6 - 8.3 10e3/uL 0.4 (LL) (P)   Absolute Lymphocytes 0.8 - 5.3 10e3/uL 1.0 (P)   Absolute Monocytes 0.0 - 1.3 10e3/uL 0.0 (P)   Absolute Eosinophils 0.0 - 0.7 10e3/uL 0.0 (P)   Absolute Metamyelocytes <=0.0 10e3/uL 0.0 (P)   RBC Morphology  Confirmed RBC Indices (P)   Platelet Morphology Automated Count Confirmed. Platelet morphology is normal.  Automated Count Confirmed. Platelet morphology is normal. (P)   RBC Fragments None Seen  Slight ! (P)      Latest Reference Range & Units 01/05/23 10:51   Erythropoietin 4 - 27 mU/mL 178 (H)   Ferritin 31 - 409 ng/mL 356   Iron 61 - 157 ug/dL 200 (H)   Iron Binding Capacity 240 - 430 ug/dL 222 (L)   Iron Sat Index 15 - 46 % 90 (H)   Lactate Dehydrogenase 0 - 250 U/L 160   TSH 0.30 - 4.20 uIU/mL 1.85   Vitamin B12 232 - 1,245 pg/mL 374   % Retic 0.5 - 2.0 % 1.4   Absolute Retic 0.025 - 0.095 10e6/uL 0.039   INR 0.85 - 1.15  1.55 (H)   PTT 22 - 38 Seconds 37   Fibrinogen 170 - 490 mg/dL 300   SPECIMEN EXPIRATION DATE  [id number]   JOSE C Anti-IgG,-C3d  Negative      Latest Reference Range & Units  01/05/23 10:51   Haptoglobin 32 - 197 mg/dL 115       PATHOLOGY:  Case Report   Surgical Pathology Report                         Case: [id number]                                   Authorizing Provider:  Karthik Mcmanus         Collected:           12/01/2022 10:08 AM                                  MD Manny                                                                   Ordering Location:     [facility]   Received:            12/01/2022 10:47 AM                                  [facility]                                                                       Pathologist:           Barbara Hawkins MD                                                            Specimens:   A) - Stomach, RANDOM STOMACH BIOPSY - GASTRITIS                                                      B) - Large Intestine, Colon, RANDOM COLON BIOPSIES                                                   C) - Large Intestine, Colon, COLON ASCENDING POLYP                                                   D) - Large Intestine, Colon, COLOON TRANSVERSE POLYP                                       Addendum   This addendum is issued to include findings of Helicobacter pylori immunoperoxidase stain performed on gastric biopsy (specimen A) for further evaluation, using appropriate controls.  The H. pylori stain is negative.  All previously reported findings remain unchanged.   Addendum electronically signed by Barbara Hawkins MD on 12/9/2022 at 12:05 AM   Final Diagnosis   A.  Stomach, biopsy-  - Mild chronic gastritis.  - Negative for active gastritis, intestinal metaplasia, gastric epithelial dysplasia, or malignancy.  - Pending Helicobacter pylori stain (see forthcoming addendum for results).  - Sampling includes: Gastric antrum and fundus/body-type mucosa.     B.  Colon, random biopsies:  - Negative for colitis, dysplasia, and malignancy.     C.  Ascending colon, polyp, polypectomy:  - Consistent with diminutive tubular adenoma.  -  Polyp size: 2 mm (per endoscopy report).  - Negative for high-grade dysplasia and malignancy.  - Complete resection and retrieval (per endoscopy report).     D.  Transverse colon, polyp, polypectomy:  - Consistent with inflammatory-type benign mucosal polyp (predominantly denuded mucosal surface precludes accurate assessment).  - Polyp size: 8 mm (per endoscopy report).  - Negative for dysplasia and malignancy.         Final Diagnosis 1/5/23:   Peripheral blood for morphology:  -Pancytopenia including:  -Moderate normochromic, macrocytic anemia with evidence of red cell regeneration  -Moderate leukopenia with absolute neutropenia and lymphopenia; leukocytes without morphologic abnormalities  -Mild thrombocytopenia          Bone Marrow Biopsy 3/13/23:  Component Ref Range & Units  Resulting Agency   Final Diagnosis   Peripheral blood for morphology:  -Pancytopenia including:  -Marked normochromic, macrocytic anemia without evidence of red cell regeneration  -Marked leukopenia with marked absolute neutropenia without overt dysplastic change and without circulating blasts  -Moderate thrombocytopenia     Bone marrow, biopsy with aspirate and special studies for evaluation:  -Acute myelogenous leukemia, see comment, dysplastic myeloid precursors also noted  -Completed immunophenotyping studies show 69% blasts consistent with myeloid lineage, small blast percentage (<1%, showing B-cell lineage also noted, please see synopsis within this report and separate full completed report)  -Cytogenetic studies in process with results to be reported separately by the performing laboratory when completed  -Acute myeloid leukemia focused panel in process with results be reported by the performing laboratory when completed     Comment       With the expression of CD13 and  (although partial) acute myeloid leukemia (AML) is favored, this immunophenotype may be consistent with AML, NOS, minimally differentiated depending on the  results of genetic studies. Of note, a very small population appears to have some B-lineage differentiation; however this population accounts for <1% of leukocytes.          Case Report   Flow Cytometry Report                             Case: [id number]                                   Authorizing Provider:  Jennifer Morrissey DO         Collected:           03/13/2023 10:44 AM           Ordering Location:     [facility]   Received:            03/13/2023 11:06 AM                                  [facility]                                                             Pathologist:           Mary Middleton MD                                                            Specimen:    Iliac Crest, Bone Marrow Aspirate, Left                                                    Flow Interpretation   A. Iliac Crest, Bone Marrow Aspirate, Left:  -Increased blasts (69%)  See comment      Electronically signed by Mary Middleton MD on 3/14/2023 at  1:58 PM   Comment     With the expression of CD13 and  (although partial) acute myeloid leukemia (AML) is favored, this immunophenotype may be consistent with AML, NOS, minimally differentiated depending on the results of genetic studies. Of note, a very small population appears to have some B-lineage differentiation; however this population accounts for <1% of leukocytes.              IMAGING:  CT A/P 9/15/22:  IMPRESSION:   1.  No acute abnormality identified.  2.  Heterogeneous prostate is noted. Correlate with any evidence for  underlying prostate disease.  3.  A few nonobstructing stones within the left kidney. No  hydronephrosis.  4.  A tiny hypodense nodule at the pancreas head is identified and is  indeterminant. This may have been faintly present on the older CT from  2/25/2015. Suggest correlation with the pancreas MRI.      MRCP 10/29/22:  IMPRESSION:  No pancreatic abnormalities are identified. The previously noted small hypodense pancreatic head  lesion is not visualized on today's MRI study.    CT urogram 1/31/23:  IMPRESSION:   1.  Nonobstructing stones within the left kidney again noted. One of  these has moved into the left renal pelvis. No hydronephrosis.  2.  No acute abnormality identified.  3.  Previously noted tiny hypodensity at the pancreas head appears  stable compared to the CT from 9/15/2022. This is unable to be  visualized on the MRI from 10/29/2022. This could just be a tiny  cystic lesion. Suggest 1-year follow-up CT to assess for stability.  4.  Mildly prominent prostate.        ASSESSMENT/PLAN:  Zbigniew Mendoza is an 88 year old male who is referred for pancytopenia. PMH is significant for Afib on eliquis, mitral prolapse with MR, renal calculus.     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   -Currently, ANC is 1.2, Hb 9.6, , and .   -He underwent EGD and colonoscopy 12/2022. EGD was normal with erosive gastropathy without bleed. Z-line was irregular. He had grade C reflux esophagitis with bleeding. Colon was normal with a 2 mm polyp removed from the ascending colon.   -He is undergoing workup with urology for gross hematuria (see below).  -He has not been started on any new recent medications nor does have concerning symptoms for underlying infection. I reviewed the possibilities of nutritional deficiency, but given his age, there is a possibility of an underlying marrow disorder such as myelodysplastic syndrome or myelofibrosis.   -Patient is not hemolyzing. Iron stores, B12 WNL. Folate low at 2 and he has started on supplementation.  -TSH 1.85.  -Patient underwent bone marrow biopsy with FLOW cytometry returning with ~70% blasts. IHC positive for CD13 and , although partial. He also has evidence of dysplasia. Cytogenetics are pending.   -We held a long discussion today in regards to the  diagnosis of AML. Patient is elderly at the age of 88. We had reviewed five year overall survival for >75 is 2.6% and >80 is 0%. He has good functional status and we are awaiting his final molecular/mutational studies for further risk stratification and prognostication. Patient is not fit for intensive induction therapy nor would he be an allogeneic transplant candidate. As such, treatment with 7+3 or vyxeos would not be indicated. We had discussed reduced intensity induction with HMA and venetoclax. I reviewed the VIALE-A trial in which venetoclax and azacytidine had improved CR and overall survival from 10 mths to 15 months vs azaycytidine alone. However, we reviewed significant risk of adverse effect including febrile neutropenia with increased morbidity and mortality. I discussed I would only be comfortable initiating this treatment on an inpatient basis due to risk of cytopenias and TLS. Patient expresses his understanding of the above. He will discuss with his children and we will revisit on Friday with plans for treatment vs best supportive care. Continue antimicrobial prophylaxis at this time.    2) Gross hematuria  -Followed by urology.  -CT urogram was negative.  -Cystoscopy without obvious cause of hematuria in the bladder.   -It appears urine cytology was sent and was not able to send for FISH analysis due to scant cellularity.    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     4) Pancreatic head lesion  -Seen on CT A/P and urogram. MRCP was negative.   -He can have repeat CT A/P in 1 year.     5) Long discussion held with patient and family today in regards to goals of care. Ultimately, patient will continue to weigh best supportive care vs initiation of treatment with HMA/Venetoclax. I had discussed if patient is to move forward with systemic treatment, would elect for inpatient treatment with cycle #1 azacytidine and then if tolerating well, will add venetoclax with  cycle#2 also to be given inpatient due to ramp up and risk of TLS.   -At this time, will continue with weekly CBC and blood/platelet transfusion as needed.   -Will also set up for weekly N-plate as well.   -I have reservation with HERMES given blasts of 70%. Will old on GCSF for this reason.   -Patient and family requesting palliative care referral to which I am in agreement.  -Follow up in 2 weeks to review goals of care.     Complexity: HIGH.    Jennifer Morrissey DO  Hematology/Oncology  [facility]

## 2023-03-17 PROBLEM — C92.00 ACUTE MYELOID LEUKEMIA NOT HAVING ACHIEVED REMISSION (H): Status: ACTIVE | Noted: 2023-01-01

## 2023-03-17 PROBLEM — D69.6 THROMBOCYTOPENIA (H): Status: ACTIVE | Noted: 2023-01-01

## 2023-03-17 NOTE — LETTER
3/17/2023         RE: Zbigniew Mendoza  [address]  [facility] [id number]        Dear Colleague,    Thank you for referring your patient, Zbigniew Mendoza, to the [facility]. Please see a copy of my visit note below.    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 PMH is significant for Afib on eliquis, mitral prolapse with MR, renal calculus.     On 12/6/22, WBC 2.1, ANC 1.5, Hb 9.6, , PLT 118K. He has had chronic, mild thrombocytopenia since 2015. Neutropenia and anemia have been new over this last year.     He is noted to be followed by urology for gross hematuria. CT urogram/cystoscopy pending.    He underwent EGD and colonoscopy 12/2022. EGD was normal with erosive gastropathy without bleed. Z-line was irregular. He had grade C reflux esophagitis with bleeding. Colon was normal with a 2 mm polyp removed from the ascending colon.     He has 10-15 lb weight loss. He has good appetite. No LAD. No drenching night sweats, unexplained fever.     He is a retired psyciatrist, but continues to publish. He served in the  and was stationed in [place] following the [place] War. He then worked at [employer] [place] [employer], and the [employer].     Patient lives at home alone and is able to perform all ADLs without issue. He has six children who are doing well. His twins are in gastroenterology and palliative care. His eldest son suffers from chronic alcoholism.        INTERIM HISTORY:  Patient has completed bone marrow biopsy. 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 He has been started on prophylaxis.       REVIEW OF SYSTEMS:   A 14 point ROS was reviewed with pertinent positives and negatives in the HPI.        HOME MEDICATIONS:  Current  Outpatient Medications   Medication Sig Dispense Refill     acyclovir (ZOVIRAX) 400 MG tablet Take 1 tablet (400 mg) by mouth 2 times daily 60 tablet 1     cefpodoxime (VANTIN) 200 MG tablet Take 1 tablet (200 mg) by mouth 2 times daily 60 tablet 1     ELIQUIS ANTICOAGULANT 5 MG tablet TAKE 1 TABLET BY MOUTH TWICE A DAY (Patient not taking: Reported on 3/1/2023) 180 tablet 3     fluconazole (DIFLUCAN) 200 MG tablet Take 1 tablet (200 mg) by mouth daily 30 tablet 1     fluticasone (FLONASE) 50 MCG/ACT nasal spray INSTILL 1 SPRAY INTO BOTH NOSTRILS DAILY 48 mL 3     gabapentin (NEURONTIN) 100 MG capsule Take 2 capsules (200 mg) by mouth nightly as needed for other (insomnia) 60 capsule 1     methylcellulose (CITRUCEL) 500 MG TABS tablet Take 500 mg by mouth daily       metoprolol succinate ER (TOPROL XL) 50 MG 24 hr tablet TAKE 1 TABLET BY MOUTH EVERY DAY 90 tablet 2     Multiple Vitamins-Minerals (MULTIVITAL) TABS Take  by mouth daily.       pantoprazole (PROTONIX) 40 MG EC tablet Take 40 mg by mouth daily       polyethylene glycol (MIRALAX) 17 GM/Dose powder Take 17 g by mouth daily (Patient not taking: Reported on 3/13/2023) 510 g 0     tamsulosin (FLOMAX) 0.4 MG capsule Take 2 capsules (0.8 mg) by mouth daily 180 capsule 3     vitamin B complex with vitamin C (STRESS TAB) tablet Take 1 tablet by mouth daily           ALLERGIES:  Allergies   Allergen Reactions     Ciprofloxacin      Acute confusion     Sulfamethizole Unknown     Confusion    3/13/2023 Pt says this is not an allergy         PAST MEDICAL HISTORY:  Past Medical History:   Diagnosis Date     Arrhythmia      Atrial fibrillation      Calculus of kidney      Mitral prolapse     mild, with mild MR     Prostate infection      STD (sexually transmitted disease)          PAST SURGICAL HISTORY:  Past Surgical History:   Procedure Laterality Date     ARTHROSCOPY SHOULDER ROTATOR CUFF REPAIR Right 1996     BACK SURGERY       COLONOSCOPY N/A 12/01/2022     "Procedure: COLONOSCOPY;  Surgeon: Karthik Mcmanus MD;  Location: [facility] OR     CYSTOSCOPY       CYSTOSCOPY, TRANSURETHRAL RESECTION (TUR) PROSTATE, COMBINED  1993    S/P TURP     ESOPHAGOSCOPY, GASTROSCOPY, DUODENOSCOPY (EGD), COMBINED N/A 2022    Procedure: ESOPHAGOGASTRODUODENOSCOPY;  Surgeon: Karthik Mcmanus MD;  Location: [facility] OR     PROSTATE SURGERY           SOCIAL HISTORY:  Social History     Socioeconomic History     Marital status:      Spouse name: Not on file     Number of children: Not on file     Years of education: Not on file     Highest education level: Not on file   Occupational History     Not on file   Tobacco Use     Smoking status: Never     Smokeless tobacco: Never   Vaping Use     Vaping Use: Never used   Substance and Sexual Activity     Alcohol use: Yes     Comment: very rare if at all     Drug use: No     Sexual activity: Not Currently   Other Topics Concern     Parent/sibling w/ CABG, MI or angioplasty before 65F 55M? Not Asked   Social History Narrative     Not on file     Social Determinants of Health     Financial Resource Strain: Not on file   Food Insecurity: Not on file   Transportation Needs: Not on file   Physical Activity: Not on file   Stress: Not on file   Social Connections: Not on file   Intimate Partner Violence: Not At Risk     Fear of Current or Ex-Partner: No     Emotionally Abused: No     Physically Abused: No     Sexually Abused: No   Housing Stability: Not on file         FAMILY HISTORY:  Family History   Problem Relation Age of Onset     Coronary Artery Disease Father      No Known Problems Mother          PHYSICAL EXAM:  Vital signs:  /57   Pulse 55   Temp 97.7  F (36.5  C) (Oral)   Resp 16   Ht 1.778 m (5' 10\")   Wt 61.7 kg (136 lb)   SpO2 100%   BMI 19.51 kg/m     ECO-1.5  GENERAL/CONSTITUTIONAL: No acute distress. Thin. Appears stated age.  EYES: Pupils are equal, round, and react to light and accommodation. " Extraocular movements intact.  No scleral icterus.  RESPIRATORY: Clear to auscultation bilaterally. No crackles or wheezing.   CARDIOVASCULAR: Regular rate and rhythm. +2/6 holosystolic murmur.   GASTROINTESTINAL: No hepatosplenomegaly, masses, or tenderness. The patient has normal bowel sounds. No guarding.  No distention.  MUSCULOSKELETAL: Warm and well-perfused, no cyanosis, clubbing, or edema.  NEUROLOGIC: Cranial nerves II-XII are intact. Alert, oriented, answers questions appropriately.  INTEGUMENTARY: No rashes or jaundice.  GAIT: Steady, does not use assistive device.      LABS:   Latest Reference Range & Units 03/13/23 08:43   WBC 4.0 - 11.0 10e3/uL 1.4 (L)   Hemoglobin 13.3 - 17.7 g/dL 7.4 (L)   Hematocrit 40.0 - 53.0 % 22.9 (L)   Platelet Count 150 - 450 10e3/uL 82 (L)   RBC Count 4.40 - 5.90 10e6/uL 2.06 (L)   MCV 78 - 100 fL 111 (H)   MCH 26.5 - 33.0 pg 35.9 (H)   MCHC 31.5 - 36.5 g/dL 32.3   RDW 10.0 - 15.0 % 22.1 (H)   % Neutrophils % 27 (P)   % Lymphocytes % 68 (P)   % Monocytes % 0 (P)   % Eosinophils % 2 (P)   % Basophils % 2 (P)   % Metamyelocytes % 1 (P)   Absolute Basophils 0.0 - 0.2 10e3/uL 0.0 (P)   Absolute Neutrophil 1.6 - 8.3 10e3/uL 0.4 (LL) (P)   Absolute Lymphocytes 0.8 - 5.3 10e3/uL 1.0 (P)   Absolute Monocytes 0.0 - 1.3 10e3/uL 0.0 (P)   Absolute Eosinophils 0.0 - 0.7 10e3/uL 0.0 (P)   Absolute Metamyelocytes <=0.0 10e3/uL 0.0 (P)   RBC Morphology  Confirmed RBC Indices (P)   Platelet Morphology Automated Count Confirmed. Platelet morphology is normal.  Automated Count Confirmed. Platelet morphology is normal. (P)   RBC Fragments None Seen  Slight ! (P)      Latest Reference Range & Units 01/05/23 10:51   Erythropoietin 4 - 27 mU/mL 178 (H)   Ferritin 31 - 409 ng/mL 356   Iron 61 - 157 ug/dL 200 (H)   Iron Binding Capacity 240 - 430 ug/dL 222 (L)   Iron Sat Index 15 - 46 % 90 (H)   Lactate Dehydrogenase 0 - 250 U/L 160   TSH 0.30 - 4.20 uIU/mL 1.85   Vitamin B12 232 - 1,245 pg/mL 374    % Retic 0.5 - 2.0 % 1.4   Absolute Retic 0.025 - 0.095 10e6/uL 0.039   INR 0.85 - 1.15  1.55 (H)   PTT 22 - 38 Seconds 37   Fibrinogen 170 - 490 mg/dL 300   SPECIMEN EXPIRATION DATE  [id number]   JOSE C Anti-IgG,-C3d  Negative      Latest Reference Range & Units 01/05/23 10:51   Haptoglobin 32 - 197 mg/dL 115       PATHOLOGY:  Case Report   Surgical Pathology Report                         Case: [id number]                                   Authorizing Provider:  Karthik Mcmanus         Collected:           12/01/2022 10:08 AM                                  MD Manny                                                                   Ordering Location:     [facility]   Received:            12/01/2022 10:47 AM                                  Main OR                                                                       Pathologist:           Barbara Hawkins MD                                                            Specimens:   A) - Stomach, RANDOM STOMACH BIOPSY - GASTRITIS                                                      B) - Large Intestine, Colon, RANDOM COLON BIOPSIES                                                   C) - Large Intestine, Colon, COLON ASCENDING POLYP                                                   D) - Large Intestine, Colon, COLOON TRANSVERSE POLYP                                       Addendum   This addendum is issued to include findings of Helicobacter pylori immunoperoxidase stain performed on gastric biopsy (specimen A) for further evaluation, using appropriate controls.  The H. pylori stain is negative.  All previously reported findings remain unchanged.   Addendum electronically signed by Barbara Hawkins MD on 12/9/2022 at 12:05 AM   Final Diagnosis   A.  Stomach, biopsy-  - Mild chronic gastritis.  - Negative for active gastritis, intestinal metaplasia, gastric epithelial dysplasia, or malignancy.  - Pending Helicobacter pylori stain (see forthcoming  addendum for results).  - Sampling includes: Gastric antrum and fundus/body-type mucosa.     B.  Colon, random biopsies:  - Negative for colitis, dysplasia, and malignancy.     C.  Ascending colon, polyp, polypectomy:  - Consistent with diminutive tubular adenoma.  - Polyp size: 2 mm (per endoscopy report).  - Negative for high-grade dysplasia and malignancy.  - Complete resection and retrieval (per endoscopy report).     D.  Transverse colon, polyp, polypectomy:  - Consistent with inflammatory-type benign mucosal polyp (predominantly denuded mucosal surface precludes accurate assessment).  - Polyp size: 8 mm (per endoscopy report).  - Negative for dysplasia and malignancy.         Final Diagnosis 1/5/23:   Peripheral blood for morphology:  -Pancytopenia including:  -Moderate normochromic, macrocytic anemia with evidence of red cell regeneration  -Moderate leukopenia with absolute neutropenia and lymphopenia; leukocytes without morphologic abnormalities  -Mild thrombocytopenia          Bone Marrow Biopsy 3/13/23:  Component Ref Range & Units  Resulting Agency   Final Diagnosis   Peripheral blood for morphology:  -Pancytopenia including:  -Marked normochromic, macrocytic anemia without evidence of red cell regeneration  -Marked leukopenia with marked absolute neutropenia without overt dysplastic change and without circulating blasts  -Moderate thrombocytopenia     Bone marrow, biopsy with aspirate and special studies for evaluation:  -Acute myelogenous leukemia, see comment, dysplastic myeloid precursors also noted  -Completed immunophenotyping studies show 69% blasts consistent with myeloid lineage, small blast percentage (<1%, showing B-cell lineage also noted, please see synopsis within this report and separate full completed report)  -Cytogenetic studies in process with results to be reported separately by the performing laboratory when completed  -Acute myeloid leukemia focused panel in process with results be  reported by the performing laboratory when completed     Comment       With the expression of CD13 and  (although partial) acute myeloid leukemia (AML) is favored, this immunophenotype may be consistent with AML, NOS, minimally differentiated depending on the results of genetic studies. Of note, a very small population appears to have some B-lineage differentiation; however this population accounts for <1% of leukocytes.          Case Report   Flow Cytometry Report                             Case: [id number]                                   Authorizing Provider:  Jennifer Morrissey DO         Collected:           03/13/2023 10:44 AM           Ordering Location:     [facility]   Received:            03/13/2023 11:06 AM                                  [facility]                                                             Pathologist:           Mary Middleton MD                                                            Specimen:    Iliac Crest, Bone Marrow Aspirate, Left                                                    Flow Interpretation   A. Iliac Crest, Bone Marrow Aspirate, Left:  -Increased blasts (69%)  See comment      Electronically signed by Mary Middleton MD on 3/14/2023 at  1:58 PM   Comment     With the expression of CD13 and  (although partial) acute myeloid leukemia (AML) is favored, this immunophenotype may be consistent with AML, NOS, minimally differentiated depending on the results of genetic studies. Of note, a very small population appears to have some B-lineage differentiation; however this population accounts for <1% of leukocytes.              IMAGING:  CT A/P 9/15/22:  IMPRESSION:   1.  No acute abnormality identified.  2.  Heterogeneous prostate is noted. Correlate with any evidence for  underlying prostate disease.  3.  A few nonobstructing stones within the left kidney. No  hydronephrosis.  4.  A tiny hypodense nodule at the pancreas head is identified and  is  indeterminant. This may have been faintly present on the older CT from  2/25/2015. Suggest correlation with the pancreas MRI.      MRCP 10/29/22:  IMPRESSION:  No pancreatic abnormalities are identified. The previously noted small hypodense pancreatic head lesion is not visualized on today's MRI study.    CT urogram 1/31/23:  IMPRESSION:   1.  Nonobstructing stones within the left kidney again noted. One of  these has moved into the left renal pelvis. No hydronephrosis.  2.  No acute abnormality identified.  3.  Previously noted tiny hypodensity at the pancreas head appears  stable compared to the CT from 9/15/2022. This is unable to be  visualized on the MRI from 10/29/2022. This could just be a tiny  cystic lesion. Suggest 1-year follow-up CT to assess for stability.  4.  Mildly prominent prostate.        ASSESSMENT/PLAN:  Zbigniew Mendoza is an 88 year old male who is referred for pancytopenia. PMH is significant for Afib on eliquis, mitral prolapse with MR, renal calculus.     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   -Currently, ANC is 1.2, Hb 9.6, , and .   -He underwent EGD and colonoscopy 12/2022. EGD was normal with erosive gastropathy without bleed. Z-line was irregular. He had grade C reflux esophagitis with bleeding. Colon was normal with a 2 mm polyp removed from the ascending colon.   -He is undergoing workup with urology for gross hematuria (see below).  -He has not been started on any new recent medications nor does have concerning symptoms for underlying infection. I reviewed the possibilities of nutritional deficiency, but given his age, there is a possibility of an underlying marrow disorder such as myelodysplastic syndrome or myelofibrosis.   -Patient is not hemolyzing. Iron stores, B12 WNL. Folate low at 2 and he has started on supplementation.  -TSH  1.85.  -Patient underwent bone marrow biopsy with FLOW cytometry returning with ~70% blasts. IHC positive for CD13 and , although partial. He also has evidence of dysplasia. Cytogenetics are pending.   -We held a long discussion today in regards to the diagnosis of AML. Patient is elderly at the age of 88. We had reviewed five year overall survival for >75 is 2.6% and >80 is 0%. He has good functional status and we are awaiting his final molecular/mutational studies for further risk stratification and prognostication. Patient is not fit for intensive induction therapy nor would he be an allogeneic transplant candidate. As such, treatment with 7+3 or vyxeos would not be indicated. We had discussed reduced intensity induction with HMA and venetoclax. I reviewed the VIALE-A trial in which venetoclax and azacytidine had improved CR and overall survival from 10 mths to 15 months vs azaycytidine alone. However, we reviewed significant risk of adverse effect including febrile neutropenia with increased morbidity and mortality. I discussed I would only be comfortable initiating this treatment on an inpatient basis due to risk of cytopenias and TLS. Patient expresses his understanding of the above. He will discuss with his children and we will revisit on Friday with plans for treatment vs best supportive care. Continue antimicrobial prophylaxis at this time.    2) Gross hematuria  -Followed by urology.  -CT urogram was negative.  -Cystoscopy without obvious cause of hematuria in the bladder.   -It appears urine cytology was sent and was not able to send for FISH analysis due to scant cellularity.    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     4) Pancreatic head lesion  -Seen on CT A/P and urogram. MRCP was negative.   -He can have repeat CT A/P in 1 year.     5) Long discussion held with patient and family today in regards to goals of care. Ultimately, patient will continue to  weigh best supportive care vs initiation of treatment with HMA/Venetoclax. I had discussed if patient is to move forward with systemic treatment, would elect for inpatient treatment with cycle #1 azacytidine and then if tolerating well, will add venetoclax with cycle#2 also to be given inpatient due to ramp up and risk of TLS.   -At this time, will continue with weekly CBC and blood/platelet transfusion as needed.   -Will also set up for weekly N-plate as well.   -I have reservation with HERMES given blasts of 70%. Will old on GCSF for this reason.   -Patient and family requesting palliative care referral to which I am in agreement.  -Follow up in 2 weeks to review goals of care.     Complexity: HIGH.    eJnnifer Morrissey DO  Hematology/Oncology  [facility]        Again, thank you for allowing me to participate in the care of your patient.        Sincerely,        Jennifer Morrissey DO

## 2023-03-17 NOTE — NURSING NOTE
"Oncology Rooming Note    March 17, 2023 3:50 PM   Zbigniew Mendoza is a 88 year old male who presents for:    Chief Complaint   Patient presents with     Oncology Clinic Visit     Pancytopenia      Initial Vitals: /57   Pulse 55   Temp 97.7  F (36.5  C) (Oral)   Resp 16   Ht 1.778 m (5' 10\")   Wt 61.7 kg (136 lb)   SpO2 100%   BMI 19.51 kg/m   Estimated body mass index is 19.51 kg/m  as calculated from the following:    Height as of this encounter: 1.778 m (5' 10\").    Weight as of this encounter: 61.7 kg (136 lb). Body surface area is 1.75 meters squared.  No Pain (0) Comment: Data Unavailable   No LMP for male patient.  Allergies reviewed: Yes  Medications reviewed: Yes    Medications: Medication refills not needed today.  Pharmacy name entered into [employer]: [id number] - [place], [place] - [address]    Clinical concerns: f/u       Kylie Cardoso CMA              "

## 2023-03-23 NOTE — TELEPHONE ENCOUNTER
"Reviewed labs. Chi does not qualify for a blood or platelet transfusion at this time. Writer contacted him to discuss his lab results. \"My leg weakness is better this afternoon, I'm out shopping and doing okay. I think I was just a little bit dehydrated.\" Chi confirms that he has been making sure to drink fluids and denies the need to come in for IV fluids at this time. Will recheck labs again next week. Writer reviewed the contact information for our triage team. Chi verbalized understanding.    Gavi Lacy RN on 3/23/2023 at 2:23 PM    "

## 2023-03-23 NOTE — TELEPHONE ENCOUNTER
Chi is coming in for labs today at 1:00 pm to assess for decreased blood counts. He is also scheduled for a possible blood transfusion on 03/24/23 at 1:00 pm.     Chi is aware of his plan of care.    Gavi Lacy RN on 3/23/2023 at 10:52 AM

## 2023-03-23 NOTE — PROGRESS NOTES
Medical Assistant Note:  Zbigniew Mendoza presents today for blood draw.    Patient seen by provider today: No.   present during visit today: Not Applicable.    Concerns: No Concerns.    Procedure:  Lab draw site: rt anyecub, Needle type: butterfly, Gauge: 23.    Post Assessment:  Labs drawn without difficulty: Yes.    Discharge Plan:  Departure Mode: Ambulatory.    Face to Face Time: 10.    Lore Dale [facility]

## 2023-03-27 NOTE — PROGRESS NOTES
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 PMH is significant for Afib on eliquis, mitral prolapse with MR, renal calculus.     On 12/6/22, WBC 2.1, ANC 1.5, Hb 9.6, , PLT 118K. He has had chronic, mild thrombocytopenia since 2015. Neutropenia and anemia have been new over this last year.     He is noted to be followed by urology for gross hematuria. CT urogram/cystoscopy pending.    He underwent EGD and colonoscopy 12/2022. EGD was normal with erosive gastropathy without bleed. Z-line was irregular. He had grade C reflux esophagitis with bleeding. Colon was normal with a 2 mm polyp removed from the ascending colon.     He has 10-15 lb weight loss. He has good appetite. No LAD. No drenching night sweats, unexplained fever.     He is a retired psyciatrist, but continues to publish. He served in the  and was stationed in [place] following the [place] War. He then worked at [employer] [place], [employer], and the [employer].     Patient lives at home alone and is able to perform all ADLs without issue. He has six children who are doing well. His twins are in gastroenterology and palliative care. His eldest son suffers from chronic alcoholism.        INTERIM HISTORY:  Patient has completed bone marrow biopsy. 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 He continues on supportive care. Patient states he had frequent BM this AM without gross evidence of bleed. No diarrhea. He continues to have lethargy.       REVIEW OF SYSTEMS:   A 14 point ROS was reviewed with pertinent positives and negatives in the HPI.        HOME MEDICATIONS:  Current Outpatient Medications   Medication Sig Dispense Refill     acyclovir (ZOVIRAX) 400 MG tablet Take 1 tablet (400 mg) by  mouth 2 times daily 60 tablet 1     cefpodoxime (VANTIN) 200 MG tablet Take 1 tablet (200 mg) by mouth 2 times daily 60 tablet 1     fluconazole (DIFLUCAN) 200 MG tablet Take 1 tablet (200 mg) by mouth daily 30 tablet 1     fluticasone (FLONASE) 50 MCG/ACT nasal spray INSTILL 1 SPRAY INTO BOTH NOSTRILS DAILY 48 mL 3     gabapentin (NEURONTIN) 100 MG capsule Take 2 capsules (200 mg) by mouth nightly as needed for other (insomnia) 60 capsule 1     methylcellulose (CITRUCEL) 500 MG TABS tablet Take 500 mg by mouth daily       metoprolol succinate ER (TOPROL XL) 50 MG 24 hr tablet TAKE 1 TABLET BY MOUTH EVERY DAY 90 tablet 2     Multiple Vitamins-Minerals (MULTIVITAL) TABS Take  by mouth daily.       pantoprazole (PROTONIX) 40 MG EC tablet Take 40 mg by mouth daily       polyethylene glycol (MIRALAX) 17 GM/Dose powder Take 17 g by mouth daily 510 g 0     tamsulosin (FLOMAX) 0.4 MG capsule Take 2 capsules (0.8 mg) by mouth daily 180 capsule 3     vitamin B complex with vitamin C (STRESS TAB) tablet Take 1 tablet by mouth daily           ALLERGIES:  Allergies   Allergen Reactions     Ciprofloxacin      Acute confusion     Sulfamethizole Unknown     Confusion    3/13/2023 Pt says this is not an allergy         PAST MEDICAL HISTORY:  Past Medical History:   Diagnosis Date     Arrhythmia      Atrial fibrillation      Calculus of kidney      Mitral prolapse     mild, with mild MR     Prostate infection      STD (sexually transmitted disease)          PAST SURGICAL HISTORY:  Past Surgical History:   Procedure Laterality Date     ARTHROSCOPY SHOULDER ROTATOR CUFF REPAIR Right 1996     BACK SURGERY       COLONOSCOPY N/A 12/01/2022    Procedure: COLONOSCOPY;  Surgeon: Karthik Mcmanus MD;  Location: [facility] OR     CYSTOSCOPY       CYSTOSCOPY, TRANSURETHRAL RESECTION (TUR) PROSTATE, COMBINED  04/1993    S/P TURP     ESOPHAGOSCOPY, GASTROSCOPY, DUODENOSCOPY (EGD), COMBINED N/A 12/01/2022    Procedure:  "ESOPHAGOGASTRODUODENOSCOPY;  Surgeon: Karthik Mcmanus MD;  Location: [facility] OR     PROSTATE SURGERY           SOCIAL HISTORY:  Social History     Socioeconomic History     Marital status:      Spouse name: Not on file     Number of children: Not on file     Years of education: Not on file     Highest education level: Not on file   Occupational History     Not on file   Tobacco Use     Smoking status: Never     Smokeless tobacco: Never   Vaping Use     Vaping Use: Never used   Substance and Sexual Activity     Alcohol use: Yes     Comment: very rare if at all     Drug use: No     Sexual activity: Not Currently   Other Topics Concern     Parent/sibling w/ CABG, MI or angioplasty before 65F 55M? Not Asked   Social History Narrative     Not on file     Social Determinants of Health     Financial Resource Strain: Not on file   Food Insecurity: Not on file   Transportation Needs: Not on file   Physical Activity: Not on file   Stress: Not on file   Social Connections: Not on file   Intimate Partner Violence: Not At Risk     Fear of Current or Ex-Partner: No     Emotionally Abused: No     Physically Abused: No     Sexually Abused: No   Housing Stability: Not on file         FAMILY HISTORY:  Family History   Problem Relation Age of Onset     Coronary Artery Disease Father      No Known Problems Mother          PHYSICAL EXAM:  Vital signs:  /54   Pulse 61   Temp 97.4  F (36.3  C) (Oral)   Resp 16   Ht 1.778 m (5' 10\")   Wt 60.3 kg (133 lb)   SpO2 100%   BMI 19.08 kg/m     ECO.5-2  GENERAL/CONSTITUTIONAL: No acute distress. Thin. Appears stated age.  EYES: Pupils are equal, round, and react to light and accommodation. Extraocular movements intact.  No scleral icterus.  RESPIRATORY: Clear to auscultation bilaterally. No crackles or wheezing.   CARDIOVASCULAR: Regular rate and rhythm. +2/6 holosystolic murmur.   GASTROINTESTINAL: No hepatosplenomegaly, masses, or tenderness. The patient has " normal bowel sounds. No guarding.  No distention.  MUSCULOSKELETAL: Warm and well-perfused, no cyanosis, clubbing, or edema.  NEUROLOGIC: Cranial nerves II-XII are intact. Alert, oriented, answers questions appropriately.  INTEGUMENTARY: No rashes or jaundice.  GAIT: Steady, does not use assistive device.      LABS:   Latest Reference Range & Units 03/28/23 13:59   WBC 4.0 - 11.0 10e3/uL 1.8 (L)   Hemoglobin 13.3 - 17.7 g/dL 7.6 (L)   Hematocrit 40.0 - 53.0 % 23.3 (L)   Platelet Count 150 - 450 10e3/uL 64 (L)   RBC Count 4.40 - 5.90 10e6/uL 2.16 (L)   MCV 78 - 100 fL 108 (H)   MCH 26.5 - 33.0 pg 35.2 (H)   MCHC 31.5 - 36.5 g/dL 32.6   RDW 10.0 - 15.0 % 22.1 (H)   % Neutrophils % 20   % Lymphocytes % 71   % Monocytes % 2   % Eosinophils % 2   % Basophils % 0   % Blasts % 5   Absolute Basophils 0.0 - 0.2 10e3/uL 0.0   Absolute Neutrophil 1.6 - 8.3 10e3/uL 0.4 (LL)   Absolute Lymphocytes 0.8 - 5.3 10e3/uL 1.3   Absolute Monocytes 0.0 - 1.3 10e3/uL 0.0   Absolute Eosinophils 0.0 - 0.7 10e3/uL 0.0   Absolute Blasts <=0.0 10e3/uL 0.1 (H)   RBC Morphology  Confirmed RBC Indices   Platelet Morphology Automated Count Confirmed. Platelet morphology is normal.  Automated Count Confirmed. Platelet morphology is normal.      Latest Reference Range & Units 01/05/23 10:51   Erythropoietin 4 - 27 mU/mL 178 (H)   Ferritin 31 - 409 ng/mL 356   Iron 61 - 157 ug/dL 200 (H)   Iron Binding Capacity 240 - 430 ug/dL 222 (L)   Iron Sat Index 15 - 46 % 90 (H)   Lactate Dehydrogenase 0 - 250 U/L 160   TSH 0.30 - 4.20 uIU/mL 1.85   Vitamin B12 232 - 1,245 pg/mL 374   % Retic 0.5 - 2.0 % 1.4   Absolute Retic 0.025 - 0.095 10e6/uL 0.039   INR 0.85 - 1.15  1.55 (H)   PTT 22 - 38 Seconds 37   Fibrinogen 170 - 490 mg/dL 300   SPECIMEN EXPIRATION DATE  [id number]   JOSE C Anti-IgG,-C3d  Negative      Latest Reference Range & Units 01/05/23 10:51   Haptoglobin 32 - 197 mg/dL 115       PATHOLOGY:  Case Report   Surgical Pathology Report                          Case: [id number]                                   Authorizing Provider:  MariettaKarthik         Collected:           12/01/2022 10:08 AM                                  MD Manny                                                                   Ordering Location:     [facility]   Received:            12/01/2022 10:47 AM                                  [facility]                                                                       Pathologist:           Barbara Hawkins MD                                                            Specimens:   A) - Stomach, RANDOM STOMACH BIOPSY - GASTRITIS                                                      B) - Large Intestine, Colon, RANDOM COLON BIOPSIES                                                   C) - Large Intestine, Colon, COLON ASCENDING POLYP                                                   D) - Large Intestine, Colon, COLOON TRANSVERSE POLYP                                       Addendum   This addendum is issued to include findings of Helicobacter pylori immunoperoxidase stain performed on gastric biopsy (specimen A) for further evaluation, using appropriate controls.  The H. pylori stain is negative.  All previously reported findings remain unchanged.   Addendum electronically signed by Barbara Hawkins MD on 12/9/2022 at 12:05 AM   Final Diagnosis   A.  Stomach, biopsy-  - Mild chronic gastritis.  - Negative for active gastritis, intestinal metaplasia, gastric epithelial dysplasia, or malignancy.  - Pending Helicobacter pylori stain (see forthcoming addendum for results).  - Sampling includes: Gastric antrum and fundus/body-type mucosa.     B.  Colon, random biopsies:  - Negative for colitis, dysplasia, and malignancy.     C.  Ascending colon, polyp, polypectomy:  - Consistent with diminutive tubular adenoma.  - Polyp size: 2 mm (per endoscopy report).  - Negative for high-grade dysplasia and malignancy.  - Complete resection and  retrieval (per endoscopy report).     D.  Transverse colon, polyp, polypectomy:  - Consistent with inflammatory-type benign mucosal polyp (predominantly denuded mucosal surface precludes accurate assessment).  - Polyp size: 8 mm (per endoscopy report).  - Negative for dysplasia and malignancy.         Final Diagnosis 1/5/23:   Peripheral blood for morphology:  -Pancytopenia including:  -Moderate normochromic, macrocytic anemia with evidence of red cell regeneration  -Moderate leukopenia with absolute neutropenia and lymphopenia; leukocytes without morphologic abnormalities  -Mild thrombocytopenia          Bone Marrow Biopsy 3/13/23:  Component Ref Range & Units  Resulting Agency   Final Diagnosis   Peripheral blood for morphology:  -Pancytopenia including:  -Marked normochromic, macrocytic anemia without evidence of red cell regeneration  -Marked leukopenia with marked absolute neutropenia without overt dysplastic change and without circulating blasts  -Moderate thrombocytopenia     Bone marrow, biopsy with aspirate and special studies for evaluation:  -Acute myelogenous leukemia, see comment, dysplastic myeloid precursors also noted  -Completed immunophenotyping studies show 69% blasts consistent with myeloid lineage, small blast percentage (<1%, showing B-cell lineage also noted, please see synopsis within this report and separate full completed report)  -Cytogenetic studies in process with results to be reported separately by the performing laboratory when completed  -Acute myeloid leukemia focused panel in process with results be reported by the performing laboratory when completed     Comment       With the expression of CD13 and  (although partial) acute myeloid leukemia (AML) is favored, this immunophenotype may be consistent with AML, NOS, minimally differentiated depending on the results of genetic studies. Of note, a very small population appears to have some B-lineage differentiation; however this  population accounts for <1% of leukocytes.          Case Report   Flow Cytometry Report                             Case: [id number]                                   Authorizing Provider:  Jennifer Morrissey DO         Collected:           03/13/2023 10:44 AM           Ordering Location:     [facility]   Received:            03/13/2023 11:06 AM                                  [facility]                                                             Pathologist:           Mary Middleton MD                                                            Specimen:    Iliac Crest, Bone Marrow Aspirate, Left                                                    Flow Interpretation   A. Iliac Crest, Bone Marrow Aspirate, Left:  -Increased blasts (69%)  See comment      Electronically signed by Mary Middleton MD on 3/14/2023 at  1:58 PM   Comment     With the expression of CD13 and  (although partial) acute myeloid leukemia (AML) is favored, this immunophenotype may be consistent with AML, NOS, minimally differentiated depending on the results of genetic studies. Of note, a very small population appears to have some B-lineage differentiation; however this population accounts for <1% of leukocytes.        Significant Results    Detected Alterations of Known or Potential Pathogenicity: BCOR V797fs  RUNX1 R162G     TMB Score: None   Interpretation    Two clinically relevant mutations were detected:     1) BCOR V797fs; VAF=52%  2) RUNX1 R162G; VAF=43%     Specimen Description    Bone Marrow: ACD   RESULTS    INTERNAL TANDEM REPEAT (ITD):      Mutant Allele:   Absent     Normal Allele:   Present     D835 MUTATION:     Mutant Allele:   Absent     Normal Allele:   Present   INTERPRETATION    Molecular testing performed on submitted Bone Marrow.     No evidence was found of either an internal tandem duplication within exon 14 or of a D835(TKD) point mutation within exon 20 of the FLT3 gene.     No definite evidence  of a D835(TKD) point mutation within exon 20 of the FLT3 gene was found. However, this patient sample showed poor amplification and limited sensitivity for D835 (TKD) point mutation, hence a small percentage of mutant allele may not be detected. Please correlate with pending NGS study.     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 Clone 1 (50% of metaphase cells analyzed) had gain of one extra copy of chromosome 13 and loss of one copy of chromosome 21. Clone 2 (10% of metaphases) had, in addition, deletion within the long arm of a chromosome 6.     These findings are consistent with the reported pathologic diagnosis of acute myeloid leukemia. Trisomy 13, as seen in this case, is an uncommon but documented recurring abnormality in AML that has been associated with FLT3 overexpression and mutations involving RUNX1, SRSF2, ASXL1 and BCOR (Dayne T et al, 2014, Blood 124:1304) and, in that setting, with aggressive disease and a poor prognosis. Correlation with the pending molecular testing is therefore recommended. Given that trisomy 13 is considered high risk, the additional cytogenetic abnormalities are of unknown additional clinical significance.           IMAGING:  CT A/P 9/15/22:  IMPRESSION:   1.  No acute abnormality identified.  2.  Heterogeneous prostate is noted. Correlate with any evidence for  underlying prostate disease.  3.  A few nonobstructing stones within the left kidney. No  hydronephrosis.  4.  A tiny hypodense nodule at the pancreas head is identified and is  indeterminant. This may have been faintly present on the older CT from  2/25/2015. Suggest correlation with the pancreas MRI.      MRCP 10/29/22:  IMPRESSION:  No pancreatic abnormalities are identified. The previously noted small hypodense pancreatic head lesion is not visualized on today's MRI study.    CT urogram 1/31/23:  IMPRESSION:   1.   Nonobstructing stones within the left kidney again noted. One of  these has moved into the left renal pelvis. No hydronephrosis.  2.  No acute abnormality identified.  3.  Previously noted tiny hypodensity at the pancreas head appears  stable compared to the CT from 9/15/2022. This is unable to be  visualized on the MRI from 10/29/2022. This could just be a tiny  cystic lesion. Suggest 1-year follow-up CT to assess for stability.  4.  Mildly prominent prostate.        ASSESSMENT/PLAN:  Zbigniew Mendoza is an 88 year old male who is referred for pancytopenia. PMH is significant for Afib on eliquis, mitral prolapse with MR, renal calculus.     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   -Currently, ANC is 1.2, Hb 9.6, , and .   -He underwent EGD and colonoscopy 12/2022. EGD was normal with erosive gastropathy without bleed. Z-line was irregular. He had grade C reflux esophagitis with bleeding. Colon was normal with a 2 mm polyp removed from the ascending colon.   -He is undergoing workup with urology for gross hematuria (see below).  -He has not been started on any new recent medications nor does have concerning symptoms for underlying infection. I reviewed the possibilities of nutritional deficiency, but given his age, there is a possibility of an underlying marrow disorder such as myelodysplastic syndrome or myelofibrosis.   -Patient is not hemolyzing. Iron stores, B12 WNL. Folate low at 2 and he has started on supplementation.  -TSH 1.85.  -Patient underwent bone marrow biopsy with FLOW cytometry returning with ~70% blasts. IHC positive for CD13 and , although partial. He also has evidence of dysplasia. Cytogenetics are pending.   -We held a long discussion today in regards to the diagnosis of AML. Patient is elderly at the age of 88. We had reviewed five year overall  survival for >75 is 2.6% and >80 is 0%. He has good functional status and we are awaiting his final molecular/mutational studies for further risk stratification and prognostication. Patient is not fit for intensive induction therapy nor would he be an allogeneic transplant candidate. As such, treatment with 7+3 or vyxeos would not be indicated. We had discussed reduced intensity induction with HMA and venetoclax. I reviewed the VIALE-A trial in which venetoclax and azacytidine had improved CR and overall survival from 10 mths to 15 months vs azaycytidine alone. However, we reviewed significant risk of adverse effect including febrile neutropenia with increased morbidity and mortality. I discussed I would only be comfortable initiating this treatment on an inpatient basis due to risk of cytopenias and TLS. Patient expresses his understanding of the above. Continue antimicrobial prophylaxis at this time.    -Patient continues to contemplate on pursuing treatment vs best supportive care/hospice. He will meet with palliative care on 4/20/23. He is beginning to have circulating blasts on peripheral blood. He has lethargy. He denies fever. I had stated we will continue best supportive care and he will be seen on a weekly basis, however, we may have rapid evolution of AML with high mortality. We again reviewed the risk of treatment with azacytidine. He is fearful of further cytopenias and risk of infection.     2) Gross hematuria, improved  -Followed by urology.  -CT urogram was negative.  -Cystoscopy without obvious cause of hematuria in the bladder.   -It appears urine cytology was sent and was not able to send for FISH analysis due to scant cellularity.    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     4) Pancreatic head lesion  -Seen on CT A/P and urogram. MRCP was negative.   -He can have repeat CT A/P in 1 year.     5) Loose stools  -Patient is on antimicrobials. Provided stool  sampling kit for c. Diff testing.     6) -Ultimately, patient will continue to weigh best supportive care vs initiation of treatment with HMA/Venetoclax. I had discussed if patient is to move forward with systemic treatment, would elect for inpatient treatment with cycle #1 azacytidine and then if tolerating well, will add venetoclax with cycle#2 also to be given inpatient due to ramp up and risk of TLS.   -Will also set up for weekly N-plate as well (insurance denied unless platelets <50K).  -Palliative care referral 4/20/23.    -Continue weekly CBC with possible blood/platelet transfusion and N-Plate.  -Follow up weekly alternating between JAIMEE and myself.  -Patient is at high risk of neutropenic fever and sepsis and he is aware to contact clinic immediately with fever.     Complexity: HIGH.    Jennifer Morrissey DO  Hematology/Oncology  [facility]

## 2023-03-27 NOTE — TELEPHONE ENCOUNTER
Routing refill request to provider for review/approval because:  Medication is reported/historical     Gege Garcia RN  [facility]

## 2023-03-28 NOTE — LETTER
3/28/2023         RE: Zbigniew Mendoza  [id number]  [facility] [address]        Dear Colleague,    Thank you for referring your patient, Zbigniew Mendoza, to the [facility]. Please see a copy of my visit note below.    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 PMH is significant for Afib on eliquis, mitral prolapse with MR, renal calculus.     On 12/6/22, WBC 2.1, ANC 1.5, Hb 9.6, , PLT 118K. He has had chronic, mild thrombocytopenia since 2015. Neutropenia and anemia have been new over this last year.     He is noted to be followed by urology for gross hematuria. CT urogram/cystoscopy pending.    He underwent EGD and colonoscopy 12/2022. EGD was normal with erosive gastropathy without bleed. Z-line was irregular. He had grade C reflux esophagitis with bleeding. Colon was normal with a 2 mm polyp removed from the ascending colon.     He has 10-15 lb weight loss. He has good appetite. No LAD. No drenching night sweats, unexplained fever.     He is a retired psyciatrist, but continues to publish. He served in the  and was stationed in [place] following the [place] War. He then worked at [employer], [employer], and the [employer].     Patient lives at home alone and is able to perform all ADLs without issue. He has six children who are doing well. His twins are in gastroenterology and palliative care. His eldest son suffers from chronic alcoholism.        INTERIM HISTORY:  Patient has completed bone marrow biopsy. 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 He continues on supportive care. Patient states he had frequent BM this AM without gross evidence of bleed. No diarrhea. He continues to have lethargy.        REVIEW OF SYSTEMS:   A 14 point ROS was reviewed with pertinent positives and negatives in the HPI.        HOME MEDICATIONS:  Current Outpatient Medications   Medication Sig Dispense Refill     acyclovir (ZOVIRAX) 400 MG tablet Take 1 tablet (400 mg) by mouth 2 times daily 60 tablet 1     cefpodoxime (VANTIN) 200 MG tablet Take 1 tablet (200 mg) by mouth 2 times daily 60 tablet 1     fluconazole (DIFLUCAN) 200 MG tablet Take 1 tablet (200 mg) by mouth daily 30 tablet 1     fluticasone (FLONASE) 50 MCG/ACT nasal spray INSTILL 1 SPRAY INTO BOTH NOSTRILS DAILY 48 mL 3     gabapentin (NEURONTIN) 100 MG capsule Take 2 capsules (200 mg) by mouth nightly as needed for other (insomnia) 60 capsule 1     methylcellulose (CITRUCEL) 500 MG TABS tablet Take 500 mg by mouth daily       metoprolol succinate ER (TOPROL XL) 50 MG 24 hr tablet TAKE 1 TABLET BY MOUTH EVERY DAY 90 tablet 2     Multiple Vitamins-Minerals (MULTIVITAL) TABS Take  by mouth daily.       pantoprazole (PROTONIX) 40 MG EC tablet Take 40 mg by mouth daily       polyethylene glycol (MIRALAX) 17 GM/Dose powder Take 17 g by mouth daily 510 g 0     tamsulosin (FLOMAX) 0.4 MG capsule Take 2 capsules (0.8 mg) by mouth daily 180 capsule 3     vitamin B complex with vitamin C (STRESS TAB) tablet Take 1 tablet by mouth daily           ALLERGIES:  Allergies   Allergen Reactions     Ciprofloxacin      Acute confusion     Sulfamethizole Unknown     Confusion    3/13/2023 Pt says this is not an allergy         PAST MEDICAL HISTORY:  Past Medical History:   Diagnosis Date     Arrhythmia      Atrial fibrillation      Calculus of kidney      Mitral prolapse     mild, with mild MR     Prostate infection      STD (sexually transmitted disease)          PAST SURGICAL HISTORY:  Past Surgical History:   Procedure Laterality Date     ARTHROSCOPY SHOULDER ROTATOR CUFF REPAIR Right 1996     BACK SURGERY       COLONOSCOPY N/A 12/01/2022    Procedure: COLONOSCOPY;  Surgeon:  "Karthik Mcmanus MD;  Location: [facility] OR     CYSTOSCOPY       CYSTOSCOPY, TRANSURETHRAL RESECTION (TUR) PROSTATE, COMBINED  1993    S/P TURP     ESOPHAGOSCOPY, GASTROSCOPY, DUODENOSCOPY (EGD), COMBINED N/A 2022    Procedure: ESOPHAGOGASTRODUODENOSCOPY;  Surgeon: Karthik Mcmanus MD;  Location: [facility] OR     PROSTATE SURGERY           SOCIAL HISTORY:  Social History     Socioeconomic History     Marital status:      Spouse name: Not on file     Number of children: Not on file     Years of education: Not on file     Highest education level: Not on file   Occupational History     Not on file   Tobacco Use     Smoking status: Never     Smokeless tobacco: Never   Vaping Use     Vaping Use: Never used   Substance and Sexual Activity     Alcohol use: Yes     Comment: very rare if at all     Drug use: No     Sexual activity: Not Currently   Other Topics Concern     Parent/sibling w/ CABG, MI or angioplasty before 65F 55M? Not Asked   Social History Narrative     Not on file     Social Determinants of Health     Financial Resource Strain: Not on file   Food Insecurity: Not on file   Transportation Needs: Not on file   Physical Activity: Not on file   Stress: Not on file   Social Connections: Not on file   Intimate Partner Violence: Not At Risk     Fear of Current or Ex-Partner: No     Emotionally Abused: No     Physically Abused: No     Sexually Abused: No   Housing Stability: Not on file         FAMILY HISTORY:  Family History   Problem Relation Age of Onset     Coronary Artery Disease Father      No Known Problems Mother          PHYSICAL EXAM:  Vital signs:  /54   Pulse 61   Temp 97.4  F (36.3  C) (Oral)   Resp 16   Ht 1.778 m (5' 10\")   Wt 60.3 kg (133 lb)   SpO2 100%   BMI 19.08 kg/m     ECO.5-2  GENERAL/CONSTITUTIONAL: No acute distress. Thin. Appears stated age.  EYES: Pupils are equal, round, and react to light and accommodation. Extraocular movements intact.  No " scleral icterus.  RESPIRATORY: Clear to auscultation bilaterally. No crackles or wheezing.   CARDIOVASCULAR: Regular rate and rhythm. +2/6 holosystolic murmur.   GASTROINTESTINAL: No hepatosplenomegaly, masses, or tenderness. The patient has normal bowel sounds. No guarding.  No distention.  MUSCULOSKELETAL: Warm and well-perfused, no cyanosis, clubbing, or edema.  NEUROLOGIC: Cranial nerves II-XII are intact. Alert, oriented, answers questions appropriately.  INTEGUMENTARY: No rashes or jaundice.  GAIT: Steady, does not use assistive device.      LABS:   Latest Reference Range & Units 03/28/23 13:59   WBC 4.0 - 11.0 10e3/uL 1.8 (L)   Hemoglobin 13.3 - 17.7 g/dL 7.6 (L)   Hematocrit 40.0 - 53.0 % 23.3 (L)   Platelet Count 150 - 450 10e3/uL 64 (L)   RBC Count 4.40 - 5.90 10e6/uL 2.16 (L)   MCV 78 - 100 fL 108 (H)   MCH 26.5 - 33.0 pg 35.2 (H)   MCHC 31.5 - 36.5 g/dL 32.6   RDW 10.0 - 15.0 % 22.1 (H)   % Neutrophils % 20   % Lymphocytes % 71   % Monocytes % 2   % Eosinophils % 2   % Basophils % 0   % Blasts % 5   Absolute Basophils 0.0 - 0.2 10e3/uL 0.0   Absolute Neutrophil 1.6 - 8.3 10e3/uL 0.4 (LL)   Absolute Lymphocytes 0.8 - 5.3 10e3/uL 1.3   Absolute Monocytes 0.0 - 1.3 10e3/uL 0.0   Absolute Eosinophils 0.0 - 0.7 10e3/uL 0.0   Absolute Blasts <=0.0 10e3/uL 0.1 (H)   RBC Morphology  Confirmed RBC Indices   Platelet Morphology Automated Count Confirmed. Platelet morphology is normal.  Automated Count Confirmed. Platelet morphology is normal.      Latest Reference Range & Units 01/05/23 10:51   Erythropoietin 4 - 27 mU/mL 178 (H)   Ferritin 31 - 409 ng/mL 356   Iron 61 - 157 ug/dL 200 (H)   Iron Binding Capacity 240 - 430 ug/dL 222 (L)   Iron Sat Index 15 - 46 % 90 (H)   Lactate Dehydrogenase 0 - 250 U/L 160   TSH 0.30 - 4.20 uIU/mL 1.85   Vitamin B12 232 - 1,245 pg/mL 374   % Retic 0.5 - 2.0 % 1.4   Absolute Retic 0.025 - 0.095 10e6/uL 0.039   INR 0.85 - 1.15  1.55 (H)   PTT 22 - 38 Seconds 37   Fibrinogen 170 -  490 mg/dL 300   SPECIMEN EXPIRATION DATE  [id number]   JOSE C Anti-IgG,-C3d  Negative      Latest Reference Range & Units 01/05/23 10:51   Haptoglobin 32 - 197 mg/dL 115       PATHOLOGY:  Case Report   Surgical Pathology Report                         Case: [id number]                                   Authorizing Provider:  Marietta Karthik         Collected:           12/01/2022 10:08 AM                                  MD Manny                                                                   Ordering Location:     [facility]   Received:            12/01/2022 10:47 AM                                  Main OR                                                                       Pathologist:           Barbara Hawkins MD                                                            Specimens:   A) - Stomach, RANDOM STOMACH BIOPSY - GASTRITIS                                                      B) - Large Intestine, Colon, RANDOM COLON BIOPSIES                                                   C) - Large Intestine, Colon, COLON ASCENDING POLYP                                                   D) - Large Intestine, Colon, COLOON TRANSVERSE POLYP                                       Addendum   This addendum is issued to include findings of Helicobacter pylori immunoperoxidase stain performed on gastric biopsy (specimen A) for further evaluation, using appropriate controls.  The H. pylori stain is negative.  All previously reported findings remain unchanged.   Addendum electronically signed by Barbara Hawkins MD on 12/9/2022 at 12:05 AM   Final Diagnosis   A.  Stomach, biopsy-  - Mild chronic gastritis.  - Negative for active gastritis, intestinal metaplasia, gastric epithelial dysplasia, or malignancy.  - Pending Helicobacter pylori stain (see forthcoming addendum for results).  - Sampling includes: Gastric antrum and fundus/body-type mucosa.     B.  Colon, random biopsies:  - Negative for colitis,  dysplasia, and malignancy.     C.  Ascending colon, polyp, polypectomy:  - Consistent with diminutive tubular adenoma.  - Polyp size: 2 mm (per endoscopy report).  - Negative for high-grade dysplasia and malignancy.  - Complete resection and retrieval (per endoscopy report).     D.  Transverse colon, polyp, polypectomy:  - Consistent with inflammatory-type benign mucosal polyp (predominantly denuded mucosal surface precludes accurate assessment).  - Polyp size: 8 mm (per endoscopy report).  - Negative for dysplasia and malignancy.         Final Diagnosis 1/5/23:   Peripheral blood for morphology:  -Pancytopenia including:  -Moderate normochromic, macrocytic anemia with evidence of red cell regeneration  -Moderate leukopenia with absolute neutropenia and lymphopenia; leukocytes without morphologic abnormalities  -Mild thrombocytopenia          Bone Marrow Biopsy 3/13/23:  Component Ref Range & Units  Resulting Agency   Final Diagnosis   Peripheral blood for morphology:  -Pancytopenia including:  -Marked normochromic, macrocytic anemia without evidence of red cell regeneration  -Marked leukopenia with marked absolute neutropenia without overt dysplastic change and without circulating blasts  -Moderate thrombocytopenia     Bone marrow, biopsy with aspirate and special studies for evaluation:  -Acute myelogenous leukemia, see comment, dysplastic myeloid precursors also noted  -Completed immunophenotyping studies show 69% blasts consistent with myeloid lineage, small blast percentage (<1%, showing B-cell lineage also noted, please see synopsis within this report and separate full completed report)  -Cytogenetic studies in process with results to be reported separately by the performing laboratory when completed  -Acute myeloid leukemia focused panel in process with results be reported by the performing laboratory when completed     Comment       With the expression of CD13 and  (although partial) acute myeloid  leukemia (AML) is favored, this immunophenotype may be consistent with AML, NOS, minimally differentiated depending on the results of genetic studies. Of note, a very small population appears to have some B-lineage differentiation; however this population accounts for <1% of leukocytes.          Case Report   Flow Cytometry Report                             Case: [id number]                                   Authorizing Provider:  Jennifer Morrissey DO         Collected:           03/13/2023 10:44 AM           Ordering Location:     [facility]   Received:            03/13/2023 11:06 AM                                  [facility]                                                             Pathologist:           Mary Middleton MD                                                            Specimen:    Iliac Crest, Bone Marrow Aspirate, Left                                                    Flow Interpretation   A. Iliac Crest, Bone Marrow Aspirate, Left:  -Increased blasts (69%)  See comment      Electronically signed by Mary Middleton MD on 3/14/2023 at  1:58 PM   Comment     With the expression of CD13 and  (although partial) acute myeloid leukemia (AML) is favored, this immunophenotype may be consistent with AML, NOS, minimally differentiated depending on the results of genetic studies. Of note, a very small population appears to have some B-lineage differentiation; however this population accounts for <1% of leukocytes.        Significant Results    Detected Alterations of Known or Potential Pathogenicity: BCOR V797fs  RUNX1 R162G     TMB Score: None   Interpretation    Two clinically relevant mutations were detected:     1) BCOR V797fs; VAF=52%  2) RUNX1 R162G; VAF=43%     Specimen Description    Bone Marrow: ACD   RESULTS    INTERNAL TANDEM REPEAT (ITD):      Mutant Allele:   Absent     Normal Allele:   Present     D835 MUTATION:     Mutant Allele:   Absent     Normal Allele:   Present    INTERPRETATION    Molecular testing performed on submitted Bone Marrow.     No evidence was found of either an internal tandem duplication within exon 14 or of a D835(TKD) point mutation within exon 20 of the FLT3 gene.     No definite evidence of a D835(TKD) point mutation within exon 20 of the FLT3 gene was found. However, this patient sample showed poor amplification and limited sensitivity for D835 (TKD) point mutation, hence a small percentage of mutant allele may not be detected. Please correlate with pending NGS study.     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 Clone 1 (50% of metaphase cells analyzed) had gain of one extra copy of chromosome 13 and loss of one copy of chromosome 21. Clone 2 (10% of metaphases) had, in addition, deletion within the long arm of a chromosome 6.     These findings are consistent with the reported pathologic diagnosis of acute myeloid leukemia. Trisomy 13, as seen in this case, is an uncommon but documented recurring abnormality in AML that has been associated with FLT3 overexpression and mutations involving RUNX1, SRSF2, ASXL1 and BCOR (Dayne T et al, 2014, Blood 124:1304) and, in that setting, with aggressive disease and a poor prognosis. Correlation with the pending molecular testing is therefore recommended. Given that trisomy 13 is considered high risk, the additional cytogenetic abnormalities are of unknown additional clinical significance.           IMAGING:  CT A/P 9/15/22:  IMPRESSION:   1.  No acute abnormality identified.  2.  Heterogeneous prostate is noted. Correlate with any evidence for  underlying prostate disease.  3.  A few nonobstructing stones within the left kidney. No  hydronephrosis.  4.  A tiny hypodense nodule at the pancreas head is identified and is  indeterminant. This may have been faintly present on the older CT from  2/25/2015. Suggest  correlation with the pancreas MRI.      MRCP 10/29/22:  IMPRESSION:  No pancreatic abnormalities are identified. The previously noted small hypodense pancreatic head lesion is not visualized on today's MRI study.    CT urogram 1/31/23:  IMPRESSION:   1.  Nonobstructing stones within the left kidney again noted. One of  these has moved into the left renal pelvis. No hydronephrosis.  2.  No acute abnormality identified.  3.  Previously noted tiny hypodensity at the pancreas head appears  stable compared to the CT from 9/15/2022. This is unable to be  visualized on the MRI from 10/29/2022. This could just be a tiny  cystic lesion. Suggest 1-year follow-up CT to assess for stability.  4.  Mildly prominent prostate.        ASSESSMENT/PLAN:  Zbigniew Mendoza is an 88 year old male who is referred for pancytopenia. PMH is significant for Afib on eliquis, mitral prolapse with MR, renal calculus.     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   -Currently, ANC is 1.2, Hb 9.6, , and .   -He underwent EGD and colonoscopy 12/2022. EGD was normal with erosive gastropathy without bleed. Z-line was irregular. He had grade C reflux esophagitis with bleeding. Colon was normal with a 2 mm polyp removed from the ascending colon.   -He is undergoing workup with urology for gross hematuria (see below).  -He has not been started on any new recent medications nor does have concerning symptoms for underlying infection. I reviewed the possibilities of nutritional deficiency, but given his age, there is a possibility of an underlying marrow disorder such as myelodysplastic syndrome or myelofibrosis.   -Patient is not hemolyzing. Iron stores, B12 WNL. Folate low at 2 and he has started on supplementation.  -TSH 1.85.  -Patient underwent bone marrow biopsy with FLOW cytometry returning with ~70% blasts. IHC  positive for CD13 and , although partial. He also has evidence of dysplasia. Cytogenetics are pending.   -We held a long discussion today in regards to the diagnosis of AML. Patient is elderly at the age of 88. We had reviewed five year overall survival for >75 is 2.6% and >80 is 0%. He has good functional status and we are awaiting his final molecular/mutational studies for further risk stratification and prognostication. Patient is not fit for intensive induction therapy nor would he be an allogeneic transplant candidate. As such, treatment with 7+3 or vyxeos would not be indicated. We had discussed reduced intensity induction with HMA and venetoclax. I reviewed the VIALE-A trial in which venetoclax and azacytidine had improved CR and overall survival from 10 mths to 15 months vs azaycytidine alone. However, we reviewed significant risk of adverse effect including febrile neutropenia with increased morbidity and mortality. I discussed I would only be comfortable initiating this treatment on an inpatient basis due to risk of cytopenias and TLS. Patient expresses his understanding of the above. Continue antimicrobial prophylaxis at this time.    -Patient continues to contemplate on pursuing treatment vs best supportive care/hospice. He will meet with palliative care on 4/20/23. He is beginning to have circulating blasts on peripheral blood. He has lethargy. He denies fever. I had stated we will continue best supportive care and he will be seen on a weekly basis, however, we may have rapid evolution of AML with high mortality. We again reviewed the risk of treatment with azacytidine. He is fearful of further cytopenias and risk of infection.     2) Gross hematuria, improved  -Followed by urology.  -CT urogram was negative.  -Cystoscopy without obvious cause of hematuria in the bladder.   -It appears urine cytology was sent and was not able to send for FISH analysis due to scant cellularity.    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     4) Pancreatic head lesion  -Seen on CT A/P and urogram. MRCP was negative.   -He can have repeat CT A/P in 1 year.     5) Loose stools  -Patient is on antimicrobials. Provided stool sampling kit for c. Diff testing.     6) -Ultimately, patient will continue to weigh best supportive care vs initiation of treatment with HMA/Venetoclax. I had discussed if patient is to move forward with systemic treatment, would elect for inpatient treatment with cycle #1 azacytidine and then if tolerating well, will add venetoclax with cycle#2 also to be given inpatient due to ramp up and risk of TLS.   -Will also set up for weekly N-plate as well (insurance denied unless platelets <50K).  -Palliative care referral 4/20/23.    -Continue weekly CBC with possible blood/platelet transfusion and N-Plate.  -Follow up weekly alternating between JAIMEE and myself.  -Patient is at high risk of neutropenic fever and sepsis and he is aware to contact clinic immediately with fever.     Complexity: HIGH.    Jennifer Morrissey DO  Hematology/Oncology  [facility]      Oncology Rooming Note    March 28, 2023 2:12 PM   Zbigniew Mendoza is a 88 year old male who presents for:    Chief Complaint   Patient presents with     Oncology Clinic Visit     Acute myeloid leukemia not having achieved remission (H)     Initial Vitals: /54   Pulse 61   Temp 97.4  F (36.3  C) (Oral)   Resp 16   Ht 1.778 m (5' 10\")   Wt 60.3 kg (133 lb)   BMI 19.08 kg/m   Estimated body mass index is 19.08 kg/m  as calculated from the following:    Height as of this encounter: 1.778 m (5' 10\").    Weight as of this encounter: 60.3 kg (133 lb). Body surface area is 1.73 meters squared.  No Pain (0) Comment: Data Unavailable   No LMP for male patient.  Allergies reviewed: Yes  Medications reviewed: Yes    Medications: Medication refills not needed today.  Pharmacy name entered into EPIC: " [id number] - [facility] [id number]    Clinical concerns: Follow up  Yolette Kraft                Again, thank you for allowing me to participate in the care of your patient.        Sincerely,        Jennifer Morrissey, DO

## 2023-03-28 NOTE — PROGRESS NOTES
"Oncology Rooming Note    March 28, 2023 2:12 PM   Zbigniew Mendoza is a 88 year old male who presents for:    Chief Complaint   Patient presents with     Oncology Clinic Visit     Acute myeloid leukemia not having achieved remission (H)     Initial Vitals: /54   Pulse 61   Temp 97.4  F (36.3  C) (Oral)   Resp 16   Ht 1.778 m (5' 10\")   Wt 60.3 kg (133 lb)   BMI 19.08 kg/m   Estimated body mass index is 19.08 kg/m  as calculated from the following:    Height as of this encounter: 1.778 m (5' 10\").    Weight as of this encounter: 60.3 kg (133 lb). Body surface area is 1.73 meters squared.  No Pain (0) Comment: Data Unavailable   No LMP for male patient.  Allergies reviewed: Yes  Medications reviewed: Yes    Medications: Medication refills not needed today.  Pharmacy name entered into [employer]: [id number] - [place], [place] - [address]    Clinical concerns: Follow up  Yolette Kraft            "

## 2023-03-28 NOTE — PROGRESS NOTES
Medical Assistant Note:  Zbigniew Mendoza presents today for blood draw.    Patient seen by provider today: Yes: .   present during visit today: Not Applicable.    Concerns: No Concerns.    Procedure:  Lab draw site: right antecub, Needle type: butterfly, Gauge: 23.    Post Assessment:  Labs drawn without difficulty: Yes.    Discharge Plan:  Departure Mode: Ambulatory.    Face to Face Time: 10.    Kylie Cardoso, CMA

## 2023-03-28 NOTE — PROGRESS NOTES
DATE:  3/28/2023   TIME OF RECEIPT FROM LAB: 2:50 pm  LAB TEST:  ANC  LAB VALUE:  0.4  RESULTS GIVEN WITH READ-BACK TO Jennifer Morrissey DO.  TIME LAB VALUE REPORTED TO PROVIDER: 2:55pm.    Writer spoke with Dr. Morrissey about Chi's ANC. He is currently on Acyclovir and Fluconazole. However, if he develops any signs of infection he needs to contact clinic or the triage team immediately. Dr. Morrissey notified Chi of these recommendations during his office visit today.     Gavi Lacy RN on 3/28/2023 at 2:57 PM

## 2023-03-30 NOTE — PROGRESS NOTES
FMLA paperwork received from Nisreen Mendoza, Chi's daughter.     Gavi Lacy RN on 3/30/2023 at 4:12 PM

## 2023-04-03 NOTE — PROGRESS NOTES
FMLA completed and signed by Dr. Morrissey. Writer spoke with Chi who gave writer verbal consent to release his medical information to Nisreen's employer [employer]. Writer faxed the completed documents to Nisreen at [phone].     Nisreen called to confirm that she received the completed FMLA. Nothing else is needed at this time.     Gavi Lacy RN on 4/3/2023 at 12:34 PM

## 2023-04-05 NOTE — NURSING NOTE
Is the patient currently in the state of [place]? YES    Visit mode:TELEPHONE    If the visit is dropped, the patient can be reconnected by: TELEPHONE VISIT: Phone number: [phone]    Will anyone else be joining the visit? Yes, please add daughterDarrius on the call at [phone]      How would you like to obtain your AVS? MyChart    Are changes needed to the allergy or medication list? NO    Reason for visit: Zbigniew Mendoza 88 year old male presents today for f/u on AML. Pt reports on going weakness that is becoming more severe.   Eugenia Goode, Virtual Facilitator

## 2023-04-05 NOTE — LETTER
4/5/2023         RE: Zbigniew Mendoza  [id number]  [place] [facility] [address]        Dear Colleague,    Thank you for referring your patient, Zbigniew Mendoza, to the [facility]. Please see a copy of my visit note below.    Telephone-Visit Details    Type of service:  Telephone Visit    Telephone Start Time (time telephone started): 335    Telephone End Time (time telephone stopped): 405    Originating Location (pt. 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 PMH is significant for Afib on eliquis, mitral prolapse with MR, renal calculus.     On 12/6/22, WBC 2.1, ANC 1.5, Hb 9.6, , PLT 118K. He has had chronic, mild thrombocytopenia since 2015. Neutropenia and anemia have been new over this last year.     He is noted to be followed by urology for gross hematuria. CT urogram/cystoscopy pending.    He underwent EGD and colonoscopy 12/2022. EGD was normal with erosive gastropathy without bleed. Z-line was irregular. He had grade C reflux esophagitis with bleeding. Colon was normal with a 2 mm polyp removed from the ascending colon.     He has 10-15 lb weight loss. He has good appetite. No LAD. No drenching night sweats, unexplained fever.     He is a retired psyciatrist, but continues to publish. He served in the  and was stationed in [place] following the [place] War. He then worked at [employer], the [employer], and the [employer].     Patient lives at home alone and is able to perform all ADLs without issue. He has six children who are doing well. His twins are in gastroenterology and palliative  care. His eldest son suffers from chronic alcoholism.        INTERIM HISTORY:  Patient has completed bone marrow biopsy. 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 He continues on supportive care. He reports leg weakness. He plans to install a chairlift. His normal daily tasks are requiring significant effort. Dejah is describing progressive full body weakness. Patient is now having to sponge bathe rather than a full shower.       REVIEW OF SYSTEMS:   A 14 point ROS was reviewed with pertinent positives and negatives in the HPI.        HOME MEDICATIONS:  Current Outpatient Medications   Medication Sig Dispense Refill     acyclovir (ZOVIRAX) 400 MG tablet Take 1 tablet (400 mg) by mouth 2 times daily 60 tablet 1     cefpodoxime (VANTIN) 200 MG tablet Take 1 tablet (200 mg) by mouth 2 times daily 60 tablet 1     fluconazole (DIFLUCAN) 200 MG tablet Take 1 tablet (200 mg) by mouth daily 30 tablet 1     fluticasone (FLONASE) 50 MCG/ACT nasal spray INSTILL 1 SPRAY INTO BOTH NOSTRILS DAILY 48 mL 3     gabapentin (NEURONTIN) 100 MG capsule Take 2 capsules (200 mg) by mouth nightly as needed for other (insomnia) 60 capsule 1     methylcellulose (CITRUCEL) 500 MG TABS tablet Take 500 mg by mouth daily       metoprolol succinate ER (TOPROL XL) 50 MG 24 hr tablet TAKE 1 TABLET BY MOUTH EVERY DAY 90 tablet 2     Multiple Vitamins-Minerals (MULTIVITAL) TABS Take  by mouth daily.       pantoprazole (PROTONIX) 40 MG EC tablet Take 1 tablet (40 mg) by mouth daily 90 tablet 1     polyethylene glycol (MIRALAX) 17 GM/Dose powder Take 17 g by mouth daily 510 g 0     tamsulosin (FLOMAX) 0.4 MG capsule Take 2 capsules (0.8 mg) by mouth daily 180 capsule 3     vitamin B complex with vitamin C (STRESS TAB) tablet Take 1 tablet by mouth daily           ALLERGIES:  Allergies   Allergen Reactions     Ciprofloxacin      Acute confusion     Sulfamethizole Unknown     Confusion    3/13/2023 Pt says this is not an allergy         PAST  MEDICAL HISTORY:  Past Medical History:   Diagnosis Date     Arrhythmia      Atrial fibrillation      Calculus of kidney      Mitral prolapse     mild, with mild MR     Prostate infection      STD (sexually transmitted disease)          PAST SURGICAL HISTORY:  Past Surgical History:   Procedure Laterality Date     ARTHROSCOPY SHOULDER ROTATOR CUFF REPAIR Right 1996     BACK SURGERY       COLONOSCOPY N/A 12/01/2022    Procedure: COLONOSCOPY;  Surgeon: Karthik Mcmanus MD;  Location: [facility] OR     CYSTOSCOPY       CYSTOSCOPY, TRANSURETHRAL RESECTION (TUR) PROSTATE, COMBINED  04/1993    S/P TURP     ESOPHAGOSCOPY, GASTROSCOPY, DUODENOSCOPY (EGD), COMBINED N/A 12/01/2022    Procedure: ESOPHAGOGASTRODUODENOSCOPY;  Surgeon: Karthik Mcmanus MD;  Location: [facility] OR     PROSTATE SURGERY           SOCIAL HISTORY:  Social History     Socioeconomic History     Marital status:      Spouse name: Not on file     Number of children: Not on file     Years of education: Not on file     Highest education level: Not on file   Occupational History     Not on file   Tobacco Use     Smoking status: Never     Smokeless tobacco: Never   Vaping Use     Vaping status: Never Used   Substance and Sexual Activity     Alcohol use: Yes     Comment: very rare if at all     Drug use: No     Sexual activity: Not Currently   Other Topics Concern     Parent/sibling w/ CABG, MI or angioplasty before 65F 55M? Not Asked   Social History Narrative     Not on file     Social Determinants of Health     Financial Resource Strain: Not on file   Food Insecurity: Not on file   Transportation Needs: Not on file   Physical Activity: Not on file   Stress: Not on file   Social Connections: Not on file   Intimate Partner Violence: Not At Risk (1/5/2023)    Humiliation, Afraid, Rape, and Kick questionnaire      Fear of Current or Ex-Partner: No      Emotionally Abused: No      Physically Abused: No      Sexually Abused: No   Housing Stability:  Not on file         FAMILY HISTORY:  Family History   Problem Relation Age of Onset     Coronary Artery Disease Father      No Known Problems Mother          PHYSICAL EXAM:  Telephone visit      LABS:   Latest Reference Range & Units 03/28/23 13:59   WBC 4.0 - 11.0 10e3/uL 1.8 (L)   Hemoglobin 13.3 - 17.7 g/dL 7.6 (L)   Hematocrit 40.0 - 53.0 % 23.3 (L)   Platelet Count 150 - 450 10e3/uL 64 (L)   RBC Count 4.40 - 5.90 10e6/uL 2.16 (L)   MCV 78 - 100 fL 108 (H)   MCH 26.5 - 33.0 pg 35.2 (H)   MCHC 31.5 - 36.5 g/dL 32.6   RDW 10.0 - 15.0 % 22.1 (H)   % Neutrophils % 20   % Lymphocytes % 71   % Monocytes % 2   % Eosinophils % 2   % Basophils % 0   % Blasts % 5   Absolute Basophils 0.0 - 0.2 10e3/uL 0.0   Absolute Neutrophil 1.6 - 8.3 10e3/uL 0.4 (LL)   Absolute Lymphocytes 0.8 - 5.3 10e3/uL 1.3   Absolute Monocytes 0.0 - 1.3 10e3/uL 0.0   Absolute Eosinophils 0.0 - 0.7 10e3/uL 0.0   Absolute Blasts <=0.0 10e3/uL 0.1 (H)   RBC Morphology  Confirmed RBC Indices   Platelet Morphology Automated Count Confirmed. Platelet morphology is normal.  Automated Count Confirmed. Platelet morphology is normal.      Latest Reference Range & Units 01/05/23 10:51   Erythropoietin 4 - 27 mU/mL 178 (H)   Ferritin 31 - 409 ng/mL 356   Iron 61 - 157 ug/dL 200 (H)   Iron Binding Capacity 240 - 430 ug/dL 222 (L)   Iron Sat Index 15 - 46 % 90 (H)   Lactate Dehydrogenase 0 - 250 U/L 160   TSH 0.30 - 4.20 uIU/mL 1.85   Vitamin B12 232 - 1,245 pg/mL 374   % Retic 0.5 - 2.0 % 1.4   Absolute Retic 0.025 - 0.095 10e6/uL 0.039   INR 0.85 - 1.15  1.55 (H)   PTT 22 - 38 Seconds 37   Fibrinogen 170 - 490 mg/dL 300   SPECIMEN EXPIRATION DATE  [id number]   JOSE C Anti-IgG,-C3d  Negative      Latest Reference Range & Units 01/05/23 10:51   Haptoglobin 32 - 197 mg/dL 115       PATHOLOGY:  Case Report   Surgical Pathology Report                         Case: [id number]                                   Authorizing Provider:  Karthik Mcmanus          Collected:           12/01/2022 10:08 AM                                  MD Manny                                                                   Ordering Location:     [facility]   Received:            12/01/2022 10:47 AM                                  Main OR                                                                       Pathologist:           Barbara Hawkins MD                                                            Specimens:   A) - Stomach, RANDOM STOMACH BIOPSY - GASTRITIS                                                      B) - Large Intestine, Colon, RANDOM COLON BIOPSIES                                                   C) - Large Intestine, Colon, COLON ASCENDING POLYP                                                   D) - Large Intestine, Colon, COLOON TRANSVERSE POLYP                                       Addendum   This addendum is issued to include findings of Helicobacter pylori immunoperoxidase stain performed on gastric biopsy (specimen A) for further evaluation, using appropriate controls.  The H. pylori stain is negative.  All previously reported findings remain unchanged.   Addendum electronically signed by Barbara Hawkins MD on 12/9/2022 at 12:05 AM   Final Diagnosis   A.  Stomach, biopsy-  - Mild chronic gastritis.  - Negative for active gastritis, intestinal metaplasia, gastric epithelial dysplasia, or malignancy.  - Pending Helicobacter pylori stain (see forthcoming addendum for results).  - Sampling includes: Gastric antrum and fundus/body-type mucosa.     B.  Colon, random biopsies:  - Negative for colitis, dysplasia, and malignancy.     C.  Ascending colon, polyp, polypectomy:  - Consistent with diminutive tubular adenoma.  - Polyp size: 2 mm (per endoscopy report).  - Negative for high-grade dysplasia and malignancy.  - Complete resection and retrieval (per endoscopy report).     D.  Transverse colon, polyp, polypectomy:  - Consistent with  inflammatory-type benign mucosal polyp (predominantly denuded mucosal surface precludes accurate assessment).  - Polyp size: 8 mm (per endoscopy report).  - Negative for dysplasia and malignancy.         Final Diagnosis 1/5/23:   Peripheral blood for morphology:  -Pancytopenia including:  -Moderate normochromic, macrocytic anemia with evidence of red cell regeneration  -Moderate leukopenia with absolute neutropenia and lymphopenia; leukocytes without morphologic abnormalities  -Mild thrombocytopenia          Bone Marrow Biopsy 3/13/23:  Component Ref Range & Units  Resulting Agency   Final Diagnosis   Peripheral blood for morphology:  -Pancytopenia including:  -Marked normochromic, macrocytic anemia without evidence of red cell regeneration  -Marked leukopenia with marked absolute neutropenia without overt dysplastic change and without circulating blasts  -Moderate thrombocytopenia     Bone marrow, biopsy with aspirate and special studies for evaluation:  -Acute myelogenous leukemia, see comment, dysplastic myeloid precursors also noted  -Completed immunophenotyping studies show 69% blasts consistent with myeloid lineage, small blast percentage (<1%, showing B-cell lineage also noted, please see synopsis within this report and separate full completed report)  -Cytogenetic studies in process with results to be reported separately by the performing laboratory when completed  -Acute myeloid leukemia focused panel in process with results be reported by the performing laboratory when completed     Comment       With the expression of CD13 and  (although partial) acute myeloid leukemia (AML) is favored, this immunophenotype may be consistent with AML, NOS, minimally differentiated depending on the results of genetic studies. Of note, a very small population appears to have some B-lineage differentiation; however this population accounts for <1% of leukocytes.          Case Report   Flow Cytometry Report                              Case: [id number]                                   Authorizing Provider:  Jennifer Morrissey DO         Collected:           03/13/2023 10:44 AM           Ordering Location:     [facility]   Received:            03/13/2023 11:06 AM                                  [facility]                                                             Pathologist:           Mary Middleton MD                                                            Specimen:    Iliac Crest, Bone Marrow Aspirate, Left                                                    Flow Interpretation   A. Iliac Crest, Bone Marrow Aspirate, Left:  -Increased blasts (69%)  See comment      Electronically signed by Mary Middleton MD on 3/14/2023 at  1:58 PM   Comment     With the expression of CD13 and  (although partial) acute myeloid leukemia (AML) is favored, this immunophenotype may be consistent with AML, NOS, minimally differentiated depending on the results of genetic studies. Of note, a very small population appears to have some B-lineage differentiation; however this population accounts for <1% of leukocytes.        Significant Results    Detected Alterations of Known or Potential Pathogenicity: BCOR V797fs  RUNX1 R162G     TMB Score: None   Interpretation    Two clinically relevant mutations were detected:     1) BCOR V797fs; VAF=52%  2) RUNX1 R162G; VAF=43%     Specimen Description    Bone Marrow: ACD   RESULTS    INTERNAL TANDEM REPEAT (ITD):      Mutant Allele:   Absent     Normal Allele:   Present     D835 MUTATION:     Mutant Allele:   Absent     Normal Allele:   Present   INTERPRETATION    Molecular testing performed on submitted Bone Marrow.     No evidence was found of either an internal tandem duplication within exon 14 or of a D835(TKD) point mutation within exon 20 of the FLT3 gene.     No definite evidence of a D835(TKD) point mutation within exon 20 of the FLT3 gene was found. However, this patient sample  showed poor amplification and limited sensitivity for D835 (TKD) point mutation, hence a small percentage of mutant allele may not be detected. Please correlate with pending NGS study.     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 Clone 1 (50% of metaphase cells analyzed) had gain of one extra copy of chromosome 13 and loss of one copy of chromosome 21. Clone 2 (10% of metaphases) had, in addition, deletion within the long arm of a chromosome 6.     These findings are consistent with the reported pathologic diagnosis of acute myeloid leukemia. Trisomy 13, as seen in this case, is an uncommon but documented recurring abnormality in AML that has been associated with FLT3 overexpression and mutations involving RUNX1, SRSF2, ASXL1 and BCOR (Dayne T et al, 2014, Blood 124:1304) and, in that setting, with aggressive disease and a poor prognosis. Correlation with the pending molecular testing is therefore recommended. Given that trisomy 13 is considered high risk, the additional cytogenetic abnormalities are of unknown additional clinical significance.           IMAGING:  CT A/P 9/15/22:  IMPRESSION:   1.  No acute abnormality identified.  2.  Heterogeneous prostate is noted. Correlate with any evidence for  underlying prostate disease.  3.  A few nonobstructing stones within the left kidney. No  hydronephrosis.  4.  A tiny hypodense nodule at the pancreas head is identified and is  indeterminant. This may have been faintly present on the older CT from  2/25/2015. Suggest correlation with the pancreas MRI.      MRCP 10/29/22:  IMPRESSION:  No pancreatic abnormalities are identified. The previously noted small hypodense pancreatic head lesion is not visualized on today's MRI study.    CT urogram 1/31/23:  IMPRESSION:   1.  Nonobstructing stones within the left kidney again noted. One of  these has moved into the left renal  pelvis. No hydronephrosis.  2.  No acute abnormality identified.  3.  Previously noted tiny hypodensity at the pancreas head appears  stable compared to the CT from 9/15/2022. This is unable to be  visualized on the MRI from 10/29/2022. This could just be a tiny  cystic lesion. Suggest 1-year follow-up CT to assess for stability.  4.  Mildly prominent prostate.        ASSESSMENT/PLAN:  Zbigniew Mendoza is an 88 year old male who is referred for pancytopenia. PMH is significant for Afib on eliquis, mitral prolapse with MR, renal calculus.     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   -Currently, ANC is 1.2, Hb 9.6, , and .   -He underwent EGD and colonoscopy 12/2022. EGD was normal with erosive gastropathy without bleed. Z-line was irregular. He had grade C reflux esophagitis with bleeding. Colon was normal with a 2 mm polyp removed from the ascending colon.   -He is undergoing workup with urology for gross hematuria (see below).  -He has not been started on any new recent medications nor does have concerning symptoms for underlying infection. I reviewed the possibilities of nutritional deficiency, but given his age, there is a possibility of an underlying marrow disorder such as myelodysplastic syndrome or myelofibrosis.   -Patient is not hemolyzing. Iron stores, B12 WNL. Folate low at 2 and he has started on supplementation.  -TSH 1.85.  -Patient underwent bone marrow biopsy with FLOW cytometry returning with ~70% blasts. IHC positive for CD13 and , although partial. He also has evidence of dysplasia. Cytogenetics are pending.   -We held a long discussion today in regards to the diagnosis of AML. Patient is elderly at the age of 88. We had reviewed five year overall survival for >75 is 2.6% and >80 is 0%. He has good functional status and we are awaiting his final  molecular/mutational studies for further risk stratification and prognostication. Patient is not fit for intensive induction therapy nor would he be an allogeneic transplant candidate. As such, treatment with 7+3 or vyxeos would not be indicated. We had discussed reduced intensity induction with HMA and venetoclax. I reviewed the VIALE-A trial in which venetoclax and azacytidine had improved CR and overall survival from 10 mths to 15 months vs azaycytidine alone. However, we reviewed significant risk of adverse effect including febrile neutropenia with increased morbidity and mortality. I discussed I would only be comfortable initiating this treatment on an inpatient basis due to risk of cytopenias and TLS. Patient expresses his understanding of the above. Continue antimicrobial prophylaxis at this time.    -Patient continues to contemplate on pursuing treatment vs best supportive care/hospice. He will meet with palliative care on 4/20/23. He is beginning to have circulating blasts on peripheral blood. He has lethargy. He denies fever. I had stated we will continue best supportive care and he will be seen on a weekly basis, however, we may have rapid evolution of AML with high mortality. We again reviewed the risk of treatment with azacytidine. He is fearful of further cytopenias and risk of infection.   -4/5/23: Patient continues to have decline. He has had increased fatigue and weakness and is considering a chairlift. Daughter, Dejah, who is a palliative care physician as well has spoken with patient in regards to home with hospice. Patient seemed to resonate with this idea. I was able to speak with [facility] (Cora Lindsey and Elly Collazo) in regards to patient's case and they may be able to support palliative transfusions for a finite time. Will place referral at this time.     2) Gross hematuria, improved  -Followed by urology.  -CT urogram was negative.  -Cystoscopy without obvious cause of hematuria  in the bladder.   -It appears urine cytology was sent and was not able to send for FISH analysis due to scant cellularity.    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     4) Pancreatic head lesion  -Seen on CT A/P and urogram. MRCP was negative.     5) Loose stools  -Patient is on antimicrobials. Provided stool sampling kit for c. Diff testing.     6) -Ultimately, patient will continue to weigh best supportive care vs initiation of treatment with HMA/Venetoclax. I had discussed if patient is to move forward with systemic treatment, would elect for inpatient treatment with cycle #1 azacytidine and then if tolerating well, will add venetoclax with cycle#2 also to be given inpatient due to ramp up and risk of TLS. However, given his progressive decline, the likelihood of moving forward with this is highly unlikely.  -Will also set up for weekly N-plate as well (insurance denied unless platelets <50K).  -Palliative care referral with Dr. Oliver on 4/20/23. However, patient and family interested in meeting with [facility] to discuss the possibility of establishing care with supportive transfusions.     -Continue weekly CBC with possible blood/platelet transfusion +/- N-Plate.  -Follow up weekly alternating between JAIMEE and myself.  -Patient is at high risk of neutropenic fever and sepsis and he is aware to contact clinic immediately with fever.       Jennifer Morrissey DO  Hematology/Oncology  [facility]        Again, thank you for allowing me to participate in the care of your patient.        Sincerely,        Jennifer Morrissey DO

## 2023-04-05 NOTE — LETTER
4/5/2023         RE: Zbigniew Mendoza  [id number]  [facility] [address]        Dear Colleague,    Thank you for referring your patient, Zbigniew Mendoza, to the [facility]. Please see a copy of my visit note below.    Telephone-Visit Details    Type of service:  Telephone Visit    Telephone Start Time (time telephone started): 335    Telephone End Time (time telephone stopped): 405    Originating Location (pt. 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 PMH is significant for Afib on eliquis, mitral prolapse with MR, renal calculus.     On 12/6/22, WBC 2.1, ANC 1.5, Hb 9.6, , PLT 118K. He has had chronic, mild thrombocytopenia since 2015. Neutropenia and anemia have been new over this last year.     He is noted to be followed by urology for gross hematuria. CT urogram/cystoscopy pending.    He underwent EGD and colonoscopy 12/2022. EGD was normal with erosive gastropathy without bleed. Z-line was irregular. He had grade C reflux esophagitis with bleeding. Colon was normal with a 2 mm polyp removed from the ascending colon.     He has 10-15 lb weight loss. He has good appetite. No LAD. No drenching night sweats, unexplained fever.     He is a retired psyciatrist, but continues to publish. He served in the  and was stationed in [place] following the [place] War. He then worked at [employer], the [employer], and the [employer].     Patient lives at home alone and is able to perform all ADLs without issue. He has six children who are doing well. His twins are in gastroenterology and palliative  care. His eldest son suffers from chronic alcoholism.        INTERIM HISTORY:  Patient has completed bone marrow biopsy. 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 He continues on supportive care. He reports leg weakness. He plans to install a chairlift. His normal daily tasks are requiring significant effort. Dejah is describing progressive full body weakness. Patient is now having to sponge bathe rather than a full shower.       REVIEW OF SYSTEMS:   A 14 point ROS was reviewed with pertinent positives and negatives in the HPI.        HOME MEDICATIONS:  Current Outpatient Medications   Medication Sig Dispense Refill     acyclovir (ZOVIRAX) 400 MG tablet Take 1 tablet (400 mg) by mouth 2 times daily 60 tablet 1     cefpodoxime (VANTIN) 200 MG tablet Take 1 tablet (200 mg) by mouth 2 times daily 60 tablet 1     fluconazole (DIFLUCAN) 200 MG tablet Take 1 tablet (200 mg) by mouth daily 30 tablet 1     fluticasone (FLONASE) 50 MCG/ACT nasal spray INSTILL 1 SPRAY INTO BOTH NOSTRILS DAILY 48 mL 3     gabapentin (NEURONTIN) 100 MG capsule Take 2 capsules (200 mg) by mouth nightly as needed for other (insomnia) 60 capsule 1     methylcellulose (CITRUCEL) 500 MG TABS tablet Take 500 mg by mouth daily       metoprolol succinate ER (TOPROL XL) 50 MG 24 hr tablet TAKE 1 TABLET BY MOUTH EVERY DAY 90 tablet 2     Multiple Vitamins-Minerals (MULTIVITAL) TABS Take  by mouth daily.       pantoprazole (PROTONIX) 40 MG EC tablet Take 1 tablet (40 mg) by mouth daily 90 tablet 1     polyethylene glycol (MIRALAX) 17 GM/Dose powder Take 17 g by mouth daily 510 g 0     tamsulosin (FLOMAX) 0.4 MG capsule Take 2 capsules (0.8 mg) by mouth daily 180 capsule 3     vitamin B complex with vitamin C (STRESS TAB) tablet Take 1 tablet by mouth daily           ALLERGIES:  Allergies   Allergen Reactions     Ciprofloxacin      Acute confusion     Sulfamethizole Unknown     Confusion    3/13/2023 Pt says this is not an allergy         PAST  MEDICAL HISTORY:  Past Medical History:   Diagnosis Date     Arrhythmia      Atrial fibrillation      Calculus of kidney      Mitral prolapse     mild, with mild MR     Prostate infection      STD (sexually transmitted disease)          PAST SURGICAL HISTORY:  Past Surgical History:   Procedure Laterality Date     ARTHROSCOPY SHOULDER ROTATOR CUFF REPAIR Right 1996     BACK SURGERY       COLONOSCOPY N/A 12/01/2022    Procedure: COLONOSCOPY;  Surgeon: Karthik Mcmanus MD;  Location: [facility] OR     CYSTOSCOPY       CYSTOSCOPY, TRANSURETHRAL RESECTION (TUR) PROSTATE, COMBINED  04/1993    S/P TURP     ESOPHAGOSCOPY, GASTROSCOPY, DUODENOSCOPY (EGD), COMBINED N/A 12/01/2022    Procedure: ESOPHAGOGASTRODUODENOSCOPY;  Surgeon: Karthik Mcmanus MD;  Location: [facility] OR     PROSTATE SURGERY           SOCIAL HISTORY:  Social History     Socioeconomic History     Marital status:      Spouse name: Not on file     Number of children: Not on file     Years of education: Not on file     Highest education level: Not on file   Occupational History     Not on file   Tobacco Use     Smoking status: Never     Smokeless tobacco: Never   Vaping Use     Vaping status: Never Used   Substance and Sexual Activity     Alcohol use: Yes     Comment: very rare if at all     Drug use: No     Sexual activity: Not Currently   Other Topics Concern     Parent/sibling w/ CABG, MI or angioplasty before 65F 55M? Not Asked   Social History Narrative     Not on file     Social Determinants of Health     Financial Resource Strain: Not on file   Food Insecurity: Not on file   Transportation Needs: Not on file   Physical Activity: Not on file   Stress: Not on file   Social Connections: Not on file   Intimate Partner Violence: Not At Risk (1/5/2023)    Humiliation, Afraid, Rape, and Kick questionnaire      Fear of Current or Ex-Partner: No      Emotionally Abused: No      Physically Abused: No      Sexually Abused: No   Housing Stability:  Not on file         FAMILY HISTORY:  Family History   Problem Relation Age of Onset     Coronary Artery Disease Father      No Known Problems Mother          PHYSICAL EXAM:  Telephone visit      LABS:   Latest Reference Range & Units 03/28/23 13:59   WBC 4.0 - 11.0 10e3/uL 1.8 (L)   Hemoglobin 13.3 - 17.7 g/dL 7.6 (L)   Hematocrit 40.0 - 53.0 % 23.3 (L)   Platelet Count 150 - 450 10e3/uL 64 (L)   RBC Count 4.40 - 5.90 10e6/uL 2.16 (L)   MCV 78 - 100 fL 108 (H)   MCH 26.5 - 33.0 pg 35.2 (H)   MCHC 31.5 - 36.5 g/dL 32.6   RDW 10.0 - 15.0 % 22.1 (H)   % Neutrophils % 20   % Lymphocytes % 71   % Monocytes % 2   % Eosinophils % 2   % Basophils % 0   % Blasts % 5   Absolute Basophils 0.0 - 0.2 10e3/uL 0.0   Absolute Neutrophil 1.6 - 8.3 10e3/uL 0.4 (LL)   Absolute Lymphocytes 0.8 - 5.3 10e3/uL 1.3   Absolute Monocytes 0.0 - 1.3 10e3/uL 0.0   Absolute Eosinophils 0.0 - 0.7 10e3/uL 0.0   Absolute Blasts <=0.0 10e3/uL 0.1 (H)   RBC Morphology  Confirmed RBC Indices   Platelet Morphology Automated Count Confirmed. Platelet morphology is normal.  Automated Count Confirmed. Platelet morphology is normal.      Latest Reference Range & Units 01/05/23 10:51   Erythropoietin 4 - 27 mU/mL 178 (H)   Ferritin 31 - 409 ng/mL 356   Iron 61 - 157 ug/dL 200 (H)   Iron Binding Capacity 240 - 430 ug/dL 222 (L)   Iron Sat Index 15 - 46 % 90 (H)   Lactate Dehydrogenase 0 - 250 U/L 160   TSH 0.30 - 4.20 uIU/mL 1.85   Vitamin B12 232 - 1,245 pg/mL 374   % Retic 0.5 - 2.0 % 1.4   Absolute Retic 0.025 - 0.095 10e6/uL 0.039   INR 0.85 - 1.15  1.55 (H)   PTT 22 - 38 Seconds 37   Fibrinogen 170 - 490 mg/dL 300   SPECIMEN EXPIRATION DATE  [id number]   JOSE C Anti-IgG,-C3d  Negative      Latest Reference Range & Units 01/05/23 10:51   Haptoglobin 32 - 197 mg/dL 115       PATHOLOGY:  Case Report   Surgical Pathology Report                         Case: [id number]                                   Authorizing Provider:  Karthik Mcmanus          Collected:           12/01/2022 10:08 AM                                  MD Manny                                                                   Ordering Location:     [facility]   Received:            12/01/2022 10:47 AM                                  [facility]                                                                       Pathologist:           Barbara Hawkins MD                                                            Specimens:   A) - Stomach, RANDOM STOMACH BIOPSY - GASTRITIS                                                      B) - Large Intestine, Colon, RANDOM COLON BIOPSIES                                                   C) - Large Intestine, Colon, COLON ASCENDING POLYP                                                   D) - Large Intestine, Colon, COLOON TRANSVERSE POLYP                                       Addendum   This addendum is issued to include findings of Helicobacter pylori immunoperoxidase stain performed on gastric biopsy (specimen A) for further evaluation, using appropriate controls.  The H. pylori stain is negative.  All previously reported findings remain unchanged.   Addendum electronically signed by Barbara Hawkins MD on 12/9/2022 at 12:05 AM   Final Diagnosis   A.  Stomach, biopsy-  - Mild chronic gastritis.  - Negative for active gastritis, intestinal metaplasia, gastric epithelial dysplasia, or malignancy.  - Pending Helicobacter pylori stain (see forthcoming addendum for results).  - Sampling includes: Gastric antrum and fundus/body-type mucosa.     B.  Colon, random biopsies:  - Negative for colitis, dysplasia, and malignancy.     C.  Ascending colon, polyp, polypectomy:  - Consistent with diminutive tubular adenoma.  - Polyp size: 2 mm (per endoscopy report).  - Negative for high-grade dysplasia and malignancy.  - Complete resection and retrieval (per endoscopy report).     D.  Transverse colon, polyp, polypectomy:  - Consistent with  inflammatory-type benign mucosal polyp (predominantly denuded mucosal surface precludes accurate assessment).  - Polyp size: 8 mm (per endoscopy report).  - Negative for dysplasia and malignancy.         Final Diagnosis 1/5/23:   Peripheral blood for morphology:  -Pancytopenia including:  -Moderate normochromic, macrocytic anemia with evidence of red cell regeneration  -Moderate leukopenia with absolute neutropenia and lymphopenia; leukocytes without morphologic abnormalities  -Mild thrombocytopenia          Bone Marrow Biopsy 3/13/23:  Component Ref Range & Units  Resulting Agency   Final Diagnosis   Peripheral blood for morphology:  -Pancytopenia including:  -Marked normochromic, macrocytic anemia without evidence of red cell regeneration  -Marked leukopenia with marked absolute neutropenia without overt dysplastic change and without circulating blasts  -Moderate thrombocytopenia     Bone marrow, biopsy with aspirate and special studies for evaluation:  -Acute myelogenous leukemia, see comment, dysplastic myeloid precursors also noted  -Completed immunophenotyping studies show 69% blasts consistent with myeloid lineage, small blast percentage (<1%, showing B-cell lineage also noted, please see synopsis within this report and separate full completed report)  -Cytogenetic studies in process with results to be reported separately by the performing laboratory when completed  -Acute myeloid leukemia focused panel in process with results be reported by the performing laboratory when completed     Comment       With the expression of CD13 and  (although partial) acute myeloid leukemia (AML) is favored, this immunophenotype may be consistent with AML, NOS, minimally differentiated depending on the results of genetic studies. Of note, a very small population appears to have some B-lineage differentiation; however this population accounts for <1% of leukocytes.          Case Report   Flow Cytometry Report                              Case: [id number]                                   Authorizing Provider:  Jennifer Morrissey DO         Collected:           03/13/2023 10:44 AM           Ordering Location:     [facility]   Received:            03/13/2023 11:06 AM                                  [facility]                                                             Pathologist:           Mary Middleton MD                                                            Specimen:    Iliac Crest, Bone Marrow Aspirate, Left                                                    Flow Interpretation   A. Iliac Crest, Bone Marrow Aspirate, Left:  -Increased blasts (69%)  See comment      Electronically signed by Mary Middleton MD on 3/14/2023 at  1:58 PM   Comment     With the expression of CD13 and  (although partial) acute myeloid leukemia (AML) is favored, this immunophenotype may be consistent with AML, NOS, minimally differentiated depending on the results of genetic studies. Of note, a very small population appears to have some B-lineage differentiation; however this population accounts for <1% of leukocytes.        Significant Results    Detected Alterations of Known or Potential Pathogenicity: BCOR V797fs  RUNX1 R162G     TMB Score: None   Interpretation    Two clinically relevant mutations were detected:     1) BCOR V797fs; VAF=52%  2) RUNX1 R162G; VAF=43%     Specimen Description    Bone Marrow: ACD   RESULTS    INTERNAL TANDEM REPEAT (ITD):      Mutant Allele:   Absent     Normal Allele:   Present     D835 MUTATION:     Mutant Allele:   Absent     Normal Allele:   Present   INTERPRETATION    Molecular testing performed on submitted Bone Marrow.     No evidence was found of either an internal tandem duplication within exon 14 or of a D835(TKD) point mutation within exon 20 of the FLT3 gene.     No definite evidence of a D835(TKD) point mutation within exon 20 of the FLT3 gene was found. However, this patient sample  showed poor amplification and limited sensitivity for D835 (TKD) point mutation, hence a small percentage of mutant allele may not be detected. Please correlate with pending NGS study.     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 Clone 1 (50% of metaphase cells analyzed) had gain of one extra copy of chromosome 13 and loss of one copy of chromosome 21. Clone 2 (10% of metaphases) had, in addition, deletion within the long arm of a chromosome 6.     These findings are consistent with the reported pathologic diagnosis of acute myeloid leukemia. Trisomy 13, as seen in this case, is an uncommon but documented recurring abnormality in AML that has been associated with FLT3 overexpression and mutations involving RUNX1, SRSF2, ASXL1 and BCOR (Dayne T et al, 2014, Blood 124:1304) and, in that setting, with aggressive disease and a poor prognosis. Correlation with the pending molecular testing is therefore recommended. Given that trisomy 13 is considered high risk, the additional cytogenetic abnormalities are of unknown additional clinical significance.           IMAGING:  CT A/P 9/15/22:  IMPRESSION:   1.  No acute abnormality identified.  2.  Heterogeneous prostate is noted. Correlate with any evidence for  underlying prostate disease.  3.  A few nonobstructing stones within the left kidney. No  hydronephrosis.  4.  A tiny hypodense nodule at the pancreas head is identified and is  indeterminant. This may have been faintly present on the older CT from  2/25/2015. Suggest correlation with the pancreas MRI.      MRCP 10/29/22:  IMPRESSION:  No pancreatic abnormalities are identified. The previously noted small hypodense pancreatic head lesion is not visualized on today's MRI study.    CT urogram 1/31/23:  IMPRESSION:   1.  Nonobstructing stones within the left kidney again noted. One of  these has moved into the left renal  pelvis. No hydronephrosis.  2.  No acute abnormality identified.  3.  Previously noted tiny hypodensity at the pancreas head appears  stable compared to the CT from 9/15/2022. This is unable to be  visualized on the MRI from 10/29/2022. This could just be a tiny  cystic lesion. Suggest 1-year follow-up CT to assess for stability.  4.  Mildly prominent prostate.        ASSESSMENT/PLAN:  Zbigniew Mendoza is an 88 year old male who is referred for pancytopenia. PMH is significant for Afib on eliquis, mitral prolapse with MR, renal calculus.     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   -Currently, ANC is 1.2, Hb 9.6, , and .   -He underwent EGD and colonoscopy 12/2022. EGD was normal with erosive gastropathy without bleed. Z-line was irregular. He had grade C reflux esophagitis with bleeding. Colon was normal with a 2 mm polyp removed from the ascending colon.   -He is undergoing workup with urology for gross hematuria (see below).  -He has not been started on any new recent medications nor does have concerning symptoms for underlying infection. I reviewed the possibilities of nutritional deficiency, but given his age, there is a possibility of an underlying marrow disorder such as myelodysplastic syndrome or myelofibrosis.   -Patient is not hemolyzing. Iron stores, B12 WNL. Folate low at 2 and he has started on supplementation.  -TSH 1.85.  -Patient underwent bone marrow biopsy with FLOW cytometry returning with ~70% blasts. IHC positive for CD13 and , although partial. He also has evidence of dysplasia. Cytogenetics are pending.   -We held a long discussion today in regards to the diagnosis of AML. Patient is elderly at the age of 88. We had reviewed five year overall survival for >75 is 2.6% and >80 is 0%. He has good functional status and we are awaiting his final  molecular/mutational studies for further risk stratification and prognostication. Patient is not fit for intensive induction therapy nor would he be an allogeneic transplant candidate. As such, treatment with 7+3 or vyxeos would not be indicated. We had discussed reduced intensity induction with HMA and venetoclax. I reviewed the VIALE-A trial in which venetoclax and azacytidine had improved CR and overall survival from 10 mths to 15 months vs azaycytidine alone. However, we reviewed significant risk of adverse effect including febrile neutropenia with increased morbidity and mortality. I discussed I would only be comfortable initiating this treatment on an inpatient basis due to risk of cytopenias and TLS. Patient expresses his understanding of the above. Continue antimicrobial prophylaxis at this time.    -Patient continues to contemplate on pursuing treatment vs best supportive care/hospice. He will meet with palliative care on 4/20/23. He is beginning to have circulating blasts on peripheral blood. He has lethargy. He denies fever. I had stated we will continue best supportive care and he will be seen on a weekly basis, however, we may have rapid evolution of AML with high mortality. We again reviewed the risk of treatment with azacytidine. He is fearful of further cytopenias and risk of infection.   -4/5/23: Patient continues to have decline. He has had increased fatigue and weakness and is considering a chairlift. Daughter, Dejah, who is a palliative care physician as well has spoken with patient in regards to home with hospice. Patient seemed to resonate with this idea. I was able to speak with [facility] (Cora Lindsey and Elly Collazo) in regards to patient's case and they may be able to support palliative transfusions for a finite time. Will place referral at this time.     2) Gross hematuria, improved  -Followed by urology.  -CT urogram was negative.  -Cystoscopy without obvious cause of hematuria  in the bladder.   -It appears urine cytology was sent and was not able to send for FISH analysis due to scant cellularity.    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     4) Pancreatic head lesion  -Seen on CT A/P and urogram. MRCP was negative.     5) Loose stools  -Patient is on antimicrobials. Provided stool sampling kit for c. Diff testing.     6) -Ultimately, patient will continue to weigh best supportive care vs initiation of treatment with HMA/Venetoclax. I had discussed if patient is to move forward with systemic treatment, would elect for inpatient treatment with cycle #1 azacytidine and then if tolerating well, will add venetoclax with cycle#2 also to be given inpatient due to ramp up and risk of TLS. However, given his progressive decline, the likelihood of moving forward with this is highly unlikely.  -Will also set up for weekly N-plate as well (insurance denied unless platelets <50K).  -Palliative care referral with Dr. Oliver on 4/20/23. However, patient and family interested in meeting with [facility] hospice to discuss the possibility of establishing care with supportive transfusions.     -Continue weekly CBC with possible blood/platelet transfusion +/- N-Plate.  -Follow up weekly alternating between JAIMEE and myself.  -Patient is at high risk of neutropenic fever and sepsis and he is aware to contact clinic immediately with fever.       Jennifer Morrissey DO  Hematology/Oncology  [facility]        Again, thank you for allowing me to participate in the care of your patient.        Sincerely,        Jennifer Morrissey DO

## 2023-04-05 NOTE — PROGRESS NOTES
Telephone-Visit Details    Type of service:  Telephone Visit    Telephone Start Time (time telephone started): 335    Telephone End Time (time telephone stopped): 405    Originating Location (pt. 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 PMH is significant for Afib on eliquis, mitral prolapse with MR, renal calculus.     On 12/6/22, WBC 2.1, ANC 1.5, Hb 9.6, , PLT 118K. He has had chronic, mild thrombocytopenia since 2015. Neutropenia and anemia have been new over this last year.     He is noted to be followed by urology for gross hematuria. CT urogram/cystoscopy pending.    He underwent EGD and colonoscopy 12/2022. EGD was normal with erosive gastropathy without bleed. Z-line was irregular. He had grade C reflux esophagitis with bleeding. Colon was normal with a 2 mm polyp removed from the ascending colon.     He has 10-15 lb weight loss. He has good appetite. No LAD. No drenching night sweats, unexplained fever.     He is a retired psyciatrist, but continues to publish. He served in the  and was stationed in [place] following the [place] War. He then worked at [employer] [place] [employer], and the [employer].     Patient lives at home alone and is able to perform all ADLs without issue. He has six children who are doing well. His twins are in gastroenterology and palliative care. His eldest son suffers from chronic alcoholism.        INTERIM HISTORY:  Patient has completed bone marrow biopsy. 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 He continues on supportive care. He reports leg weakness. He plans to install a  chairlift. His normal daily tasks are requiring significant effort. Dejah is describing progressive full body weakness. Patient is now having to sponge bathe rather than a full shower.       REVIEW OF SYSTEMS:   A 14 point ROS was reviewed with pertinent positives and negatives in the HPI.        HOME MEDICATIONS:  Current Outpatient Medications   Medication Sig Dispense Refill     acyclovir (ZOVIRAX) 400 MG tablet Take 1 tablet (400 mg) by mouth 2 times daily 60 tablet 1     cefpodoxime (VANTIN) 200 MG tablet Take 1 tablet (200 mg) by mouth 2 times daily 60 tablet 1     fluconazole (DIFLUCAN) 200 MG tablet Take 1 tablet (200 mg) by mouth daily 30 tablet 1     fluticasone (FLONASE) 50 MCG/ACT nasal spray INSTILL 1 SPRAY INTO BOTH NOSTRILS DAILY 48 mL 3     gabapentin (NEURONTIN) 100 MG capsule Take 2 capsules (200 mg) by mouth nightly as needed for other (insomnia) 60 capsule 1     methylcellulose (CITRUCEL) 500 MG TABS tablet Take 500 mg by mouth daily       metoprolol succinate ER (TOPROL XL) 50 MG 24 hr tablet TAKE 1 TABLET BY MOUTH EVERY DAY 90 tablet 2     Multiple Vitamins-Minerals (MULTIVITAL) TABS Take  by mouth daily.       pantoprazole (PROTONIX) 40 MG EC tablet Take 1 tablet (40 mg) by mouth daily 90 tablet 1     polyethylene glycol (MIRALAX) 17 GM/Dose powder Take 17 g by mouth daily 510 g 0     tamsulosin (FLOMAX) 0.4 MG capsule Take 2 capsules (0.8 mg) by mouth daily 180 capsule 3     vitamin B complex with vitamin C (STRESS TAB) tablet Take 1 tablet by mouth daily           ALLERGIES:  Allergies   Allergen Reactions     Ciprofloxacin      Acute confusion     Sulfamethizole Unknown     Confusion    3/13/2023 Pt says this is not an allergy         PAST MEDICAL HISTORY:  Past Medical History:   Diagnosis Date     Arrhythmia      Atrial fibrillation      Calculus of kidney      Mitral prolapse     mild, with mild MR     Prostate infection      STD (sexually transmitted disease)          PAST SURGICAL  HISTORY:  Past Surgical History:   Procedure Laterality Date     ARTHROSCOPY SHOULDER ROTATOR CUFF REPAIR Right 1996     BACK SURGERY       COLONOSCOPY N/A 12/01/2022    Procedure: COLONOSCOPY;  Surgeon: Karthik Mcmanus MD;  Location: [facility] OR     CYSTOSCOPY       CYSTOSCOPY, TRANSURETHRAL RESECTION (TUR) PROSTATE, COMBINED  04/1993    S/P TURP     ESOPHAGOSCOPY, GASTROSCOPY, DUODENOSCOPY (EGD), COMBINED N/A 12/01/2022    Procedure: ESOPHAGOGASTRODUODENOSCOPY;  Surgeon: Karthik Mcmanus MD;  Location: [facility] OR     PROSTATE SURGERY           SOCIAL HISTORY:  Social History     Socioeconomic History     Marital status:      Spouse name: Not on file     Number of children: Not on file     Years of education: Not on file     Highest education level: Not on file   Occupational History     Not on file   Tobacco Use     Smoking status: Never     Smokeless tobacco: Never   Vaping Use     Vaping status: Never Used   Substance and Sexual Activity     Alcohol use: Yes     Comment: very rare if at all     Drug use: No     Sexual activity: Not Currently   Other Topics Concern     Parent/sibling w/ CABG, MI or angioplasty before 65F 55M? Not Asked   Social History Narrative     Not on file     Social Determinants of Health     Financial Resource Strain: Not on file   Food Insecurity: Not on file   Transportation Needs: Not on file   Physical Activity: Not on file   Stress: Not on file   Social Connections: Not on file   Intimate Partner Violence: Not At Risk (1/5/2023)    Humiliation, Afraid, Rape, and Kick questionnaire      Fear of Current or Ex-Partner: No      Emotionally Abused: No      Physically Abused: No      Sexually Abused: No   Housing Stability: Not on file         FAMILY HISTORY:  Family History   Problem Relation Age of Onset     Coronary Artery Disease Father      No Known Problems Mother          PHYSICAL EXAM:  Telephone visit      LABS:   Latest Reference Range & Units 03/28/23 13:59    WBC 4.0 - 11.0 10e3/uL 1.8 (L)   Hemoglobin 13.3 - 17.7 g/dL 7.6 (L)   Hematocrit 40.0 - 53.0 % 23.3 (L)   Platelet Count 150 - 450 10e3/uL 64 (L)   RBC Count 4.40 - 5.90 10e6/uL 2.16 (L)   MCV 78 - 100 fL 108 (H)   MCH 26.5 - 33.0 pg 35.2 (H)   MCHC 31.5 - 36.5 g/dL 32.6   RDW 10.0 - 15.0 % 22.1 (H)   % Neutrophils % 20   % Lymphocytes % 71   % Monocytes % 2   % Eosinophils % 2   % Basophils % 0   % Blasts % 5   Absolute Basophils 0.0 - 0.2 10e3/uL 0.0   Absolute Neutrophil 1.6 - 8.3 10e3/uL 0.4 (LL)   Absolute Lymphocytes 0.8 - 5.3 10e3/uL 1.3   Absolute Monocytes 0.0 - 1.3 10e3/uL 0.0   Absolute Eosinophils 0.0 - 0.7 10e3/uL 0.0   Absolute Blasts <=0.0 10e3/uL 0.1 (H)   RBC Morphology  Confirmed RBC Indices   Platelet Morphology Automated Count Confirmed. Platelet morphology is normal.  Automated Count Confirmed. Platelet morphology is normal.      Latest Reference Range & Units 01/05/23 10:51   Erythropoietin 4 - 27 mU/mL 178 (H)   Ferritin 31 - 409 ng/mL 356   Iron 61 - 157 ug/dL 200 (H)   Iron Binding Capacity 240 - 430 ug/dL 222 (L)   Iron Sat Index 15 - 46 % 90 (H)   Lactate Dehydrogenase 0 - 250 U/L 160   TSH 0.30 - 4.20 uIU/mL 1.85   Vitamin B12 232 - 1,245 pg/mL 374   % Retic 0.5 - 2.0 % 1.4   Absolute Retic 0.025 - 0.095 10e6/uL 0.039   INR 0.85 - 1.15  1.55 (H)   PTT 22 - 38 Seconds 37   Fibrinogen 170 - 490 mg/dL 300   SPECIMEN EXPIRATION DATE  [id number]   JOSE C Anti-IgG,-C3d  Negative      Latest Reference Range & Units 01/05/23 10:51   Haptoglobin 32 - 197 mg/dL 115       PATHOLOGY:  Case Report   Surgical Pathology Report                         Case: [id number]                                   Authorizing Provider:  Karthik Mcmanus         Collected:           12/01/2022 10:08 AM                                  MD Manny                                                                   Ordering Location:     [facility]   Received:            12/01/2022 10:47 AM                                   [facility] OR                                                                       Pathologist:           Barbara Hawkins MD                                                            Specimens:   A) - Stomach, RANDOM STOMACH BIOPSY - GASTRITIS                                                      B) - Large Intestine, Colon, RANDOM COLON BIOPSIES                                                   C) - Large Intestine, Colon, COLON ASCENDING POLYP                                                   D) - Large Intestine, Colon, COLOON TRANSVERSE POLYP                                       Addendum   This addendum is issued to include findings of Helicobacter pylori immunoperoxidase stain performed on gastric biopsy (specimen A) for further evaluation, using appropriate controls.  The H. pylori stain is negative.  All previously reported findings remain unchanged.   Addendum electronically signed by Barbara Hawkins MD on 12/9/2022 at 12:05 AM   Final Diagnosis   A.  Stomach, biopsy-  - Mild chronic gastritis.  - Negative for active gastritis, intestinal metaplasia, gastric epithelial dysplasia, or malignancy.  - Pending Helicobacter pylori stain (see forthcoming addendum for results).  - Sampling includes: Gastric antrum and fundus/body-type mucosa.     B.  Colon, random biopsies:  - Negative for colitis, dysplasia, and malignancy.     C.  Ascending colon, polyp, polypectomy:  - Consistent with diminutive tubular adenoma.  - Polyp size: 2 mm (per endoscopy report).  - Negative for high-grade dysplasia and malignancy.  - Complete resection and retrieval (per endoscopy report).     D.  Transverse colon, polyp, polypectomy:  - Consistent with inflammatory-type benign mucosal polyp (predominantly denuded mucosal surface precludes accurate assessment).  - Polyp size: 8 mm (per endoscopy report).  - Negative for dysplasia and malignancy.         Final Diagnosis 1/5/23:   Peripheral blood for  morphology:  -Pancytopenia including:  -Moderate normochromic, macrocytic anemia with evidence of red cell regeneration  -Moderate leukopenia with absolute neutropenia and lymphopenia; leukocytes without morphologic abnormalities  -Mild thrombocytopenia          Bone Marrow Biopsy 3/13/23:  Component Ref Range & Units  Resulting Agency   Final Diagnosis   Peripheral blood for morphology:  -Pancytopenia including:  -Marked normochromic, macrocytic anemia without evidence of red cell regeneration  -Marked leukopenia with marked absolute neutropenia without overt dysplastic change and without circulating blasts  -Moderate thrombocytopenia     Bone marrow, biopsy with aspirate and special studies for evaluation:  -Acute myelogenous leukemia, see comment, dysplastic myeloid precursors also noted  -Completed immunophenotyping studies show 69% blasts consistent with myeloid lineage, small blast percentage (<1%, showing B-cell lineage also noted, please see synopsis within this report and separate full completed report)  -Cytogenetic studies in process with results to be reported separately by the performing laboratory when completed  -Acute myeloid leukemia focused panel in process with results be reported by the performing laboratory when completed     Comment       With the expression of CD13 and  (although partial) acute myeloid leukemia (AML) is favored, this immunophenotype may be consistent with AML, NOS, minimally differentiated depending on the results of genetic studies. Of note, a very small population appears to have some B-lineage differentiation; however this population accounts for <1% of leukocytes.          Case Report   Flow Cytometry Report                             Case: [id number]                                   Authorizing Provider:  Jennifer Morrissey DO         Collected:           03/13/2023 10:44 AM           Ordering Location:     [facility]   Received:            03/13/2023  11:06 AM                                  [facility]                                                             Pathologist:           Mary Middleton MD                                                            Specimen:    Iliac Crest, Bone Marrow Aspirate, Left                                                    Flow Interpretation   A. Iliac Crest, Bone Marrow Aspirate, Left:  -Increased blasts (69%)  See comment      Electronically signed by Mary Middleton MD on 3/14/2023 at  1:58 PM   Comment     With the expression of CD13 and  (although partial) acute myeloid leukemia (AML) is favored, this immunophenotype may be consistent with AML, NOS, minimally differentiated depending on the results of genetic studies. Of note, a very small population appears to have some B-lineage differentiation; however this population accounts for <1% of leukocytes.        Significant Results    Detected Alterations of Known or Potential Pathogenicity: BCOR V797fs  RUNX1 R162G     TMB Score: None   Interpretation    Two clinically relevant mutations were detected:     1) BCOR V797fs; VAF=52%  2) RUNX1 R162G; VAF=43%     Specimen Description    Bone Marrow: ACD   RESULTS    INTERNAL TANDEM REPEAT (ITD):      Mutant Allele:   Absent     Normal Allele:   Present     D835 MUTATION:     Mutant Allele:   Absent     Normal Allele:   Present   INTERPRETATION    Molecular testing performed on submitted Bone Marrow.     No evidence was found of either an internal tandem duplication within exon 14 or of a D835(TKD) point mutation within exon 20 of the FLT3 gene.     No definite evidence of a D835(TKD) point mutation within exon 20 of the FLT3 gene was found. However, this patient sample showed poor amplification and limited sensitivity for D835 (TKD) point mutation, hence a small percentage of mutant allele may not be detected. Please correlate with pending NGS study.     ISCN     46,XY,+13,-21[10]/46,idem,del(6)(q13q23)[2]/46,XY[8]   Interpretation    Two related clones were found in this bone marrow aspirate. Clone 1 (50% of metaphase cells analyzed) had gain of one extra copy of chromosome 13 and loss of one copy of chromosome 21. Clone 2 (10% of metaphases) had, in addition, deletion within the long arm of a chromosome 6.     These findings are consistent with the reported pathologic diagnosis of acute myeloid leukemia. Trisomy 13, as seen in this case, is an uncommon but documented recurring abnormality in AML that has been associated with FLT3 overexpression and mutations involving RUNX1, SRSF2, ASXL1 and BCOR (Dayne T et al, 2014, Blood 124:1304) and, in that setting, with aggressive disease and a poor prognosis. Correlation with the pending molecular testing is therefore recommended. Given that trisomy 13 is considered high risk, the additional cytogenetic abnormalities are of unknown additional clinical significance.           IMAGING:  CT A/P 9/15/22:  IMPRESSION:   1.  No acute abnormality identified.  2.  Heterogeneous prostate is noted. Correlate with any evidence for  underlying prostate disease.  3.  A few nonobstructing stones within the left kidney. No  hydronephrosis.  4.  A tiny hypodense nodule at the pancreas head is identified and is  indeterminant. This may have been faintly present on the older CT from  2/25/2015. Suggest correlation with the pancreas MRI.      MRCP 10/29/22:  IMPRESSION:  No pancreatic abnormalities are identified. The previously noted small hypodense pancreatic head lesion is not visualized on today's MRI study.    CT urogram 1/31/23:  IMPRESSION:   1.  Nonobstructing stones within the left kidney again noted. One of  these has moved into the left renal pelvis. No hydronephrosis.  2.  No acute abnormality identified.  3.  Previously noted tiny hypodensity at the pancreas head appears  stable compared to the CT from 9/15/2022. This is unable to  be  visualized on the MRI from 10/29/2022. This could just be a tiny  cystic lesion. Suggest 1-year follow-up CT to assess for stability.  4.  Mildly prominent prostate.        ASSESSMENT/PLAN:  Zbigniew Mendoza is an 88 year old male who is referred for pancytopenia. PMH is significant for Afib on eliquis, mitral prolapse with MR, renal calculus.     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   -Currently, ANC is 1.2, Hb 9.6, , and .   -He underwent EGD and colonoscopy 12/2022. EGD was normal with erosive gastropathy without bleed. Z-line was irregular. He had grade C reflux esophagitis with bleeding. Colon was normal with a 2 mm polyp removed from the ascending colon.   -He is undergoing workup with urology for gross hematuria (see below).  -He has not been started on any new recent medications nor does have concerning symptoms for underlying infection. I reviewed the possibilities of nutritional deficiency, but given his age, there is a possibility of an underlying marrow disorder such as myelodysplastic syndrome or myelofibrosis.   -Patient is not hemolyzing. Iron stores, B12 WNL. Folate low at 2 and he has started on supplementation.  -TSH 1.85.  -Patient underwent bone marrow biopsy with FLOW cytometry returning with ~70% blasts. IHC positive for CD13 and , although partial. He also has evidence of dysplasia. Cytogenetics are pending.   -We held a long discussion today in regards to the diagnosis of AML. Patient is elderly at the age of 88. We had reviewed five year overall survival for >75 is 2.6% and >80 is 0%. He has good functional status and we are awaiting his final molecular/mutational studies for further risk stratification and prognostication. Patient is not fit for intensive induction therapy nor would he be an allogeneic transplant candidate. As such,  treatment with 7+3 or vyxeos would not be indicated. We had discussed reduced intensity induction with HMA and venetoclax. I reviewed the VIALE-A trial in which venetoclax and azacytidine had improved CR and overall survival from 10 mths to 15 months vs azaycytidine alone. However, we reviewed significant risk of adverse effect including febrile neutropenia with increased morbidity and mortality. I discussed I would only be comfortable initiating this treatment on an inpatient basis due to risk of cytopenias and TLS. Patient expresses his understanding of the above. Continue antimicrobial prophylaxis at this time.    -Patient continues to contemplate on pursuing treatment vs best supportive care/hospice. He will meet with palliative care on 4/20/23. He is beginning to have circulating blasts on peripheral blood. He has lethargy. He denies fever. I had stated we will continue best supportive care and he will be seen on a weekly basis, however, we may have rapid evolution of AML with high mortality. We again reviewed the risk of treatment with azacytidine. He is fearful of further cytopenias and risk of infection.   -4/5/23: Patient continues to have decline. He has had increased fatigue and weakness and is considering a chairlift. Daughter, Dejah, who is a palliative care physician as well has spoken with patient in regards to home with hospice. Patient seemed to resonate with this idea. I was able to speak with [facility] (Cora Lindsey and Elly Collazo) in regards to patient's case and they may be able to support palliative transfusions for a finite time. Will place referral at this time.     2) Gross hematuria, improved  -Followed by urology.  -CT urogram was negative.  -Cystoscopy without obvious cause of hematuria in the bladder.   -It appears urine cytology was sent and was not able to send for FISH analysis due to scant cellularity.    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     4) Pancreatic head lesion  -Seen on CT A/P and urogram. MRCP was negative.     5) Loose stools  -Patient is on antimicrobials. Provided stool sampling kit for c. Diff testing.     6) -Ultimately, patient will continue to weigh best supportive care vs initiation of treatment with HMA/Venetoclax. I had discussed if patient is to move forward with systemic treatment, would elect for inpatient treatment with cycle #1 azacytidine and then if tolerating well, will add venetoclax with cycle#2 also to be given inpatient due to ramp up and risk of TLS. However, given his progressive decline, the likelihood of moving forward with this is highly unlikely.  -Will also set up for weekly N-plate as well (insurance denied unless platelets <50K).  -Palliative care referral with Dr. lOiver on 4/20/23. However, patient and family interested in meeting with [facility] hosp[facility] to discuss the possibility of establishing care with supportive transfusions.     -Continue weekly CBC with possible blood/platelet transfusion +/- N-Plate.  -Follow up weekly alternating between JAIMEE and myself.  -Patient is at high risk of neutropenic fever and sepsis and he is aware to contact clinic immediately with fever.       Jennifer Morrissey,   Hematology/Oncology  [facility]

## 2023-04-06 NOTE — PROGRESS NOTES
Writer also clarified the plan of care with Dr. Morrissey. She reports that Chi will continue with weekly follow ups and blood transfusions. The hospice referral is just for an initial consult and it have if needed in the near future.    Gavi Lacy RN on 4/6/2023 at 9:00 AM

## 2023-04-06 NOTE — PROGRESS NOTES
Infusion Nursing Note:  Zbigniew Mendoza presents today for labs and possible transfusion.    Patient seen by provider today: No   present during visit today: Not Applicable.    Note: blood pressure low upon arrival. Hgb did come back at 7. Chi is feeling a little weaker today as well. 1 unit PRBCs transfused. BP improved with the volume of the unit of blood.    Critical ANC of 0.3 today. Stable. Reinforced neutropenic precautions education.     Does not meet parameters for NPLATE    Intravenous Access:  Lab draw site right AC, Needle type butterfly, Gauge 23.  Labs drawn without difficulty.    Peripheral IV placed.    Treatment Conditions:  Lab Results   Component Value Date    HGB 7.0 (L) 04/06/2023    WBC 1.4 (L) 04/06/2023    ANEU 0.4 (LL) 03/28/2023    ANEUTAUTO 0.5 (L) 03/01/2023    PLT 55 (L) 04/06/2023      Results reviewed, labs MET treatment parameters, ok to proceed with treatment.    Blood transfusion consent signed 2/20/23.    Post Infusion Assessment:  Patient tolerated infusion without incident.  Blood return noted pre and post infusion.  Site patent and intact, free from redness, edema or discomfort.  No evidence of extravasations.  Access discontinued per protocol.     Discharge Plan:   Discharge instructions reviewed with: Patient and Family.  Patient and/or family verbalized understanding of discharge instructions and all questions answered.  AVS to patient via [employer].  Patient will return next week for next appointment.   Patient discharged in stable condition accompanied by: daughter.  Departure Mode: Ambulatory.      Ken Rocha RN

## 2023-04-06 NOTE — PROGRESS NOTES
Jennifer Morrissey, Gavi Trevino, CHAZ  HI Gavi,     Placing a [facility] hospice referral for Chi Mendoza as stat. Can you contact Cora Lindsey and Elly Collazo please to arrange a meeting? [phone]     Thank you     Writer spoke with Cora Lindsey at [facility]. Someone from their intake team will contact Chi after orders have been received. Writer faxed stat order, face sheet, and last office visit note with Dr. Morrissey. Receipt confirmed with [employer].    Gavi Lacy RN on 4/6/2023 at 8:58 AM

## 2023-04-07 NOTE — PROGRESS NOTES
received a call from Elly with [facility] stating that they will be able to complete the initial consult with Mr. Mendoza, but at this time are unable to sign him directly on to hospice due to staffing. Likely, it will be 3-4 weeks before Chi can be admitted to hospice. Writer spoke with daughter Kirsty who is rightfully concerned about this.     Melanier spoke with Elly and Cora at [facility] who report that they will be having an initial consult with Chi on 04/11/23. They will be completing an assessment to see if it's okay to wait a few weeks before admitting Chi or if he has immediate needs now. Chi is currently still scheduled to see Dr. Morrissey at the Cancer clinic weekly with labs.    Gavi Lacy RN on 4/7/2023 at 4:15 PM

## 2023-04-11 NOTE — PROGRESS NOTES
Melanier received a call from Alecia hodge RN at [facility]. Alecia reports that she completed a Hospice consult with Chi and his family today. Alecia reports that although the [facility] team is unable to onboard Chi at this time, they will continue to check in on him weekly to see how he's doing.     Writer will update Dr. Morrissey.    Gavi Lacy RN on 4/11/2023 at 4:11 PM

## 2023-04-12 NOTE — PROGRESS NOTES
Oncology/Hematology Visit Note    Apr 13, 2023    Reason for visit: Follow up AML    Oncology HPI: Zbigniew Mendoza is a 88 year old male initially seen in our clinic for pancytopenia.  In December 2022, WBC 2.1, ANC 1.5, hemoglobin 9.6, platelets 118 K.  GI work-up with erosive gastropathy without bleeding and colon was normal with a 2 mm polyp.  He had a 10 to 15 pound weight loss, no lymphadenopathy, night sweats or unexplained fever.  With his ongoing cytopenias, Dr. 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  He was not fit for intensive induction therapy or allogenic transplant.  Dr. Morrissey reviewed options with venetoclax/azacitidine versus azacitidine alone however the risk of morbidity mortality was discussed.    Patient continued to contemplate on pursuing treatment versus bed supportive care/hospice.  Dr. Morrissey recommended weekly Nplate in the meantime.    He is here today for close follow-up.    Interval History: Chi is here today unaccompanied.  He is actually feeling quite a bit better lately since his blood transfusion last week and recently installed a chairlift in his house to reach his second floor.  He says this has made a world of difference and he has more energy.  His leg weakness is overall better.  Appetite has been pretty good.  He met with the hospice team with his children and although, down the road, this may be an option, he is not ready at this time.  He has decided that chemotherapy is now completely off the table.  He would like to continue with transfusion support and Nplate, if he does qualify with parameters.    Review of Systems: See interval hx. Denies fevers, chills, HA, dizziness, n/t, changes in vision, cough, sore throat, CP, SOB, abdominal pain, N/V, diarrhea, changes in urination, bleeding, bruising, rash.     PMHx and Social Hx reviewed per EPIC.      Medications:  Current Outpatient Medications   Medication  "Sig Dispense Refill     acyclovir (ZOVIRAX) 400 MG tablet Take 1 tablet (400 mg) by mouth 2 times daily 60 tablet 1     cefpodoxime (VANTIN) 200 MG tablet Take 1 tablet (200 mg) by mouth 2 times daily 60 tablet 1     fluconazole (DIFLUCAN) 200 MG tablet Take 1 tablet (200 mg) by mouth daily 30 tablet 1     fluticasone (FLONASE) 50 MCG/ACT nasal spray INSTILL 1 SPRAY INTO BOTH NOSTRILS DAILY 48 mL 3     gabapentin (NEURONTIN) 100 MG capsule Take 2 capsules (200 mg) by mouth nightly as needed for other (insomnia) 60 capsule 1     methylcellulose (CITRUCEL) 500 MG TABS tablet Take 500 mg by mouth daily       metoprolol succinate ER (TOPROL XL) 50 MG 24 hr tablet TAKE 1 TABLET BY MOUTH EVERY DAY 90 tablet 2     Multiple Vitamins-Minerals (MULTIVITAL) TABS Take  by mouth daily.       pantoprazole (PROTONIX) 40 MG EC tablet Take 1 tablet (40 mg) by mouth daily 90 tablet 1     polyethylene glycol (MIRALAX) 17 GM/Dose powder Take 17 g by mouth daily 510 g 0     tamsulosin (FLOMAX) 0.4 MG capsule Take 2 capsules (0.8 mg) by mouth daily 180 capsule 3     vitamin B complex with vitamin C (STRESS TAB) tablet Take 1 tablet by mouth daily         Allergies   Allergen Reactions     Ciprofloxacin      Acute confusion     Sulfamethizole Unknown     Confusion    3/13/2023 Pt says this is not an allergy       EXAM:    BP 98/53   Pulse 62   Temp 97.8  F (36.6  C) (Tympanic)   Resp 16   Ht 1.778 m (5' 10\")   Wt 60.6 kg (133 lb 8 oz)   SpO2 99%   BMI 19.16 kg/m      GENERAL:  Male, in no acute distress.  Alert and oriented x3. Well groomed.   HEENT:  Normocephalic, atraumatic. No conjunctival injection or eye swelling.   LUNGS:  Nonlabored breathing, no cough or audible wheezing, able to speak full sentences.  MSK: Full ROM UE.    SKIN: No rash on exposed skin.   NEURO: CN grossly intact, speech normal  PSYCH: Mentation appears normal, insight and judgement intact      Labs:    04/13/23 08:29   WBC 1.7 (L)   Hemoglobin 7.3 (L) "   Hematocrit 22.7 (L)   Platelet Count 41 (LL)   RBC Count 2.18 (L)    (H)   MCH 33.5 (H)   MCHC 32.2   RDW 23.0 (H)   % Neutrophils 18   % Lymphocytes 75   % Monocytes 1   % Eosinophils 4   % Basophils 0   % Blasts 2   Absolute Basophils 0.0   Absolute Neutrophil 0.3 (LL)   Absolute Lymphocytes 1.3   Absolute Monocytes 0.0   Absolute Eosinophils 0.1   Absolute Blasts 0.0   RBC Morphology Confirmed RBC Indices   Platelet Morphology Automated Count Confirmed. Platelet morphology is normal.     Imaging: n/a    Impression/Plan: Zbigniew Mendoza is a 88 year old male with AML currently on transfusion support and possible Nplate.     AML: 70% blasts on bone marrow biopsy and we have supported in with blood/platelet transfusions and possible Nplate.  He has met with [facility] hospice and although it may be appropriate down the road, he is not ready for it.  He also has decided that chemotherapy is not an option for him, which Dr. Morrissey has expressed in the past as well.  Hemoglobin 7.3, no need for blood transfusion today, but platelets are 41K and he does qualify for Nplate.  We will plan for weekly CBC with JAIMEE/MD and possible blood/platelets and/or Nplate.  - 4/17 Zoe, labs, possible blood/platelets, Nplate    Gross hematuria: improved, clear urine.  Followed by urology.    Afib: Previously on Eliquis, discontinued due to thrombocytopenia.  No bleeding.    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    Ophtho: Diagnosed with macular degeneration and is followed closely with ophthalmology.  Given the risk of infection and bleeding with these injections, it was decided to discontinue the injection.  No significant vision changes recently.      Chart documentation with Dragon Voice recognition Software. Although reviewed after completion, some words and grammatical errors may remain.    30 minutes spent on the date of the encounter doing chart review, review of test results,  interpretation of tests, patient visit and documentation     Zoe Tom PA-C  Hematology/Oncology  [facility]

## 2023-04-13 NOTE — PROGRESS NOTES
Infusion Nursing Note:  Zbigniew Mendoza presents today for NPlate # 1.    Patient seen by provider today: Yes:  Zoe Tom Pa-C.   present during visit today: Not Applicable.    Note:  Visit with Zoe Tom PA-C today.  Patient reports is feeling well today.  Patient denies fevers, chills or signs of infection.  Patient denies dizziness or lightheadedness.  Patient denies issues with bleeding or bruising.    WBC = 1.7  ANC = 0.3  HGB = 7.3  PLTS = 41,000    Writer reviewed Cbc/diff/plt results with Zoe Tom PA-C.  THERESA Tom PA-C/Kaylee PerezRN:  No PRBC or PLT transfusion today per parameters.  Okay to give NPlate first dose today for PLTS of 41,000.  -Writer provided NPlate information sheet for patient to take home.    Writer reviewed Neutropenic Precautions and Bleeding Precautions with patient today.  Patient understands to contact clinic if experiences fever of 100.4 or greater, shaking chills (with or without fever), signs of infection, issues with bleeding or bruising OR with any other problem or concern.  Patient confirms that he does have a thermometer at home.    Intravenous Access:  No IV needed today.    Treatment Conditions:  Lab Results   Component Value Date    HGB 7.3 (L) 04/13/2023    WBC 1.7 (L) 04/13/2023    ANEU 0.3 (LL) 04/13/2023    ANEUTAUTO 0.5 (L) 03/01/2023    PLT 41 (LL) 04/13/2023      Results reviewed, labs MET treatment parameters, ok to proceed with treatment.    Post Infusion Assessment:  Patient tolerated injection without incident.       Discharge Plan:   Discharge instructions reviewed with: Patient.  Patient and/or family verbalized understanding of discharge instructions and all questions answered.  Patient discharged in stable condition accompanied by: self.  Departure Mode: Ambulatory.  4/17/23: Labs, Appointment with Zoe Tom PA-C + Possible PRBC/PLTS.    Kaylee Perez, RN, BSN, OCN on 4/13/2023 at 2:13 PM

## 2023-04-13 NOTE — PROGRESS NOTES
Medical Assistant Note:  Zbigniew Mendoza presents today for blood draw.    Patient seen by provider today: Yes: Zoe Tom.   present during visit today: Not Applicable.    Concerns: No Concerns.    Procedure:  Lab draw site: right antecub, Needle type: butterfly, Gauge: 23.    Post Assessment:  Labs drawn without difficulty: Yes.    Discharge Plan:  Departure Mode: Ambulatory.    Face to Face Time: 10.    Kylie Cardoso, CMA

## 2023-04-13 NOTE — NURSING NOTE
"Oncology Rooming Note    April 13, 2023 8:46 AM   Zbigniew Mendoza is a 88 year old male who presents for:    Chief Complaint   Patient presents with     Oncology Clinic Visit     Acute myeloid leukemia not having achieved remission       Initial Vitals: BP 98/53   Pulse 62   Temp 97.8  F (36.6  C) (Tympanic)   Resp 16   Ht 1.778 m (5' 10\")   Wt 60.6 kg (133 lb 8 oz)   SpO2 99%   BMI 19.16 kg/m   Estimated body mass index is 19.16 kg/m  as calculated from the following:    Height as of this encounter: 1.778 m (5' 10\").    Weight as of this encounter: 60.6 kg (133 lb 8 oz). Body surface area is 1.73 meters squared.  No Pain (0) Comment: Data Unavailable   No LMP for male patient.  Allergies reviewed: Yes  Medications reviewed: Yes    Medications: Medication refills not needed today.  Pharmacy name entered into [employer]: [id number] - [place], [place] - [address]    Clinical concerns: follow up       Gege Ashton CMA              "

## 2023-04-13 NOTE — LETTER
4/13/2023         RE: Zbigniew Mendoza  [address]  [facility] [address]        Dear Colleague,    Thank you for referring your patient, Zbigniew Mendoza, to the [facility]. Please see a copy of my visit note below.    Oncology/Hematology Visit Note    Apr 13, 2023    Reason for visit: Follow up AML    Oncology HPI: Zbigniew Mendoza is a 88 year old male initially seen in our clinic for pancytopenia.  In December 2022, WBC 2.1, ANC 1.5, hemoglobin 9.6, platelets 118 K.  GI work-up with erosive gastropathy without bleeding and colon was normal with a 2 mm polyp.  He had a 10 to 15 pound weight loss, no lymphadenopathy, night sweats or unexplained fever.  With his ongoing cytopenias, Dr. 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  He was not fit for intensive induction therapy or allogenic transplant.  Dr. Morrissey reviewed options with venetoclax/azacitidine versus azacitidine alone however the risk of morbidity mortality was discussed.    Patient continued to contemplate on pursuing treatment versus bed supportive care/hospice.  Dr. Morrissey recommended weekly Nplate in the meantime.    He is here today for close follow-up.    Interval History: Chi is here today unaccompanied.  He is actually feeling quite a bit better lately since his blood transfusion last week and recently installed a chairlift in his house to reach his second floor.  He says this has made a world of difference and he has more energy.  His leg weakness is overall better.  Appetite has been pretty good.  He met with the hospice team with his children and although, down the road, this may be an option, he is not ready at this time.  He has decided that chemotherapy is now completely off the table.  He would like to continue with transfusion support and Nplate, if he does qualify with parameters.    Review of Systems: See interval hx. Denies fevers,  "chills, HA, dizziness, n/t, changes in vision, cough, sore throat, CP, SOB, abdominal pain, N/V, diarrhea, changes in urination, bleeding, bruising, rash.     PMHx and Social Hx reviewed per EPIC.      Medications:  Current Outpatient Medications   Medication Sig Dispense Refill     acyclovir (ZOVIRAX) 400 MG tablet Take 1 tablet (400 mg) by mouth 2 times daily 60 tablet 1     cefpodoxime (VANTIN) 200 MG tablet Take 1 tablet (200 mg) by mouth 2 times daily 60 tablet 1     fluconazole (DIFLUCAN) 200 MG tablet Take 1 tablet (200 mg) by mouth daily 30 tablet 1     fluticasone (FLONASE) 50 MCG/ACT nasal spray INSTILL 1 SPRAY INTO BOTH NOSTRILS DAILY 48 mL 3     gabapentin (NEURONTIN) 100 MG capsule Take 2 capsules (200 mg) by mouth nightly as needed for other (insomnia) 60 capsule 1     methylcellulose (CITRUCEL) 500 MG TABS tablet Take 500 mg by mouth daily       metoprolol succinate ER (TOPROL XL) 50 MG 24 hr tablet TAKE 1 TABLET BY MOUTH EVERY DAY 90 tablet 2     Multiple Vitamins-Minerals (MULTIVITAL) TABS Take  by mouth daily.       pantoprazole (PROTONIX) 40 MG EC tablet Take 1 tablet (40 mg) by mouth daily 90 tablet 1     polyethylene glycol (MIRALAX) 17 GM/Dose powder Take 17 g by mouth daily 510 g 0     tamsulosin (FLOMAX) 0.4 MG capsule Take 2 capsules (0.8 mg) by mouth daily 180 capsule 3     vitamin B complex with vitamin C (STRESS TAB) tablet Take 1 tablet by mouth daily         Allergies   Allergen Reactions     Ciprofloxacin      Acute confusion     Sulfamethizole Unknown     Confusion    3/13/2023 Pt says this is not an allergy       EXAM:    BP 98/53   Pulse 62   Temp 97.8  F (36.6  C) (Tympanic)   Resp 16   Ht 1.778 m (5' 10\")   Wt 60.6 kg (133 lb 8 oz)   SpO2 99%   BMI 19.16 kg/m      GENERAL:  Male, in no acute distress.  Alert and oriented x3. Well groomed.   HEENT:  Normocephalic, atraumatic. No conjunctival injection or eye swelling.   LUNGS:  Nonlabored breathing, no cough or audible " wheezing, able to speak full sentences.  MSK: Full ROM UE.    SKIN: No rash on exposed skin.   NEURO: CN grossly intact, speech normal  PSYCH: Mentation appears normal, insight and judgement intact      Labs:    04/13/23 08:29   WBC 1.7 (L)   Hemoglobin 7.3 (L)   Hematocrit 22.7 (L)   Platelet Count 41 (LL)   RBC Count 2.18 (L)    (H)   MCH 33.5 (H)   MCHC 32.2   RDW 23.0 (H)   % Neutrophils 18   % Lymphocytes 75   % Monocytes 1   % Eosinophils 4   % Basophils 0   % Blasts 2   Absolute Basophils 0.0   Absolute Neutrophil 0.3 (LL)   Absolute Lymphocytes 1.3   Absolute Monocytes 0.0   Absolute Eosinophils 0.1   Absolute Blasts 0.0   RBC Morphology Confirmed RBC Indices   Platelet Morphology Automated Count Confirmed. Platelet morphology is normal.     Imaging: n/a    Impression/Plan: Zbigniew Mendoza is a 88 year old male with AML currently on transfusion support and possible Nplate.     AML: 70% blasts on bone marrow biopsy and we have supported in with blood/platelet transfusions and possible Nplate.  He has met with [facility] hospice and although it may be appropriate down the road, he is not ready for it.  He also has decided that chemotherapy is not an option for him, which Dr. Morrissey has expressed in the past as well.  Hemoglobin 7.3, no need for blood transfusion today, but platelets are 41K and he does qualify for Nplate.  We will plan for weekly CBC with JAIMEE/MD and possible blood/platelets and/or Nplate.  - 4/17 Zoe, labs, possible blood/platelets, Nplate    Gross hematuria: improved, clear urine.  Followed by urology.    Afib: Previously on Eliquis, discontinued due to thrombocytopenia.  No bleeding.    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    Ophtho: Diagnosed with macular degeneration and is followed closely with ophthalmology.  Given the risk of infection and bleeding with these injections, it was decided to discontinue the injection.  No significant vision changes  recently.      Chart documentation with Dragon Voice recognition Software. Although reviewed after completion, some words and grammatical errors may remain.    30 minutes spent on the date of the encounter doing chart review, review of test results, interpretation of tests, patient visit and documentation     Zoe Tom PA-C  Hematology/Oncology  [facility]                    Again, thank you for allowing me to participate in the care of your patient.        Sincerely,        Zoe Tom PA-C

## 2023-04-14 NOTE — PROGRESS NOTES
Oncology/Hematology Visit Note    Apr 17, 2023    Reason for visit: Follow up AML    Oncology HPI: Zbigniew Mendoza is a 88 year old male initially seen in our clinic for pancytopenia.  In December 2022, WBC 2.1, ANC 1.5, hemoglobin 9.6, platelets 118 K.  GI work-up with erosive gastropathy without bleeding and colon was normal with a 2 mm polyp.  He had a 10 to 15 pound weight loss, no lymphadenopathy, night sweats or unexplained fever.  With his ongoing cytopenias, Dr. 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  He was not fit for intensive induction therapy or allogenic transplant.  Dr. Morrissey reviewed options with venetoclax/azacitidine versus azacitidine alone however the risk of morbidity mortality was discussed.    Patient continued to contemplate on pursuing treatment versus bed supportive care/hospice.  Dr. Morrissey recommended weekly Nplate in the meantime.     He is here today for close follow-up.    Interval History: Cih is here and he is doing really well.  He tolerated Nplate last week really well. He has bilateral leg weakness in the morning and it gets better throughout the day. He is still thrilled about his chairlift in his house.  He continues to have no bleeding.  Denies fever or chills,     Review of Systems: See interval hx. Denies fevers, chills, cough, CP, SOB, abdominal pain, N/V, changes in urination, bleeding, bruising.     PMHx and Social Hx reviewed per EPIC.      Medications:  Current Outpatient Medications   Medication Sig Dispense Refill     acyclovir (ZOVIRAX) 400 MG tablet Take 1 tablet (400 mg) by mouth 2 times daily 60 tablet 1     cefpodoxime (VANTIN) 200 MG tablet Take 1 tablet (200 mg) by mouth 2 times daily 60 tablet 1     fluconazole (DIFLUCAN) 200 MG tablet Take 1 tablet (200 mg) by mouth daily 30 tablet 1     fluticasone (FLONASE) 50 MCG/ACT nasal spray INSTILL 1 SPRAY INTO BOTH NOSTRILS DAILY 48 mL 3      "gabapentin (NEURONTIN) 100 MG capsule Take 2 capsules (200 mg) by mouth nightly as needed for other (insomnia) 60 capsule 1     methylcellulose (CITRUCEL) 500 MG TABS tablet Take 500 mg by mouth daily       metoprolol succinate ER (TOPROL XL) 50 MG 24 hr tablet TAKE 1 TABLET BY MOUTH EVERY DAY 90 tablet 2     Multiple Vitamins-Minerals (MULTIVITAL) TABS Take  by mouth daily.       pantoprazole (PROTONIX) 40 MG EC tablet Take 1 tablet (40 mg) by mouth daily 90 tablet 1     polyethylene glycol (MIRALAX) 17 GM/Dose powder Take 17 g by mouth daily 510 g 0     tamsulosin (FLOMAX) 0.4 MG capsule Take 2 capsules (0.8 mg) by mouth daily 180 capsule 3     vitamin B complex with vitamin C (STRESS TAB) tablet Take 1 tablet by mouth daily         Allergies   Allergen Reactions     Ciprofloxacin      Acute confusion     Sulfamethizole Unknown     Confusion    3/13/2023 Pt says this is not an allergy       EXAM:    BP (!) 107/36   Pulse 55   Temp 97  F (36.1  C) (Tympanic)   Resp 16   Ht 1.778 m (5' 10\")   Wt 61.1 kg (134 lb 12.8 oz)   SpO2 100%   BMI 19.34 kg/m      GENERAL:  Female, in no acute distress.  Alert and oriented x3.   HEENT:  Normocephalic, atraumatic.  PERRL, oropharynx clear with no sores or thrush.   LYMPH NODES:  No palpable pre/post-auricular, cervical, axillary lymphadenopathy appreciated.  CV:  RRR, No murmurs, gallops, or rubs.   LUNGS:  Clear to auscultation bilaterally.   ABDOMEN:  Soft, nontender and nondistended.  Bowel sounds heard x4.  No apparent hepatosplenomegaly.   EXTREMITIES:  No clubbing, cyanosis, or edema.   SKIN: No rash,   PSYCH: Calm and cooperative      Labs:    04/17/23 10:18   WBC 1.3 (L) (P)   Hemoglobin 7.3 (L) (P)   Hematocrit 23.2 (L) (P)   Platelet Count 41 (LL) (P)   RBC Count 2.23 (L) (P)    (H) (P)   MCH 32.7 (P)   MCHC 31.5 (P)   RDW 23.4 (H) (P)   Absolute NRBCs 0.0 (P)   NRBCs per 100 WBC 0 (P)   ABO/Rh(D) O NEG   Antibody Screen Negative   SPECIMEN EXPIRATION " DATE [id number]     Imaging: n/a    Impression/Plan: Zbigniew Mendoza is a 88 year old male with AML currently on transfusion support and possible Nplate.     AML: 70% blasts on bone marrow biopsy and we have supported in with blood/platelet transfusions and possible Nplate.  He has met with [facility] hospice and although it may be appropriate down the road, he is not ready for it.  He also has decided that chemotherapy is not an option for him, which Dr. Morrissey has expressed in the past as well.  Hemoglobin 7.3, no need for blood transfusion today.  Platelets are 41 K, he does not qualify for platelet transfusion, but he did receive Nplate on 4/13/2023, he would qualify for another dose, but we need to wait until its been at least a week.  We will adjust his schedule to keep him from multiple appointments per week.  -4/23 Edgar, repeat CBC, Nplate, possible blood/platelets    Gross hematuria: improved, clear urine.  Followed by urology.    Afib: Previously on Eliquis, discontinued due to thrombocytopenia.  No bleeding.    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    Ophtho: Diagnosed with macular degeneration and is followed closely with ophthalmology.  Given the risk of infection and bleeding with these injections, it was decided to discontinue the injection.  No significant vision changes recently.      Chart documentation with Dragon Voice recognition Software. Although reviewed after completion, some words and grammatical errors may remain.    20 minutes spent on the date of the encounter doing chart review, review of test results, interpretation of tests, patient visit and documentation     Zoe Tom PA-C  Hematology/Oncology  [facility]

## 2023-04-17 NOTE — PROGRESS NOTES
Medical Assistant Note:  Zbigniew Mendoza presents today for blood draw.    Patient seen by provider today: Yes: Zoe Wright PA-C.   present during visit today: Not Applicable.    Concerns: No Concerns.    Procedure:  Lab draw site: LT HAND, Needle type: Butterfly, Gauge: 23.    Post Assessment:  Labs drawn without difficulty: Yes.    Discharge Plan:  Departure Mode: Ambulatory.    Face to Face Time: 10.    Lore Dale CMA

## 2023-04-17 NOTE — NURSING NOTE
"Oncology Rooming Note    April 17, 2023 10:38 AM   Zbigniew Mendoza is a 88 year old male who presents for:    Chief Complaint   Patient presents with     Oncology Clinic Visit     Acute myeloid leukemia not having achieved remission        Initial Vitals: BP (!) 107/36   Pulse 55   Temp 97  F (36.1  C) (Tympanic)   Resp 16   Ht 1.778 m (5' 10\")   Wt 61.1 kg (134 lb 12.8 oz)   SpO2 100%   BMI 19.34 kg/m   Estimated body mass index is 19.34 kg/m  as calculated from the following:    Height as of this encounter: 1.778 m (5' 10\").    Weight as of this encounter: 61.1 kg (134 lb 12.8 oz). Body surface area is 1.74 meters squared.  No Pain (0) Comment: Data Unavailable   No LMP for male patient.  Allergies reviewed: Yes  Medications reviewed: Yes    Medications: Medication refills not needed today.  Pharmacy name entered into In[employer]: [id number] - [place], [place] - [address]    Clinical concerns: follow up       Gege Ashton CMA              "

## 2023-04-17 NOTE — LETTER
4/17/2023         RE: Zbigniew Mendoza  [address]  [facility] [address]        Dear Colleague,    Thank you for referring your patient, Zbigniew Mendoza, to the [facility]. Please see a copy of my visit note below.    Oncology/Hematology Visit Note    Apr 17, 2023    Reason for visit: Follow up AML    Oncology HPI: Zbigniew Mendoza is a 88 year old male initially seen in our clinic for pancytopenia.  In December 2022, WBC 2.1, ANC 1.5, hemoglobin 9.6, platelets 118 K.  GI work-up with erosive gastropathy without bleeding and colon was normal with a 2 mm polyp.  He had a 10 to 15 pound weight loss, no lymphadenopathy, night sweats or unexplained fever.  With his ongoing cytopenias, Dr. 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  He was not fit for intensive induction therapy or allogenic transplant.  Dr. Morrissey reviewed options with venetoclax/azacitidine versus azacitidine alone however the risk of morbidity mortality was discussed.    Patient continued to contemplate on pursuing treatment versus bed supportive care/hospice.  Dr. Morrissey recommended weekly Nplate in the meantime.     He is here today for close follow-up.    Interval History: Chi is here and he is doing really well.  He tolerated Nplate last week really well. He has bilateral leg weakness in the morning and it gets better throughout the day. He is still thrilled about his chairlift in his house.  He continues to have no bleeding.  Denies fever or chills,     Review of Systems: See interval hx. Denies fevers, chills, cough, CP, SOB, abdominal pain, N/V, changes in urination, bleeding, bruising.     PMHx and Social Hx reviewed per EPIC.      Medications:  Current Outpatient Medications   Medication Sig Dispense Refill     acyclovir (ZOVIRAX) 400 MG tablet Take 1 tablet (400 mg) by mouth 2 times daily 60 tablet 1     cefpodoxime (VANTIN) 200 MG tablet  "Take 1 tablet (200 mg) by mouth 2 times daily 60 tablet 1     fluconazole (DIFLUCAN) 200 MG tablet Take 1 tablet (200 mg) by mouth daily 30 tablet 1     fluticasone (FLONASE) 50 MCG/ACT nasal spray INSTILL 1 SPRAY INTO BOTH NOSTRILS DAILY 48 mL 3     gabapentin (NEURONTIN) 100 MG capsule Take 2 capsules (200 mg) by mouth nightly as needed for other (insomnia) 60 capsule 1     methylcellulose (CITRUCEL) 500 MG TABS tablet Take 500 mg by mouth daily       metoprolol succinate ER (TOPROL XL) 50 MG 24 hr tablet TAKE 1 TABLET BY MOUTH EVERY DAY 90 tablet 2     Multiple Vitamins-Minerals (MULTIVITAL) TABS Take  by mouth daily.       pantoprazole (PROTONIX) 40 MG EC tablet Take 1 tablet (40 mg) by mouth daily 90 tablet 1     polyethylene glycol (MIRALAX) 17 GM/Dose powder Take 17 g by mouth daily 510 g 0     tamsulosin (FLOMAX) 0.4 MG capsule Take 2 capsules (0.8 mg) by mouth daily 180 capsule 3     vitamin B complex with vitamin C (STRESS TAB) tablet Take 1 tablet by mouth daily         Allergies   Allergen Reactions     Ciprofloxacin      Acute confusion     Sulfamethizole Unknown     Confusion    3/13/2023 Pt says this is not an allergy       EXAM:    BP (!) 107/36   Pulse 55   Temp 97  F (36.1  C) (Tympanic)   Resp 16   Ht 1.778 m (5' 10\")   Wt 61.1 kg (134 lb 12.8 oz)   SpO2 100%   BMI 19.34 kg/m      GENERAL:  Female, in no acute distress.  Alert and oriented x3.   HEENT:  Normocephalic, atraumatic.  PERRL, oropharynx clear with no sores or thrush.   LYMPH NODES:  No palpable pre/post-auricular, cervical, axillary lymphadenopathy appreciated.  CV:  RRR, No murmurs, gallops, or rubs.   LUNGS:  Clear to auscultation bilaterally.   ABDOMEN:  Soft, nontender and nondistended.  Bowel sounds heard x4.  No apparent hepatosplenomegaly.   EXTREMITIES:  No clubbing, cyanosis, or edema.   SKIN: No rash,   PSYCH: Calm and cooperative      Labs:    04/17/23 10:18   WBC 1.3 (L) (P)   Hemoglobin 7.3 (L) (P)   Hematocrit 23.2 " (L) (P)   Platelet Count 41 (LL) (P)   RBC Count 2.23 (L) (P)    (H) (P)   MCH 32.7 (P)   MCHC 31.5 (P)   RDW 23.4 (H) (P)   Absolute NRBCs 0.0 (P)   NRBCs per 100 WBC 0 (P)   ABO/Rh(D) O NEG   Antibody Screen Negative   SPECIMEN EXPIRATION DATE [id number]     Imaging: n/a    Impression/Plan: Zbigniew Mendoza is a 88 year old male with AML currently on transfusion support and possible Nplate.     AML: 70% blasts on bone marrow biopsy and we have supported in with blood/platelet transfusions and possible Nplate.  He has met with [facility] hospice and although it may be appropriate down the road, he is not ready for it.  He also has decided that chemotherapy is not an option for him, which Dr. Morrissey has expressed in the past as well.  Hemoglobin 7.3, no need for blood transfusion today.  Platelets are 41 K, he does not qualify for platelet transfusion, but he did receive Nplate on 4/13/2023, he would qualify for another dose, but we need to wait until its been at least a week.  We will adjust his schedule to keep him from multiple appointments per week.  -4/23 Edgar, repeat CBC, Nplate, possible blood/platelets    Gross hematuria: improved, clear urine.  Followed by urology.    Afib: Previously on Eliquis, discontinued due to thrombocytopenia.  No bleeding.    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    Ophtho: Diagnosed with macular degeneration and is followed closely with ophthalmology.  Given the risk of infection and bleeding with these injections, it was decided to discontinue the injection.  No significant vision changes recently.      Chart documentation with Dragon Voice recognition Software. Although reviewed after completion, some words and grammatical errors may remain.    20 minutes spent on the date of the encounter doing chart review, review of test results, interpretation of tests, patient visit and documentation     Zoe Tom  MATHEUS  Hematology/Oncology  [facility]                  Again, thank you for allowing me to participate in the care of your patient.        Sincerely,        Zoe Tom PA-C

## 2023-04-17 NOTE — PROGRESS NOTES
Plt 41 (critical result consistent with previous critical result)  Hgb 7.3    MARYJANE Slaughter notified of lab results at 1105.  Patient does not meet parameters for PRBC or platelet transfusion today.  Patient is not due for Nplate yet, as he received his last dose on 4/13/2023.  Per pharmacist EMILIANA Hernandez to delay next Nplate until patient's next clinic/infusion visit on 4/25.  MARYJANE Enriquez in agreement with this plan.    Discussed with Chi.  He verbalized understanding and is in agreement with this plan.    Robbie Dallas, ARACELIN, RN, OCN  Infusion Nurse  [facility]

## 2023-04-20 NOTE — PROGRESS NOTES
Palliative Care Clinic Consult Note    Patient Name: Zbigniew Mendoza  Primary Provider: Eagle Negrete    Chief Complaint/Patient ID: Zbigniew Mendoza is a 88 year old male with PMHx of AML with high risk cytogenetics, Afib on eliquis, and mitral prolapse with MR.      Reviewed: Yes, regular Rx's for gabapentin    History of Present Illness:  Zbigniew Mendoza is a 88 year old male who is seen for a new consult visit today with Palliative Care.  His daughters Dejah and Teena are also present and provide additional history.     Reviewed oncology note from 4/5.  They had a long discussion about overall prognosis given his age.  He is not a good candidate for intensive induction therapy or allogenic transplant.  There is a significant risk of adverse effects from treatment, so oncology would only recommend doing this on an inpatient basis.  Patient has had conversations with his daughter Dejah who is a palliative care fellow in regards to home hospice.  They found an agency ([facility] hospice) that may be able to support palliative transfusions for a finite period of time.    Patient and family both feel confident in his decision to not pursue treatment for his AML and instead focus on quality of life.  His children actually feel like he has seemed more upbeat and have more energy since making this decision.    He denies any pain.  He has been losing weight.  It sound that he had a history of some GI upset (some queasiness and some bowel changes) in the past, which caused him to make some dietary changes.  He now tries to follow the FODMAP diet, tries to avoid gluten and dairy, and does not eat very much at the end of the day.  His children are worried he is too restrictive in his diet.  He has not had a return of the GI distress symptoms he had in the past.    He is able to fall asleep, however often on his sleep is disruptive.  Seems like this may have been more when he was struggling with anxiety about his  "diagnosis.    He does have some increased weakness.  He used to be able to be more active, and was going to the gym every other day even as recently as December 2022.  He used to love to play tennis and golf.  Has to take sponge baths now instead of full shower.  They had a chairlift installed in his home so that he can continue using both floors in his house, and this has been useful.  His daughter also had him purchase walkers yesterday, much to his chagrin.  He does live alone but has many people visiting/checking on him.    There is some concern about these changes in his health affecting his dignity.  Daughter also notes that it is been hard for him to learn how to \"be the patient\".       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 He was in the  and was stationed in [place] after the [place] War.  Social History     Tobacco Use     Smoking status: Never     Smokeless tobacco: Never   Vaping Use     Vaping status: Never Used   Substance Use Topics     Alcohol use: Yes     Comment: very rare if at all     Drug use: No     Family History- Reviewed in Epic.     Allergies   Allergen Reactions     Ciprofloxacin      Acute confusion     Sulfamethizole Unknown     Confusion    3/13/2023 Pt says this is not an allergy     Advanced Care Planning: Unsure if he has completed a formal healthcare directive.  Thinks there may have been something included in his formal will paperwork.    Medications- Reviewed in Epic.    Past Medical History- Reviewed in [facility].    Past Surgical History- Reviewed in Epic.    Review of Systems:   ROS: 10 point ROS neg other than the symptoms noted above in the HPI.    Physical Exam:   /69   Pulse 72   Resp 16   Wt 62.5 kg (137 lb 12.8 oz)   SpO2 92%   BMI 19.77 kg/m    Constitutional: Alert, pleasant, no apparent distress. Sitting up in chair.  Eyes: Sclera non-icteric, no eye " discharge.  ENT: No nasal discharge. Ears grossly normal.  Respiratory: Unlabored respirations. Speaking in full sentences.  Musculoskeletal: Extremities appear normal- no gross deformities noted. No edema noted on upper body.   Skin: No suspicious lesions or rashes on visible skin.  Neurologic: Clear speech, no aphasia. No facial droop.  Psychiatric: Mentation appears normal, appropriate attention. Affect normal/bright. Does not appear anxious or depressed.      Key Data Reviewed:  LABS: 2/21/2023- Cr 0.9, GFR 82, Albumin 3.9, LFTs essentially WNL.   4/17/2023- WBC 1.3, Hgb 7.3, Plts 41.     Impression & Recommendations & Counseling:  Zbigniew Mendoza is a 88 year old male with history of  AML with high risk cytogenetics, Afib on eliquis, and mitral prolapse with MR.    Appetite  Weight loss - It sounds like a fear of recurrent GI symptoms is contributing to his weight loss and decreased PO intake in addition to an effect from his Leukemia. We discussed that a greater emphasis on nutrition   -Advised more frequent, smaller meals during the day and to try to get in more nutrition earlier in the day.  -Suggested looking into meal supplements/shakes.  There are some nondairy based options available.     Weakness  Fatigue - He lives alone in a two-story home, so this is a concern.  He has a chairlift now, which is working well. Discussed how tools such as this and using his walkers can be helpful in avoiding complications such as falls that could impact his ability to stay in his own home.   -Referral placed to PT for some guidance on exercises that would be safe for him to do in his newer physical state.   -We also talked about the risks/benefits of returning to the gym.    ACP  GOC - Patient and family are very much in agreement with his decision to not pursue cancer directed therapy for his AML.  He does want to focus on his quality of life, and he does get fulfillment from being active, working on ongoing medical  literature review, and going to the gym.  We talked about the risks of infection at the gym, however if this is an activity that brings quality to his life and helps allow him to be more active, it may be worth the risk of going more regularly.  We also talked about the focus on nutrition.  Since he is interested in continuing to keep up with his strength and try to be more active, we did talk about finding ways to optimize his nutrition.  If his goals were to change in the future, we discussed that we would likely place less emphasis on how many calories he is getting per day.  He did complete some sort of a healthcare directive paperwork with his will, however, its not clear how specific this paperwork is.  -  Provided blank copy of HCD paperwork today.      Follow up: 6 weeks       Total time spent on day of encounter is 94 mins, including reviewing record, review of above studies, above visit with patient (FTF 12:44-1:45PM), symptomatic discussion of appetite/weight loss, muscle weakness, and fatigue, including medication adjustments/prescription management, and documentation.     Aleena Oliver, DO  Palliative Medicine   Pager [phone], AMCOM ID [id number]    Some chart documentation performed using Dragon Voice recognition Software. Although reviewed after completion, some words and grammatical errors may remain.

## 2023-04-20 NOTE — LETTER
4/20/2023         RE: Zbigniew Mendoza  [address]  [facility] [address]        Dear Colleague,    Thank you for referring your patient, Zbigniew Mendoza, to the [facility]. Please see a copy of my visit note below.     Palliative Care Clinic Consult Note    Patient Name: Zbigniew Mendoza  Primary Provider: Eagle Negrete    Chief Complaint/Patient ID: Zbigniew Mendoza is a 88 year old male with PMHx of AML with high risk cytogenetics, Afib on eliquis, and mitral prolapse with MR.      Reviewed: Yes, regular Rx's for gabapentin    History of Present Illness:  Zbigniew Mendoza is a 88 year old male who is seen for a new consult visit today with Palliative Care.  His daughters Dejah and Teena are also present and provide additional history.     Reviewed oncology note from 4/5.  They had a long discussion about overall prognosis given his age.  He is not a good candidate for intensive induction therapy or allogenic transplant.  There is a significant risk of adverse effects from treatment, so oncology would only recommend doing this on an inpatient basis.  Patient has had conversations with his daughter Dejah who is a palliative care fellow in regards to home hospice.  They found an agency ([facility] hosp[facility]) that may be able to support palliative transfusions for a finite period of time.    Patient and family both feel confident in his decision to not pursue treatment for his AML and instead focus on quality of life.  His children actually feel like he has seemed more upbeat and have more energy since making this decision.    He denies any pain.  He has been losing weight.  It sound that he had a history of some GI upset (some queasiness and some bowel changes) in the past, which caused him to make some dietary changes.  He now tries to follow the FODMAP diet, tries to avoid gluten and dairy, and does not eat very much at the end of the day.  His children are worried he is too  "restrictive in his diet.  He has not had a return of the GI distress symptoms he had in the past.    He is able to fall asleep, however often on his sleep is disruptive.  Seems like this may have been more when he was struggling with anxiety about his diagnosis.    He does have some increased weakness.  He used to be able to be more active, and was going to the gym every other day even as recently as December 2022.  He used to love to play tennis and golf.  Has to take sponge baths now instead of full shower.  They had a chairlift installed in his home so that he can continue using both floors in his house, and this has been useful.  His daughter also had him purchase walkers yesterday, much to his chagrin.  He does live alone but has many people visiting/checking on him.    There is some concern about these changes in his health affecting his dignity.  Daughter also notes that it is been hard for him to learn how to \"be the patient\".       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 He was in the  and was stationed in [place] after the [place] War.  Social History     Tobacco Use     Smoking status: Never     Smokeless tobacco: Never   Vaping Use     Vaping status: Never Used   Substance Use Topics     Alcohol use: Yes     Comment: very rare if at all     Drug use: No     Family History- Reviewed in Epic.     Allergies   Allergen Reactions     Ciprofloxacin      Acute confusion     Sulfamethizole Unknown     Confusion    3/13/2023 Pt says this is not an allergy     Advanced Care Planning: Unsure if he has completed a formal healthcare directive.  Thinks there may have been something included in his formal will paperwork.    Medications- Reviewed in Epic.    Past Medical History- Reviewed in [employer].    Past Surgical History- Reviewed in Epic.    Review of Systems:   ROS: 10 point ROS neg other than the symptoms noted " above in the HPI.    Physical Exam:   /69   Pulse 72   Resp 16   Wt 62.5 kg (137 lb 12.8 oz)   SpO2 92%   BMI 19.77 kg/m    Constitutional: Alert, pleasant, no apparent distress. Sitting up in chair.  Eyes: Sclera non-icteric, no eye discharge.  ENT: No nasal discharge. Ears grossly normal.  Respiratory: Unlabored respirations. Speaking in full sentences.  Musculoskeletal: Extremities appear normal- no gross deformities noted. No edema noted on upper body.   Skin: No suspicious lesions or rashes on visible skin.  Neurologic: Clear speech, no aphasia. No facial droop.  Psychiatric: Mentation appears normal, appropriate attention. Affect normal/bright. Does not appear anxious or depressed.      Key Data Reviewed:  LABS: 2/21/2023- Cr 0.9, GFR 82, Albumin 3.9, LFTs essentially WNL.   4/17/2023- WBC 1.3, Hgb 7.3, Plts 41.     Impression & Recommendations & Counseling:  Zbigniew Mendoza is a 88 year old male with history of  AML with high risk cytogenetics, Afib on eliquis, and mitral prolapse with MR.    Appetite  Weight loss - It sounds like a fear of recurrent GI symptoms is contributing to his weight loss and decreased PO intake in addition to an effect from his Leukemia. We discussed that a greater emphasis on nutrition   -Advised more frequent, smaller meals during the day and to try to get in more nutrition earlier in the day.  -Suggested looking into meal supplements/shakes.  There are some nondairy based options available.     Weakness  Fatigue - He lives alone in a two-story home, so this is a concern.  He has a chairlift now, which is working well. Discussed how tools such as this and using his walkers can be helpful in avoiding complications such as falls that could impact his ability to stay in his own home.   -Referral placed to PT for some guidance on exercises that would be safe for him to do in his newer physical state.   -We also talked about the risks/benefits of returning to the  gym.    ACP  GOC - Patient and family are very much in agreement with his decision to not pursue cancer directed therapy for his AML.  He does want to focus on his quality of life, and he does get fulfillment from being active, working on ongoing medical literature review, and going to the gym.  We talked about the risks of infection at the gym, however if this is an activity that brings quality to his life and helps allow him to be more active, it may be worth the risk of going more regularly.  We also talked about the focus on nutrition.  Since he is interested in continuing to keep up with his strength and try to be more active, we did talk about finding ways to optimize his nutrition.  If his goals were to change in the future, we discussed that we would likely place less emphasis on how many calories he is getting per day.  He did complete some sort of a healthcare directive paperwork with his will, however, its not clear how specific this paperwork is.  -  Provided blank copy of HCD paperwork today.      Follow up: 6 weeks       Total time spent on day of encounter is 94 mins, including reviewing record, review of above studies, above visit with patient (FTF 12:44-1:45PM), symptomatic discussion of appetite/weight loss, muscle weakness, and fatigue, including medication adjustments/prescription management, and documentation.     Aleena Oliver,   Palliative Medicine   Pager [phone], AMCOM ID [id number]    Some chart documentation performed using Dragon Voice recognition Software. Although reviewed after completion, some words and grammatical errors may remain.      Oncology Rooming Note    April 20, 2023 12:41 PM   Zbigniew Mendoza is a 88 year old male who presents for:    Chief Complaint   Patient presents with     Oncology Clinic Visit     Initial Vitals: /69   Pulse 72   Resp 16   Wt 62.5 kg (137 lb 12.8 oz)   SpO2 92%   BMI 19.77 kg/m   Estimated body mass index is 19.77 kg/m  as  "calculated from the following:    Height as of 4/17/23: 1.778 m (5' 10\").    Weight as of this encounter: 62.5 kg (137 lb 12.8 oz). Body surface area is 1.76 meters squared.  No Pain (0) Comment: Data Unavailable   No LMP for male patient.  Allergies reviewed: Yes  Medications reviewed: Yes    Medications: Medication refills not needed today.  Pharmacy name entered into [employer]: [id number] [address] [address]    Clinical concerns:  doctor was notified.      Padmini Watts, [facility]                Again, thank you for allowing me to participate in the care of your patient.        Sincerely,        Aleena Oliver, DO    "

## 2023-04-20 NOTE — PATIENT INSTRUCTIONS
Recommendations:  -We discussed trying to do more frequent smaller meals to increase your daily calories.  -Also suggested incorporating some protein supplements, such as shakes, during the day. I would aim to drink 2 throughout the day.  -Recommend using your walkers regularly and to view them as a tool to help you maintain independence and avoid other health setbacks.  -I placed a referral to physical therapy to help provide some guidance on ongoing strengthening and building stamina for you.  -I also provided a blank copy of a healthcare directive for you.    Follow up: 6 weeks in person      Reasons to Call    If you are having worsening/uncontrolled symptoms we want you to call!    You or your other physicians make any changes to medications we have prescribed.  -Please call for refills 4-5 days before you will run out of medication.    Important Phone Numbers, including: Refills, scheduling, and general questions     Palliative Care RN: Maritza Patterson - [phone]  *After hours or on weekends. Will connect you with on call MD. [phone]

## 2023-04-20 NOTE — PROGRESS NOTES
"Oncology Rooming Note    April 20, 2023 12:41 PM   Zbigniew Mendoza is a 88 year old male who presents for:    Chief Complaint   Patient presents with     Oncology Clinic Visit     Initial Vitals: /69   Pulse 72   Resp 16   Wt 62.5 kg (137 lb 12.8 oz)   SpO2 92%   BMI 19.77 kg/m   Estimated body mass index is 19.77 kg/m  as calculated from the following:    Height as of 4/17/23: 1.778 m (5' 10\").    Weight as of this encounter: 62.5 kg (137 lb 12.8 oz). Body surface area is 1.76 meters squared.  No Pain (0) Comment: Data Unavailable   No LMP for male patient.  Allergies reviewed: Yes  Medications reviewed: Yes    Medications: Medication refills not needed today.  Pharmacy name entered into [employer]: [id number] - [place], [place] - [address]    Clinical concerns:  doctor was notified.      Padmini Watts CMA            "

## 2023-04-25 NOTE — PROGRESS NOTES
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    Weight as of this encounter: 62.3 kg (137 lb 4.8 oz). Body surface area is 1.75 meters squared.  No Pain (0) Comment: Data Unavailable   No LMP for male patient.  Allergies reviewed: Yes  Medications reviewed: Yes    Medications: Medication refills not needed today.  Pharmacy name entered into [employer]: [id number] - [place], [place] - [address]    Clinical concerns: follow up        Yolette Kraft            "

## 2023-04-25 NOTE — PROGRESS NOTES
Infusion Nursing Note:  Zbigniew Mendoza presents today for 1 liter IVF, 1 unit PRBC and Nplate.    Patient seen by provider today: Yes: Edgar Vu.   present during visit today: Not Applicable.    Note: Critical lab results reviewed by Edgar Vu.  Notified patient that Edgar sent a prescription to his pharmacy for metoprolol 25mg.    Started blood transfusion at 120ml/hr and increased to 200ml/hr for remainder of transfusion.      Intravenous Access:  Peripheral IV placed.    Treatment Conditions:  Lab Results   Component Value Date    HGB 6.1 (LL) 04/25/2023    WBC 1.6 (L) 04/25/2023    ANEU 0.2 (LL) 04/25/2023    ANEUTAUTO 0.5 (L) 03/01/2023    PLT 42 (LL) 04/25/2023      Results reviewed, labs MET treatment parameters, ok to proceed with treatment.  Blood transfusion consent signed 2/20/23.      Post Infusion Assessment:  Patient tolerated infusion without incident.  Patient tolerated injection without incident.  Blood return noted pre and post infusion.  Site patent and intact, free from redness, edema or discomfort.  No evidence of extravasations.  Access discontinued per protocol.       Discharge Plan:   [facility]  Patient will return 5/4/23 for next appointment.   Patient discharged in stable condition accompanied by: self.  Departure Mode: Ambulatory.      Regina Alejandre RN

## 2023-04-25 NOTE — LETTER
4/25/2023         RE: Zbigniew Mendoza  [address]  [facility] [address]        Dear Colleague,    Thank you for referring your patient, Zbigniew Mendoza, to the [facility]. Please see a copy of my visit note below.    Oncology/Hematology Visit Note  Apr 25, 2023    Reason for visit: Follow up AML     Oncology HPI: Zbigniew Mendoza is a 88 year old male initially seen in our clinic for pancytopenia.  In December 2022, WBC 2.1, ANC 1.5, hemoglobin 9.6, platelets 118 K.  GI work-up with erosive gastropathy without bleeding and colon was normal with a 2 mm polyp.  He had a 10 to 15 pound weight loss, no lymphadenopathy, night sweats or unexplained fever.  With his ongoing cytopenias, Dr. 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  He was not fit for intensive induction therapy or allogenic transplant.  Dr. Morrissey reviewed options with venetoclax/azacitidine versus azacitidine alone however the risk of morbidity mortality was discussed.     Patient continued to contemplate on pursuing treatment versus bed supportive care/hospice.  Dr. Morrissey recommended weekly Nplate in the meantime along with transfusions as needed.      He is here today for close follow-up.    Interval History:  Chi is overall doing well. He has had some episodes of lightheadedness upon standing and one instance where he had to steady himself on furniture. No falls. He denies any breathing concerns or chest pain. No bleeding. GI upset is generally better though still not eating a lot. He is driving during the day comfortably. He is not driving at night due to vision issues. No fevers. Admits he doesn't take his ppx medications consistently.     Current Outpatient Medications   Medication Sig Dispense Refill     acyclovir (ZOVIRAX) 400 MG tablet Take 1 tablet (400 mg) by mouth 2 times daily 60 tablet 1     cefpodoxime (VANTIN) 200 MG tablet Take 1 tablet  (200 mg) by mouth 2 times daily 60 tablet 1     fluconazole (DIFLUCAN) 200 MG tablet Take 1 tablet (200 mg) by mouth daily 30 tablet 1     fluticasone (FLONASE) 50 MCG/ACT nasal spray INSTILL 1 SPRAY INTO BOTH NOSTRILS DAILY 48 mL 3     gabapentin (NEURONTIN) 100 MG capsule Take 2 capsules (200 mg) by mouth nightly as needed for other (insomnia) 60 capsule 1     methylcellulose (CITRUCEL) 500 MG TABS tablet Take 500 mg by mouth daily       metoprolol succinate ER (TOPROL XL) 50 MG 24 hr tablet TAKE 1 TABLET BY MOUTH EVERY DAY 90 tablet 2     Multiple Vitamins-Minerals (MULTIVITAL) TABS Take  by mouth daily.       pantoprazole (PROTONIX) 40 MG EC tablet Take 1 tablet (40 mg) by mouth daily 90 tablet 1     polyethylene glycol (MIRALAX) 17 GM/Dose powder Take 17 g by mouth daily 510 g 0     tamsulosin (FLOMAX) 0.4 MG capsule Take 2 capsules (0.8 mg) by mouth daily 180 capsule 3     vitamin B complex with vitamin C (STRESS TAB) tablet Take 1 tablet by mouth daily         Past Medical History  Past Medical History:   Diagnosis Date     Arrhythmia      Atrial fibrillation      Calculus of kidney      Mitral prolapse     mild, with mild MR     Prostate infection      STD (sexually transmitted disease)      Past Surgical History:   Procedure Laterality Date     ARTHROSCOPY SHOULDER ROTATOR CUFF REPAIR Right 1996     BACK SURGERY       COLONOSCOPY N/A 12/01/2022    Procedure: COLONOSCOPY;  Surgeon: Karthik Mcmanus MD;  Location: [facility] OR     CYSTOSCOPY       CYSTOSCOPY, TRANSURETHRAL RESECTION (TUR) PROSTATE, COMBINED  04/1993    S/P TURP     ESOPHAGOSCOPY, GASTROSCOPY, DUODENOSCOPY (EGD), COMBINED N/A 12/01/2022    Procedure: ESOPHAGOGASTRODUODENOSCOPY;  Surgeon: Karthik Mcmanus MD;  Location: [facility] OR     PROSTATE SURGERY       Allergies   Allergen Reactions     Ciprofloxacin      Acute confusion     Sulfamethizole Unknown     Confusion    3/13/2023 Pt says this is not an allergy     Social History  "  Social History     Tobacco Use     Smoking status: Never     Smokeless tobacco: Never   Vaping Use     Vaping status: Never Used   Substance Use Topics     Alcohol use: Yes     Comment: very rare if at all     Drug use: No      Past medical history and social history were reviewed.    Physical Examination:  BP (!) 83/42   Pulse 70   Temp 97  F (36.1  C) (Oral)   Resp 16   Ht 1.778 m (5' 10\")   Wt 62.3 kg (137 lb 4.8 oz)   SpO2 97%   BMI 19.70 kg/m    Wt Readings from Last 10 Encounters:   04/20/23 62.5 kg (137 lb 12.8 oz)   04/17/23 61.1 kg (134 lb 12.8 oz)   04/13/23 60.6 kg (133 lb 8 oz)   03/28/23 60.3 kg (133 lb)   03/17/23 61.7 kg (136 lb)   03/15/23 61.7 kg (136 lb 1.6 oz)   03/13/23 61.9 kg (136 lb 8 oz)   03/01/23 62.1 kg (137 lb)   02/20/23 62.1 kg (136 lb 12.8 oz)   02/17/23 63 kg (139 lb)     Constitutional: Well-appearing, thin elderly male in no acute distress.  Eyes: EOMI, PERRL. No scleral icterus.  ENT: Oral mucosa is moist without lesions or thrush.   Lymphatic: Neck is supple without cervical or supraclavicular lymphadenopathy.   Cardiovascular: Regular rate and rhythm. No murmurs, gallops, or rubs. No peripheral edema.  Respiratory: Clear to auscultation bilaterally. No wheezes or crackles.  Gastrointestinal: Bowel sounds present. Abdomen soft, non-tender.   Neurologic: Cranial nerves II through XII are grossly intact.  Skin: No rashes, petechiae, or bruising noted on exposed skin.    Laboratory Data:   Latest Reference Range & Units 04/25/23 09:23   WBC 4.0 - 11.0 10e3/uL 1.6 (L)   Hemoglobin 13.3 - 17.7 g/dL 6.1 (LL)   Hematocrit 40.0 - 53.0 % 19.1 (L)   Platelet Count 150 - 450 10e3/uL 42 (LL)   RBC Count 4.40 - 5.90 10e6/uL 1.84 (L)   MCV 78 - 100 fL 104 (H)   MCH 26.5 - 33.0 pg 33.2 (H)   MCHC 31.5 - 36.5 g/dL 31.9   RDW 10.0 - 15.0 % 23.7 (H)   % Neutrophils % 11   % Lymphocytes % 81   % Monocytes % 6   % Eosinophils % 0   % Basophils % 0   % Blasts % 2   Absolute Basophils 0.0 - " 0.2 10e3/uL 0.0   Absolute Neutrophil 1.6 - 8.3 10e3/uL 0.2 (LL)   Absolute Lymphocytes 0.8 - 5.3 10e3/uL 1.3   Absolute Monocytes 0.0 - 1.3 10e3/uL 0.1   Absolute Eosinophils 0.0 - 0.7 10e3/uL 0.0   Absolute Blasts <=0.0 10e3/uL 0.0   RBC Morphology  Confirmed RBC Indices   Platelet Morphology Automated Count Confirmed. Platelet morphology is normal.  Automated Count Confirmed. Platelet morphology is normal.   (LL): Data is critically low  (L): Data is abnormally low  (H): Data is abnormally high      Assessment and Plan:  Zbigniew Mendoza is a 88 year old male with AML currently on transfusion support and Nplate.      # AML  70% blasts on bone marrow biopsy and we have supported in with blood/platelet transfusions and possible Nplate.  He has met with [facility] hospice and although it may be appropriate down the road, he is not ready for it.  He also has decided that chemotherapy is not an option for him, which Dr. Morrissey has expressed in the past as well.   - Continue weekly CBC and possible transfusion and N-plate  - Hgb today low at 6.1. Will get 1 unit pRBC  - Plt low at 42K. Will receive N-plate  - Transfuse for hgb <7 and plt <20K  - Weekly provider meetings. Palliative also following  - Discuss driving further with Dr. Morrissey last week. Patient is feeling comfortable driving short distances during the day    # ID ppx  No current signs of symptoms of infection. ANC still very low at 0.2 2/2 AML.  - Continue ACV  - Continue Vantin  - Continue fluconazole     # A Fib  # Hypotension  A fib under good control. Had to stop Eliquis for thrombocytopenia. BP low today likely 2/2 weight loss and anemia. He has had orthostatic symptoms at home  - 500cc IVF for hypotension plus 1 unit PRBC. BP improved  - Spoke with cardiology. Will reduce Metoprolol ER to 25mg daily  - Continue to hold Eliquis with no intention of restarting      # Pancreatic head lesion  Seen on CT CAP and urogram, but MRCP negative.  - Not discussed     #  "Ophtho  Diagnosed with macular degeneration and is followed closely with ophthalmology.    - Given the risk of infection and bleeding with these injections, it was decided to discontinue the injection.    - No significant vision changes recently.     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    Weight as of this encounter: 62.3 kg (137 lb 4.8 oz). Body surface area is 1.75 meters squared.  No Pain (0) Comment: Data Unavailable   No LMP for male patient.  Allergies reviewed: Yes  Medications reviewed: Yes    Medications: Medication refills not needed today.  Pharmacy name entered into [employer]: [id number] - [place], [place] - [address]    Clinical concerns: follow up        Yolette Kraft                Again, thank you for allowing me to participate in the care of your patient.        Sincerely,        MARYJANE Hunt    "

## 2023-04-25 NOTE — PROGRESS NOTES
Nursing Note:  Zbigniew Mendoza presents today for PIV and labs.    Patient seen by provider today: Yes, MARYJANE Hernández.   present during visit today: Not Applicable.    Note: Critical Hgb 6.1 and plt 42 reported to LOLA Hernández and Regina SEXTON RN    Intravenous Access:  Lab draw site right AC, Needle type IV, Gauge 22.  Labs drawn without difficulty.  Peripheral IV placed.    Discharge Plan:   Patient was sent to [facility] for PA appointment.    Krystle Panchal, RN

## 2023-04-25 NOTE — PROGRESS NOTES
Oncology/Hematology Visit Note  Apr 25, 2023    Reason for visit: Follow up AML     Oncology HPI: Zbigniew Mendoza is a 88 year old male initially seen in our clinic for pancytopenia.  In December 2022, WBC 2.1, ANC 1.5, hemoglobin 9.6, platelets 118 K.  GI work-up with erosive gastropathy without bleeding and colon was normal with a 2 mm polyp.  He had a 10 to 15 pound weight loss, no lymphadenopathy, night sweats or unexplained fever.  With his ongoing cytopenias, Dr. 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  He was not fit for intensive induction therapy or allogenic transplant.  Dr. Morrissey reviewed options with venetoclax/azacitidine versus azacitidine alone however the risk of morbidity mortality was discussed.     Patient continued to contemplate on pursuing treatment versus bed supportive care/hospice.  Dr. Morrissey recommended weekly Nplate in the meantime along with transfusions as needed.      He is here today for close follow-up.    Interval History:  Chi is overall doing well. He has had some episodes of lightheadedness upon standing and one instance where he had to steady himself on furniture. No falls. He denies any breathing concerns or chest pain. No bleeding. GI upset is generally better though still not eating a lot. He is driving during the day comfortably. He is not driving at night due to vision issues. No fevers. Admits he doesn't take his ppx medications consistently.     Current Outpatient Medications   Medication Sig Dispense Refill     acyclovir (ZOVIRAX) 400 MG tablet Take 1 tablet (400 mg) by mouth 2 times daily 60 tablet 1     cefpodoxime (VANTIN) 200 MG tablet Take 1 tablet (200 mg) by mouth 2 times daily 60 tablet 1     fluconazole (DIFLUCAN) 200 MG tablet Take 1 tablet (200 mg) by mouth daily 30 tablet 1     fluticasone (FLONASE) 50 MCG/ACT nasal spray INSTILL 1 SPRAY INTO BOTH NOSTRILS DAILY 48 mL 3     gabapentin  (NEURONTIN) 100 MG capsule Take 2 capsules (200 mg) by mouth nightly as needed for other (insomnia) 60 capsule 1     methylcellulose (CITRUCEL) 500 MG TABS tablet Take 500 mg by mouth daily       metoprolol succinate ER (TOPROL XL) 50 MG 24 hr tablet TAKE 1 TABLET BY MOUTH EVERY DAY 90 tablet 2     Multiple Vitamins-Minerals (MULTIVITAL) TABS Take  by mouth daily.       pantoprazole (PROTONIX) 40 MG EC tablet Take 1 tablet (40 mg) by mouth daily 90 tablet 1     polyethylene glycol (MIRALAX) 17 GM/Dose powder Take 17 g by mouth daily 510 g 0     tamsulosin (FLOMAX) 0.4 MG capsule Take 2 capsules (0.8 mg) by mouth daily 180 capsule 3     vitamin B complex with vitamin C (STRESS TAB) tablet Take 1 tablet by mouth daily         Past Medical History  Past Medical History:   Diagnosis Date     Arrhythmia      Atrial fibrillation      Calculus of kidney      Mitral prolapse     mild, with mild MR     Prostate infection      STD (sexually transmitted disease)      Past Surgical History:   Procedure Laterality Date     ARTHROSCOPY SHOULDER ROTATOR CUFF REPAIR Right 1996     BACK SURGERY       COLONOSCOPY N/A 12/01/2022    Procedure: COLONOSCOPY;  Surgeon: Karthik Mcmanus MD;  Location: [facility] OR     CYSTOSCOPY       CYSTOSCOPY, TRANSURETHRAL RESECTION (TUR) PROSTATE, COMBINED  04/1993    S/P TURP     ESOPHAGOSCOPY, GASTROSCOPY, DUODENOSCOPY (EGD), COMBINED N/A 12/01/2022    Procedure: ESOPHAGOGASTRODUODENOSCOPY;  Surgeon: Karthik Mcmanus MD;  Location: [facility] OR     PROSTATE SURGERY       Allergies   Allergen Reactions     Ciprofloxacin      Acute confusion     Sulfamethizole Unknown     Confusion    3/13/2023 Pt says this is not an allergy     Social History   Social History     Tobacco Use     Smoking status: Never     Smokeless tobacco: Never   Vaping Use     Vaping status: Never Used   Substance Use Topics     Alcohol use: Yes     Comment: very rare if at all     Drug use: No      Past medical history and  "social history were reviewed.    Physical Examination:  BP (!) 83/42   Pulse 70   Temp 97  F (36.1  C) (Oral)   Resp 16   Ht 1.778 m (5' 10\")   Wt 62.3 kg (137 lb 4.8 oz)   SpO2 97%   BMI 19.70 kg/m    Wt Readings from Last 10 Encounters:   04/20/23 62.5 kg (137 lb 12.8 oz)   04/17/23 61.1 kg (134 lb 12.8 oz)   04/13/23 60.6 kg (133 lb 8 oz)   03/28/23 60.3 kg (133 lb)   03/17/23 61.7 kg (136 lb)   03/15/23 61.7 kg (136 lb 1.6 oz)   03/13/23 61.9 kg (136 lb 8 oz)   03/01/23 62.1 kg (137 lb)   02/20/23 62.1 kg (136 lb 12.8 oz)   02/17/23 63 kg (139 lb)     Constitutional: Well-appearing, thin elderly male in no acute distress.  Eyes: EOMI, PERRL. No scleral icterus.  ENT: Oral mucosa is moist without lesions or thrush.   Lymphatic: Neck is supple without cervical or supraclavicular lymphadenopathy.   Cardiovascular: Regular rate and rhythm. No murmurs, gallops, or rubs. No peripheral edema.  Respiratory: Clear to auscultation bilaterally. No wheezes or crackles.  Gastrointestinal: Bowel sounds present. Abdomen soft, non-tender.   Neurologic: Cranial nerves II through XII are grossly intact.  Skin: No rashes, petechiae, or bruising noted on exposed skin.    Laboratory Data:   Latest Reference Range & Units 04/25/23 09:23   WBC 4.0 - 11.0 10e3/uL 1.6 (L)   Hemoglobin 13.3 - 17.7 g/dL 6.1 (LL)   Hematocrit 40.0 - 53.0 % 19.1 (L)   Platelet Count 150 - 450 10e3/uL 42 (LL)   RBC Count 4.40 - 5.90 10e6/uL 1.84 (L)   MCV 78 - 100 fL 104 (H)   MCH 26.5 - 33.0 pg 33.2 (H)   MCHC 31.5 - 36.5 g/dL 31.9   RDW 10.0 - 15.0 % 23.7 (H)   % Neutrophils % 11   % Lymphocytes % 81   % Monocytes % 6   % Eosinophils % 0   % Basophils % 0   % Blasts % 2   Absolute Basophils 0.0 - 0.2 10e3/uL 0.0   Absolute Neutrophil 1.6 - 8.3 10e3/uL 0.2 (LL)   Absolute Lymphocytes 0.8 - 5.3 10e3/uL 1.3   Absolute Monocytes 0.0 - 1.3 10e3/uL 0.1   Absolute Eosinophils 0.0 - 0.7 10e3/uL 0.0   Absolute Blasts <=0.0 10e3/uL 0.0   RBC Morphology  " Confirmed RBC Indices   Platelet Morphology Automated Count Confirmed. Platelet morphology is normal.  Automated Count Confirmed. Platelet morphology is normal.   (LL): Data is critically low  (L): Data is abnormally low  (H): Data is abnormally high      Assessment and Plan:  Zbingiew Mendoza is a 88 year old male with AML currently on transfusion support and Nplate.      # AML  70% blasts on bone marrow biopsy and we have supported in with blood/platelet transfusions and possible Nplate.  He has met with [facility] hospice and although it may be appropriate down the road, he is not ready for it.  He also has decided that chemotherapy is not an option for him, which Dr. Morrissey has expressed in the past as well.   - Continue weekly CBC and possible transfusion and N-plate  - Hgb today low at 6.1. Will get 1 unit pRBC  - Plt low at 42K. Will receive N-plate  - Transfuse for hgb <7 and plt <20K  - Weekly provider meetings. Palliative also following  - Discuss driving further with Dr. Morrissey last week. Patient is feeling comfortable driving short distances during the day    # ID ppx  No current signs of symptoms of infection. ANC still very low at 0.2 2/2 AML.  - Continue ACV  - Continue Vantin  - Continue fluconazole     # A Fib  # Hypotension  A fib under good control. Had to stop Eliquis for thrombocytopenia. BP low today likely 2/2 weight loss and anemia. He has had orthostatic symptoms at home  - 500cc IVF for hypotension plus 1 unit PRBC. BP improved  - Spoke with cardiology. Will reduce Metoprolol ER to 25mg daily  - Continue to hold Eliquis with no intention of restarting      # Pancreatic head lesion  Seen on CT CAP and urogram, but MRCP negative.  - Not discussed     # Ophtho  Diagnosed with macular degeneration and is followed closely with ophthalmology.    - Given the risk of infection and bleeding with these injections, it was decided to discontinue the injection.    - No significant vision changes recently.     30  minutes spent on the date of the encounter doing chart review, review of test results, interpretation of tests, patient visit, documentation and discussion with other provider(s)     Edgar Vu PA-C  Department of Hematology and Oncology  [facility]

## 2023-05-04 NOTE — PROGRESS NOTES
Infusion Nursing Note:  Zbigniew Mendoza presents today for Blood transfusion (2 units PRBC) + Nplate.    Patient seen by provider today: Yes: Dr. Jennifer Morrissey   present during visit today: Not Applicable.    Note:   5/4/2023 0935  Dr. Morrissey notified of critical results: Hgb 5.9, Plt 38  VORB Dr. Morrissey/Robbie Dallas RN:  Give 2 units PRBC today.  No lasix needed between units.  Give Nplate today, if OK per pharmacy.    Discussed Nplate with pharmacist David, who stated patient is ok to receive Nplate on same day as PRBC transfusion.  Dose adjusted per pharmacy due to platelet count <50,000.    Intravenous Access:  Peripheral IV placed.    Treatment Conditions:  Lab Results   Component Value Date    HGB 5.9 (LL) 05/04/2023    WBC 2.9 (L) 05/04/2023    ANEU 0.2 (LL) 04/25/2023    ANEUTAUTO 0.5 (L) 03/01/2023    PLT 38 (LL) 05/04/2023      Results reviewed, labs MET treatment parameters, ok to proceed with treatment.    Blood transfusion consent signed 2/20/2023.    Post Infusion Assessment:  Patient tolerated infusion without incident.  Patient tolerated injection without incident.  Blood return noted pre and post infusion.  Site patent and intact, free from redness, edema or discomfort.  No evidence of extravasations.  Access discontinued per protocol.     Discharge Plan:   Discharge instructions reviewed with: Patient and Family.  Patient and/or family verbalized understanding of discharge instructions and all questions answered.  AVS to patient via [employer].  Patient will return 5/11 for next appointment.   Patient discharged in stable condition accompanied by: daughter.  Departure Mode: Ambulatory with walker.      Robbie Dallas, [facility]

## 2023-05-04 NOTE — PROGRESS NOTES
Latest Reference Range & Units Most Recent   Hemoglobin 13.3 - 17.7 g/dL 5.9 (LL)  5/4/23 08:37   (LL): Data is critically low     Latest Reference Range & Units Most Recent   Platelet Count     150 - 450 10e3/uL 38 (LL)  5/4/23 08:37   (LL): Data is critically low     Latest Reference Range & Units Most Recent   Absolute Neutrophil 1.6 - 8.3 10e3/uL 0.2 (LL)  5/4/23 08:37   (LL): Data is critically low    Writer spoke to Dr. Morrissey about Chi's labs. Chi is receiving two units of blood today in our infusion center. He is also receiving N-Plate as scheduled. Chi's ANC level is not new. He has been taking  Acyclovir, Cefpodozime, and Fluconazole for infection prevention.     Writer reviewed the need for Chi to monitor for any acute bleeding. He is to call our clinic if he notices any blood in his urine, stools, or has a bloody nose. He also needs to seek emergency care if he falls and hits his head. He verbalized understanding.     Disability parking application signed by Dr. Morrissey and given to Chi during his visit today.     Gavi Lacy RN on 5/4/2023 at 11:12 AM

## 2023-05-04 NOTE — PROGRESS NOTES
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    Weight as of this encounter: 61.1 kg (134 lb 9.6 oz). Body surface area is 1.74 meters squared.  No Pain (0) Comment: Data Unavailable   No LMP for male patient.  Allergies reviewed: Yes  Medications reviewed: Yes    Medications: Medication refills not needed today.  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 PMH is significant for Afib on eliquis, mitral prolapse with MR, renal calculus.     On 12/6/22, WBC 2.1, ANC 1.5, Hb 9.6, , PLT 118K. He has had chronic, mild thrombocytopenia since 2015. Neutropenia and anemia have been new over this last year.     He is noted to be followed by urology for gross hematuria. CT urogram/cystoscopy pending.    He underwent EGD and colonoscopy 12/2022. EGD was normal with erosive gastropathy without bleed. Z-line was irregular. He had grade C reflux esophagitis with bleeding. Colon was normal with a 2 mm polyp removed from the ascending colon.     He has 10-15 lb weight loss. He has good appetite. No LAD. No drenching night sweats, unexplained fever.     He is a retired psyciatrist, but continues to publish. He served " in the  and was stationed in [place] following the [place] War. He then worked at [employer], the [employer], and the [employer].     Patient lives at home alone and is able to perform all ADLs without issue. He has six children who are doing well. His twins are in gastroenterology and palliative care. His eldest son suffers from chronic alcoholism.        INTERIM HISTORY:  Patient had an initial visit with [facility], but has not had return phonecall. He has established with palliative care as well.     No pain. He denies diarrhea currently, in fact, was having constipation and utilized a fleet enema. He was completing a crossword puzzle yesterday and stood up too fast and had fall with injury to the right buttock. He denies ecchymosis. He is using a walker. He is ambulating without pain.     He is drinking one ensure daily.         REVIEW OF SYSTEMS:   A 14 point ROS was reviewed with pertinent positives and negatives in the HPI.        HOME MEDICATIONS:  Current Outpatient Medications   Medication Sig Dispense Refill     acyclovir (ZOVIRAX) 400 MG tablet Take 1 tablet (400 mg) by mouth 2 times daily 60 tablet 1     cefpodoxime (VANTIN) 200 MG tablet Take 1 tablet (200 mg) by mouth 2 times daily 60 tablet 1     fluconazole (DIFLUCAN) 200 MG tablet Take 1 tablet (200 mg) by mouth daily 30 tablet 1     fluticasone (FLONASE) 50 MCG/ACT nasal spray INSTILL 1 SPRAY INTO BOTH NOSTRILS DAILY 48 mL 3     gabapentin (NEURONTIN) 100 MG capsule Take 2 capsules (200 mg) by mouth nightly as needed for other (insomnia) 60 capsule 1     methylcellulose (CITRUCEL) 500 MG TABS tablet Take 500 mg by mouth daily       metoprolol succinate ER (TOPROL XL) 25 MG 24 hr tablet Take 1 tablet (25 mg) by mouth daily 30 tablet 3     Multiple Vitamins-Minerals (MULTIVITAL) TABS Take  by mouth daily.       polyethylene glycol (MIRALAX) 17 GM/Dose powder Take 17 g by mouth daily 510 g 0     tamsulosin (FLOMAX) 0.4 MG capsule Take 2 capsules (0.8  mg) by mouth daily 180 capsule 3     vitamin B complex with vitamin C (STRESS TAB) tablet Take 1 tablet by mouth daily           ALLERGIES:  Allergies   Allergen Reactions     Ciprofloxacin      Acute confusion     Sulfamethizole Unknown     Confusion    3/13/2023 Pt says this is not an allergy         PAST MEDICAL HISTORY:  Past Medical History:   Diagnosis Date     Arrhythmia      Atrial fibrillation      Calculus of kidney      Mitral prolapse     mild, with mild MR     Prostate infection      STD (sexually transmitted disease)          PAST SURGICAL HISTORY:  Past Surgical History:   Procedure Laterality Date     ARTHROSCOPY SHOULDER ROTATOR CUFF REPAIR Right 1996     BACK SURGERY       COLONOSCOPY N/A 12/01/2022    Procedure: COLONOSCOPY;  Surgeon: Karthik Mcmanus MD;  Location: [facility] OR     CYSTOSCOPY       CYSTOSCOPY, TRANSURETHRAL RESECTION (TUR) PROSTATE, COMBINED  04/1993    S/P TURP     ESOPHAGOSCOPY, GASTROSCOPY, DUODENOSCOPY (EGD), COMBINED N/A 12/01/2022    Procedure: ESOPHAGOGASTRODUODENOSCOPY;  Surgeon: Karthik Mcmanus MD;  Location: [facility] OR     PROSTATE SURGERY           SOCIAL HISTORY:  Social History     Socioeconomic History     Marital status:      Spouse name: Not on file     Number of children: Not on file     Years of education: Not on file     Highest education level: Not on file   Occupational History     Not on file   Tobacco Use     Smoking status: Never     Smokeless tobacco: Never   Vaping Use     Vaping status: Never Used   Substance and Sexual Activity     Alcohol use: Yes     Comment: very rare if at all     Drug use: No     Sexual activity: Not Currently   Other Topics Concern     Parent/sibling w/ CABG, MI or angioplasty before 65F 55M? Not Asked   Social History Narrative     Not on file     Social Determinants of Health     Financial Resource Strain: Not on file   Food Insecurity: Not on file   Transportation Needs: Not on file   Physical Activity: Not  "on file   Stress: Not on file   Social Connections: Not on file   Intimate Partner Violence: Not At Risk (2023)    Humiliation, Afraid, Rape, and Kick questionnaire      Fear of Current or Ex-Partner: No      Emotionally Abused: No      Physically Abused: No      Sexually Abused: No   Housing Stability: Not on file         FAMILY HISTORY:  Family History   Problem Relation Age of Onset     Coronary Artery Disease Father      No Known Problems Mother          PHYSICAL EXAM:  BP 94/45   Pulse 60   Temp 97.6  F (36.4  C) (Oral)   Resp 16   Ht 1.778 m (5' 10\")   Wt 61.1 kg (134 lb 9.6 oz)   SpO2 98%   BMI 19.31 kg/m    PHYSICAL EXAMINATION:   ECO+  GENERAL/CONSTITUTIONAL: No acute distress. Chronically-ill appearing. Pallor, malnourished.  EYES: Pupils are equal, round, and react to light and accommodation. Extraocular movements intact.  No scleral icterus.  RESPIRATORY: Clear to auscultation bilaterally. No crackles or wheezing.   CARDIOVASCULAR: Regular rate and rhythm without murmurs, gallops, or rubs.  GASTROINTESTINAL: No hepatosplenomegaly, masses, or tenderness. The patient has normal bowel sounds. No guarding.  No distention.  MUSCULOSKELETAL: Warm and well-perfused, no cyanosis, clubbing, or edema.  NEUROLOGIC: Cranial nerves II-XII are intact. Alert, oriented, answers questions appropriately.  INTEGUMENTARY: No rashes or jaundice.  GAIT: Requiring walker.       LABS:   Latest Reference Range & Units 23 08:37   WBC 4.0 - 11.0 10e3/uL 2.9 (L) (P)   Hemoglobin 13.3 - 17.7 g/dL 5.9 (LL) (P)   Hematocrit 40.0 - 53.0 % 18.7 (L) (P)   Platelet Count 150 - 450 10e3/uL 38 (LL) (P)   RBC Count 4.40 - 5.90 10e6/uL 1.87 (L) (P)   MCV 78 - 100 fL 100 (P)   MCH 26.5 - 33.0 pg 31.6 (P)   MCHC 31.5 - 36.5 g/dL 31.6 (P)   RDW 10.0 - 15.0 % 22.9 (H) (P)      Latest Reference Range & Units 23 10:51   Erythropoietin 4 - 27 mU/mL 178 (H)   Ferritin 31 - 409 ng/mL 356   Iron 61 - 157 ug/dL 200 (H)   Iron " Binding Capacity 240 - 430 ug/dL 222 (L)   Iron Sat Index 15 - 46 % 90 (H)   Lactate Dehydrogenase 0 - 250 U/L 160   TSH 0.30 - 4.20 uIU/mL 1.85   Vitamin B12 232 - 1,245 pg/mL 374   % Retic 0.5 - 2.0 % 1.4   Absolute Retic 0.025 - 0.095 10e6/uL 0.039   INR 0.85 - 1.15  1.55 (H)   PTT 22 - 38 Seconds 37   Fibrinogen 170 - 490 mg/dL 300   SPECIMEN EXPIRATION DATE  [id number]   JOSE C Anti-IgG,-C3d  Negative      Latest Reference Range & Units 01/05/23 10:51   Haptoglobin 32 - 197 mg/dL 115       PATHOLOGY:  Case Report   Surgical Pathology Report                         Case: [id number]                                   Authorizing Provider:  Karthik Mcmanus         Collected:           12/01/2022 10:08 AM                                  MD Manny                                                                   Ordering Location:     [facility]   Received:            12/01/2022 10:47 AM                                  Main OR                                                                       Pathologist:           Barbara Hawkins MD                                                            Specimens:   A) - Stomach, RANDOM STOMACH BIOPSY - GASTRITIS                                                      B) - Large Intestine, Colon, RANDOM COLON BIOPSIES                                                   C) - Large Intestine, Colon, COLON ASCENDING POLYP                                                   D) - Large Intestine, Colon, COLOON TRANSVERSE POLYP                                       Addendum   This addendum is issued to include findings of Helicobacter pylori immunoperoxidase stain performed on gastric biopsy (specimen A) for further evaluation, using appropriate controls.  The H. pylori stain is negative.  All previously reported findings remain unchanged.   Addendum electronically signed by Barbara Hawkins MD on 12/9/2022 at 12:05 AM   Final Diagnosis   A.  Stomach, biopsy-  - Mild  chronic gastritis.  - Negative for active gastritis, intestinal metaplasia, gastric epithelial dysplasia, or malignancy.  - Pending Helicobacter pylori stain (see forthcoming addendum for results).  - Sampling includes: Gastric antrum and fundus/body-type mucosa.     B.  Colon, random biopsies:  - Negative for colitis, dysplasia, and malignancy.     C.  Ascending colon, polyp, polypectomy:  - Consistent with diminutive tubular adenoma.  - Polyp size: 2 mm (per endoscopy report).  - Negative for high-grade dysplasia and malignancy.  - Complete resection and retrieval (per endoscopy report).     D.  Transverse colon, polyp, polypectomy:  - Consistent with inflammatory-type benign mucosal polyp (predominantly denuded mucosal surface precludes accurate assessment).  - Polyp size: 8 mm (per endoscopy report).  - Negative for dysplasia and malignancy.         Final Diagnosis 1/5/23:   Peripheral blood for morphology:  -Pancytopenia including:  -Moderate normochromic, macrocytic anemia with evidence of red cell regeneration  -Moderate leukopenia with absolute neutropenia and lymphopenia; leukocytes without morphologic abnormalities  -Mild thrombocytopenia          Bone Marrow Biopsy 3/13/23:  Component Ref Range & Units  Resulting Agency   Final Diagnosis   Peripheral blood for morphology:  -Pancytopenia including:  -Marked normochromic, macrocytic anemia without evidence of red cell regeneration  -Marked leukopenia with marked absolute neutropenia without overt dysplastic change and without circulating blasts  -Moderate thrombocytopenia     Bone marrow, biopsy with aspirate and special studies for evaluation:  -Acute myelogenous leukemia, see comment, dysplastic myeloid precursors also noted  -Completed immunophenotyping studies show 69% blasts consistent with myeloid lineage, small blast percentage (<1%, showing B-cell lineage also noted, please see synopsis within this report and separate full completed  report)  -Cytogenetic studies in process with results to be reported separately by the performing laboratory when completed  -Acute myeloid leukemia focused panel in process with results be reported by the performing laboratory when completed     Comment       With the expression of CD13 and  (although partial) acute myeloid leukemia (AML) is favored, this immunophenotype may be consistent with AML, NOS, minimally differentiated depending on the results of genetic studies. Of note, a very small population appears to have some B-lineage differentiation; however this population accounts for <1% of leukocytes.          Case Report   Flow Cytometry Report                             Case: [id number]                                   Authorizing Provider:  Jennifer Morrissey DO         Collected:           03/13/2023 10:44 AM           Ordering Location:     [facility]   Received:            03/13/2023 11:06 AM                                  [facility]                                                             Pathologist:           Mary Middleton MD                                                            Specimen:    Iliac Crest, Bone Marrow Aspirate, Left                                                    Flow Interpretation   A. Iliac Crest, Bone Marrow Aspirate, Left:  -Increased blasts (69%)  See comment      Electronically signed by Mary Middleton MD on 3/14/2023 at  1:58 PM   Comment     With the expression of CD13 and  (although partial) acute myeloid leukemia (AML) is favored, this immunophenotype may be consistent with AML, NOS, minimally differentiated depending on the results of genetic studies. Of note, a very small population appears to have some B-lineage differentiation; however this population accounts for <1% of leukocytes.        Significant Results    Detected Alterations of Known or Potential Pathogenicity: BCOR V797fs  RUNX1 R162G     TMB Score: None    Interpretation    Two clinically relevant mutations were detected:     1) BCOR V797fs; VAF=52%  2) RUNX1 R162G; VAF=43%     Specimen Description    Bone Marrow: ACD   RESULTS    INTERNAL TANDEM REPEAT (ITD):      Mutant Allele:   Absent     Normal Allele:   Present     D835 MUTATION:     Mutant Allele:   Absent     Normal Allele:   Present   INTERPRETATION    Molecular testing performed on submitted Bone Marrow.     No evidence was found of either an internal tandem duplication within exon 14 or of a D835(TKD) point mutation within exon 20 of the FLT3 gene.     No definite evidence of a D835(TKD) point mutation within exon 20 of the FLT3 gene was found. However, this patient sample showed poor amplification and limited sensitivity for D835 (TKD) point mutation, hence a small percentage of mutant allele may not be detected. Please correlate with pending NGS study.     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 Clone 1 (50% of metaphase cells analyzed) had gain of one extra copy of chromosome 13 and loss of one copy of chromosome 21. Clone 2 (10% of metaphases) had, in addition, deletion within the long arm of a chromosome 6.     These findings are consistent with the reported pathologic diagnosis of acute myeloid leukemia. Trisomy 13, as seen in this case, is an uncommon but documented recurring abnormality in AML that has been associated with FLT3 overexpression and mutations involving RUNX1, SRSF2, ASXL1 and BCOR (Dayne T et al, 2014, Blood 124:1304) and, in that setting, with aggressive disease and a poor prognosis. Correlation with the pending molecular testing is therefore recommended. Given that trisomy 13 is considered high risk, the additional cytogenetic abnormalities are of unknown additional clinical significance.           IMAGING:  CT A/P 9/15/22:  IMPRESSION:   1.  No acute abnormality identified.  2.   Heterogeneous prostate is noted. Correlate with any evidence for  underlying prostate disease.  3.  A few nonobstructing stones within the left kidney. No  hydronephrosis.  4.  A tiny hypodense nodule at the pancreas head is identified and is  indeterminant. This may have been faintly present on the older CT from  2/25/2015. Suggest correlation with the pancreas MRI.      MRCP 10/29/22:  IMPRESSION:  No pancreatic abnormalities are identified. The previously noted small hypodense pancreatic head lesion is not visualized on today's MRI study.    CT urogram 1/31/23:  IMPRESSION:   1.  Nonobstructing stones within the left kidney again noted. One of  these has moved into the left renal pelvis. No hydronephrosis.  2.  No acute abnormality identified.  3.  Previously noted tiny hypodensity at the pancreas head appears  stable compared to the CT from 9/15/2022. This is unable to be  visualized on the MRI from 10/29/2022. This could just be a tiny  cystic lesion. Suggest 1-year follow-up CT to assess for stability.  4.  Mildly prominent prostate.        ASSESSMENT/PLAN:  Zbigniew Mendoza is an 88 year old male who is referred for pancytopenia. PMH is significant for Afib on eliquis, mitral prolapse with MR, renal calculus.     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   -Currently, ANC is 1.2, Hb 9.6, , and .   -He underwent EGD and colonoscopy 12/2022. EGD was normal with erosive gastropathy without bleed. Z-line was irregular. He had grade C reflux esophagitis with bleeding. Colon was normal with a 2 mm polyp removed from the ascending colon.   -He is undergoing workup with urology for gross hematuria (see below).  -He has not been started on any new recent medications nor does have concerning symptoms for underlying infection. I reviewed the possibilities of nutritional deficiency,  but given his age, there is a possibility of an underlying marrow disorder such as myelodysplastic syndrome or myelofibrosis.   -Patient is not hemolyzing. Iron stores, B12 WNL. Folate low at 2 and he has started on supplementation.  -TSH 1.85.  -Patient underwent bone marrow biopsy with FLOW cytometry returning with ~70% blasts. IHC positive for CD13 and , although partial. He also has evidence of dysplasia. Cytogenetics are pending.   -We held a long discussion today in regards to the diagnosis of AML. Patient is elderly at the age of 88. We had reviewed five year overall survival for >75 is 2.6% and >80 is 0%. He has good functional status and we are awaiting his final molecular/mutational studies for further risk stratification and prognostication. Patient is not fit for intensive induction therapy nor would he be an allogeneic transplant candidate. As such, treatment with 7+3 or vyxeos would not be indicated. We had discussed reduced intensity induction with HMA and venetoclax. I reviewed the VIALE-A trial in which venetoclax and azacytidine had improved CR and overall survival from 10 mths to 15 months vs azaycytidine alone. However, we reviewed significant risk of adverse effect including febrile neutropenia with increased morbidity and mortality. I discussed I would only be comfortable initiating this treatment on an inpatient basis due to risk of cytopenias and TLS. Patient expresses his understanding of the above. Continue antimicrobial prophylaxis at this time.    -Patient continues to contemplate on pursuing treatment vs best supportive care/hospice. He will meet with palliative care on 4/20/23. He is beginning to have circulating blasts on peripheral blood. He has lethargy. He denies fever. I had stated we will continue best supportive care and he will be seen on a weekly basis, however, we may have rapid evolution of AML with high mortality. We again reviewed the risk of treatment with  azacytidine. He is fearful of further cytopenias and risk of infection.   -4/5/23: Patient continues to have decline. He has had increased fatigue and weakness and is considering a chairlift. Daughter, Dejah, who is a palliative care physician as well has spoken with patient in regards to home with hospice. Patient seemed to resonate with this idea. I was able to speak with [facility] (Cora Lindsey and Elly Collazo) in regards to patient's case and they may be able to support palliative transfusions for a finite time. Will place referral at this time.   5/4/23: Patient continues to have decline. He has fatigue, poor appetite. He has made the decision against azacytidine, which is appropriate given his progressive decline. He has met with [facility] with initial visit, but has not yet had follow up. In the interim, he has also established with Dr. Oliver. He had a fall yesterday, but is still able to ambulate without pain. He declines imaging. No injury to the head. I renewed antimicrobial prophylactics today and provided a medication list to patient and daughter, Nisreen. WBC has returned at 2.9 (diff pending), Hb 5.9, PLT 38. He will receive 2U PRBC today and N-plate. No bleed.    2) Gross hematuria, improved  -Followed by urology.  -CT urogram was negative.  -Cystoscopy without obvious cause of hematuria in the bladder.   -It appears urine cytology was sent and was not able to send for FISH analysis due to scant cellularity.    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     4) Pancreatic head lesion  -Seen on CT A/P and urogram. MRCP was negative.     5) Intermitten loose stools  -Patient is on antimicrobials. Provided stool sampling kit for c. Diff testing.     6) -Continue best supportive care with weekly CBC with possible blood/platelet transfusion +/- N-Plate.  -Follow up weekly alternating between JAIMEE and myself.  -Patient is at high risk of neutropenic fever and  sepsis and he is aware to contact clinic immediately with fever.       Jennifer Morrissey DO  Hematology/Oncology  [facility]

## 2023-05-10 NOTE — PROGRESS NOTES
Oncology/Hematology Visit Note    May 11, 2023    Reason for visit: Follow up AML    Oncology HPI: Zbigniew Mendoza is a 88 year old male initially seen in our clinic for pancytopenia.  In December 2022, WBC 2.1, ANC 1.5, hemoglobin 9.6, platelets 118 K.  GI work-up with erosive gastropathy without bleeding and colon was normal with a 2 mm polyp.  He had a 10 to 15 pound weight loss, no lymphadenopathy, night sweats or unexplained fever.  With his ongoing cytopenias, Dr. 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  He was not fit for intensive induction therapy or allogenic transplant.  Dr. Morrissey reviewed options with venetoclax/azacitidine versus azacitidine alone however the risk of morbidity and mortality was discussed.    Patient continued to contemplate on pursuing treatment versus bed supportive care/hospice.  Dr. Morrissey recommended weekly Nplate in the meantime.  He received 2 units of blood on 5/4/2023 in addition to Nplate.    He is here today for close follow-up, possible Nplate and blood.    Interval History: Chi is here unaccompanied and he is doing pretty well.  His weakness is ongoing, but no significant changes recently.  He has intermittent constipation, now taking MiraLAX daily and improving.  His son is a gastroenterologist has been helping with his bowel regimen.  His vision has been a little bit worse, history of macular degeneration, no longer getting his Avastin injections due to risk of bleeding.  Appetite has been okay.  No recent falls.    Review of Systems: See interval hx. Denies fevers, chills, HA, dizziness, n/t, cough, sore throat, CP, SOB, abdominal pain, N/V, diarrhea, changes in urination, bleeding, bruising, rash.      PMHx and Social Hx reviewed per EPIC.      Medications:  Current Outpatient Medications   Medication Sig Dispense Refill     acyclovir (ZOVIRAX) 400 MG tablet Take 1 tablet (400 mg) by mouth 2 times daily  "60 tablet 1     cefpodoxime (VANTIN) 200 MG tablet Take 1 tablet (200 mg) by mouth 2 times daily 60 tablet 1     fluconazole (DIFLUCAN) 200 MG tablet Take 1 tablet (200 mg) by mouth daily 30 tablet 1     fluticasone (FLONASE) 50 MCG/ACT nasal spray INSTILL 1 SPRAY INTO BOTH NOSTRILS DAILY 48 mL 3     gabapentin (NEURONTIN) 100 MG capsule Take 2 capsules (200 mg) by mouth nightly as needed for other (insomnia) 60 capsule 1     methylcellulose (CITRUCEL) 500 MG TABS tablet Take 500 mg by mouth daily       metoprolol succinate ER (TOPROL XL) 25 MG 24 hr tablet Take 1 tablet (25 mg) by mouth daily 30 tablet 3     Multiple Vitamins-Minerals (MULTIVITAL) TABS Take  by mouth daily.       polyethylene glycol (MIRALAX) 17 GM/Dose powder Take 17 g by mouth daily 510 g 0     tamsulosin (FLOMAX) 0.4 MG capsule Take 2 capsules (0.8 mg) by mouth daily 180 capsule 3     vitamin B complex with vitamin C (STRESS TAB) tablet Take 1 tablet by mouth daily         Allergies   Allergen Reactions     Ciprofloxacin      Acute confusion     Sulfamethizole Unknown     Confusion    3/13/2023 Pt says this is not an allergy       EXAM:    /67   Pulse 74   Temp 98.6  F (37  C) (Oral)   Resp 18   Ht 1.778 m (5' 10\")   Wt 59.6 kg (131 lb 6.4 oz)   SpO2 99%   BMI 18.85 kg/m      GENERAL:  Male, in no acute distress.  Alert and oriented x3.   HEENT:  Normocephalic, atraumatic.  PERRL, oropharynx clear with no sores or thrush.   LYMPH NODES:  No palpable pre/post-auricular, cervical, axillary lymphadenopathy appreciated.  CV:  RRR, No murmurs, gallops, or rubs.   LUNGS:  Clear to auscultation bilaterally.   ABDOMEN:  Soft, nontender and nondistended.  Bowel sounds heard x4.  No apparent hepatosplenomegaly.   EXTREMITIES:  No clubbing, cyanosis, or edema.   SKIN: No rash,   PSYCH: Calm and cooperative      Labs:    Latest Reference Range & Units 05/11/23 08:38   WBC 4.0 - 11.0 10e3/uL 5.1   Hemoglobin 13.3 - 17.7 g/dL 9.1 (L)   Hematocrit " 40.0 - 53.0 % 28.6 (L)   Platelet Count 150 - 450 10e3/uL 41 (LL)   RBC Count 4.40 - 5.90 10e6/uL 2.94 (L)   MCV 78 - 100 fL 97   MCH 26.5 - 33.0 pg 31.0   MCHC 31.5 - 36.5 g/dL 31.8   RDW 10.0 - 15.0 % 19.5 (H)   % Neutrophils % 3   % Lymphocytes % 92   % Monocytes % 2   % Eosinophils % 1   % Basophils % 0   % Blasts % 2   Absolute Basophils 0.0 - 0.2 10e3/uL 0.0   NRBC/W <=0 % 1 (H)   Absolute Neutrophil 1.6 - 8.3 10e3/uL 0.2 (LL)   Absolute Lymphocytes 0.8 - 5.3 10e3/uL 4.7   Absolute Monocytes 0.0 - 1.3 10e3/uL 0.1   Absolute Eosinophils 0.0 - 0.7 10e3/uL 0.1   Absolute Blasts <=0.0 10e3/uL 0.1 (H)   Absolute NRBCs <=0.0 10e3/uL 0.1 (H)   RBC Morphology  Confirmed RBC Indices   Platelet Morphology Automated Count Confirmed. Platelet morphology is normal.  Automated Count Confirmed. Platelet morphology is normal.       Imaging: n/a    Impression/Plan: Zbigniew Mendoza is a 88 year old male with AML currently on transfusion support and Nplate.     AML: 70% blasts on bone marrow biopsy and we have supported in with blood/platelet transfusions and possible Nplate.  He has met with [facility] and although it may be appropriate down the road, he is not ready for it.  He also has decided that chemotherapy is not an option for him, which Dr. Morrissey has expressed in the past as well. Hemoglobin 9.1 today, received blood transfusion last week, not indicated today.  Platelets 41K, plan for Nplate today, but no indication for platelet transfusion.  -5/18 Zoe, repeat CBC, Nplate, possible blood/platelets    Gross hematuria: improved, clear urine.  Followed by urology.    Afib: Previously on Eliquis, discontinued due to thrombocytopenia.  No bleeding.    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    Ophtho: Diagnosed with macular degeneration and is followed closely with ophthalmology.  Given the risk of infection and bleeding with these injections, it was decided to discontinue the Avastin  injection.  No significant vision changes recently.      Chart documentation with Dragon Voice recognition Software. Although reviewed after completion, some words and grammatical errors may remain.    30 minutes spent on the date of the encounter doing chart review, review of test results, interpretation of tests, patient visit and documentation       Zoe Tom PA-C  Hematology/Oncology  [facility]

## 2023-05-11 NOTE — NURSING NOTE
"Oncology Rooming Note    May 11, 2023 8:59 AM   Zbigniew Mendoza is a 88 year old male who presents for:    Chief Complaint   Patient presents with     Oncology Clinic Visit     Acute myeloid leukemia not having achieved remission      Initial Vitals: /67   Pulse 74   Temp 98.6  F (37  C) (Oral)   Resp 18   Ht 1.778 m (5' 10\")   Wt 59.6 kg (131 lb 6.4 oz)   SpO2 99%   BMI 18.85 kg/m   Estimated body mass index is 18.85 kg/m  as calculated from the following:    Height as of this encounter: 1.778 m (5' 10\").    Weight as of this encounter: 59.6 kg (131 lb 6.4 oz). Body surface area is 1.72 meters squared.  No Pain (0) Comment: Data Unavailable   No LMP for male patient.  Allergies reviewed: Yes  Medications reviewed: Yes    Medications: Medication refills not needed today.  Pharmacy name entered into [facility]: [id number] - [place], [place] - [address]    Clinical concerns: f/u       Kylie Cardoso CMA              "

## 2023-05-11 NOTE — LETTER
5/11/2023         RE: Zbigniew Mendoza  [id number]  [place] [address]        Dear Colleague,    Thank you for referring your patient, Zbigniew Mendoza, to the [facility]. Please see a copy of my visit note below.    Oncology/Hematology Visit Note    May 11, 2023    Reason for visit: Follow up AML    Oncology HPI: Zbigniew Mendoza is a 88 year old male initially seen in our clinic for pancytopenia.  In December 2022, WBC 2.1, ANC 1.5, hemoglobin 9.6, platelets 118 K.  GI work-up with erosive gastropathy without bleeding and colon was normal with a 2 mm polyp.  He had a 10 to 15 pound weight loss, no lymphadenopathy, night sweats or unexplained fever.  With his ongoing cytopenias, Dr. 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  He was not fit for intensive induction therapy or allogenic transplant.  Dr. Morrissey reviewed options with venetoclax/azacitidine versus azacitidine alone however the risk of morbidity and mortality was discussed.    Patient continued to contemplate on pursuing treatment versus bed supportive care/hospice.  Dr. Morrissey recommended weekly Nplate in the meantime.  He received 2 units of blood on 5/4/2023 in addition to Nplate.    He is here today for close follow-up, possible Nplate and blood.    Interval History: Chi is here unaccompanied and he is doing pretty well.  His weakness is ongoing, but no significant changes recently.  He has intermittent constipation, now taking MiraLAX daily and improving.  His son is a gastroenterologist has been helping with his bowel regimen.  His vision has been a little bit worse, history of macular degeneration, no longer getting his Avastin injections due to risk of bleeding.  Appetite has been okay.  No recent falls.    Review of Systems: See interval hx. Denies fevers, chills, HA, dizziness, n/t, cough, sore throat, CP, SOB, abdominal pain, N/V, diarrhea,  "changes in urination, bleeding, bruising, rash.      PMHx and Social Hx reviewed per EPIC.      Medications:  Current Outpatient Medications   Medication Sig Dispense Refill     acyclovir (ZOVIRAX) 400 MG tablet Take 1 tablet (400 mg) by mouth 2 times daily 60 tablet 1     cefpodoxime (VANTIN) 200 MG tablet Take 1 tablet (200 mg) by mouth 2 times daily 60 tablet 1     fluconazole (DIFLUCAN) 200 MG tablet Take 1 tablet (200 mg) by mouth daily 30 tablet 1     fluticasone (FLONASE) 50 MCG/ACT nasal spray INSTILL 1 SPRAY INTO BOTH NOSTRILS DAILY 48 mL 3     gabapentin (NEURONTIN) 100 MG capsule Take 2 capsules (200 mg) by mouth nightly as needed for other (insomnia) 60 capsule 1     methylcellulose (CITRUCEL) 500 MG TABS tablet Take 500 mg by mouth daily       metoprolol succinate ER (TOPROL XL) 25 MG 24 hr tablet Take 1 tablet (25 mg) by mouth daily 30 tablet 3     Multiple Vitamins-Minerals (MULTIVITAL) TABS Take  by mouth daily.       polyethylene glycol (MIRALAX) 17 GM/Dose powder Take 17 g by mouth daily 510 g 0     tamsulosin (FLOMAX) 0.4 MG capsule Take 2 capsules (0.8 mg) by mouth daily 180 capsule 3     vitamin B complex with vitamin C (STRESS TAB) tablet Take 1 tablet by mouth daily         Allergies   Allergen Reactions     Ciprofloxacin      Acute confusion     Sulfamethizole Unknown     Confusion    3/13/2023 Pt says this is not an allergy       EXAM:    /67   Pulse 74   Temp 98.6  F (37  C) (Oral)   Resp 18   Ht 1.778 m (5' 10\")   Wt 59.6 kg (131 lb 6.4 oz)   SpO2 99%   BMI 18.85 kg/m      GENERAL:  Male, in no acute distress.  Alert and oriented x3.   HEENT:  Normocephalic, atraumatic.  PERRL, oropharynx clear with no sores or thrush.   LYMPH NODES:  No palpable pre/post-auricular, cervical, axillary lymphadenopathy appreciated.  CV:  RRR, No murmurs, gallops, or rubs.   LUNGS:  Clear to auscultation bilaterally.   ABDOMEN:  Soft, nontender and nondistended.  Bowel sounds heard x4.  No " apparent hepatosplenomegaly.   EXTREMITIES:  No clubbing, cyanosis, or edema.   SKIN: No rash,   PSYCH: Calm and cooperative      Labs:    Latest Reference Range & Units 05/11/23 08:38   WBC 4.0 - 11.0 10e3/uL 5.1   Hemoglobin 13.3 - 17.7 g/dL 9.1 (L)   Hematocrit 40.0 - 53.0 % 28.6 (L)   Platelet Count 150 - 450 10e3/uL 41 (LL)   RBC Count 4.40 - 5.90 10e6/uL 2.94 (L)   MCV 78 - 100 fL 97   MCH 26.5 - 33.0 pg 31.0   MCHC 31.5 - 36.5 g/dL 31.8   RDW 10.0 - 15.0 % 19.5 (H)   % Neutrophils % 3   % Lymphocytes % 92   % Monocytes % 2   % Eosinophils % 1   % Basophils % 0   % Blasts % 2   Absolute Basophils 0.0 - 0.2 10e3/uL 0.0   NRBC/W <=0 % 1 (H)   Absolute Neutrophil 1.6 - 8.3 10e3/uL 0.2 (LL)   Absolute Lymphocytes 0.8 - 5.3 10e3/uL 4.7   Absolute Monocytes 0.0 - 1.3 10e3/uL 0.1   Absolute Eosinophils 0.0 - 0.7 10e3/uL 0.1   Absolute Blasts <=0.0 10e3/uL 0.1 (H)   Absolute NRBCs <=0.0 10e3/uL 0.1 (H)   RBC Morphology  Confirmed RBC Indices   Platelet Morphology Automated Count Confirmed. Platelet morphology is normal.  Automated Count Confirmed. Platelet morphology is normal.       Imaging: n/a    Impression/Plan: Zbigniew Mendoza is a 88 year old male with AML currently on transfusion support and Nplate.     AML: 70% blasts on bone marrow biopsy and we have supported in with blood/platelet transfusions and possible Nplate.  [facility] and although it may be appropriate down the road, he is not ready for it.  He also has decided that chemotherapy is not an option for him, which Dr. Morrissey has expressed in the past as well. Hemoglobin 9.1 today, received blood transfusion last week, not indicated today.  Platelets 41K, plan for Nplate today, but no indication for platelet transfusion.  -5/18 Zoe, repeat CBC, Nplate, possible blood/platelets    Gross hematuria: improved, clear urine.  Followed by urology.    Afib: Previously on Eliquis, discontinued due to thrombocytopenia.  No  bleeding.    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    Ophtho: Diagnosed with macular degeneration and is followed closely with ophthalmology.  Given the risk of infection and bleeding with these injections, it was decided to discontinue the Avastin injection.  No significant vision changes recently.      Chart documentation with Dragon Voice recognition Software. Although reviewed after completion, some words and grammatical errors may remain.    30 minutes spent on the date of the encounter doing chart review, review of test results, interpretation of tests, patient visit and documentation       Zoe Tom PA-C  Hematology/Oncology  [facility]                Again, thank you for allowing me to participate in the care of your patient.        Sincerely,        Zoe Tom PA-C

## 2023-05-11 NOTE — PROGRESS NOTES
Nursing Note:  Zbigniew Mendoza presents today for PIV labs.    Patient seen by provider today: Yes: MARYJANE Enriquez   present during visit today: Not Applicable.    Note: does not need blood or platelets today.  Nplate given    Intravenous Access:  Labs drawn without difficulty.  Peripheral IV placed.    Discharge Plan:   Patient was sent to [facility]  for PA appointment.    Nara Fischer RN

## 2023-05-17 NOTE — PROGRESS NOTES
Oncology/Hematology Visit Note    May 18, 2023    Reason for visit: Follow up AML    Oncology HPI: Zbigniew Mendoza is a 88 year old male initially seen in our clinic for pancytopenia.  In December 2022, WBC 2.1, ANC 1.5, hemoglobin 9.6, platelets 118 K.  GI work-up with erosive gastropathy without bleeding and colon was normal with a 2 mm polyp.  He had a 10 to 15 pound weight loss, no lymphadenopathy, night sweats or unexplained fever.  With his ongoing cytopenias, Dr. 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  He was not fit for intensive induction therapy or allogenic transplant.  Dr. Morrissey reviewed options with venetoclax/azacitidine versus azacitidine alone however the risk of morbidity and mortality was discussed.    Patient continued to contemplate on pursuing treatment versus bed supportive care/hospice.  Dr. Morrissey recommended weekly Nplate in the meantime.  He received 2 units of blood on 5/4/2023 in addition to Nplate.    He is here today for close follow-up, possible Nplate and blood.    Interval History: Chi is here with his daughter, Nisreen.  He continues to have bilateral leg weakness, but actually better today.  He does use a walker on a regular basis.  He has been trying to increase the protein in his diet and gets about 2 Ensure cartons in per day.  He is tolerating that okay.  He feels that his appetite is been pretty decent.  Constipation is better and he is some better laxative regimen.  He has macular degeneration and vision is certainly less since he no longer receives his injections, but overall stable.  No fever chills, bleeding, pain, chest pain or shortness of breath, vomiting or diarrhea.    Review of Systems: See interval hx. Denies fevers, chills, HA, dizziness, n/t, cough, sore throat, CP, SOB, abdominal pain, N/V, diarrhea, changes in urination, bleeding, bruising, rash.      PMHx and Social Hx reviewed per  "EPIC.      Medications:  Current Outpatient Medications   Medication Sig Dispense Refill     acyclovir (ZOVIRAX) 400 MG tablet Take 1 tablet (400 mg) by mouth 2 times daily 60 tablet 1     cefpodoxime (VANTIN) 200 MG tablet Take 1 tablet (200 mg) by mouth 2 times daily 60 tablet 1     fluconazole (DIFLUCAN) 200 MG tablet Take 1 tablet (200 mg) by mouth daily 30 tablet 1     fluticasone (FLONASE) 50 MCG/ACT nasal spray INSTILL 1 SPRAY INTO BOTH NOSTRILS DAILY 48 mL 3     gabapentin (NEURONTIN) 100 MG capsule Take 2 capsules (200 mg) by mouth nightly as needed for other (insomnia) 60 capsule 1     methylcellulose (CITRUCEL) 500 MG TABS tablet Take 500 mg by mouth daily       metoprolol succinate ER (TOPROL XL) 25 MG 24 hr tablet Take 1 tablet (25 mg) by mouth daily 30 tablet 3     Multiple Vitamins-Minerals (MULTIVITAL) TABS Take  by mouth daily.       polyethylene glycol (MIRALAX) 17 GM/Dose powder Take 17 g by mouth daily 510 g 0     tamsulosin (FLOMAX) 0.4 MG capsule Take 2 capsules (0.8 mg) by mouth daily 180 capsule 3     vitamin B complex with vitamin C (STRESS TAB) tablet Take 1 tablet by mouth daily         Allergies   Allergen Reactions     Ciprofloxacin      Acute confusion     Sulfamethizole Unknown     Confusion    3/13/2023 Pt says this is not an allergy       EXAM:    BP 99/53   Pulse 67   Temp 96.9  F (36.1  C) (Tympanic)   Resp 16   Ht 1.778 m (5' 10\")   Wt 60 kg (132 lb 3.2 oz)   SpO2 98%   BMI 18.97 kg/m      GENERAL:  Male, in no acute distress.  Alert and oriented x3. Thin/frail.  HEENT:  Normocephalic, atraumatic.  PERRL, oropharynx clear with no sores or thrush.   LYMPH NODES:  No palpable pre/post-auricular, cervical, axillary lymphadenopathy appreciated.  CV:  RRR, No murmurs, gallops, or rubs.   LUNGS:  Clear to auscultation bilaterally.   EXTREMITIES:  No clubbing, cyanosis, or edema.   SKIN: No rash,   PSYCH: Calm and cooperative      Labs:    05/18/23 08:42   WBC 10.8   Hemoglobin " 7.7 (L)   Hematocrit 24.1 (L)   Platelet Count 38 (LL)   RBC Count 2.49 (L)   MCV 97   MCH 30.9   MCHC 32.0   RDW 19.3 (H)   % Neutrophils 2   % Lymphocytes 28   % Monocytes 4   % Eosinophils 0   % Basophils 0   % Blasts 66   Absolute Basophils 0.0   Absolute Neutrophil 0.2 (LL)   Absolute Lymphocytes 3.0   Absolute Monocytes 0.4   Absolute Eosinophils 0.0   Absolute Blasts 7.1 (H)   RBC Morphology Confirmed RBC Indices   Platelet Morphology Automated Count Confirmed. Platelet morphology is normal.   ABO/Rh(D) O NEG   Antibody Screen Negative   SPECIMEN EXPIRATION DATE [id number]     Imaging: n/a    Impression/Plan: Zbigniew Mendoza is a 88 year old male with AML currently on transfusion support and Nplate.     AML: 70% blasts on bone marrow biopsy and we have supported in with blood/platelet transfusions and possible Nplate.  He has met with [facility] hospice and although it may be appropriate down the road, he is not ready for it.  He also has decided that chemotherapy is not an option for him, which Dr. Morrissey has expressed in the past as well. Hemoglobin 7.7 today, received blood transfusion 2 weeks ago, not indicated today.  Platelets 38K, plan for Nplate today, but no indication for platelet transfusion.  -5/25Allison, repeat CBC, Nplate, possible blood/platelets    Gross hematuria: improved, clear urine.  Followed by urology.    Afib: Previously on Eliquis, discontinued due to thrombocytopenia.  No bleeding.    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    Ophtho: Diagnosed with macular degeneration and is followed closely with ophthalmology.  Given the risk of infection and bleeding with these injections, it was decided to discontinue the Avastin injection.  No significant vision changes recently.      Chart documentation with Dragon Voice recognition Software. Although reviewed after completion, some words and grammatical errors may remain.    30 minutes spent on the date of the  encounter doing chart review, review of test results, interpretation of tests, patient visit, documentation and discussion with family       Zoe Tom PA-C  Hematology/Oncology  [facility]

## 2023-05-18 NOTE — LETTER
5/18/2023         RE: Zbigniew Mendoza  [address]  [facility] [address]        Dear Colleague,    Thank you for referring your patient, Zbigniew Mendoza, to the [facility]. Please see a copy of my visit note below.    Oncology/Hematology Visit Note    May 18, 2023    Reason for visit: Follow up AML    Oncology HPI: Zbigniew Mendoza is a 88 year old male initially seen in our clinic for pancytopenia.  In December 2022, WBC 2.1, ANC 1.5, hemoglobin 9.6, platelets 118 K.  GI work-up with erosive gastropathy without bleeding and colon was normal with a 2 mm polyp.  He had a 10 to 15 pound weight loss, no lymphadenopathy, night sweats or unexplained fever.  With his ongoing cytopenias, Dr. 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  He was not fit for intensive induction therapy or allogenic transplant.  Dr. Morrissey reviewed options with venetoclax/azacitidine versus azacitidine alone however the risk of morbidity and mortality was discussed.    Patient continued to contemplate on pursuing treatment versus bed supportive care/hospice.  Dr. Morrissey recommended weekly Nplate in the meantime.  He received 2 units of blood on 5/4/2023 in addition to Nplate.    He is here today for close follow-up, possible Nplate and blood.    Interval History: Chi is here with his daughter, Nisreen.  He continues to have bilateral leg weakness, but actually better today.  He does use a walker on a regular basis.  He has been trying to increase the protein in his diet and gets about 2 Ensure cartons in per day.  He is tolerating that okay.  He feels that his appetite is been pretty decent.  Constipation is better and he is some better laxative regimen.  He has macular degeneration and vision is certainly less since he no longer receives his injections, but overall stable.  No fever chills, bleeding, pain, chest pain or shortness of breath,  "vomiting or diarrhea.    Review of Systems: See interval hx. Denies fevers, chills, HA, dizziness, n/t, cough, sore throat, CP, SOB, abdominal pain, N/V, diarrhea, changes in urination, bleeding, bruising, rash.      PMHx and Social Hx reviewed per EPIC.      Medications:  Current Outpatient Medications   Medication Sig Dispense Refill     acyclovir (ZOVIRAX) 400 MG tablet Take 1 tablet (400 mg) by mouth 2 times daily 60 tablet 1     cefpodoxime (VANTIN) 200 MG tablet Take 1 tablet (200 mg) by mouth 2 times daily 60 tablet 1     fluconazole (DIFLUCAN) 200 MG tablet Take 1 tablet (200 mg) by mouth daily 30 tablet 1     fluticasone (FLONASE) 50 MCG/ACT nasal spray INSTILL 1 SPRAY INTO BOTH NOSTRILS DAILY 48 mL 3     gabapentin (NEURONTIN) 100 MG capsule Take 2 capsules (200 mg) by mouth nightly as needed for other (insomnia) 60 capsule 1     methylcellulose (CITRUCEL) 500 MG TABS tablet Take 500 mg by mouth daily       metoprolol succinate ER (TOPROL XL) 25 MG 24 hr tablet Take 1 tablet (25 mg) by mouth daily 30 tablet 3     Multiple Vitamins-Minerals (MULTIVITAL) TABS Take  by mouth daily.       polyethylene glycol (MIRALAX) 17 GM/Dose powder Take 17 g by mouth daily 510 g 0     tamsulosin (FLOMAX) 0.4 MG capsule Take 2 capsules (0.8 mg) by mouth daily 180 capsule 3     vitamin B complex with vitamin C (STRESS TAB) tablet Take 1 tablet by mouth daily         Allergies   Allergen Reactions     Ciprofloxacin      Acute confusion     Sulfamethizole Unknown     Confusion    3/13/2023 Pt says this is not an allergy       EXAM:    BP 99/53   Pulse 67   Temp 96.9  F (36.1  C) (Tympanic)   Resp 16   Ht 1.778 m (5' 10\")   Wt 60 kg (132 lb 3.2 oz)   SpO2 98%   BMI 18.97 kg/m      GENERAL:  Male, in no acute distress.  Alert and oriented x3. Thin/frail.  HEENT:  Normocephalic, atraumatic.  PERRL, oropharynx clear with no sores or thrush.   LYMPH NODES:  No palpable pre/post-auricular, cervical, axillary lymphadenopathy " appreciated.  CV:  RRR, No murmurs, gallops, or rubs.   LUNGS:  Clear to auscultation bilaterally.   EXTREMITIES:  No clubbing, cyanosis, or edema.   SKIN: No rash,   PSYCH: Calm and cooperative      Labs:    05/18/23 08:42   WBC 10.8   Hemoglobin 7.7 (L)   Hematocrit 24.1 (L)   Platelet Count 38 (LL)   RBC Count 2.49 (L)   MCV 97   MCH 30.9   MCHC 32.0   RDW 19.3 (H)   % Neutrophils 2   % Lymphocytes 28   % Monocytes 4   % Eosinophils 0   % Basophils 0   % Blasts 66   Absolute Basophils 0.0   Absolute Neutrophil 0.2 (LL)   Absolute Lymphocytes 3.0   Absolute Monocytes 0.4   Absolute Eosinophils 0.0   Absolute Blasts 7.1 (H)   RBC Morphology Confirmed RBC Indices   Platelet Morphology Automated Count Confirmed. Platelet morphology is normal.   ABO/Rh(D) O NEG   Antibody Screen Negative   SPECIMEN EXPIRATION DATE [id number]     Imaging: n/a    Impression/Plan: Zbigniew Mendoza is a 88 year old male with AML currently on transfusion support and Nplate.     AML: 70% blasts on bone marrow biopsy and we have supported in with blood/platelet transfusions and possible Nplate.  He has met with [facility] hospice and although it may be appropriate down the road, he is not ready for it.  He also has decided that chemotherapy is not an option for him, which Dr. Morrissey has expressed in the past as well. Hemoglobin 7.7 today, received blood transfusion 2 weeks ago, not indicated today.  Platelets 38K, plan for Nplate today, but no indication for platelet transfusion.  -5/25Allison, repeat CBC, Nplate, possible blood/platelets    Gross hematuria: improved, clear urine.  Followed by urology.    Afib: Previously on Eliquis, discontinued due to thrombocytopenia.  No bleeding.    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    Ophtho: Diagnosed with macular degeneration and is followed closely with ophthalmology.  Given the risk of infection and bleeding with these injections, it was decided to discontinue the  Avastin injection.  No significant vision changes recently.      Chart documentation with Dragon Voice recognition Software. Although reviewed after completion, some words and grammatical errors may remain.    30 minutes spent on the date of the encounter doing chart review, review of test results, interpretation of tests, patient visit, documentation and discussion with family       Zoe Tom PA-C  Hematology/Oncology  [facility]              Again, thank you for allowing me to participate in the care of your patient.        Sincerely,        Zoe Tom PA-C

## 2023-05-18 NOTE — PROGRESS NOTES
Infusion Nursing Note:  Zbigniew Mendoza presents today for Nplate, labs.    Patient seen by provider today: Yes: MARYJANE Cleveland   present during visit today: Not Applicable.    Note: N/A.      Intravenous Access:  Peripheral IV placed.    Treatment Conditions:  Lab Results   Component Value Date    HGB 7.7 (L) 05/18/2023    WBC 10.8 05/18/2023    ANEU 0.2 (LL) 05/18/2023    ANEUTAUTO 0.5 (L) 03/01/2023    PLT 38 (LL) 05/18/2023      Nplate given today. Pt does not meet parameters for blood or platelets.      Post Infusion Assessment:  Patient tolerated injection without incident.  Access discontinued per protocol.       Discharge Plan:   Discharge instructions reviewed with: Patient and Family.  Patient and/or family verbalized understanding of discharge instructions and all questions answered.  Copy of AVS reviewed with patient and/or family.  Patient will return 5/25/23 for next appointment.  Patient discharged in stable condition accompanied by: self and daughter.  Departure Mode: Ambulatory.      May Mckoy RN

## 2023-05-18 NOTE — NURSING NOTE
"Oncology Rooming Note    May 18, 2023 8:05 AM   Zbigniew Mendoza is a 88 year old male who presents for:    Chief Complaint   Patient presents with     Oncology Clinic Visit     Acute myeloid leukemia not having achieved remission      Initial Vitals: BP 99/53   Pulse 67   Temp 96.9  F (36.1  C) (Tympanic)   Resp 16   Ht 1.778 m (5' 10\")   Wt 60 kg (132 lb 3.2 oz)   SpO2 98%   BMI 18.97 kg/m   Estimated body mass index is 18.97 kg/m  as calculated from the following:    Height as of this encounter: 1.778 m (5' 10\").    Weight as of this encounter: 60 kg (132 lb 3.2 oz). Body surface area is 1.72 meters squared.  No Pain (0) Comment: Data Unavailable   No LMP for male patient.  Allergies reviewed: Yes  Medications reviewed: Yes    Medications: Medication refills not needed today.  Pharmacy name entered into [employer]: [id number] - [place], [place] - [address]    Clinical concerns: f/u       Kylie Cardoso CMA              "

## 2023-05-18 NOTE — PROGRESS NOTES
Nursing Note:  Zbigniew Mendoza presents today for PIV start and labs.    Patient seen by provider today: Yes: MARYJANE Cleveland   present during visit today: Not Applicable.    Note: N/A.    Intravenous Access:  Labs drawn without difficulty.  Peripheral IV placed.    Discharge Plan:   Patient was sent to infusion for possible treatment appointment.    Ken Rocha RN

## 2023-05-25 NOTE — PROGRESS NOTES
Oncology/Hematology Visit Note    May 25, 2023    Reason for visit: Follow up AML    Oncology HPI: Zbigniew Mendoza is a 89 year old male initially seen in our clinic for pancytopenia.  In December 2022, WBC 2.1, ANC 1.5, hemoglobin 9.6, platelets 118 K.  GI work-up with erosive gastropathy without bleeding and colon was normal with a 2 mm polyp.  He had a 10 to 15 pound weight loss, no lymphadenopathy, night sweats or unexplained fever.  With his ongoing cytopenias, Dr. 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  He was not fit for intensive induction therapy or allogenic transplant. Dr. Morrissey reviewed options with venetoclax/azacitidine versus azacitidine alone however the risk of morbidity and mortality was discussed.    Patient continued to contemplate on pursuing treatment versus bed supportive care/hospice.  Dr. Morrissey recommended weekly Nplate in the meantime.  He received 2 units of blood on 5/4/2023 in addition to Nplate.    He is here today for close follow-up, possible Nplate and blood.    Interval History: Chi is here with his daughter, Nisreen.  He definitely has more fatigue today than he did last week.  It is taking him longer to get ready in the morning.  He feels he is eating pretty well with 2-3 small meals per day and drinking 2 cartons of Ensure per day.  But he does admit that he is not drinking fluids is much as he should.  No fever or chills, cough, urinary changes, skin changes, headache or vision changes, vomiting or diarrhea.    Review of Systems: See interval hx. Denies fevers, chills, HA, dizziness, n/t, cough, sore throat, CP, SOB, abdominal pain, N/V, diarrhea, changes in urination, bleeding, bruising, rash.      PMHx and Social Hx reviewed per EPIC.      Medications:  Current Outpatient Medications   Medication Sig Dispense Refill     acyclovir (ZOVIRAX) 400 MG tablet Take 1 tablet (400 mg) by mouth 2 times daily 60  "tablet 1     cefpodoxime (VANTIN) 200 MG tablet Take 1 tablet (200 mg) by mouth 2 times daily 60 tablet 1     fluconazole (DIFLUCAN) 200 MG tablet Take 1 tablet (200 mg) by mouth daily 30 tablet 1     fluticasone (FLONASE) 50 MCG/ACT nasal spray INSTILL 1 SPRAY INTO BOTH NOSTRILS DAILY 48 mL 3     gabapentin (NEURONTIN) 100 MG capsule Take 2 capsules (200 mg) by mouth nightly as needed for other (insomnia) 60 capsule 1     methylcellulose (CITRUCEL) 500 MG TABS tablet Take 500 mg by mouth daily       metoprolol succinate ER (TOPROL XL) 25 MG 24 hr tablet Take 1 tablet (25 mg) by mouth daily 30 tablet 3     Multiple Vitamins-Minerals (MULTIVITAL) TABS Take  by mouth daily.       polyethylene glycol (MIRALAX) 17 GM/Dose powder Take 17 g by mouth daily 510 g 0     tamsulosin (FLOMAX) 0.4 MG capsule Take 2 capsules (0.8 mg) by mouth daily 180 capsule 3     vitamin B complex with vitamin C (STRESS TAB) tablet Take 1 tablet by mouth daily         Allergies   Allergen Reactions     Ciprofloxacin      Acute confusion     Sulfamethizole Unknown     Confusion    3/13/2023 Pt says this is not an allergy       EXAM:    /53   Pulse 87   Temp 97.1  F (36.2  C) (Oral)   Resp 16   Ht 1.778 m (5' 10\")   Wt 59.7 kg (131 lb 11.2 oz)   SpO2 94%   BMI 18.90 kg/m      GENERAL:  Male, in no acute distress.  Alert and oriented x3. Thin/frail.  HEENT:  Normocephalic, atraumatic.  PERRL, oropharynx clear with no sores or thrush.   LYMPH NODES:  No palpable pre/post-auricular, cervical, axillary lymphadenopathy appreciated.  LUNGS:  Clear to auscultation bilaterally.   EXTREMITIES:  No clubbing, cyanosis, or edema.   SKIN: No rash,   PSYCH: Calm and cooperative      Labs:    05/25/23 07:57   WBC 22.2 (H)   Hemoglobin 6.8 (LL)   Hematocrit 21.1 (L)   Platelet Count 31 (LL)   RBC Count 2.20 (L)   MCV 96   MCH 30.9   MCHC 32.2   RDW 18.8 (H)   % Neutrophils 0   % Lymphocytes 35   % Monocytes 1   % Eosinophils 0   % Basophils 0   % " Blasts 64   Absolute Basophils 0.0   Absolute Neutrophil 0.0 (LL)   Absolute Lymphocytes 7.8 (H)   Absolute Monocytes 0.2   Absolute Eosinophils 0.0   Absolute Blasts 14.2 (H)   RBC Morphology Confirmed RBC Indices   Platelet Morphology Automated Count Confirmed. Platelet morphology is normal.   Elliptocytes Slight !     Imaging: n/a    Impression/Plan: Zbigniew Mendoza is a 88 year old male with AML currently on transfusion support and Nplate.     AML: 70% blasts on bone marrow biopsy and we have supported in with blood/platelet transfusions and possible Nplate.  He has met with [facility] hospice and although it may be appropriate down the road, he is not ready for it.  He also has decided that chemotherapy is not an option for him, which Dr. Morrissey has expressed in the past as well.  He has increased fatigue and hemoglobin 6.8, plan for 2 units of blood.  Platelets 31K, plan for Nplate as well.  No indication for platelet transfusion.  WBC increased to 22.2, likely progression of his AML, but infection work-up done today, see below. We will continue to follow him weekly with transfusion support.   - 6/1 Zoe, repeat CBC, Nplate, possible blood/platelets    Leukocytosis: Likely secondary to progression of his AML, but given bigger increase, infection work-up completed.  Viral respiratory panel negative.  UA negative, lactate normal, blood cultures pending.  He will continue Vantin, acyclovir and fluconazole.    Gross hematuria: improved, clear urine.  Followed by urology.    Afib: Previously on Eliquis, discontinued due to thrombocytopenia.  No bleeding.    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    Ophtho: Diagnosed with macular degeneration and is followed closely with ophthalmology.  Given the risk of infection and bleeding with these injections, it was decided to discontinue the Avastin injection.  No significant vision changes recently.      Chart documentation with Dragon Voice  recognition Software. Although reviewed after completion, some words and grammatical errors may remain.    30 minutes spent on the date of the encounter doing chart review, review of test results, interpretation of tests, patient visit, documentation, discussion with other provider(s) and discussion with family       Zoe Tom PA-C  Hematology/Oncology  [facility]

## 2023-05-25 NOTE — PROGRESS NOTES
Infusion Nursing Note:  Zbigniew Mendoza presents today for labs/Nplate/2U PRBC.    Patient seen by provider today: Yes: MARYJANE Cleveland   present during visit today: Not Applicable.    Note: Pt with acute leukocytosis today with WBC 22.2  UA, Lactate, Viral swab, BC X2 obtained    Intravenous Access:  Labs drawn with difficulty; multiple attempts of lab draw with butterfly needle to obtain second set of blood cultures  Peripheral IV placed per FT RN    Treatment Conditions:  Lab Results   Component Value Date    HGB 6.8 (LL) 05/25/2023    WBC 22.2 (H) 05/25/2023    ANEU 0.0 (LL) 05/25/2023    ANEUTAUTO 0.5 (L) 03/01/2023    PLT 31 (LL) 05/25/2023      Lab Results   Component Value Date     02/21/2023    POTASSIUM 4.0 02/21/2023    MAG 2.1 02/24/2022    CR 0.90 02/21/2023    RACHANA 8.9 02/21/2023    BILITOTAL 0.6 02/21/2023    ALBUMIN 3.9 02/21/2023    ALT <5 (L) 02/21/2023    AST 21 02/21/2023     Results reviewed, labs MET treatment parameters, ok to proceed with treatment.  Blood transfusion consent signed 2/20/23.      Post Infusion Assessment:  Patient tolerated infusion without incident.  Patient tolerated injection without incident.  Blood return noted pre and post infusion.  Site patent and intact, free from redness, edema or discomfort.  No evidence of extravasations.  Access discontinued per protocol.       Discharge Plan:   Discharge instructions reviewed with: Patient/daughter  Copy of AVS reviewed with patient and/or family.  Patient will return 6/1/23 for labs/RC/possible transfusion/Nplate for next appointment.  Patient discharged in stable condition accompanied by: daughter.  Departure Mode: Ambulatory.      Ondina Kaplan RN

## 2023-05-25 NOTE — LETTER
5/25/2023         RE: Zbigniew Mendoza  [address]  [facility] [address]        Dear Colleague,    Thank you for referring your patient, Zbigniew Mendoza, to the [facility]. Please see a copy of my visit note below.    Oncology/Hematology Visit Note    May 25, 2023    Reason for visit: Follow up AML    Oncology HPI: Zbigniew Mendoza is a 89 year old male initially seen in our clinic for pancytopenia.  In December 2022, WBC 2.1, ANC 1.5, hemoglobin 9.6, platelets 118 K.  GI work-up with erosive gastropathy without bleeding and colon was normal with a 2 mm polyp.  He had a 10 to 15 pound weight loss, no lymphadenopathy, night sweats or unexplained fever.  With his ongoing cytopenias, Dr. 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  He was not fit for intensive induction therapy or allogenic transplant. Dr. Morrissey reviewed options with venetoclax/azacitidine versus azacitidine alone however the risk of morbidity and mortality was discussed.    Patient continued to contemplate on pursuing treatment versus bed supportive care/hospice.  Dr. Morrissey recommended weekly Nplate in the meantime.  He received 2 units of blood on 5/4/2023 in addition to Nplate.    He is here today for close follow-up, possible Nplate and blood.    Interval History: Chi is here with his daughter, Nisreen.  He definitely has more fatigue today than he did last week.  It is taking him longer to get ready in the morning.  He feels he is eating pretty well with 2-3 small meals per day and drinking 2 cartons of Ensure per day.  But he does admit that he is not drinking fluids is much as he should.  No fever or chills, cough, urinary changes, skin changes, headache or vision changes, vomiting or diarrhea.    Review of Systems: See interval hx. Denies fevers, chills, HA, dizziness, n/t, cough, sore throat, CP, SOB, abdominal pain, N/V, diarrhea, changes  "in urination, bleeding, bruising, rash.      PMHx and Social Hx reviewed per EPIC.      Medications:  Current Outpatient Medications   Medication Sig Dispense Refill     acyclovir (ZOVIRAX) 400 MG tablet Take 1 tablet (400 mg) by mouth 2 times daily 60 tablet 1     cefpodoxime (VANTIN) 200 MG tablet Take 1 tablet (200 mg) by mouth 2 times daily 60 tablet 1     fluconazole (DIFLUCAN) 200 MG tablet Take 1 tablet (200 mg) by mouth daily 30 tablet 1     fluticasone (FLONASE) 50 MCG/ACT nasal spray INSTILL 1 SPRAY INTO BOTH NOSTRILS DAILY 48 mL 3     gabapentin (NEURONTIN) 100 MG capsule Take 2 capsules (200 mg) by mouth nightly as needed for other (insomnia) 60 capsule 1     methylcellulose (CITRUCEL) 500 MG TABS tablet Take 500 mg by mouth daily       metoprolol succinate ER (TOPROL XL) 25 MG 24 hr tablet Take 1 tablet (25 mg) by mouth daily 30 tablet 3     Multiple Vitamins-Minerals (MULTIVITAL) TABS Take  by mouth daily.       polyethylene glycol (MIRALAX) 17 GM/Dose powder Take 17 g by mouth daily 510 g 0     tamsulosin (FLOMAX) 0.4 MG capsule Take 2 capsules (0.8 mg) by mouth daily 180 capsule 3     vitamin B complex with vitamin C (STRESS TAB) tablet Take 1 tablet by mouth daily         Allergies   Allergen Reactions     Ciprofloxacin      Acute confusion     Sulfamethizole Unknown     Confusion    3/13/2023 Pt says this is not an allergy       EXAM:    /53   Pulse 87   Temp 97.1  F (36.2  C) (Oral)   Resp 16   Ht 1.778 m (5' 10\")   Wt 59.7 kg (131 lb 11.2 oz)   SpO2 94%   BMI 18.90 kg/m      GENERAL:  Male, in no acute distress.  Alert and oriented x3. Thin/frail.  HEENT:  Normocephalic, atraumatic.  PERRL, oropharynx clear with no sores or thrush.   LYMPH NODES:  No palpable pre/post-auricular, cervical, axillary lymphadenopathy appreciated.  LUNGS:  Clear to auscultation bilaterally.   EXTREMITIES:  No clubbing, cyanosis, or edema.   SKIN: No rash,   PSYCH: Calm and cooperative      Labs:    " 05/25/23 07:57   WBC 22.2 (H)   Hemoglobin 6.8 (LL)   Hematocrit 21.1 (L)   Platelet Count 31 (LL)   RBC Count 2.20 (L)   MCV 96   MCH 30.9   MCHC 32.2   RDW 18.8 (H)   % Neutrophils 0   % Lymphocytes 35   % Monocytes 1   % Eosinophils 0   % Basophils 0   % Blasts 64   Absolute Basophils 0.0   Absolute Neutrophil 0.0 (LL)   Absolute Lymphocytes 7.8 (H)   Absolute Monocytes 0.2   Absolute Eosinophils 0.0   Absolute Blasts 14.2 (H)   RBC Morphology Confirmed RBC Indices   Platelet Morphology Automated Count Confirmed. Platelet morphology is normal.   Elliptocytes Slight !     Imaging: n/a    Impression/Plan: Zbigniew Mendoza is a 88 year old male with AML currently on transfusion support and Nplate.     AML: 70% blasts on bone marrow biopsy and we have supported in with blood/platelet transfusions and possible Nplate.  He has met with [facility] and although it may be appropriate down the road, he is not ready for it.  He also has decided that chemotherapy is not an option for him, which Dr. Morrissey has expressed in the past as well.  He has increased fatigue and hemoglobin 6.8, plan for 2 units of blood.  Platelets 31K, plan for Nplate as well.  No indication for platelet transfusion.  WBC increased to 22.2, likely progression of his AML, but infection work-up done today, see below. We will continue to follow him weekly with transfusion support.   - 6/1 Zoe, repeat CBC, Nplate, possible blood/platelets    Leukocytosis: Likely secondary to progression of his AML, but given bigger increase, infection work-up completed.  Viral respiratory panel negative.  UA negative, lactate normal, blood cultures pending.  He will continue Vantin, acyclovir and fluconazole.    Gross hematuria: improved, clear urine.  Followed by urology.    Afib: Previously on Eliquis, discontinued due to thrombocytopenia.  No bleeding.    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    Ophtho: Diagnosed with macular  "degeneration and is followed closely with ophthalmology.  Given the risk of infection and bleeding with these injections, it was decided to discontinue the Avastin injection.  No significant vision changes recently.      Chart documentation with Dragon Voice recognition Software. Although reviewed after completion, some words and grammatical errors may remain.    30 minutes spent on the date of the encounter doing chart review, review of test results, interpretation of tests, patient visit, documentation, discussion with other provider(s) and discussion with family       Zoe Tom PA-C  Hematology/Oncology  [facility]          Oncology Rooming Note    May 25, 2023 8:12 AM   Zbigniew Mendoza is a 89 year old male who presents for:    Chief Complaint   Patient presents with     Oncology Clinic Visit     Acute myeloid leukemia not having achieved remission      Initial Vitals: Resp 16   Ht 1.778 m (5' 10\")   Wt 59.7 kg (131 lb 11.2 oz)   BMI 18.90 kg/m   Estimated body mass index is 18.9 kg/m  as calculated from the following:    Height as of this encounter: 1.778 m (5' 10\").    Weight as of this encounter: 59.7 kg (131 lb 11.2 oz). Body surface area is 1.72 meters squared.  No Pain (0) Comment: Data Unavailable   No LMP for male patient.  Allergies reviewed: Yes  Medications reviewed: Yes    Medications: Medication refills not needed today.  Pharmacy name entered into [employer]: [id number] - [facility] [address] [address]    Clinical concerns: follow up    Yolette Kraft                Again, thank you for allowing me to participate in the care of your patient.        Sincerely,        Zoe Tom PA-C    "

## 2023-05-25 NOTE — PROGRESS NOTES
"Oncology Rooming Note    May 25, 2023 8:12 AM   Zbigniew Mendoza is a 89 year old male who presents for:    Chief Complaint   Patient presents with     Oncology Clinic Visit     Acute myeloid leukemia not having achieved remission      Initial Vitals: Resp 16   Ht 1.778 m (5' 10\")   Wt 59.7 kg (131 lb 11.2 oz)   BMI 18.90 kg/m   Estimated body mass index is 18.9 kg/m  as calculated from the following:    Height as of this encounter: 1.778 m (5' 10\").    Weight as of this encounter: 59.7 kg (131 lb 11.2 oz). Body surface area is 1.72 meters squared.  No Pain (0) Comment: Data Unavailable   No LMP for male patient.  Allergies reviewed: Yes  Medications reviewed: Yes    Medications: Medication refills not needed today.  Pharmacy name entered into [employer]: [id number] - [place], [place] - [address] [address]    Clinical concerns: follow up    Yolette Kraft            "

## 2023-05-25 NOTE — PROGRESS NOTES
Nursing Note:  Zbigniew Mendoza presents today for Labs.    Patient seen by provider today: Yes: MARYJANE [place]   present during visit today: Not Applicable.    Note: N/A.    Intravenous Access:  Labs drawn without difficulty.  Peripheral IV placed.    Discharge Plan:   Patient was sent to [facility] for 0830 appointment.    Cecily Grayson RN

## 2023-06-01 NOTE — PROGRESS NOTES
Writer received completed health care directive from Chi. Writer emailed completed document to [employer] department.     Gavi Lacy RN on 6/1/2023 at 9:42 AM

## 2023-06-01 NOTE — NURSING NOTE
"Oncology Rooming Note    June 1, 2023 9:04 AM   Zbigniew Mendoza is a 89 year old male who presents for:    Chief Complaint   Patient presents with     Oncology Clinic Visit     Acute myeloid leukemia not having achieved remission      Initial Vitals: BP 98/48   Pulse 71   Temp 98.1  F (36.7  C) (Oral)   Resp 16   Ht 1.778 m (5' 10\")   Wt 59 kg (130 lb 1.6 oz)   SpO2 100%   BMI 18.67 kg/m   Estimated body mass index is 18.67 kg/m  as calculated from the following:    Height as of this encounter: 1.778 m (5' 10\").    Weight as of this encounter: 59 kg (130 lb 1.6 oz). Body surface area is 1.71 meters squared.  No Pain (0) Comment: Data Unavailable   No LMP for male patient.  Allergies reviewed: Yes  Medications reviewed: Yes    Medications: Medication refills not needed today.  Pharmacy name entered into [employer]: [id number] - [place], [place] - [address] [address]    Clinical concerns: f/u       Kylie Cardoso CMA              "

## 2023-06-01 NOTE — PROGRESS NOTES
Infusion Nursing Note:  Zbigniew Mendoza presents today for NPlate + 1 Liter NS **Patient did not meet parameters for PRBC or PLT transfusion**.    Patient seen by provider today: Yes:  Zoe Tom PA-C today.   present during visit today: Not Applicable.    Note:  Visit with Zoe Tom PA-C today.  Patient reports is feeling well today.  Patient denies issues with bleeding or bruising.    WBC = 38.9  ANC = 0.0  HGB = 8.5  PLTS = 29,000    Patient does not meet parameters today for PRBC or PLT transfusion per parameters in Blood Product Plan.    BP = 98/48 (clinic visit).  Writer reviewed with Zoe Tom PA-C.  VORB Zoe Tom PA-C/Kaylee Perez RN:  Recheck BP.  If SBP < 100, then give either NS 500mls or 1 Liter NS over 1 hour.  1000:  BP = 93/53.  1 Liter NS over 1 hour given today.  1120: BP = 128/56.      Intravenous Access:  PIV + Labs today per [facility].  PIV site C/D/I.  PIV flushes well + excellent blood return.    Treatment Conditions:  Lab Results   Component Value Date    HGB 8.5 (L) 06/01/2023    WBC 38.9 (H) 06/01/2023    ANEU 0.0 (LL) 05/25/2023    ANEUTAUTO 0.5 (L) 03/01/2023    PLT 29 (LL) 06/01/2023      Results reviewed, labs MET treatment parameters, ok to proceed with treatment.      Post Infusion Assessment:  Patient tolerated infusion without incident.  Patient tolerated injection without incident.  Blood return noted pre and post infusion.  Site patent and intact, free from redness, edema or discomfort.  No evidence of extravasations.  Access discontinued per protocol.       Discharge Plan:   Discharge instructions reviewed with: Patient and daughter (Nisreen).  Patient and/or family verbalized understanding of discharge instructions and all questions answered.  Patient discharged in stable condition accompanied by: daughter.  Departure Mode: Ambulatory with walker.  6/7/23: Labs, Appointment with Edgar Vu PA-C + NPlate and PRBC/PLT transfusion if  needed.    Kaylee Perez RN, BSN, OCN on 6/1/2023 at 11:33 AM

## 2023-06-01 NOTE — LETTER
6/1/2023         RE: Zbigniew Mendoza  [address]  [facility] [address]        Dear Colleague,    Thank you for referring your patient, Zbigniew Mendoza, to the [facility]. Please see a copy of my visit note below.    Oncology/Hematology Visit Note    Jun 1, 2023    Reason for visit: Follow up AML    Oncology HPI: Zbigniew Mendoza is a 89 year old male initially seen in our clinic for pancytopenia.  In December 2022, WBC 2.1, ANC 1.5, hemoglobin 9.6, platelets 118 K.  GI work-up with erosive gastropathy without bleeding and colon was normal with a 2 mm polyp.  He had a 10 to 15 pound weight loss, no lymphadenopathy, night sweats or unexplained fever.  With his ongoing cytopenias, Dr. 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  He was not fit for intensive induction therapy or allogenic transplant. Dr. Morrissey reviewed options with venetoclax/azacitidine versus azacitidine alone however the risk of morbidity and mortality was discussed.    Patient continued to contemplate on pursuing treatment versus bed supportive care/hospice.  Dr. Morrissey recommended weekly Nplate in the meantime.  He received 2 units of blood and Nplate on 5/4/2023 and again on 5/25/23.    He is here today for close follow-up, possible Nplate and blood.    Interval History: Chi is here with his daughter, Nisreen.  He is doing much better this week compared to last week when his hemoglobin was low when he did receive 2 units of blood.  He has had more energy, but overall still weak.  No shortness of breath or chest pain.  He uses a walker with ambulation.  Appetite is low, but he is taking in 2 Ensure cartons per day and drinking ice tea. No other new complaints today.    Review of Systems: See interval hx. Denies fevers, chills, HA, dizziness, n/t, cough, sore throat, CP, SOB, abdominal pain, N/V, diarrhea, changes in urination, bleeding, bruising,  "rash.      PMHx and Social Hx reviewed per EPIC.      Medications:  Current Outpatient Medications   Medication Sig Dispense Refill     acyclovir (ZOVIRAX) 400 MG tablet Take 1 tablet (400 mg) by mouth 2 times daily 60 tablet 1     cefpodoxime (VANTIN) 200 MG tablet Take 1 tablet (200 mg) by mouth 2 times daily 60 tablet 1     fluconazole (DIFLUCAN) 200 MG tablet Take 1 tablet (200 mg) by mouth daily 30 tablet 1     fluticasone (FLONASE) 50 MCG/ACT nasal spray INSTILL 1 SPRAY INTO BOTH NOSTRILS DAILY 48 mL 3     gabapentin (NEURONTIN) 100 MG capsule Take 2 capsules (200 mg) by mouth nightly as needed for other (insomnia) 60 capsule 1     methylcellulose (CITRUCEL) 500 MG TABS tablet Take 500 mg by mouth daily       metoprolol succinate ER (TOPROL XL) 25 MG 24 hr tablet Take 1 tablet (25 mg) by mouth daily 30 tablet 3     Multiple Vitamins-Minerals (MULTIVITAL) TABS Take  by mouth daily.       polyethylene glycol (MIRALAX) 17 GM/Dose powder Take 17 g by mouth daily 510 g 0     tamsulosin (FLOMAX) 0.4 MG capsule Take 2 capsules (0.8 mg) by mouth daily 180 capsule 3     vitamin B complex with vitamin C (STRESS TAB) tablet Take 1 tablet by mouth daily         Allergies   Allergen Reactions     Ciprofloxacin      Acute confusion     Sulfamethizole Unknown     Confusion    3/13/2023 Pt says this is not an allergy       EXAM:    BP 98/48   Pulse 71   Temp 98.1  F (36.7  C) (Oral)   Resp 16   Ht 1.778 m (5' 10\")   Wt 59 kg (130 lb 1.6 oz)   SpO2 100%   BMI 18.67 kg/m      GENERAL:  Male, in no acute distress.  Alert and oriented x3. Thin/frail.  HEENT:  Normocephalic, atraumatic.  PERRL, oropharynx clear with no sores or thrush.   LYMPH NODES:  No palpable pre/post-auricular, cervical, axillary lymphadenopathy appreciated.  LUNGS:  Clear to auscultation bilaterally.   EXTREMITIES:  No clubbing, cyanosis, or edema.   SKIN: No rash, less pale today  PSYCH: Calm and cooperative      Labs:    06/01/23 08:51   WBC 38.9 " (H) (P)   Hemoglobin 8.5 (L) (P)   Hematocrit 26.9 (L) (P)   Platelet Count 29 (LL) (P)   RBC Count 2.86 (L) (P)   MCV 94 (P)   MCH 29.7 (P)   MCHC 31.6 (P)   RDW 16.7 (H) (P)   Absolute NRBCs 0.0 (P)   NRBCs per 100 WBC 0 (P)     Imaging: n/a    Impression/Plan: Zbigniew Mendoza is a 88 year old male with AML currently on transfusion support and Nplate.     AML: 70% blasts on bone marrow biopsy and we have supported in with blood/platelet transfusions and possible Nplate.  He has met with [facility] hospice and although it may be appropriate down the road, he is not ready for it.  He also has decided that chemotherapy is not an option for him, which Dr. Morrissey has expressed in the past as well.  He had increased fatigue last week and received 2 units of blood, this is improved, but overall generally weak over the last few months.  Platelets are 29 K and plan for Nplate.  WBC 38.9, likely concern for progression of his AML.  Overall, he is feeling pretty stable today, we will monitor.  Leukocytosis work-up completed last week and negative. We will continue to follow him weekly with transfusion support.   -6/7 Edgar, CBC, Nplate, possible blood/platelets    Leukocytosis: Likely secondary to progression of his AML and leukocytosis/sepsis work-up completed last week and negative.  He is afebrile today and feeling better.    Hypotension: 98/48 on arrival, repeat 93/53.  We will plan for IVF.  He is actually feeling pretty decent today.  Afebrile and sepsis work-up completed last week.  Recommended he push more fluids at home.    Gross hematuria: improved, clear urine.  Followed by urology.    Afib: Previously on Eliquis, discontinued due to thrombocytopenia.  No bleeding.    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    Ophtho: Diagnosed with macular degeneration and is followed closely with ophthalmology.  Given the risk of infection and bleeding with these injections, it was decided to  discontinue the Avastin injection.  No significant vision changes recently.      Chart documentation with Dragon [employer] recognition Software. Although reviewed after completion, some words and grammatical errors may remain.    30 minutes spent on the date of the encounter doing chart review, review of test results, interpretation of tests, patient visit, documentation, discussion with other provider(s) and discussion with family       Zoe Tom PA-C  Hematology/Oncology  [facility]          Again, thank you for allowing me to participate in the care of your patient.        Sincerely,        Zoe Tom PA-C

## 2023-06-01 NOTE — PROGRESS NOTES
Oncology/Hematology Visit Note    Jun 1, 2023    Reason for visit: Follow up AML    Oncology HPI: Zbigniew Mendoza is a 89 year old male initially seen in our clinic for pancytopenia.  In December 2022, WBC 2.1, ANC 1.5, hemoglobin 9.6, platelets 118 K.  GI work-up with erosive gastropathy without bleeding and colon was normal with a 2 mm polyp.  He had a 10 to 15 pound weight loss, no lymphadenopathy, night sweats or unexplained fever.  With his ongoing cytopenias, Dr. 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  He was not fit for intensive induction therapy or allogenic transplant. Dr. Morrissey reviewed options with venetoclax/azacitidine versus azacitidine alone however the risk of morbidity and mortality was discussed.    Patient continued to contemplate on pursuing treatment versus bed supportive care/hospice.  Dr. Morrissey recommended weekly Nplate in the meantime.  He received 2 units of blood and Nplate on 5/4/2023 and again on 5/25/23.    He is here today for close follow-up, possible Nplate and blood.    Interval History: Chi is here with his daughter, Nisreen.  He is doing much better this week compared to last week when his hemoglobin was low when he did receive 2 units of blood.  He has had more energy, but overall still weak.  No shortness of breath or chest pain.  He uses a walker with ambulation.  Appetite is low, but he is taking in 2 Ensure cartons per day and drinking ice tea. No other new complaints today.    Review of Systems: See interval hx. Denies fevers, chills, HA, dizziness, n/t, cough, sore throat, CP, SOB, abdominal pain, N/V, diarrhea, changes in urination, bleeding, bruising, rash.      PMHx and Social Hx reviewed per EPIC.      Medications:  Current Outpatient Medications   Medication Sig Dispense Refill     acyclovir (ZOVIRAX) 400 MG tablet Take 1 tablet (400 mg) by mouth 2 times daily 60 tablet 1     cefpodoxime (VANTIN)  "200 MG tablet Take 1 tablet (200 mg) by mouth 2 times daily 60 tablet 1     fluconazole (DIFLUCAN) 200 MG tablet Take 1 tablet (200 mg) by mouth daily 30 tablet 1     fluticasone (FLONASE) 50 MCG/ACT nasal spray INSTILL 1 SPRAY INTO BOTH NOSTRILS DAILY 48 mL 3     gabapentin (NEURONTIN) 100 MG capsule Take 2 capsules (200 mg) by mouth nightly as needed for other (insomnia) 60 capsule 1     methylcellulose (CITRUCEL) 500 MG TABS tablet Take 500 mg by mouth daily       metoprolol succinate ER (TOPROL XL) 25 MG 24 hr tablet Take 1 tablet (25 mg) by mouth daily 30 tablet 3     Multiple Vitamins-Minerals (MULTIVITAL) TABS Take  by mouth daily.       polyethylene glycol (MIRALAX) 17 GM/Dose powder Take 17 g by mouth daily 510 g 0     tamsulosin (FLOMAX) 0.4 MG capsule Take 2 capsules (0.8 mg) by mouth daily 180 capsule 3     vitamin B complex with vitamin C (STRESS TAB) tablet Take 1 tablet by mouth daily         Allergies   Allergen Reactions     Ciprofloxacin      Acute confusion     Sulfamethizole Unknown     Confusion    3/13/2023 Pt says this is not an allergy       EXAM:    BP 98/48   Pulse 71   Temp 98.1  F (36.7  C) (Oral)   Resp 16   Ht 1.778 m (5' 10\")   Wt 59 kg (130 lb 1.6 oz)   SpO2 100%   BMI 18.67 kg/m      GENERAL:  Male, in no acute distress.  Alert and oriented x3. Thin/frail.  HEENT:  Normocephalic, atraumatic.  PERRL, oropharynx clear with no sores or thrush.   LYMPH NODES:  No palpable pre/post-auricular, cervical, axillary lymphadenopathy appreciated.  LUNGS:  Clear to auscultation bilaterally.   EXTREMITIES:  No clubbing, cyanosis, or edema.   SKIN: No rash, less pale today  PSYCH: Calm and cooperative      Labs:    06/01/23 08:51   WBC 38.9 (H) (P)   Hemoglobin 8.5 (L) (P)   Hematocrit 26.9 (L) (P)   Platelet Count 29 (LL) (P)   RBC Count 2.86 (L) (P)   MCV 94 (P)   MCH 29.7 (P)   MCHC 31.6 (P)   RDW 16.7 (H) (P)   Absolute NRBCs 0.0 (P)   NRBCs per 100 WBC 0 (P)     Imaging: " n/a    Impression/Plan: Zbigniew Mendoza is a 88 year old male with AML currently on transfusion support and Nplate.     AML: 70% blasts on bone marrow biopsy and we have supported in with blood/platelet transfusions and possible Nplate.  He has met with [facility] hospice and although it may be appropriate down the road, he is not ready for it.  He also has decided that chemotherapy is not an option for him, which Dr. Morrissey has expressed in the past as well.  He had increased fatigue last week and received 2 units of blood, this is improved, but overall generally weak over the last few months.  Platelets are 29 K and plan for Nplate.  WBC 38.9, likely concern for progression of his AML.  Overall, he is feeling pretty stable today, we will monitor.  Leukocytosis work-up completed last week and negative. We will continue to follow him weekly with transfusion support.   -6/7 Edgar, CBC, Nplate, possible blood/platelets    Leukocytosis: Likely secondary to progression of his AML and leukocytosis/sepsis work-up completed last week and negative.  He is afebrile today and feeling better.    Hypotension: 98/48 on arrival, repeat 93/53.  We will plan for IVF.  He is actually feeling pretty decent today.  Afebrile and sepsis work-up completed last week.  Recommended he push more fluids at home.    Gross hematuria: improved, clear urine.  Followed by urology.    Afib: Previously on Eliquis, discontinued due to thrombocytopenia.  No bleeding.    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    Ophtho: Diagnosed with macular degeneration and is followed closely with ophthalmology.  Given the risk of infection and bleeding with these injections, it was decided to discontinue the Avastin injection.  No significant vision changes recently.      Chart documentation with Dragon Voice recognition Software. Although reviewed after completion, some words and grammatical errors may remain.    30 minutes spent on the  date of the encounter doing chart review, review of test results, interpretation of tests, patient visit, documentation, discussion with other provider(s) and discussion with family       Zoe Tom PA-C  Hematology/Oncology  [facility]

## 2023-06-01 NOTE — PROGRESS NOTES
Nursing Note:  Zbigniew Mendoza presents today for PIV start with labs.    Patient seen by provider today: Yes: MARYJANE [place]   present during visit today: Not Applicable.    Note: Woke up early today in anticipation of his appointment.    Intravenous Access:  Labs drawn without difficulty.  Peripheral IV placed.    Discharge Plan:   Patient was sent to [facility] for provider appointment.    Ken Rocha RN

## 2023-06-04 PROBLEM — R50.81 FEBRILE NEUTROPENIA (H): Status: ACTIVE | Noted: 2023-01-01

## 2023-06-04 PROBLEM — D70.9 FEBRILE NEUTROPENIA (H): Status: ACTIVE | Noted: 2023-01-01

## 2023-06-04 NOTE — TELEPHONE ENCOUNTER
"Telephone Call:     Pt's daughter calling. She is not present with the patient, but offers some of the following information:     Pt is having some new and worsening sx  Has been cold because he had turned the heat up in his house despite the hot weather outside  Low grade fever since yesterday afternoon 99.5-100.5F  He is more confused and forgetful than his baseline  Spoke to sibling who is an MD and she suggested that the caller contact [facility]  She is concerned that patient could \"have another infection\"  Pt has a hx of pancytopenia and leukemia     Writer could not triage patient's sx due to him not being present, but writer let the caller know that although the clinics are closed today, that [facility] has several UCC and EDs available if she would like to have patient evaluated today for his sx to find out if he has an infection or other cause.     She had no further questions at this time. She was advised that if she is with him later today and would like full triage of sx that she could call back or patient' could call FNA.     Shirley Reynoso RN  [facility] Nurse Advisor 4:12 PM 6/4/2023  "

## 2023-06-04 NOTE — ED PROVIDER NOTES
History     Chief Complaint:  Fever and Altered Mental Status     HPI   Zbigniew Mendoza is a 89 year old male with history of hypertension, leukemia, and atrial fibrillation, who presents with a fever of 100.5 F Tmax since yesterday as well as onset of confusion as noted by his daughter. The patient called the cancer center today with concern and was told to present to the ED. His daughter states that he has been increasingly confused and forgetful with things such as forgetting how to use the AC and climbing into the backseat of his car. Patient endorses fatigue. Denies cough, shortness of breath, chest pain, dizziness, abdominal pain, constipation, diarrhea, dysuria, frequency, neck pain, slurred speech, trouble moving, or chills. No known sick contacts.     Independent Historian:   Patient and daughter report history as above.    Review of External Notes:   none    Medications:    Acyclovir  Cefpodoxime  Fluconazole   Fluticasone  Gabapentin  Methylcellulose  Metoprolol succinate  Polyethylene glycol  Tamsulosin    Past Medical History:    Arrhythmia  Atrial fibrillation  Calculus of kidney  Mitral prolapse  Prostate infection  Pancytopenia  Thrombocytopenia   Chronic idiopathic constipation    Past Surgical History:    Arthroscopy right shoulder, rotator cuff repair  Back surgery  Colonoscopy  Cystoscopy  Cystoscopy, transurethral resection of prostate, combined  Esophagogastroduodenoscopy  Prostate surgery      Physical Exam     Patient Vitals for the past 24 hrs:   BP Temp Temp src Pulse Resp SpO2   06/04/23 2000 -- 99.8  F (37.7  C) Temporal -- -- 96 %   06/04/23 1831 114/71 99.9  F (37.7  C) Temporal 92 18 97 %        Physical Exam  Constitutional:       Appearance: He is well-developed.   HENT:      Right Ear: External ear normal.      Left Ear: External ear normal.      Mouth/Throat:      Mouth: Mucous membranes are moist.      Pharynx: Oropharynx is clear. No oropharyngeal exudate or posterior  oropharyngeal erythema.   Eyes:      General: No scleral icterus.     Conjunctiva/sclera: Conjunctivae normal.      Pupils: Pupils are equal, round, and reactive to light.   Cardiovascular:      Rate and Rhythm: Normal rate and regular rhythm.      Heart sounds: Normal heart sounds. No murmur heard.     No friction rub. No gallop.   Pulmonary:      Effort: Pulmonary effort is normal. No respiratory distress.      Breath sounds: Normal breath sounds. No wheezing or rales.   Abdominal:      General: Bowel sounds are normal. There is no distension.      Palpations: Abdomen is soft. There is no mass.      Tenderness: There is no abdominal tenderness.   Musculoskeletal:         General: Normal range of motion.      Cervical back: Normal range of motion and neck supple.   Skin:     General: Skin is warm and dry.      Capillary Refill: Capillary refill takes less than 2 seconds.      Findings: No rash.   Neurological:      General: No focal deficit present.      Mental Status: He is alert.      Cranial Nerves: No cranial nerve deficit.           Emergency Department Course   ECG  ECG taken at 1845, ECG read at 1855  Unusual P axis, possible ectopic atrial rhythm  T wave abnormality, consider lateral ischemia  Abnormal ECG   Rate 88 bpm. ND interval 210 ms. QRS duration 90 ms. QT/QTc 356/430 ms. P-R-T axes 167 -28 139.     Imaging:  XR Chest 2 Views   Final Result   IMPRESSION: No focal airspace consolidation. No pleural effusion or pneumothorax. Skin folds overlie the mid right hemithorax.      Cardiomediastinal silhouette is normal. Atherosclerosis of the thoracic aorta.      CT Head w/o Contrast   Final Result   IMPRESSION:   1.  No CT finding of a mass, hemorrhage or focal area consistent with diffuse small of acute ischemia.   2.  Diffuse age related changes.         Report per radiology    Laboratory:  Labs Ordered and Resulted from Time of ED Arrival to Time of ED Departure   INR - Abnormal       Result Value    INR  1.21 (*)    COMPREHENSIVE METABOLIC PANEL - Abnormal    Sodium 133 (*)     Potassium 4.3      Chloride 100      Carbon Dioxide (CO2) 25      Anion Gap 8      Urea Nitrogen 15.1      Creatinine 0.91      Calcium 8.6 (*)     Glucose 124 (*)     Alkaline Phosphatase 142 (*)     AST 26      ALT 11      Protein Total 6.3 (*)     Albumin 3.4 (*)     Bilirubin Total 0.3      GFR Estimate 81     ROUTINE UA WITH MICROSCOPIC REFLEX TO CULTURE - Abnormal    Color Urine Yellow      Appearance Urine Slightly Cloudy (*)     Glucose Urine Negative      Bilirubin Urine Negative      Ketones Urine Negative      Specific Gravity Urine 1.024      Blood Urine Negative      pH Urine 7.5 (*)     Protein Albumin Urine 30 (*)     Urobilinogen Urine 4.0 (*)     Nitrite Urine Negative      Leukocyte Esterase Urine Negative      Mucus Urine Present (*)     Calcium Oxalate Crystals Urine Few (*)     RBC Urine 3 (*)     WBC Urine 1      Squamous Epithelials Urine 1     CBC WITH PLATELETS AND DIFFERENTIAL - Abnormal    WBC Count 58.9 (*)     RBC Count 2.53 (*)     Hemoglobin 7.6 (*)     Hematocrit 22.5 (*)     MCV 89      MCH 30.0      MCHC 33.8      RDW 16.6 (*)     Platelet Count 27 (*)    DIFFERENTIAL - Abnormal    % Neutrophils 0      % Lymphocytes 12      % Monocytes 7      % Eosinophils 0      % Basophils 0      % Blasts 81      Absolute Neutrophils 0.0 (*)     Absolute Lymphocytes 7.1 (*)     Absolute Monocytes 4.1 (*)     Absolute Eosinophils 0.0      Absolute Basophils 0.0      Absolute Blasts 47.7 (*)     RBC Morphology Confirmed RBC Indices      Platelet Assessment        Value: Automated Count Confirmed. Platelet morphology is normal.   LACTIC ACID WHOLE BLOOD - Normal    Lactic Acid 1.2     INFLUENZA A/B, RSV, & SARS-COV2 PCR - Normal    Influenza A PCR Negative      Influenza B PCR Negative      RSV PCR Negative      SARS CoV2 PCR Negative     LACTIC ACID WHOLE BLOOD - Normal    Lactic Acid 1.1     TYPE AND SCREEN, ADULT     ABO/RH(D) O NEG      Antibody Screen Negative      SPECIMEN EXPIRATION DATE [id number]     BLOOD CULTURE   BLOOD CULTURE   ABO/RH TYPE AND SCREEN      Emergency Department Course & Assessments:       Interventions:  Medications   ceFEPIme (MAXIPIME) 2 g vial to attach to  ml bag for ADULTS or 50 ml bag for PEDS (2 g Intravenous $New Bag 6/4/23 2148)   0.9% sodium chloride BOLUS (0 mLs Intravenous Stopped 6/4/23 2015)        Assessments:  1848 I entered the patient's room and obtained history.  1935 I rechecked the patient and explained findings.    Independent Interpretation (X-rays, CTs, rhythm strip):  none    Consultations/Discussion of Management or Tests:  2135 I consulted with MARYJANE Bob, for Dr. Martínez, hospitalist, regarding the patient's history and presentation here in the emergency department who accepted the patient for admission.        Social Determinants of Health affecting care:   None and none    Disposition:  The patient was admitted to the hospital under the care of Dr. Martínez.     Impression & Plan    CMS Diagnoses: None    Medical Decision Making:  Patient presents today with daughter for evaluation of confusion and fever.  Patient did have a temp 100.5 orally at home today.  Daughter states that he usually has some baseline confusion but seems worse today.  Patient does not describe any pain or any concerns.  He has no other infectious symptoms.  Unfortunately, patient is found to be neutropenic.  He does have AML and is not currently deemed a treatment candidate.  I recommend admission for treatment of neutropenic fever.  I ordered cefepime to cover.  Patient is admitted to     Diagnosis:    ICD-10-CM    1. Febrile neutropenia (H)  D70.9     R50.81            Scribe Disclosure:  Hiram GARCÍA Hired, am serving as a scribe at 7:03 PM on 6/4/2023 to document services personally performed by Nohemy Campbell MD based on my observations and the provider's statements to  me.     6/4/2023   Nohemy Campbell MD Cheng, Wenlan, MD  06/04/23 7652

## 2023-06-04 NOTE — TELEPHONE ENCOUNTER
Patient does have AML. Low grade temp x 24 hours. T 100.2-100.5 R  Increased forgetfulness, confusion. Did not remember how to use air conditioner, recently turned heat on. Has had increased confusion for awhile but much more so in last 24 hours. Weakness is ongoing symptom. No increase in Weakness. Overall daughter who is on consent to communicate has concerns about fever and increased confusion.     Protocol reviewed, Disposition to Go to ED now or pcp triage. Paged on call oncologist Vik Madrigal at 512pm. Provider called back at 525pm and advised patient go to ED.     DALIA SUTTON, CHAZ  [facility] nurse advisors  6/4/2023  5:28 PM    Reason for Disposition    Fever > 100.4 F (38.0 C)    Additional Information    Negative: [1] Difficult to awaken or acting confused (e.g., disoriented, slurred speech) AND [2] present now AND [3] diabetes mellitus    Negative: [1] Difficult to awaken or acting confused (e.g., disoriented, slurred speech) AND [2] present now AND [3] new-onset    Negative: [1] Weakness of the face, arm, or leg on one side of the body AND [2] new-onset    Negative: [1] Numbness of the face, arm, or leg on one side of the body AND [2] new-onset    Negative: [1] Loss of speech or garbled speech AND [2] new-onset    Negative: Difficulty breathing or bluish lips    Negative: Shock suspected (e.g., cold/pale/clammy skin, too weak to stand, low BP, rapid pulse)    Negative: Seeing, hearing, or feeling things that are not there (i.e., visual, auditory, or tactile hallucinations)    Negative: Followed a head injury    Negative: Drug overdose suspected    Negative: Sounds like a life-threatening emergency to the triager    Negative: Headache or vomiting    Negative: Stiff neck (can't touch chin to chest)    Negative: Very strange or paranoid behavior    Protocols used: CONFUSION - DELIRIUM-A-AH

## 2023-06-04 NOTE — ED TRIAGE NOTES
Pt arrives with c/o fever and confusion over the past couple days but the confusion has worsened over the past 24 hours. Pt gets infusions on a regular basis d/t leukemia, no other treatment currently. Pt denies any dysuria, increased frequency or any pain.      Triage Assessment     Row Name 06/04/23 0592       Triage Assessment (Adult)    Airway WDL WDL       Respiratory WDL    Respiratory WDL WDL       Skin Circulation/Temperature WDL    Skin Circulation/Temperature WDL WDL       Cardiac WDL    Cardiac WDL WDL       Peripheral/Neurovascular WDL    Peripheral Neurovascular WDL WDL       Cognitive/Neuro/Behavioral WDL    Cognitive/Neuro/Behavioral WDL X    Level of Consciousness intermittent confusion

## 2023-06-05 NOTE — PROGRESS NOTES
[facility]    Medicine Progress Note - Hospitalist Service    Date of Admission:  6/4/2023    Assessment & Plan   Zbigniew Mendoza is a 89 year old male admitted on 6/4/2023. He presented with neutropenic fever as well as acute confusion.  Patient is a psychiatrist.        Neutropenic fever:   Patient presents with a temperature of 100.5 at home.  Patient has severe AML and his white cells are really not useful at this point.  His fever could be due to to his cancer itself.  Chest x-ray is negative and his UA is normal.  Blood cultures are NTD.  Patient was started on cefepime,will continue while inpatient.  Patient has no symptoms of cough, dysuria, sputum production, or any other infection at this point.  I suspect this is all due to his progression of his AML    Pancytopenia: Patient has a hemoglobin of 7.6 and a platelet level of 27.  Has a history of getting transfused platelets as well as packed red blood cells.  He has an appointment with oncology on Wednesday 6/7.    AML/hyperviscosity syndrome:   Patient's white blood cell count is up to 59 from being 39 just a few days ago.  Possible that the patient is having hyperviscosity syndrome from this and he is having progression of his AML.  He is not a candidate for chemotherapy.  He is followed by Dr. Morrissey with oncology.  Oncology assessed patient and recommended against any ongoing transfusions.  Likely planning for hospice on discharge.    -Palliative following  -Social work consult    Encephalopathy/infusion:   Patient presents with being confused per the patient's daughter.  Apparently he was confused about which door open for the car and had issues with his air conditioner.  He is not at his baseline.  it is possible that hyperviscosity syndrome is playing a role, his AML, and less likely an infection.  Patient also looks like he has been on Neurontin in the past not sure if he currently is taking this but that could play a role as  well.  I explained to the patient's daughter that the patient's confusion likely is to get worse over time and we may not have a fix for this.    BPH/hematuria:   Patient is off of Eliquis for which she was on for atrial fibrillation.  He does still take Flomax.    Atrial fibrillation:   Patient currently sounds like he is in sinus and his telemetry is showing that as well.  The patient is on Toprol.  He is not on Eliquis due to his low platelet level and history of hematuria.  He is completely appropriate.  Would not recommend any anticoagulation going forward.    Sleep deprivation:   Patient really has not been sleeping well at home for the last 5 days and he feels that this could be a contributing factor into his confusion    Macular degeneration:   This puts patient at high risk for falling.  Used to get eye injections but he is no longer getting.    Gluten-free diet:   Requested by the patient     Advance care planning:   Previous provider had a very long conversation with the patient and his daughter talking about his goals of care, living situation, fact that he is still driving, the need for support from family at home as well as getting hospice involved sooner than later.  Patient made it clear that were not doing CPR or intubation.  Patient however still is interested in getting transfusions as long as his packed red blood cells are making him feel stronger.  Oncology recommends against further transfusions.  We discussed letting the patient's daughter stay with him at home so he is not home alone.  Discussed driving, patient was told that he is not allowed to drive any longer by me and his daughter is in agreement.  It has also been recommended not to rehospitalize the patient try and keep him at home.  Oncology recommends hospice initiation on discharge.  Plan is for home on hospice.        Diet: Regular Diet Adult    DVT Prophylaxis: Pneumatic Compression Devices  Osorio Catheter: Not present  Lines:  None     Cardiac Monitoring: None  Code Status: No CPR- Do NOT Intubate      Clinically Significant Risk Factors Present on Admission              # Hypoalbuminemia: Lowest albumin = 3.4 g/dL at 6/4/2023  7:27 PM, will monitor as appropriate  # Coagulation Defect: INR = 1.21 (Ref range: 0.85 - 1.15) and/or PTT = N/A, will monitor for bleeding  # Thrombocytopenia: Lowest platelets = 27 in last 2 days, will monitor for bleeding   # Hypertension: Noted on problem list               Disposition Plan     Expected Discharge Date: 06/05/2023                  Shayne Noland DO  Hospitalist Service  [facility]  Securely message with [employer] (more info)  Text page via [facility] Paging/Directory   ______________________________________________________________________    Interval History   Patient is feeling okay.  He has no significant complaints.  He does report fatigue and insomnia.  He also reports that he is still comprehending what the oncologist had told him today.    Physical Exam   Vital Signs: Temp: 98.8  F (37.1  C) Temp src: Oral BP: 130/61 Pulse: 62   Resp: 18 SpO2: 99 % O2 Device: None (Room air)    Weight: 0 lbs 0 oz    General Appearance: Appears well.  Sitting propped up in bed.  Awake and alert.  Respiratory: Clear to auscultation bilaterally.  Normal work of breathing.  Cardiovascular: Regular rate and rhythm.  Murmurs rubs or gallops  GI: Benign.  No tenderness palpation.  Positive bowel sounds.  Skin: No rashes or lesions on exposed skin.  Other: Does not appear obviously confused and is fully oriented.    Medical Decision Making       45 MINUTES SPENT BY ME on the date of service doing chart review, history, exam, documentation & further activities per the note.      Data   ------------------------- PAST 24 HR DATA REVIEWED -----------------------------------------------    I have personally reviewed the following data over the past 24 hrs:    58.9 (HH)  \   7.6 (L)   / 27 (LL)     133  (L) 100 15.1 /  124 (H)   4.3 25 0.91 \       ALT: 11 AST: 26 AP: 142 (H) TBILI: 0.3   ALB: 3.4 (L) TOT PROTEIN: 6.3 (L) LIPASE: N/A       Procal: N/A CRP: N/A Lactic Acid: 1.1       INR:  1.21 (H) PTT:  N/A   D-dimer:  N/A Fibrinogen:  N/A       Imaging results reviewed over the past 24 hrs:   Recent Results (from the past 24 hour(s))   CT Head w/o Contrast    Narrative    EXAM: CT HEAD W/O CONTRAST  LOCATION: [facility]  DATE/TIME: 6/4/2023 8:41 PM CDT    INDICATION: confusion  COMPARISON: None.  TECHNIQUE: Routine CT Head without IV contrast. Multiplanar reformats. Dose reduction techniques were used.    FINDINGS:  INTRACRANIAL CONTENTS: No intracranial hemorrhage, extraaxial collection, or mass effect.  No CT evidence of acute infarct. There is scattered diffuse low attenuation within the periventricular and subcortical white matter consistent with mild diffuse   small vessel ischemic disease. The ventricular system, basal cisterns and the cortical sulci are consistent with diffuse volume loss.     VISUALIZED ORBITS/SINUSES/MASTOIDS: No intraorbital abnormality. No paranasal sinus mucosal disease. No middle ear or mastoid effusion.    BONES/SOFT TISSUES: No acute abnormality.      Impression    IMPRESSION:  1.  No CT finding of a mass, hemorrhage or focal area consistent with diffuse small of acute ischemia.  2.  Diffuse age related changes.   XR Chest 2 Views    Narrative    EXAM: XR CHEST 2 VIEWS  LOCATION: [facility]  DATE/TIME: 6/4/2023 8:42 PM CDT    INDICATION: Confusion.  COMPARISON: 09/02/2019.      Impression    IMPRESSION: No focal airspace consolidation. No pleural effusion or pneumothorax. Skin folds overlie the mid right hemithorax.    Cardiomediastinal silhouette is normal. Atherosclerosis of the thoracic aorta.

## 2023-06-05 NOTE — PLAN OF CARE
PRIMARY DIAGNOSIS: NEUTROPENIA    OUTPATIENT/OBSERVATION GOALS TO BE MET BEFORE DISCHARGE:    1. ADLs back to baseline: Yes    2. Activity and level of assistance: Up with standby assistance.    3. Pain status: Pain free.    4. Return to near baseline physical activity: Yes     Discharge Planner Nurse   Safe discharge environment identified: No  Barriers to discharge: Yes       Entered by: Selene Subramanian RN 06/05/2023 2:04 PM     Please review provider order for any additional goals.   Nurse to notify provider when observation goals have been met and patient is ready for discharge.    Pending  consult for assist with hospice services.

## 2023-06-05 NOTE — PHARMACY-ADMISSION MEDICATION HISTORY
Pharmacist Admission Medication History    Admission medication history is complete. The information provided in this note is only as accurate as the sources available at the time of the update.    Medication reconciliation/reorder completed by provider prior to medication history? No    Information Source(s): Patient via in-person    Pertinent Information: Vantin and Diflucan were on patient's dispense report but he stated that he was not taking Vantin or Diflucan.. He clarified that his Eliquis was stopped several months ago due to bleeding risk . He also stated that he takes Flomax 0.4 mg BID instead of 0.8 mg daily - he had 0.8 mg dose already today -6/5.     Changes made to PTA medication list:    Added: Prilosec     Deleted: Vitamin B with C     Changed: Gabapentin 200 mg nightly PRN --> 100 mg nightly PRN, Miralax 17 grams daily --> daily PRN     Medication Affordability:  Not including over the counter (OTC) medications, was there a time in the past 3 months when you did not take your medications as prescribed because of cost?: No    Allergies reviewed with patient and updates made in EHR: yes    Medication History Completed By: Mercedes Soni [facility] 6/5/2023 9:36 AM    Prior to Admission medications    Medication Sig Last Dose Taking? Auth Provider Long Term End Date   gabapentin (NEURONTIN) 100 MG capsule Take 100 mg by mouth nightly as needed for other (insomnia) Past Week Yes Unknown, Entered By History Yes    Multiple Vitamins-Minerals (MULTIVITAL) TABS Take  by mouth daily. Past Week Yes Epi Thompson MD     omeprazole (PRILOSEC OTC) 20 MG EC tablet Take 20 mg by mouth daily as needed Past Week Yes Unknown, Entered By History     polyethylene glycol (MIRALAX) 17 g packet Take 1 packet by mouth daily as needed for constipation Past Week Yes Unknown, Entered By History     acyclovir (ZOVIRAX) 400 MG tablet Take 1 tablet (400 mg) by mouth 2 times daily 6/2/2023  Jennifer Morrissey, DO Yes    cefpodoxime  (VANTIN) 200 MG tablet Take 1 tablet (200 mg) by mouth 2 times daily   Jennifer Morrissey DO     fluconazole (DIFLUCAN) 200 MG tablet Take 1 tablet (200 mg) by mouth daily   Jennifer Morrissey DO     fluticasone (FLONASE) 50 MCG/ACT nasal spray INSTILL 1 SPRAY INTO BOTH NOSTRILS DAILY 6/3/2023  Eagle Negrete MD     metoprolol succinate ER (TOPROL XL) 25 MG 24 hr tablet Take 1 tablet (25 mg) by mouth daily 6/3/2023 at pm  Edgar Vu PA Yes    tamsulosin (FLOMAX) 0.4 MG capsule Take 2 capsules (0.8 mg) by mouth daily  Patient taking differently: Take 0.4 mg by mouth 2 times daily 6/3/2023 at pm  Eagle Negrete MD

## 2023-06-05 NOTE — CONSULTS
Palliative Care Consultation Note  [facility]      Patient: Zbigniew Mendoza  Date of Admission:  6/4/2023    Requesting Clinician / Team: hospitalist  Reason for consult: Goals of care  Decisional support  Patient and family support     Recommendations & Counseling     GOALS OF CARE:     Restorative with limits  and hospice on discharge    ADVANCE CARE PLANNING:    Patient has an advance directive dated 5/19/2023.  Primary Health Care Agent Nisreen Reji.  Surrogates are Alfonso Joinern.     There is no POLST form on file, plan to complete prior to DC.    Code status: No CPR- Do NOT Intubate    MEDICAL MANAGEMENT:   #General Weakness/Debility     Staff to assist with ADLs as able    Family is planning to move in with patient and will also hire private pay services    Hospice on discharge    #Constipation,Functional constipation  Polyethylene glycol (MiraLAX)     PSYCHOSOCIAL/SPIRITUAL SUPPORT:    Family     Palliative Care will continue to follow. Thank you for the consult and allowing us to aid in the care of Zbigniew Mendoza.    These recommendations have been discussed with medical team and family.    Vanessa Menjivar NP  Securely message with Vocera (more info)  Text page via [facility] Paging/Directory       Palliative Summary/HPI     Zbigniew Mendoza is a 89 year old male with a past medical history of on going AML, pancytopenia, hematuria, BPH, atrial fibrillation, macular degeneration, who presents with acute confusion.  Patient has been getting blood transfusions every 1-2 weeks.     Today, the patient was seen for:  Goals of care conversation    Palliative Care Summary:  Met with patient and son at the bedside and daughters Nisreen and Dejah over the phone  I introduced our role as an extra layer of support and how we help patients and families dealing with serious, potentially life-limiting illnesses. I explained the composition of the palliative care team.  Palliative care helps  patients and families navigate their care while focusing on the whole person; providing emotional, social and spiritual support  Palliative care often assists with symptom management, information sharing about what to expect from the illness, available treatment options and what effect those options may have on the disease course, and provide effective communication and caring support. Discussed and reviewed overall goals of care. At this time they are waiting to hear from oncology if transfusions will continue to be effective. If they are not likely to help he is interested in going home on hospice with family support. He reports that if they will still help and not cause harm he would like to continue with them as they add comfort and quality of life. He feels better after he gets them. PLan will be to regroup with the family once he is seen by oncology. He has not decided on antibiotics yet and also wants to wait to determine the whole plan.    Prognosis, Goals, & Planning:      Functional Status just prior to this current hospitalization:    He has had progressive weakness with one fall.        Prognosis, Goals, and/or Advance Care Planning:  We discussed general treatment options (full/restorative, selective/conservatives, and comfort only/hospice). We then discussed how these specifically apply to AML. Based on this discussion, he is likely interested in comfort care and hospice however isnt ready to make that decision right now.    Advance Care Planning Discussion 6/5/2023. Vanessa GARCÍA NP met with Patient and their family today at the hospital to discuss Advance Care Planning. Zbigniew Mendoza does have decisional capacity and was present for this discussion.  Those present were informed of the voluntary nature of this discussion and wished to proceed.  The discussion included: goals of care, code status, wishes and worries, functional decline, discharge planning. This discussion began at 1010  and ended at 1110 for a total of 60 mins minutes.        Code Status was addressed today:     Yes          Patient has decision-making capacity today for complex decisions:Intact            Coping, Meaning, & Spirituality:     Mood, coping, and/or meaning in the context of serious illness were addressed today: Yes    Social:   Living situation:lives alone    Medications:  I have reviewed this patient's medication profile and medications from this hospitalization. Notable medications:     ROS:  Comprehensive ROS is reviewed and is negative except as here & per HPI:     Physical Exam   Vital Signs with Ranges  Temp:  [98.5  F (36.9  C)-99.9  F (37.7  C)] 98.5  F (36.9  C)  Pulse:  [62-92] 62  Resp:  [11-18] 18  BP: (113-132)/(57-71) 113/70  SpO2:  [96 %-97 %] 97 %  0 lbs 0 oz    PHYSICAL EXAM:  Constitutional: thin, alert and no distress   Cardiovascular: negative  Respiratory: negative  Psychiatric: mentation appears normal and affect normal/bright  Head: Normocephalic. No masses, lesions, tenderness or abnormalities  Abdomen: Abdomen soft, non-tender. BS normal. No masses, organomegaly    Data reviewed:  Results for orders placed or performed during the hospital encounter of 06/04/23 (from the past 24 hour(s))   EKG 12-lead, tracing only   Result Value Ref Range    Systolic Blood Pressure  mmHg    Diastolic Blood Pressure  mmHg    Ventricular Rate 88 BPM    Atrial Rate 88 BPM    DE Interval 210 ms    QRS Duration 90 ms     ms    QTc 430 ms    P Axis 167 degrees    R AXIS -28 degrees    T Axis 139 degrees    Interpretation ECG       Unusual P axis, possible ectopic atrial rhythm  T wave abnormality, consider lateral ischemia  Abnormal ECG  When compared with ECG of 24-FEB-2022 08:29,  Ectopic atrial rhythm has replaced Atrial fibrillation  Confirmed by - EMERGENCY ROOM, PHYSICIAN ([id number]),  MATI JARRETT (Arianna) on 6/5/2023 6:45:07 AM     CBC with platelets differential    Narrative    The following orders  were created for panel order CBC with platelets differential.  Procedure                               Abnormality         Status                     ---------                               -----------         ------                     CBC with platelets and d...[[id number]]  Abnormal            Final result               Manual Differential[[id number]]          Abnormal            Final result                 Please view results for these tests on the individual orders.   INR   Result Value Ref Range    INR 1.21 (H) 0.85 - 1.15   ABO/Rh type and screen    Narrative    The following orders were created for panel order ABO/Rh type and screen.  Procedure                               Abnormality         Status                     ---------                               -----------         ------                     Adult Type and Screen[[id number]]                            Final result                 Please view results for these tests on the individual orders.   Comprehensive metabolic panel   Result Value Ref Range    Sodium 133 (L) 136 - 145 mmol/L    Potassium 4.3 3.4 - 5.3 mmol/L    Chloride 100 98 - 107 mmol/L    Carbon Dioxide (CO2) 25 22 - 29 mmol/L    Anion Gap 8 7 - 15 mmol/L    Urea Nitrogen 15.1 8.0 - 23.0 mg/dL    Creatinine 0.91 0.67 - 1.17 mg/dL    Calcium 8.6 (L) 8.8 - 10.2 mg/dL    Glucose 124 (H) 70 - 99 mg/dL    Alkaline Phosphatase 142 (H) 40 - 129 U/L    AST 26 10 - 50 U/L    ALT 11 10 - 50 U/L    Protein Total 6.3 (L) 6.4 - 8.3 g/dL    Albumin 3.4 (L) 3.5 - 5.2 g/dL    Bilirubin Total 0.3 <=1.2 mg/dL    GFR Estimate 81 >60 mL/min/1.73m2   Lactic acid whole blood   Result Value Ref Range    Lactic Acid 1.2 0.7 - 2.0 mmol/L   CBC with platelets and differential   Result Value Ref Range    WBC Count 58.9 (HH) 4.0 - 11.0 10e3/uL    RBC Count 2.53 (L) 4.40 - 5.90 10e6/uL    Hemoglobin 7.6 (L) 13.3 - 17.7 g/dL    Hematocrit 22.5 (L) 40.0 - 53.0 %    MCV 89 78 - 100 fL    MCH 30.0 26.5 - 33.0 pg     "MCHC 33.8 31.5 - 36.5 g/dL    RDW 16.6 (H) 10.0 - 15.0 %    Platelet Count 27 (LL) 150 - 450 10e3/uL    Narrative    Previously reported component [ NRBCs ] is no longer reported.\"  Previously reported component [ NRBCs Absolute ] is no longer reported.\"   Adult Type and Screen   Result Value Ref Range    ABO/RH(D) O NEG     Antibody Screen Negative Negative    SPECIMEN EXPIRATION DATE [id number]    Manual Differential   Result Value Ref Range    % Neutrophils 0 %    % Lymphocytes 12 %    % Monocytes 7 %    % Eosinophils 0 %    % Basophils 0 %    % Blasts 81 %    Absolute Neutrophils 0.0 (LL) 1.6 - 8.3 10e3/uL    Absolute Lymphocytes 7.1 (H) 0.8 - 5.3 10e3/uL    Absolute Monocytes 4.1 (H) 0.0 - 1.3 10e3/uL    Absolute Eosinophils 0.0 0.0 - 0.7 10e3/uL    Absolute Basophils 0.0 0.0 - 0.2 10e3/uL    Absolute Blasts 47.7 (H) <=0.0 10e3/uL    RBC Morphology Confirmed RBC Indices     Platelet Assessment  Automated Count Confirmed. Platelet morphology is normal.     Automated Count Confirmed. Platelet morphology is normal.   Symptomatic Influenza A/B, RSV, & SARS-CoV2 PCR (COVID-19) Nasopharyngeal    Specimen: Nasopharyngeal; Swab   Result Value Ref Range    Influenza A PCR Negative Negative    Influenza B PCR Negative Negative    RSV PCR Negative Negative    SARS CoV2 PCR Negative Negative    Narrative    Testing was performed using the Xpert Xpress CoV2/Flu/RSV Assay on the [employer] GeneXpert Instrument. This test should be ordered for the detection of SARS-CoV-2, influenza, and RSV viruses in individuals who meet clinical and/or epidemiological criteria. Test performance is unknown in asymptomatic patients. This test is for in vitro diagnostic use under the FDA EUA for laboratories certified under CLIA to perform high or moderate complexity testing. This test has not been FDA cleared or approved. A negative result does not rule out the presence of PCR inhibitors in the specimen or target RNA in concentration below " the limit of detection for the assay. If only one viral target is positive but coinfection with multiple targets is suspected, the sample should be re-tested with another FDA cleared, approved, or authorized test, if coinfection would change clinical management. This test was validated by the [facility]. These laboratories are certified under the Clinical Laboratory Improvement Amendments of 1988 (CLIA-88) as qualified to perform high complexity laboratory testing.   Lactic acid whole blood   Result Value Ref Range    Lactic Acid 1.1 0.7 - 2.0 mmol/L   UA with Microscopic reflex to Culture    Specimen: Urine, Midstream   Result Value Ref Range    Color Urine Yellow Colorless, Straw, Light Yellow, Yellow    Appearance Urine Slightly Cloudy (A) Clear    Glucose Urine Negative Negative mg/dL    Bilirubin Urine Negative Negative    Ketones Urine Negative Negative mg/dL    Specific Gravity Urine 1.024 1.003 - 1.035    Blood Urine Negative Negative    pH Urine 7.5 (H) 5.0 - 7.0    Protein Albumin Urine 30 (A) Negative mg/dL    Urobilinogen Urine 4.0 (A) Normal, 2.0 mg/dL    Nitrite Urine Negative Negative    Leukocyte Esterase Urine Negative Negative    Mucus Urine Present (A) None Seen /LPF    Calcium Oxalate Crystals Urine Few (A) None Seen /HPF    RBC Urine 3 (H) <=2 /HPF    WBC Urine 1 <=5 /HPF    Squamous Epithelials Urine 1 <=1 /HPF    Narrative    Urine Culture not indicated   CT Head w/o Contrast    Narrative    EXAM: CT HEAD W/O CONTRAST  LOCATION: [facility]  DATE/TIME: 6/4/2023 8:41 PM CDT    INDICATION: confusion  COMPARISON: None.  TECHNIQUE: Routine CT Head without IV contrast. Multiplanar reformats. Dose reduction techniques were used.    FINDINGS:  INTRACRANIAL CONTENTS: No intracranial hemorrhage, extraaxial collection, or mass effect.  No CT evidence of acute infarct. There is scattered diffuse low attenuation within the periventricular and subcortical white  matter consistent with mild diffuse   small vessel ischemic disease. The ventricular system, basal cisterns and the cortical sulci are consistent with diffuse volume loss.     VISUALIZED ORBITS/SINUSES/MASTOIDS: No intraorbital abnormality. No paranasal sinus mucosal disease. No middle ear or mastoid effusion.    BONES/SOFT TISSUES: No acute abnormality.      Impression    IMPRESSION:  1.  No CT finding of a mass, hemorrhage or focal area consistent with diffuse small of acute ischemia.  2.  Diffuse age related changes.   XR Chest 2 Views    Narrative    EXAM: XR CHEST 2 VIEWS  LOCATION: [facility]  DATE/TIME: 6/4/2023 8:42 PM CDT    INDICATION: Confusion.  COMPARISON: 09/02/2019.      Impression    IMPRESSION: No focal airspace consolidation. No pleural effusion or pneumothorax. Skin folds overlie the mid right hemithorax.    Cardiomediastinal silhouette is normal. Atherosclerosis of the thoracic aorta.          Medical Decision Making     MANAGEMENT DISCUSSED with the following over the past 24 hours: medical team, patient and family   Tests REVIEWED in the past 24 hours:  - See lab/imaging results included in the data section of the note  SUPPLEMENTAL HISTORY, in addition to the patient's history, over the past 24 hours obtained from:   - family  Medical complexity over the past 24 hours:  - Goals of care conversation

## 2023-06-05 NOTE — PLAN OF CARE
PRIMARY DIAGNOSIS: NEUTROPENIA  OUTPATIENT/OBSERVATION GOALS TO BE MET BEFORE DISCHARGE:  1. ADLs back to baseline: Yes    2. Activity and level of assistance: Up with standby assistance.    3. Pain status: Pain free.    4. Return to near baseline physical activity: Yes     Discharge Planner Nurse   Safe discharge environment identified: No  Barriers to discharge: Yes       Entered by: Selene Subramanian RN 06/05/2023 10:44 AM     Please review provider order for any additional goals.   Nurse to notify provider when observation goals have been met and patient is ready for discharge.    Pending consults palliative and hem/onc.

## 2023-06-05 NOTE — PROGRESS NOTES
ROOM #223  Living Situation (if not independent, order SW consult): Independent; multi-level 4 bedroom home.       Activity level at baseline: Independent  Activity level on admit: SBA GB/W    Who will be transporting you at discharge: Family    Patient registered to observation; given Patient Bill of Rights; given the opportunity to ask questions about observation status and their plan of care.  Patient has been oriented to the observation room, bathroom and call light is in place.    Discussed discharge goals and expectations with patient/family.     Pt ambulated from ED card independent with GB/W slow and steady. Verified pt A&O x4. Denies Pain Denies SOB, vss on arrival. Pt and son expressing wanting to discharge ASAP, pts son stating that home hospice is already in place. Pt expressing not wanting to be in the hospital tonight, as we are doing no cares. Son agrees.       Pts daughter arrived to unit very upset that a conversation was had with the pt without her in the room about his wishes. Informed daughter that the pt was A&O x4; and able to make his ow Pt insistent on going home. X-cover paged for assistance.

## 2023-06-05 NOTE — PLAN OF CARE
Pt is alert to self with some confusion. Pt denies pain or discomfort. VSS. Pt is very hard of hearing. Pt is on Neutropenic precaution. Pt is on Cefepime for neutropenia febrile. Pt Temp was stable during the shift. Pt is on regular diet and has an order for Palliative care consults. Pt is sleeping in bed. Bed alarm in place and call light within reach. Will continue to monitor and assess pt.

## 2023-06-05 NOTE — ED NOTES
RECEIVING UNIT ED HANDOFF REVIEW    Above ED Nurse Handoff Report was reviewed: Yes  Reviewed by: Soraya Morales RN on June 5, 2023 at 5:30 PM       [facility]  ED Nurse Handoff Report    ED Chief complaint: Fever and Altered Mental Status  . ED Diagnosis:   Final diagnoses:   Febrile neutropenia (H)       Allergies:   Allergies   Allergen Reactions     Ciprofloxacin      Acute confusion     Sulfamethizole Unknown     Confusion    3/13/2023 Pt says this is not an allergy       Code Status: Full Code    Activity level - Baseline/Home:  independent.  Activity Level - Current:   standby.   Lift room needed: No.   Bariatric: No   Needed: No   Isolation: neutropenic  Infection: Not Applicable.     Respiratory status: Room air    Vital Signs (within 30 minutes):   Vitals:    06/04/23 1831 06/04/23 2000 06/04/23 2130   BP: 114/71  132/63   Pulse: 92  81   Resp: 18  11   Temp: 99.9  F (37.7  C) 99.8  F (37.7  C)    TempSrc: Temporal Temporal    SpO2: 97% 96% 96%       Cardiac Rhythm:  ,      Pain level:    Patient confused: Yes.   Patient Falls Risk: arm band in place, patient and family education and activity supervised.   Elimination Status: Has voided     Patient Report - Initial Complaint: fever  .   Focused Assessment: febrile neutropenia    Abnormal Results:   Labs Ordered and Resulted from Time of ED Arrival to Time of ED Departure   INR - Abnormal       Result Value    INR 1.21 (*)    COMPREHENSIVE METABOLIC PANEL - Abnormal    Sodium 133 (*)     Potassium 4.3      Chloride 100      Carbon Dioxide (CO2) 25      Anion Gap 8      Urea Nitrogen 15.1      Creatinine 0.91      Calcium 8.6 (*)     Glucose 124 (*)     Alkaline Phosphatase 142 (*)     AST 26      ALT 11      Protein Total 6.3 (*)     Albumin 3.4 (*)     Bilirubin Total 0.3      GFR Estimate 81     ROUTINE UA WITH MICROSCOPIC REFLEX TO CULTURE - Abnormal    Color Urine Yellow      Appearance Urine Slightly Cloudy (*)      Glucose Urine Negative      Bilirubin Urine Negative      Ketones Urine Negative      Specific Gravity Urine 1.024      Blood Urine Negative      pH Urine 7.5 (*)     Protein Albumin Urine 30 (*)     Urobilinogen Urine 4.0 (*)     Nitrite Urine Negative      Leukocyte Esterase Urine Negative      Mucus Urine Present (*)     Calcium Oxalate Crystals Urine Few (*)     RBC Urine 3 (*)     WBC Urine 1      Squamous Epithelials Urine 1     CBC WITH PLATELETS AND DIFFERENTIAL - Abnormal    WBC Count 58.9 (*)     RBC Count 2.53 (*)     Hemoglobin 7.6 (*)     Hematocrit 22.5 (*)     MCV 89      MCH 30.0      MCHC 33.8      RDW 16.6 (*)     Platelet Count 27 (*)    DIFFERENTIAL - Abnormal    % Neutrophils 0      % Lymphocytes 12      % Monocytes 7      % Eosinophils 0      % Basophils 0      % Blasts 81      Absolute Neutrophils 0.0 (*)     Absolute Lymphocytes 7.1 (*)     Absolute Monocytes 4.1 (*)     Absolute Eosinophils 0.0      Absolute Basophils 0.0      Absolute Blasts 47.7 (*)     RBC Morphology Confirmed RBC Indices      Platelet Assessment        Value: Automated Count Confirmed. Platelet morphology is normal.   LACTIC ACID WHOLE BLOOD - Normal    Lactic Acid 1.2     INFLUENZA A/B, RSV, & SARS-COV2 PCR - Normal    Influenza A PCR Negative      Influenza B PCR Negative      RSV PCR Negative      SARS CoV2 PCR Negative     LACTIC ACID WHOLE BLOOD - Normal    Lactic Acid 1.1     TYPE AND SCREEN, ADULT    ABO/RH(D) O NEG      Antibody Screen Negative      SPECIMEN EXPIRATION DATE [id number]     BLOOD CULTURE   BLOOD CULTURE   ABO/RH TYPE AND SCREEN        XR Chest 2 Views   Final Result   IMPRESSION: No focal airspace consolidation. No pleural effusion or pneumothorax. Skin folds overlie the mid right hemithorax.      Cardiomediastinal silhouette is normal. Atherosclerosis of the thoracic aorta.      CT Head w/o Contrast   Final Result   IMPRESSION:   1.  No CT finding of a mass, hemorrhage or focal area consistent  with diffuse small of acute ischemia.   2.  Diffuse age related changes.          Treatments provided: iv fluids, abs  Family Comments:   OBS brochure/video discussed/provided to patient:  Yes  ED Medications:   Medications   ceFEPIme (MAXIPIME) 2 g vial to attach to  ml bag for ADULTS or 50 ml bag for PEDS (2 g Intravenous $New Bag 6/4/23 5580)   0.9% sodium chloride BOLUS (0 mLs Intravenous Stopped 6/4/23 2015)       Drips infusing:  Yes  For the majority of the shift this patient was Green.   Interventions performed were .    Sepsis treatment initiated: No    Cares/treatment/interventions/medications to be completed following ED care:     ED Nurse Name: Melody Oliva RN  9:57 PM

## 2023-06-05 NOTE — H&P
[facility]    History and Physical - Hospitalist Service       Date of Admission:  6/4/2023  Primary Care Physician   Eagle Negrete  CONSULTANTS: Dr Morrissey: Hem/Onc  Palliative care  ON waiting list with [facility]    Assessment & Plan      Zbigniew Mendoza is a 89 year old male admitted on 6/4/2023. He presented with neutropenic fever as well as acute confusion.  Patient is a psychiatrist.      1. Neutropenic fever: Patient presents with a temperature of 100.5 at home.  Patient has severe AML and his white cells are really not useful at this point.  His fever could be due to to his cancer itself.  Chest x-ray is negative and his UA is normal.  Blood cultures are pending.  Patient was started on cefepime.  Patient has no symptoms of cough, dysuria, sputum production, or any other infection at this point.  I suspect this is all due to his progression of his AML  2. Pancytopenia: Patient has a hemoglobin of 7.6 and a platelet level of 27.  Has a history of getting transfused platelets as well as packed red blood cells.  He has an appointment with oncology on Wednesday 6/7.  3. AML/hyperviscosity syndrome: Patient's white blood cell count is up to 59 from being 39 just a few days ago.  I suspect that the patient is having hyperviscosity syndrome from this and he is having progression of his AML.  He is not a candidate for chemotherapy.  He is followed by Dr. Morrissey with oncology.  I had a long discussion about advance care planning, hospice, and being at home.  Patient's family would like to be seen by palliative care tomorrow to make sure that everything can be set up at home for the future.  Patient is on a waiting list with [facility] as they allow ongoing transfusions.  I had a long discussion about my recommendation of really stopping transfusions unless they are giving him any benefit.  Currently patient still is feeling stronger when he does get his packed cells.  I did express  my concerns that the patient's prognosis is very poor and that he needs to be prepared for his journey going forward.  I explained to the patient and his daughter that he can expect to get weaker over time, become more frail, have neurological symptoms due to hyperviscosity, and be more fatigued.  I highly recommend the patient getting signed up with hospice sooner than later.  4. Encephalopathy/infusion: Patient presents with being confused per the patient's daughter.  Apparently he was confused about which door open for the car and had issues with his air conditioner.  He is not at his baseline.  I suspect that hyperviscosity syndrome is playing a role, his AML, and less likely an infection.  Patient also looks like he has been on Neurontin in the past not sure if he currently is taking this but that could play a role as well.  I explained to the patient's daughter that the patient's confusion likely is to get worse over time and we may not have a fix for this.  5. BPH/hematuria: Patient is off of Eliquis for which she was on for atrial fibrillation.  He does still take Flomax.  6. Atrial fibrillation: Patient currently sounds like he is in sinus and his telemetry is showing that as well.  The patient is on Toprol.  He is not on Eliquis due to his low platelet level and history of hematuria.  He is completely appropriate.  Would not recommend any anticoagulation going forward.  7. Sleep deprivation: Patient really has not been sleeping well at home for the last 5 days and he feels that this could be a contributing factor into his confusion  8. Macular degeneration: This puts patient at high risk for falling.  Used to get eye injections but he is no longer getting.  9. Gluten-free diet:   Requested by the patient   10. Advance care planning: Had a very long conversation with the patient and his daughter talking about his goals of care, living situation, fact that he is still driving, the need for support from  family at home as well as getting hospice involved sooner than later.  Patient made it clear that were not doing CPR or intubation.  Patient however still is interested in getting transfusions as long as his packed red blood cells are making him feel stronger.  We discussed letting the patient's daughter stay with him at home so he is not home alone.  Discussed driving, patient was told that he is not allowed to drive any longer by me and his daughter is in agreement.  I also recommend not to rehospitalize the patient try and keep him at home.  I would focus on his comfort more than anything at this point.  Patient still however would like transfusions which I recommended against.      4Ms:  Polypharmacy: Medications reviewed, awaiting pharmacy to reconcile all of them.  Given his confusion would recommend getting off of his Neurontin if he still on it.  Need to be careful with hypotension with his Toprol as well but he needs this for his atrial fibrillation.  Mentation:  SLUMS N/A,  BIMS N/A,  Capacity: Full the patient is a little confused he does still have capacity to make his own medical decisions.  He has named his daughter Nisreen the primary decision maker if he cannot speak for himself  Social support: Patient has been  4 times and currently is single living alone in a 4 bedroom house.  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  He has a stair lift to be able to go up to his bedroom.  He does have 2 walkers.  Patient has 6 children including 4 sons and 2 daughters.  The patient's twins are physicians (son is going into GI and daughter is in palliative/hospice care).  Patient has been still driving.  I had a long discussion about telling him that he is not able to do this any longer as he is putting himself and the general public at risk.  This was discussed with the patient's daughter  bedside.  Patient has been getting signed up with home care which is supposed to start next week.  Most certainly needs to be signed up for hospice.  Mobility: Starting to use a walker as he is getting weaker has a 4 wheeled walker  What is importmant: Patient's goal is wanting to finish his last paper to get published.  Recently filled out a new healthcare directive naming his daughter Nisreen the main power of  but his son out of state also is listed.  His goal is to be home, wants to die at home and not be in a nursing facility.  Patient now is open to having his daughter stay with him at his home which we discussed today.    Discussed plan of care with Dr Campbell in the ER, discussed at length with the patient's eldest daughter Nisreen bedside  Diet:  Gluten-free  DVT Prophylaxis: Patient is at high risk for DVT given his AML however given his thrombocytopenia and history of hematuria he is not a candidate for anticoagulation.  Also given the fact that he is so frail our focus more is quality life at this point.   His biggest risk is hyperviscosity syndrome.  Osorio Catheter: Not present  Lines: None     Cardiac Monitoring: None    RESTRAINTS: None indicated  Code Status:  DNR/I, patient is getting signed up with hospice as he is on the waiting list but still wants to continue transfusions for now.  I highly recommend a comfort approach with do not rehospitalize orders.      95 MINUTES SPENT BY ME on the date of service doing chart review, history, exam, documentation & further activities per the note.  Lots of time spent in advance care planning and discussions with the patient's daughter at bedside.  Also reviewed oncology notes.        This document was created using voice recognition technology.  Please excuse any typographical errors that may have occurred.  Please call with any questions.         Clinically Significant Risk Factors Present on Admission              # Hypoalbuminemia: Lowest  albumin = 3.4 g/dL at 6/4/2023  7:27 PM, will monitor as appropriate  # Coagulation Defect: INR = 1.21 (Ref range: 0.85 - 1.15) and/or PTT = N/A, will monitor for bleeding  # Thrombocytopenia: Lowest platelets = 27 in last 2 days, will monitor for bleeding   # Hypertension: Noted on problem list               Disposition Plan   Patient will be in admitted to observation overnight.  Blood cultures are pending.  I doubt infection and I suspect he will be able to go home tomorrow.  Would like him seen by palliative care prior to this to make sure that the patient has all resources set up at home for his journey going forward.     Expected Discharge Date: 06/05/2023                  Hema Martínez MD  Hospitalist Service  [facility]  Securely message with [employer] (more info)  Text page via [facility] Paging/Directory     Length of stay: Anticipate less than 2 midnight hospitalization for evaluation and treatment of confusion.          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  Patient really has had increased confusion and was more abrupt so the daughter brought the patient into the hospital.  Patient's temperature was 100.5 at home.  His confusion was worse.  Daughter states that he opened up the wrong door getting into the backseat of his car rather than the 's passenger seat.  He also could not work the air conditioner.  Patient continues to drive.  Patient has history of writing papers and is currently working on 1 now.  Patient denies any current cough, sputum  production, dysuria, or new rashes.  His chest x-ray was clean in the ER as well as a head CT.  Unfortunately for him his white count is up to 59 and it had been 39 just 3 days ago.  Patient is a talker.  We had discussions about his psychiatry, his papers, research reviews, and his family.  Patient currently is living alone but does have the support of his family.    With regard to his AML, the patient is dying.  He is not a candidate for any chemotherapy or transplantation.  He has been getting transfusions for his anemia as well as thrombocytopenia.  He has been in contact with [facility] hospice and is on their waiting list as they will still allow him to have transfusions.  I tried having a discussion with him and his daughter about stopping transfusions but while they are still helping him with his weakness he wants to continue them.  Patient states that he has not been sleeping and that insomnia and sleep deprivation may be contributing factor to his confusion as well.    Patient was started on cefepime, his UA was negative, and blood cultures are pending.      Past Medical History    Past Medical History:   Diagnosis Date     AML (acute myelogenous leukemia) (H)      Arrhythmia      Atrial fibrillation      Calculus of kidney      Mitral prolapse     mild, with mild MR     Prostate infection      STD (sexually transmitted disease)        Past Surgical History   Past Surgical History:   Procedure Laterality Date     ARTHROSCOPY SHOULDER ROTATOR CUFF REPAIR Right 1996     BACK SURGERY       COLONOSCOPY N/A 12/01/2022    Procedure: COLONOSCOPY;  Surgeon: Karthik Mcmanus MD;  Location: [facility] OR     CYSTOSCOPY       CYSTOSCOPY, TRANSURETHRAL RESECTION (TUR) PROSTATE, COMBINED  04/1993    S/P TURP     ESOPHAGOSCOPY, GASTROSCOPY, DUODENOSCOPY (EGD), COMBINED N/A 12/01/2022    Procedure: ESOPHAGOGASTRODUODENOSCOPY;  Surgeon: Karthik Mcmanus MD;  Location: [facility] OR     PROSTATE SURGERY         Prior  to Admission Medications Reconciliation has to be done.  Prior to Admission Medications   Prescriptions Last Dose Informant Patient Reported? Taking?   Multiple Vitamins-Minerals (MULTIVITAL) TABS   Yes No   Sig: Take  by mouth daily.   acyclovir (ZOVIRAX) 400 MG tablet   No No   Sig: Take 1 tablet (400 mg) by mouth 2 times daily   cefpodoxime (VANTIN) 200 MG tablet   No No   Sig: Take 1 tablet (200 mg) by mouth 2 times daily   fluconazole (DIFLUCAN) 200 MG tablet   No No   Sig: Take 1 tablet (200 mg) by mouth daily   fluticasone (FLONASE) 50 MCG/ACT nasal spray   No No   Sig: INSTILL 1 SPRAY INTO BOTH NOSTRILS DAILY   gabapentin (NEURONTIN) 100 MG capsule   No No   Sig: Take 2 capsules (200 mg) by mouth nightly as needed for other (insomnia)   methylcellulose (CITRUCEL) 500 MG TABS tablet   Yes No   Sig: Take 500 mg by mouth daily   metoprolol succinate ER (TOPROL XL) 25 MG 24 hr tablet   No No   Sig: Take 1 tablet (25 mg) by mouth daily   polyethylene glycol (MIRALAX) 17 GM/Dose powder   No No   Sig: Take 17 g by mouth daily   tamsulosin (FLOMAX) 0.4 MG capsule   No No   Sig: Take 2 capsules (0.8 mg) by mouth daily   vitamin B complex with vitamin C (STRESS TAB) tablet   Yes No   Sig: Take 1 tablet by mouth daily      Facility-Administered Medications: None     REVIEW OF SYSTEMS:  A comprehensive review of systems was negative except for items noted in the HPI.  Patient currently denies any fever, chills, sweats, nausea, vomiting, diarrhea, shortness of breath, or chest pain.  Denies any current headache, but does admit that he is likely confused.  Patient has poor eyesight and is very hard of hearing.      Physical Exam   Vital Signs: Temp: 99.2  F (37.3  C) Temp src: Oral BP: 125/57 Pulse: 71   Resp: 16 SpO2: 96 % O2 Device: None (Room air)    Weight: 0 lbs 0 oz    General appearance: Patient is alert and oriented x3 though is confused, is able to have a normal conversation about his medical practice, no  apparent distress, pleasant and conversing normally, speaking in full sentences, appears elderly and frail, is thin and cachectic  HEENT:  Atraumatic/normal cephalic, EOMI, Pupils equally round and reactive to light, sclera non-icteric, Mucous membranes are moist  NECK:  supple without bruit or lymphadenopathy, thin  RESPIRATORY: Clear to auscultation bilateral, good air movement  CARDIOVASCULAR: Regular rate and rhythm, normal S1/S2, no murmurs, rubs, or gallops present, peripheral pulses intact  GASTROINTESTINAL: Thin, non-distended, non-tender, soft, bowel sounds present throughout, no mass or hepatosplenomegaly  MUSCULOSKELETAL: without deformity, normal range of motion  SKIN:  Warm, dry, no rashes, no mottling  NEUROLOGIC:  Cranial nerves II-XII intact, without any focal deficits, strength 4/5 throughout  EXTREMITIES:  Moves all extremities, no clubbing, cyanosis, nor edema  :  Osorio not present         Data     I have personally reviewed the following data over the past 24 hrs:    58.9 (HH) was 39 on 6/1/2023  \   7.6 (L)   /was 8.5 on 6/1/2023 27 (LL)     133 (L) 100 15.1 /  124 (H)   4.3 25 0.91 \       ALT: 11 AST: 26 AP: 142 (H) TBILI: 0.3   ALB: 3.4 (L) TOT PROTEIN: 6.3 (L) LIPASE: N/A       Procal: N/A CRP: N/A Lactic Acid: 1.1       INR:  1.21 (H) PTT:  N/A   D-dimer:  N/A Fibrinogen:  N/A       Imaging:   Results for orders placed or performed during the hospital encounter of 06/04/23   XR Chest 2 Views    Narrative    EXAM: XR CHEST 2 VIEWS  LOCATION: [facility]  DATE/TIME: 6/4/2023 8:42 PM CDT    INDICATION: Confusion.  COMPARISON: 09/02/2019.      Impression    IMPRESSION: No focal airspace consolidation. No pleural effusion or pneumothorax. Skin folds overlie the mid right hemithorax.    Cardiomediastinal silhouette is normal. Atherosclerosis of the thoracic aorta.   CT Head w/o Contrast    Narrative    EXAM: CT HEAD W/O CONTRAST  LOCATION: [facility]  HOSPITAL  DATE/TIME: 6/4/2023 8:41 PM CDT    INDICATION: confusion  COMPARISON: None.  TECHNIQUE: Routine CT Head without IV contrast. Multiplanar reformats. Dose reduction techniques were used.    FINDINGS:  INTRACRANIAL CONTENTS: No intracranial hemorrhage, extraaxial collection, or mass effect.  No CT evidence of acute infarct. There is scattered diffuse low attenuation within the periventricular and subcortical white matter consistent with mild diffuse   small vessel ischemic disease. The ventricular system, basal cisterns and the cortical sulci are consistent with diffuse volume loss.     VISUALIZED ORBITS/SINUSES/MASTOIDS: No intraorbital abnormality. No paranasal sinus mucosal disease. No middle ear or mastoid effusion.    BONES/SOFT TISSUES: No acute abnormality.      Impression    IMPRESSION:  1.  No CT finding of a mass, hemorrhage or focal area consistent with diffuse small of acute ischemia.  2.  Diffuse age related changes.     Procedures: None  I have personally have reviewed the patient's most up to date radiologic exams, labs, orders, and medications myself

## 2023-06-05 NOTE — CONSULTS
[facility]    Hematology/Oncology Consult Note      Date of Admission:  6/4/2023  Date of Consultation:  06/05/23  Reason for Consultation: AML      ASSESSMENT:  AML in an elderly male with rapid worsening of peripheral blasts.  I had a umm conversation with Dr. Mendoza and his daughter Nisreen by phone.  Unfortunately his prognosis is grim given the rapidly rising blast count.  I told them that I expect that he will have clinical deterioration over the coming weeks and I expect survival will be short.  He fortunately is not having any distressing symptoms, no issues with bone pain or constitutional symptoms.  He has had more confusion per his daughter and will likely develop leukostasis as the blast count rises.  We discussed the possibility of Hydrea to control the blast count but it would not likely improve symptoms and could add toxicity which he wants to avoid.  They would like to take him home with hospice which I think is very appropriate although cautioned his care needs will increase as symptoms worsen.  His children plan to care for him at home, 2 sons are now in town to help and another daughter is trying to come soon.    Neutropenic fever.  Low grade without rigors.  Urine and blood cultures are negative to date.  If remain negative tomorrow I would favor discontinuing antibiotics particularly given current goals of care.    Mental status change.  He was oriented and lucid at the time of my visit but has had intermittent confusion per his daughter.  Doubt leukostasis causing CNS symptoms at this point but will likely worsen as his blast count rises.    PLAN:   Have requested social work consult to assist with arranging home hospice and any other needs at discharge.  No leukemia directed therapy planned.      HISTORY OF PRESENT ILLNESS: Zbigniew Mendoza is a 89 year old male seen at the request of Dr. Noland for evaluation of AML admitted with neutropenia, fever and altered mental  status.  He initially presented with complaints of increased weakness earlier this year and was noted to have pancytopenia.  He was seen by Dr. 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    He discussed treatment options with Dr. Morrissey and decided against pursuing anticancer therapy and favored a comfort care approach.  He has been receiving transfusion support.  He and his family have been looking into enrolling in hospice and are in the process of enrolling with [facility].    He states that he presented to the ED with concerns about increase in generalized weakness and a low grade temp.  I spoke to his daughter who states that he has had clear mental status change recently with confusion and forgetfulness.  Denies any chills, no bleeding, no new focal pain.    On presentation to the ED labs were remarkable for significant leukocytosis with WBC 58.9K and zero neutrophils.  Absolute blast count was 47.7K.  WBC 2 weeks ago was 10.8K.    Cultures were obtained and he was started on empiric antibiotics (cefepime).  Denies cough or dyspnea, some mild diarrhea but no abdominal pain.  No nausea.      REVIEW OF SYSTEMS:   A 14 point ROS was reviewed with pertinent positives and negatives in the HPI.      MEDICATIONS:  Current Facility-Administered Medications   Medication     ceFEPIme (MAXIPIME) 2 g vial to attach to  ml bag for ADULTS or 50 ml bag for PEDS     melatonin tablet 1 mg     metoprolol succinate ER (TOPROL XL) 24 hr tablet 25 mg     ondansetron (ZOFRAN ODT) ODT tab 4 mg    Or     ondansetron (ZOFRAN) injection 4 mg     polyethylene glycol (MIRALAX) Packet 17 g     senna-docusate (SENOKOT-S/PERICOLACE) 8.6-50 MG per tablet 1 tablet    Or     senna-docusate (SENOKOT-S/PERICOLACE) 8.6-50 MG per tablet 2 tablet     tamsulosin (FLOMAX) capsule 0.8 mg     Current Outpatient  Medications   Medication     gabapentin (NEURONTIN) 100 MG capsule     Multiple Vitamins-Minerals (MULTIVITAL) TABS     omeprazole (PRILOSEC OTC) 20 MG EC tablet     polyethylene glycol (MIRALAX) 17 g packet     acyclovir (ZOVIRAX) 400 MG tablet     cefpodoxime (VANTIN) 200 MG tablet     fluconazole (DIFLUCAN) 200 MG tablet     fluticasone (FLONASE) 50 MCG/ACT nasal spray     metoprolol succinate ER (TOPROL XL) 25 MG 24 hr tablet     tamsulosin (FLOMAX) 0.4 MG capsule         ALLERGIES:  Allergies   Allergen Reactions     Ciprofloxacin      Acute confusion         PAST MEDICAL HISTORY:  Past Medical History:   Diagnosis Date     AML (acute myelogenous leukemia) (H)      Arrhythmia      Atrial fibrillation      Calculus of kidney      Mitral prolapse     mild, with mild MR     Prostate infection      STD (sexually transmitted disease)          PAST SURGICAL HISTORY:  Past Surgical History:   Procedure Laterality Date     ARTHROSCOPY SHOULDER ROTATOR CUFF REPAIR Right 1996     BACK SURGERY       COLONOSCOPY N/A 12/01/2022    Procedure: COLONOSCOPY;  Surgeon: Karthik Mcmanus MD;  Location: [facility] OR     CYSTOSCOPY       CYSTOSCOPY, TRANSURETHRAL RESECTION (TUR) PROSTATE, COMBINED  04/1993    S/P TURP     ESOPHAGOSCOPY, GASTROSCOPY, DUODENOSCOPY (EGD), COMBINED N/A 12/01/2022    Procedure: ESOPHAGOGASTRODUODENOSCOPY;  Surgeon: Karthik Mcmanus MD;  Location: [facility] OR     PROSTATE SURGERY           SOCIAL HISTORY:  Social History     Socioeconomic History     Marital status:      Spouse name: Not on file     Number of children: Not on file     Years of education: Not on file     Highest education level: Not on file   Occupational History     Not on file   Tobacco Use     Smoking status: Never     Smokeless tobacco: Never   Vaping Use     Vaping status: Never Used   Substance and Sexual Activity     Alcohol use: Yes     Comment: very rare if at all     Drug use: No     Sexual activity: Not  "Currently   Other Topics Concern     Parent/sibling w/ CABG, MI or angioplasty before 65F 55M? Not Asked   Social History Narrative     Not on file     Social Determinants of Health     Financial Resource Strain: Not on file   Food Insecurity: Not on file   Transportation Needs: Not on file   Physical Activity: Not on file   Stress: Not on file   Social Connections: Not on file   Intimate Partner Violence: Not At Risk (4/13/2023)    Humiliation, Afraid, Rape, and Kick questionnaire      Fear of Current or Ex-Partner: No      Emotionally Abused: No      Physically Abused: No      Sexually Abused: No   Housing Stability: Not on file         FAMILY HISTORY:  Family History   Problem Relation Age of Onset     Coronary Artery Disease Father      No Known Problems Mother          PHYSICAL EXAM:  Vital signs:  Temp: 98.8  F (37.1  C) Temp src: Oral BP: 130/61 Pulse: 62   Resp: 18 SpO2: 99 % O2 Device: None (Room air)        Estimated body mass index is 18.67 kg/m  as calculated from the following:    Height as of 6/1/23: 1.778 m (5' 10\").    Weight as of 6/1/23: 59 kg (130 lb 1.6 oz).    GENERAL/CONSTITUTIONAL: Pleasant elderly male, no acute distress.  HEENT: Gaze conjugate, pupils equal and round. No scleral icterus.  LYMPH: No cervical, supraclavicular, axillary or inguinal adenopathy.   RESPIRATORY: Lungs clear to auscultation bilaterally.    CARDIOVASCULAR: Regular rate and rhythm.    GASTROINTESTINAL: No organomegaly, masses, or tenderness.  No guarding.  No distention.  MUSCULOSKELETAL: No clubbing, cyanosis or edema.  INTEGUMENTARY: No rashes.      LABS:   Latest Reference Range & Units 06/04/23 19:27   Sodium 136 - 145 mmol/L 133 (L)   Potassium 3.4 - 5.3 mmol/L 4.3   Chloride 98 - 107 mmol/L 100   Carbon Dioxide (CO2) 22 - 29 mmol/L 25   Urea Nitrogen 8.0 - 23.0 mg/dL 15.1   Creatinine 0.67 - 1.17 mg/dL 0.91   GFR Estimate >60 mL/min/1.73m2 81   Calcium 8.8 - 10.2 mg/dL 8.6 (L)   Anion Gap 7 - 15 mmol/L 8 "   Albumin 3.5 - 5.2 g/dL 3.4 (L)   Protein Total 6.4 - 8.3 g/dL 6.3 (L)   Alkaline Phosphatase 40 - 129 U/L 142 (H)   ALT 10 - 50 U/L 11   AST 10 - 50 U/L 26   Bilirubin Total <=1.2 mg/dL 0.3   Glucose 70 - 99 mg/dL 124 (H)   Lactic Acid 0.7 - 2.0 mmol/L 1.2   WBC 4.0 - 11.0 10e3/uL 58.9 (HH)   Hemoglobin 13.3 - 17.7 g/dL 7.6 (L)   Hematocrit 40.0 - 53.0 % 22.5 (L)   Platelet Count 150 - 450 10e3/uL 27 (LL)   RBC Count 4.40 - 5.90 10e6/uL 2.53 (L)   MCV 78 - 100 fL 89   MCH 26.5 - 33.0 pg 30.0   MCHC 31.5 - 36.5 g/dL 33.8   RDW 10.0 - 15.0 % 16.6 (H)   % Neutrophils % 0   % Lymphocytes % 12   % Monocytes % 7   % Eosinophils % 0   % Basophils % 0   % Blasts % 81   Absolute Basophils 0.0 - 0.2 10e3/uL 0.0   Absolute Neutrophil 1.6 - 8.3 10e3/uL 0.0 (LL)   Absolute Lymphocytes 0.8 - 5.3 10e3/uL 7.1 (H)   Absolute Monocytes 0.0 - 1.3 10e3/uL 4.1 (H)   Absolute Eosinophils 0.0 - 0.7 10e3/uL 0.0   Absolute Blasts <=0.0 10e3/uL 47.7 (H)   (HH): Data is critically high  (LL): Data is critically low  (L): Data is abnormally low  (H): Data is abnormally high          Thank you for the opportunity to participate in this patient's care.  Please call with any questions.    Panchito Lugo MD  Hematology/Oncology  [facility]

## 2023-06-05 NOTE — PROGRESS NOTES
Writer received a call from Herrera thomas RN caring for Chi in the ER. She reports that a consult has been placed for a hematology/oncology consult. Writer reviewed this with Chi's daughter Nisreen.    Gavi Lacy RN on 6/5/2023 at 11:22 AM

## 2023-06-05 NOTE — PROGRESS NOTES
ONCOLOGY    Brief note, full consult to follow.  89 year old gentleman with AML diagnosed in March now with marked increase in circulating blasts, cognitive change.  Discussed with patient and talked to his daughter Nisreen by phone.  Given the rapidity of change prognosis is very poor and I expect he will deteriorate rapidly in the coming weeks.  He lives alone and they had been planning to enroll in hospice.  Children plan to provide care in his home and given likely issues with hyperviscosity I would not recommend further blood transfusions.  Will ask social work to assist to ensure we have hospice in place at discharge.    Panchito Lugo MD on 6/5/2023 at 2:50 PM

## 2023-06-05 NOTE — PLAN OF CARE
PRIMARY DIAGNOSIS: NEUTROPENIA    OUTPATIENT/OBSERVATION GOALS TO BE MET BEFORE DISCHARGE:    1. ADLs back to baseline: Yes    2. Activity and level of assistance: Up with standby assistance.    3. Pain status: Pain free.    4. Return to near baseline physical activity: Yes     Discharge Planner Nurse   Safe discharge environment identified: No  Barriers to discharge: Yes       Entered by: Selene Subramanian RN 06/05/2023 5:36 PM     Please review provider order for any additional goals.   Nurse to notify provider when observation goals have been met and patient is ready for discharge.    Pending  consult for assist with hospice services.     [facility]    ED Boarding Nurse Handoff Addendum Report:    Date/time: 6/5/2023, 5:36 PM    Activity Level: standby    Fall Risk: No    Active Infusions: None    Current Meds Due: None     Current care needs: Social work     Oxygen requirements (liters/min and/or FiO2): None     Respiratory status: Room air    Vital signs (within last 30 minutes):    Vitals:    06/04/23 2206 06/05/23 0800 06/05/23 1324 06/05/23 1604   BP: 125/57 113/70 130/61 130/57   BP Location:    Right arm   Pulse: 71 62 62 68   Resp: 16 18 18 18   Temp: 99.2  F (37.3  C) 98.5  F (36.9  C) 98.8  F (37.1  C) 98.3  F (36.8  C)   TempSrc: Oral Oral Oral Oral   SpO2: 96% 97% 99% 94%       Focused assessment within last 30 minutes:    SBA, forgetful, hem/onc following. Plan for hospice services. Afebrile this shift. Neutropenic precautions maintained    ED Boarding Nurse name: Selene Subramanian RN

## 2023-06-05 NOTE — PROGRESS NOTES
Writer received a call from Chi's daughter Nisreen stating that Chi is currently in the Emergency Room at [facility]. He went there on 06/04/23 due to increased confusion and low grade temperature. 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 Morrissey's opinion.    Writer advised Nisreen that Dr. Morrissey currently has not assessed Chi and is unable to make that diagnosis. Nisreen is upset about this, but verbalized understanding. Writer has a call out to the Emergency Department to see if a hematology/oncology consult could be placed. Awaiting return call from the Emergency Room RN caring for Chi.     Gavi Lacy, RN on 6/5/2023 at 9:35 AM

## 2023-06-06 NOTE — PROGRESS NOTES
Writer received a call from Rafaela at [facility] stating that they did not receive a referral for Chi. She thinks she would be able to accept him into their program.     Gavi Lacy RN on 6/6/2023 at 9:24 AM

## 2023-06-06 NOTE — PROGRESS NOTES
Chi was recently hospitalized and the decision was made to move forward with hospice care. Writer contacted Chi's daughter Nisreen Mendoza to make sure a referral was placed while he was inpatient yesterday. Nisreen thinks that a referral was sent to [facility], but she is unsure. She is requesting that writer reach out to Efrem Mendoza to assess further. Writer tried contacting Efrem. Will await a call back. Writer also left a message with Rafaela from [facility].     Nisreen is also requesting that writer reach out to Chi about his plan of care. He had some confusion in the hospital and wanted clarification on if he can continue blood transfusions.     Gavi Lacy RN on 6/6/2023 at 8:22 AM

## 2023-06-06 NOTE — PROGRESS NOTES
Writer received a call from Nisreen confirming that [facility] is able to come to their home to initial hospice today at 1:00 pm. Nisreen is requesting that they provide a hospital bed and antinausea medications. Writer updated Dr. Morrissey. We wish Chi a peaceful transition.     Gavi Lacy RN on 6/6/2023 at 11:52 AM

## 2023-06-06 NOTE — PROGRESS NOTES
Writer received a call from daughter Nisreen Reji stating that Chi just fainted getting into his lift chair. EMS was called and they assisted in getting him into bed. Chi and family refused transport. EMS mentioned to Chi's family that hospice should be onboarded STAT. Nisreen would like to move forward with [facility].     Emergency referral faxed to their team at [phone]. Receipt confirmed with [employer]. Writer also called [facility] intake and spoke to Talisha. She is going to call Chi's daughter Nisreen to set up a consult/admission today.    Gavi Lacy RN on 6/6/2023 at 9:41 AM

## 2023-06-06 NOTE — PROGRESS NOTES
[facility]    Background: Transitional Care Management program identified per system criteria and reviewed by [facility] Resource [place] team for possible outreach.    Assessment: Upon chart review, [facility] Team member will not proceed with patient outreach related to this episode of Transitional Care Management program due to reason below:    RN outreach,   Patient has been discharged with Hospice Care    Plan: Transitional Care Management episode addressed appropriately per reason noted above.        TRENT Martinez  [facility]    *Connected Care Resource Team does NOT follow patient ongoing. Referrals are identified based on internal discharge reports and the outreach is to ensure patient has an understanding of their discharge instructions.

## 2023-06-06 NOTE — DISCHARGE SUMMARY
[facility]  Hospitalist Discharge Summary      Date of Admission:  6/4/2023  Date of Discharge:  6/5/2023  Discharging Provider: TY Rea PA-C  Discharge Service: Hospitalist Service    Discharge Diagnoses   Neutropenic fever  Progressing AML  Pancytopenia      Follow-ups Needed After Discharge   Follow-up Appointments     Follow-up and recommended labs and tests       Follow up with hospice care as they should be calling you in 1 to 2 days.             Unresulted Labs Ordered in the Past 30 Days of this Admission     Date and Time Order Name Status Description    6/4/2023  6:55 PM Blood Culture Peripheral Blood Preliminary NGTD    6/4/2023  6:55 PM Blood Culture Peripheral Blood Preliminary NGTD      These results will be followed up by PCP or hospitalist if positive cultures    Discharge Disposition   Discharged to home  Condition at discharge: Stable    Hospital Course   Zbigniew Mendoza is a 89 year old male admitted on 6/4/2023. He presented with neutropenic fever as well as acute confusion.  Patient is a psychiatrist.    Was seen by oncology during admission who stated that his AML is rapidly worsening and has a poor prognosis.  Expected survival will be several weeks however is reassuring that he is having no distressing symptoms at this time.  The plan arranged by heme-onc and the daytime hospitalist was to arrange for home hospice, not do any leukemia directed therapy, and likely discharge tomorrow.  He was on antibiotics during admission for possible neutropenic fever.  Per oncology if the cultures remained negative they would favor discontinuing antibiotics given his current goals of care.  PCP or hospitalist will contact the patient if cultures are positive along with touch base with his current oncologist. was notified on cross cover by the patient's RN that he would like to leave this evening.  Patient was alert x orientated and adamant about going home. I felt he was able to  make his own medical decisions.  I respected his wishes of wanting to go home and set up hospice outpatient. I set up a stat outpatient hospice referral as there was family concern about what to do if something was to happen before hospice was set up.  I also sent the patient with oxycodone and Zofran to help with the transition until hospice is in place.  Patient was eager and looking forward to going home.  Plan was also discussed with family members present in the room who were all in agreement with the discharge plan.  His noted confusion had resolved, patient was ambulating independently and tolerating oral intake prior to discharge.     Neutropenic fever:   Patient presents with a temperature of 100.5 at home.  Patient has severe AML and his white cells are really not useful at this point.  His fever could be due to to his cancer itself.  Chest x-ray is negative and his UA is normal.  Blood cultures are NTD.  Patient was started on cefepime,will continue while inpatient.  Patient has no symptoms of cough, dysuria, sputum production, or any other infection at this point.  I suspect this is all due to his progression of his AML    Advance care planning:   Previous provider had a very long conversation with the patient and his daughter talking about his goals of care, living situation, fact that he is still driving, the need for support from family at home as well as getting hospice involved sooner than later.  Patient made it clear that were not doing CPR or intubation.  Patient however still is interested in getting transfusions as long as his packed red blood cells are making him feel stronger.  Oncology recommends against further transfusions.  We discussed letting the patient's daughter stay with him at home so he is not home alone.  Discussed driving, patient was told that he is not allowed to drive any longer by me and his daughter is in agreement.  It has also been recommended not to rehospitalize the  patient try and keep him at home.  Oncology recommends hospice initiation on discharge.  Plan is for home on hospice.      Pancytopenia: Patient has a hemoglobin of 7.6 and a platelet level of 27.  Has a history of getting transfused platelets as well as packed red blood cells.  He has an appointment with oncology on Wednesday 6/7.     AML/hyperviscosity syndrome:   Patient's white blood cell count is up to 59 from being 39 just a few days ago.  Possible that the patient is having hyperviscosity syndrome from this and he is having progression of his AML.  He is not a candidate for chemotherapy.  He is followed by Dr. Morrissey with oncology.  Oncology assessed patient and recommended against any ongoing transfusions.  Likely planning for hospice on discharge.    -Palliative following  -Social work consult     Encephalopathy/infusion:   Patient presents with being confused per the patient's daughter.  Apparently he was confused about which door open for the car and had issues with his air conditioner.  He is not at his baseline.  it is possible that hyperviscosity syndrome is playing a role, his AML, and less likely an infection.  Patient also looks like he has been on Neurontin in the past not sure if he currently is taking this but that could play a role as well.  I explained to the patient's daughter that the patient's confusion likely is to get worse over time and we may not have a fix for this.     BPH/hematuria:   Patient is off of Eliquis for which she was on for atrial fibrillation.  He does still take Flomax.     Atrial fibrillation:   Patient currently sounds like he is in sinus and his telemetry is showing that as well.  The patient is on Toprol.  He is not on Eliquis due to his low platelet level and history of hematuria.  He is completely appropriate.  Would not recommend any anticoagulation going forward.     Sleep deprivation:   Patient really has not been sleeping well at home for the last 5 days and he  feels that this could be a contributing factor into his confusion     Macular degeneration:   This puts patient at high risk for falling.  Used to get eye injections but he is no longer getting.     Gluten-free diet:   Requested by the patient        Consultations This Hospital Stay   PALLIATIVE CARE ADULT IP CONSULT  HEMATOLOGY & ONCOLOGY IP CONSULT  CARE MANAGEMENT / SOCIAL WORK IP CONSULT  CARE MANAGEMENT / SOCIAL WORK IP CONSULT  SOCIAL WORK IP CONSULT    Code Status   No CPR- Do NOT Intubate    Time Spent on this Encounter   I, TY Rea PA-C, personally saw the patient today and spent less than or equal to 30 minutes discharging this patient.       TY Rea PA-C  [facility] OBSERVATION DEPT  [address] [facility] [address]  Phone: [phone]  ______________________________________________________________________    Physical Exam   Vital Signs: Temp: 99.3  F (37.4  C) Temp src: Oral BP: 131/62 Pulse: 73   Resp: 18 SpO2: 99 % O2 Device: None (Room air)    Weight: 130 lbs 4.67 oz    General: Appears well, alert and orientated, no acute distress  Lungs: Nonlabored  Skin: Pink, warm, dry  Neuro: Speech clear, alert and orientated x4, no focal deficits noted       Primary Care Physician   Eagle Negrete    Discharge Orders      Hospice Referral      Reason for your hospital stay    You were admitted to the hospital due to fevers.  You were seen by oncology who states your AML cancer is rapidly worsening and did not recommend any further therapies/treatments.  I respect your wish to discharge home this evening as you seem to be doing very well.  I have placed an emergent hospice consult therefore they should be contacting you in 1 to 2 days to set up outpatient hospice. I also sent you with some antinausea and pain medication in case you are to need these until hospice is able to see you.  If you are to have any concerns or questions, you should contact your primary care  provider as they may be able to help guide the outpatient transition to hospice.  We will continue to monitor your blood cultures taken in the hospital.  If they were to become positive someone would contact you with further guidance.  The phone number for hospice [phone].     Follow-up and recommended labs and tests     Follow up with hospice care as they should be calling you in 1 to 2 days.     Activity    Your activity upon discharge: activity as tolerated     Diet    Follow this diet upon discharge: Diet as tolerated       Significant Results and Procedures   Results for orders placed or performed during the hospital encounter of 06/04/23   XR Chest 2 Views    Narrative    EXAM: XR CHEST 2 VIEWS  LOCATION: [facility]  DATE/TIME: 6/4/2023 8:42 PM CDT    INDICATION: Confusion.  COMPARISON: 09/02/2019.      Impression    IMPRESSION: No focal airspace consolidation. No pleural effusion or pneumothorax. Skin folds overlie the mid right hemithorax.    Cardiomediastinal silhouette is normal. Atherosclerosis of the thoracic aorta.   CT Head w/o Contrast    Narrative    EXAM: CT HEAD W/O CONTRAST  LOCATION: [facility]  DATE/TIME: 6/4/2023 8:41 PM CDT    INDICATION: confusion  COMPARISON: None.  TECHNIQUE: Routine CT Head without IV contrast. Multiplanar reformats. Dose reduction techniques were used.    FINDINGS:  INTRACRANIAL CONTENTS: No intracranial hemorrhage, extraaxial collection, or mass effect.  No CT evidence of acute infarct. There is scattered diffuse low attenuation within the periventricular and subcortical white matter consistent with mild diffuse   small vessel ischemic disease. The ventricular system, basal cisterns and the cortical sulci are consistent with diffuse volume loss.     VISUALIZED ORBITS/SINUSES/MASTOIDS: No intraorbital abnormality. No paranasal sinus mucosal disease. No middle ear or mastoid effusion.    BONES/SOFT TISSUES: No acute  abnormality.      Impression    IMPRESSION:  1.  No CT finding of a mass, hemorrhage or focal area consistent with diffuse small of acute ischemia.  2.  Diffuse age related changes.       Discharge Medications   Current Discharge Medication List      START taking these medications    Details   ondansetron (ZOFRAN ODT) 4 MG ODT tab Take 1 tablet (4 mg) by mouth every 6 hours as needed for nausea or vomiting  Qty: 30 tablet, Refills: 0    Associated Diagnoses: Acute myeloid leukemia not having achieved remission (H)      oxyCODONE (ROXICODONE) 5 MG tablet Take 1 tablet (5 mg) by mouth every 6 hours as needed for pain  Qty: 12 tablet, Refills: 0    Associated Diagnoses: Acute myeloid leukemia not having achieved remission (H)         CONTINUE these medications which have NOT CHANGED    Details   gabapentin (NEURONTIN) 100 MG capsule Take 100 mg by mouth nightly as needed for other (insomnia)      Multiple Vitamins-Minerals (MULTIVITAL) TABS Take  by mouth daily.      omeprazole (PRILOSEC OTC) 20 MG EC tablet Take 20 mg by mouth daily as needed      polyethylene glycol (MIRALAX) 17 g packet Take 1 packet by mouth daily as needed for constipation      acyclovir (ZOVIRAX) 400 MG tablet Take 1 tablet (400 mg) by mouth 2 times daily  Qty: 60 tablet, Refills: 1    Associated Diagnoses: Pancytopenia (H)      cefpodoxime (VANTIN) 200 MG tablet Take 1 tablet (200 mg) by mouth 2 times daily  Qty: 60 tablet, Refills: 1    Associated Diagnoses: Pancytopenia (H)      fluconazole (DIFLUCAN) 200 MG tablet Take 1 tablet (200 mg) by mouth daily  Qty: 30 tablet, Refills: 1    Associated Diagnoses: Pancytopenia (H)      fluticasone (FLONASE) 50 MCG/ACT nasal spray INSTILL 1 SPRAY INTO BOTH NOSTRILS DAILY  Qty: 48 mL, Refills: 3    Comments: DX Code Needed  .  Associated Diagnoses: Chronic rhinitis      metoprolol succinate ER (TOPROL XL) 25 MG 24 hr tablet Take 1 tablet (25 mg) by mouth daily  Qty: 30 tablet, Refills: 3    Associated  Diagnoses: Paroxysmal atrial fibrillation (H)      tamsulosin (FLOMAX) 0.4 MG capsule Take 2 capsules (0.8 mg) by mouth daily  Qty: 180 capsule, Refills: 3    Associated Diagnoses: Urinary retention           Allergies   Allergies   Allergen Reactions     Ciprofloxacin      Acute confusion

## 2023-07-19 NOTE — PROGRESS NOTES
Critical ANC reported to Regina ACEVEDO RN   Complex Repair And Single Advancement Flap Text: The defect edges were debeveled with a #15 scalpel blade.  The primary defect was closed partially with a complex linear closure.  Given the location of the remaining defect, shape of the defect and the proximity to free margins a single advancement flap was deemed most appropriate for complete closure of the defect.  Using a sterile surgical marker, an appropriate advancement flap was drawn incorporating the defect and placing the expected incisions within the relaxed skin tension lines where possible. The area thus outlined was incised deep to adipose tissue with a #15 scalpel blade. The skin margins were undermined to an appropriate distance in all directions utilizing iris scissors and carried over to close the primary defect.

## 2024-04-01 NOTE — LETTER
Yodit Kessler is calling to request a refill on the following medication(s):    Medication Request:  Requested Prescriptions     Pending Prescriptions Disp Refills    gabapentin (NEURONTIN) 400 MG capsule 90 capsule 1       Last Visit Date (If Applicable):  3/19/2024    Next Visit Date:    Visit date not found                 "    5/4/2023         RE: Zbigniew Mendoza  [id number]  [facility] [address]        Dear Colleague,    Thank you for referring your patient, Zbigniew Mendoza, to the [facility]. Please see a copy of my visit note below.    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    Weight as of this encounter: 61.1 kg (134 lb 9.6 oz). Body surface area is 1.74 meters squared.  No Pain (0) Comment: Data Unavailable   No LMP for male patient.  Allergies reviewed: Yes  Medications reviewed: Yes    Medications: Medication refills not needed today.  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 PMH is significant for Afib on eliquis, mitral prolapse with MR, renal calculus.     On 12/6/22, WBC 2.1, ANC 1.5, Hb 9.6, , PLT 118K. He has had chronic, mild thrombocytopenia since 2015. Neutropenia and anemia have been new over this last year.     He is noted to be followed by urology for gross hematuria. CT urogram/cystoscopy pending.    He underwent EGD and colonoscopy 12/2022. EGD was normal with erosive gastropathy without bleed. Z-line was irregular. He had grade C reflux esophagitis with " bleeding. Colon was normal with a 2 mm polyp removed from the ascending colon.     He has 10-15 lb weight loss. He has good appetite. No LAD. No drenching night sweats, unexplained fever.     He is a retired psyciatrist, but continues to publish. He served in the  and was stationed in [place] following the [place] War. He then worked at [employer], the [employer], and the [employer].     Patient lives at home alone and is able to perform all ADLs without issue. He has six children who are doing well. His twins are in gastroenterology and palliative care. His eldest son suffers from chronic alcoholism.        INTERIM HISTORY:  Patient had an initial visit with [facility], but has not had return phonecall. He has established with palliative care as well.     No pain. He denies diarrhea currently, in fact, was having constipation and utilized a fleet enema. He was completing a crossword puzzle yesterday and stood up too fast and had fall with injury to the right buttock. He denies ecchymosis. He is using a walker. He is ambulating without pain.     He is drinking one ensure daily.         REVIEW OF SYSTEMS:   A 14 point ROS was reviewed with pertinent positives and negatives in the HPI.        HOME MEDICATIONS:  Current Outpatient Medications   Medication Sig Dispense Refill     acyclovir (ZOVIRAX) 400 MG tablet Take 1 tablet (400 mg) by mouth 2 times daily 60 tablet 1     cefpodoxime (VANTIN) 200 MG tablet Take 1 tablet (200 mg) by mouth 2 times daily 60 tablet 1     fluconazole (DIFLUCAN) 200 MG tablet Take 1 tablet (200 mg) by mouth daily 30 tablet 1     fluticasone (FLONASE) 50 MCG/ACT nasal spray INSTILL 1 SPRAY INTO BOTH NOSTRILS DAILY 48 mL 3     gabapentin (NEURONTIN) 100 MG capsule Take 2 capsules (200 mg) by mouth nightly as needed for other (insomnia) 60 capsule 1     methylcellulose (CITRUCEL) 500 MG TABS tablet Take 500 mg by mouth daily       metoprolol succinate ER (TOPROL XL) 25 MG 24 hr tablet Take 1  tablet (25 mg) by mouth daily 30 tablet 3     Multiple Vitamins-Minerals (MULTIVITAL) TABS Take  by mouth daily.       polyethylene glycol (MIRALAX) 17 GM/Dose powder Take 17 g by mouth daily 510 g 0     tamsulosin (FLOMAX) 0.4 MG capsule Take 2 capsules (0.8 mg) by mouth daily 180 capsule 3     vitamin B complex with vitamin C (STRESS TAB) tablet Take 1 tablet by mouth daily           ALLERGIES:  Allergies   Allergen Reactions     Ciprofloxacin      Acute confusion     Sulfamethizole Unknown     Confusion    3/13/2023 Pt says this is not an allergy         PAST MEDICAL HISTORY:  Past Medical History:   Diagnosis Date     Arrhythmia      Atrial fibrillation      Calculus of kidney      Mitral prolapse     mild, with mild MR     Prostate infection      STD (sexually transmitted disease)          PAST SURGICAL HISTORY:  Past Surgical History:   Procedure Laterality Date     ARTHROSCOPY SHOULDER ROTATOR CUFF REPAIR Right 1996     BACK SURGERY       COLONOSCOPY N/A 12/01/2022    Procedure: COLONOSCOPY;  Surgeon: Karthik Mcmanus MD;  Location: [facility] OR     CYSTOSCOPY       CYSTOSCOPY, TRANSURETHRAL RESECTION (TUR) PROSTATE, COMBINED  04/1993    S/P TURP     ESOPHAGOSCOPY, GASTROSCOPY, DUODENOSCOPY (EGD), COMBINED N/A 12/01/2022    Procedure: ESOPHAGOGASTRODUODENOSCOPY;  Surgeon: Karthik Mcmanus MD;  Location: [facility] OR     PROSTATE SURGERY           SOCIAL HISTORY:  Social History     Socioeconomic History     Marital status:      Spouse name: Not on file     Number of children: Not on file     Years of education: Not on file     Highest education level: Not on file   Occupational History     Not on file   Tobacco Use     Smoking status: Never     Smokeless tobacco: Never   Vaping Use     Vaping status: Never Used   Substance and Sexual Activity     Alcohol use: Yes     Comment: very rare if at all     Drug use: No     Sexual activity: Not Currently   Other Topics Concern     Parent/sibling w/  "CABG, MI or angioplasty before 65F 55M? Not Asked   Social History Narrative     Not on file     Social Determinants of Health     Financial Resource Strain: Not on file   Food Insecurity: Not on file   Transportation Needs: Not on file   Physical Activity: Not on file   Stress: Not on file   Social Connections: Not on file   Intimate Partner Violence: Not At Risk (2023)    Humiliation, Afraid, Rape, and Kick questionnaire      Fear of Current or Ex-Partner: No      Emotionally Abused: No      Physically Abused: No      Sexually Abused: No   Housing Stability: Not on file         FAMILY HISTORY:  Family History   Problem Relation Age of Onset     Coronary Artery Disease Father      No Known Problems Mother          PHYSICAL EXAM:  BP 94/45   Pulse 60   Temp 97.6  F (36.4  C) (Oral)   Resp 16   Ht 1.778 m (5' 10\")   Wt 61.1 kg (134 lb 9.6 oz)   SpO2 98%   BMI 19.31 kg/m    PHYSICAL EXAMINATION:   ECO+  GENERAL/CONSTITUTIONAL: No acute distress. Chronically-ill appearing. Pallor, malnourished.  EYES: Pupils are equal, round, and react to light and accommodation. Extraocular movements intact.  No scleral icterus.  RESPIRATORY: Clear to auscultation bilaterally. No crackles or wheezing.   CARDIOVASCULAR: Regular rate and rhythm without murmurs, gallops, or rubs.  GASTROINTESTINAL: No hepatosplenomegaly, masses, or tenderness. The patient has normal bowel sounds. No guarding.  No distention.  MUSCULOSKELETAL: Warm and well-perfused, no cyanosis, clubbing, or edema.  NEUROLOGIC: Cranial nerves II-XII are intact. Alert, oriented, answers questions appropriately.  INTEGUMENTARY: No rashes or jaundice.  GAIT: Requiring walker.       LABS:   Latest Reference Range & Units 23 08:37   WBC 4.0 - 11.0 10e3/uL 2.9 (L) (P)   Hemoglobin 13.3 - 17.7 g/dL 5.9 (LL) (P)   Hematocrit 40.0 - 53.0 % 18.7 (L) (P)   Platelet Count 150 - 450 10e3/uL 38 (LL) (P)   RBC Count 4.40 - 5.90 10e6/uL 1.87 (L) (P)   MCV 78 - 100 " fL 100 (P)   MCH 26.5 - 33.0 pg 31.6 (P)   MCHC 31.5 - 36.5 g/dL 31.6 (P)   RDW 10.0 - 15.0 % 22.9 (H) (P)      Latest Reference Range & Units 01/05/23 10:51   Erythropoietin 4 - 27 mU/mL 178 (H)   Ferritin 31 - 409 ng/mL 356   Iron 61 - 157 ug/dL 200 (H)   Iron Binding Capacity 240 - 430 ug/dL 222 (L)   Iron Sat Index 15 - 46 % 90 (H)   Lactate Dehydrogenase 0 - 250 U/L 160   TSH 0.30 - 4.20 uIU/mL 1.85   Vitamin B12 232 - 1,245 pg/mL 374   % Retic 0.5 - 2.0 % 1.4   Absolute Retic 0.025 - 0.095 10e6/uL 0.039   INR 0.85 - 1.15  1.55 (H)   PTT 22 - 38 Seconds 37   Fibrinogen 170 - 490 mg/dL 300   SPECIMEN EXPIRATION DATE  [id number]   JOSE C Anti-IgG,-C3d  Negative      Latest Reference Range & Units 01/05/23 10:51   Haptoglobin 32 - 197 mg/dL 115       PATHOLOGY:  Case Report   Surgical Pathology Report                         Case: [id number]                                   Authorizing Provider:  Karthik Mcmanus         Collected:           12/01/2022 10:08 AM                                  MD Manny                                                                   Ordering Location:     [facility]   Received:            12/01/2022 10:47 AM                                  Main OR                                                                       Pathologist:           Barbara Hawkins MD                                                            Specimens:   A) - Stomach, RANDOM STOMACH BIOPSY - GASTRITIS                                                      B) - Large Intestine, Colon, RANDOM COLON BIOPSIES                                                   C) - Large Intestine, Colon, COLON ASCENDING POLYP                                                   D) - Large Intestine, Colon, COLOON TRANSVERSE POLYP                                       Addendum   This addendum is issued to include findings of Helicobacter pylori immunoperoxidase stain performed on gastric biopsy (specimen A) for  further evaluation, using appropriate controls.  The H. pylori stain is negative.  All previously reported findings remain unchanged.   Addendum electronically signed by Barbara Hawkins MD on 12/9/2022 at 12:05 AM   Final Diagnosis   A.  Stomach, biopsy-  - Mild chronic gastritis.  - Negative for active gastritis, intestinal metaplasia, gastric epithelial dysplasia, or malignancy.  - Pending Helicobacter pylori stain (see forthcoming addendum for results).  - Sampling includes: Gastric antrum and fundus/body-type mucosa.     B.  Colon, random biopsies:  - Negative for colitis, dysplasia, and malignancy.     C.  Ascending colon, polyp, polypectomy:  - Consistent with diminutive tubular adenoma.  - Polyp size: 2 mm (per endoscopy report).  - Negative for high-grade dysplasia and malignancy.  - Complete resection and retrieval (per endoscopy report).     D.  Transverse colon, polyp, polypectomy:  - Consistent with inflammatory-type benign mucosal polyp (predominantly denuded mucosal surface precludes accurate assessment).  - Polyp size: 8 mm (per endoscopy report).  - Negative for dysplasia and malignancy.         Final Diagnosis 1/5/23:   Peripheral blood for morphology:  -Pancytopenia including:  -Moderate normochromic, macrocytic anemia with evidence of red cell regeneration  -Moderate leukopenia with absolute neutropenia and lymphopenia; leukocytes without morphologic abnormalities  -Mild thrombocytopenia          Bone Marrow Biopsy 3/13/23:  Component Ref Range & Units  Resulting Agency   Final Diagnosis   Peripheral blood for morphology:  -Pancytopenia including:  -Marked normochromic, macrocytic anemia without evidence of red cell regeneration  -Marked leukopenia with marked absolute neutropenia without overt dysplastic change and without circulating blasts  -Moderate thrombocytopenia     Bone marrow, biopsy with aspirate and special studies for evaluation:  -Acute myelogenous leukemia, see comment, dysplastic  myeloid precursors also noted  -Completed immunophenotyping studies show 69% blasts consistent with myeloid lineage, small blast percentage (<1%, showing B-cell lineage also noted, please see synopsis within this report and separate full completed report)  -Cytogenetic studies in process with results to be reported separately by the performing laboratory when completed  -Acute myeloid leukemia focused panel in process with results be reported by the performing laboratory when completed     Comment       With the expression of CD13 and  (although partial) acute myeloid leukemia (AML) is favored, this immunophenotype may be consistent with AML, NOS, minimally differentiated depending on the results of genetic studies. Of note, a very small population appears to have some B-lineage differentiation; however this population accounts for <1% of leukocytes.          Case Report   Flow Cytometry Report                             Case: [id number]                                   Authorizing Provider:  Jennifer Morrissey DO         Collected:           03/13/2023 10:44 AM           Ordering Location:     [facility]   Received:            03/13/2023 11:06 AM                                  [facility]                                                             Pathologist:           Mary Middleton MD                                                            Specimen:    Iliac Crest, Bone Marrow Aspirate, Left                                                    Flow Interpretation   A. Iliac Crest, Bone Marrow Aspirate, Left:  -Increased blasts (69%)  See comment      Electronically signed by Mary Middleton MD on 3/14/2023 at  1:58 PM   Comment     With the expression of CD13 and  (although partial) acute myeloid leukemia (AML) is favored, this immunophenotype may be consistent with AML, NOS, minimally differentiated depending on the results of genetic studies. Of note, a very small population  appears to have some B-lineage differentiation; however this population accounts for <1% of leukocytes.        Significant Results    Detected Alterations of Known or Potential Pathogenicity: BCOR V797fs  RUNX1 R162G     TMB Score: None   Interpretation    Two clinically relevant mutations were detected:     1) BCOR V797fs; VAF=52%  2) RUNX1 R162G; VAF=43%     Specimen Description    Bone Marrow: ACD   RESULTS    INTERNAL TANDEM REPEAT (ITD):      Mutant Allele:   Absent     Normal Allele:   Present     D835 MUTATION:     Mutant Allele:   Absent     Normal Allele:   Present   INTERPRETATION    Molecular testing performed on submitted Bone Marrow.     No evidence was found of either an internal tandem duplication within exon 14 or of a D835(TKD) point mutation within exon 20 of the FLT3 gene.     No definite evidence of a D835(TKD) point mutation within exon 20 of the FLT3 gene was found. However, this patient sample showed poor amplification and limited sensitivity for D835 (TKD) point mutation, hence a small percentage of mutant allele may not be detected. Please correlate with pending NGS study.     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 Clone 1 (50% of metaphase cells analyzed) had gain of one extra copy of chromosome 13 and loss of one copy of chromosome 21. Clone 2 (10% of metaphases) had, in addition, deletion within the long arm of a chromosome 6.     These findings are consistent with the reported pathologic diagnosis of acute myeloid leukemia. Trisomy 13, as seen in this case, is an uncommon but documented recurring abnormality in AML that has been associated with FLT3 overexpression and mutations involving RUNX1, SRSF2, ASXL1 and BCOR (Dayne T et al, 2014, Blood 124:1304) and, in that setting, with aggressive disease and a poor prognosis. Correlation with the pending molecular testing is therefore  recommended. Given that trisomy 13 is considered high risk, the additional cytogenetic abnormalities are of unknown additional clinical significance.           IMAGING:  CT A/P 9/15/22:  IMPRESSION:   1.  No acute abnormality identified.  2.  Heterogeneous prostate is noted. Correlate with any evidence for  underlying prostate disease.  3.  A few nonobstructing stones within the left kidney. No  hydronephrosis.  4.  A tiny hypodense nodule at the pancreas head is identified and is  indeterminant. This may have been faintly present on the older CT from  2/25/2015. Suggest correlation with the pancreas MRI.      MRCP 10/29/22:  IMPRESSION:  No pancreatic abnormalities are identified. The previously noted small hypodense pancreatic head lesion is not visualized on today's MRI study.    CT urogram 1/31/23:  IMPRESSION:   1.  Nonobstructing stones within the left kidney again noted. One of  these has moved into the left renal pelvis. No hydronephrosis.  2.  No acute abnormality identified.  3.  Previously noted tiny hypodensity at the pancreas head appears  stable compared to the CT from 9/15/2022. This is unable to be  visualized on the MRI from 10/29/2022. This could just be a tiny  cystic lesion. Suggest 1-year follow-up CT to assess for stability.  4.  Mildly prominent prostate.        ASSESSMENT/PLAN:  Zbigniew Mendoza is an 88 year old male who is referred for pancytopenia. PMH is significant for Afib on eliquis, mitral prolapse with MR, renal calculus.     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   -Currently, ANC is 1.2, Hb 9.6, , and .   -He underwent EGD and colonoscopy 12/2022. EGD was normal with erosive gastropathy without bleed. Z-line was irregular. He had grade C reflux esophagitis with bleeding. Colon was normal with a 2 mm polyp removed from the ascending colon.    -He is undergoing workup with urology for gross hematuria (see below).  -He has not been started on any new recent medications nor does have concerning symptoms for underlying infection. I reviewed the possibilities of nutritional deficiency, but given his age, there is a possibility of an underlying marrow disorder such as myelodysplastic syndrome or myelofibrosis.   -Patient is not hemolyzing. Iron stores, B12 WNL. Folate low at 2 and he has started on supplementation.  -TSH 1.85.  -Patient underwent bone marrow biopsy with FLOW cytometry returning with ~70% blasts. IHC positive for CD13 and , although partial. He also has evidence of dysplasia. Cytogenetics are pending.   -We held a long discussion today in regards to the diagnosis of AML. Patient is elderly at the age of 88. We had reviewed five year overall survival for >75 is 2.6% and >80 is 0%. He has good functional status and we are awaiting his final molecular/mutational studies for further risk stratification and prognostication. Patient is not fit for intensive induction therapy nor would he be an allogeneic transplant candidate. As such, treatment with 7+3 or vyxeos would not be indicated. We had discussed reduced intensity induction with HMA and venetoclax. I reviewed the VIALE-A trial in which venetoclax and azacytidine had improved CR and overall survival from 10 mths to 15 months vs azaycytidine alone. However, we reviewed significant risk of adverse effect including febrile neutropenia with increased morbidity and mortality. I discussed I would only be comfortable initiating this treatment on an inpatient basis due to risk of cytopenias and TLS. Patient expresses his understanding of the above. Continue antimicrobial prophylaxis at this time.    -Patient continues to contemplate on pursuing treatment vs best supportive care/hospice. He will meet with palliative care on 4/20/23. He is beginning to have circulating blasts on peripheral blood.  He has lethargy. He denies fever. I had stated we will continue best supportive care and he will be seen on a weekly basis, however, we may have rapid evolution of AML with high mortality. We again reviewed the risk of treatment with azacytidine. He is fearful of further cytopenias and risk of infection.   -4/5/23: Patient continues to have decline. He has had increased fatigue and weakness and is considering a chairlift. Daughter, Dejah, who is a palliative care physician as well has spoken with patient in regards to home with hospice. Patient seemed to resonate with this idea. I was able to speak with [facility] (Cora Lindsey and Elly Collazo) in regards to patient's case and they may be able to support palliative transfusions for a finite time. Will place referral at this time.   5/4/23: Patient continues to have decline. He has fatigue, poor appetite. He has made the decision against azacytidine, which is appropriate given his progressive decline. He has met with [facility] with initial visit, but has not yet had follow up. In the interim, he has also established with Dr. Oliver. He had a fall yesterday, but is still able to ambulate without pain. He declines imaging. No injury to the head. I renewed antimicrobial prophylactics today and provided a medication list to patient and daughter, Nisreen. WBC has returned at 2.9 (diff pending), Hb 5.9, PLT 38. He will receive 2U PRBC today and N-plate. No bleed.    2) Gross hematuria, improved  -Followed by urology.  -CT urogram was negative.  -Cystoscopy without obvious cause of hematuria in the bladder.   -It appears urine cytology was sent and was not able to send for FISH analysis due to scant cellularity.    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     4) Pancreatic head lesion  -Seen on CT A/P and urogram. MRCP was negative.     5) Intermitten loose stools  -Patient is on antimicrobials. Provided stool sampling kit for  c. Diff testing.     6) -Continue best supportive care with weekly CBC with possible blood/platelet transfusion +/- N-Plate.  -Follow up weekly alternating between JAIMEE and myself.  -Patient is at high risk of neutropenic fever and sepsis and he is aware to contact clinic immediately with fever.       Jennifer Morrissey DO  Hematology/Oncology  [facility]          Again, thank you for allowing me to participate in the care of your patient.        Sincerely,        Jennifer Morrissey DO

## (undated) DEVICE — ENDO FORCEP SPIKED SERRATED SHAFT JUMBO 239CM G56998

## (undated) DEVICE — SUCTION MANIFOLD NEPTUNE 2 SYS 4 PORT 0702-020-000

## (undated) DEVICE — BAG CLEAR TRASH 1.3M 39X33" P4040C

## (undated) DEVICE — GOWN XLG DISP 9545

## (undated) DEVICE — ENDO SNARE EXACTO COLD 9MM LOOP 2.4MMX230CM 00711115

## (undated) DEVICE — PAD CHUX UNDERPAD 30X36" P3036C

## (undated) DEVICE — KIT PROCEDURE W/CLEAN-A-SCOPE LINERS V2 200800

## (undated) DEVICE — TUBING SUCTION MEDI-VAC SOFT 3/16"X20' N520A

## (undated) DEVICE — LINEN FULL SHEET 5511

## (undated) DEVICE — SUCTION CANISTER MEDIVAC LINER 3000ML W/LID 65651-530

## (undated) DEVICE — GLOVE BIOGEL PI MICRO SZ 7.5 48575

## (undated) DEVICE — CLIP HEMOSTASIS ASSURANCE W16 MM BX00711884

## (undated) DEVICE — LINEN HALF SHEET 5512

## (undated) DEVICE — BAG RED BIOHAZARD 37X50" 40GAL A7450PR

## (undated) RX ORDER — DEXAMETHASONE SODIUM PHOSPHATE 4 MG/ML
INJECTION, SOLUTION INTRA-ARTICULAR; INTRALESIONAL; INTRAMUSCULAR; INTRAVENOUS; SOFT TISSUE
Status: DISPENSED
Start: 2022-01-01

## (undated) RX ORDER — PROPOFOL 10 MG/ML
INJECTION, EMULSION INTRAVENOUS
Status: DISPENSED
Start: 2022-01-01

## (undated) RX ORDER — LIDOCAINE HYDROCHLORIDE 10 MG/ML
INJECTION, SOLUTION EPIDURAL; INFILTRATION; INTRACAUDAL; PERINEURAL
Status: DISPENSED
Start: 2022-01-01

## (undated) RX ORDER — GLYCOPYRROLATE 0.2 MG/ML
INJECTION INTRAMUSCULAR; INTRAVENOUS
Status: DISPENSED
Start: 2022-01-01

## (undated) RX ORDER — ONDANSETRON 2 MG/ML
INJECTION INTRAMUSCULAR; INTRAVENOUS
Status: DISPENSED
Start: 2022-01-01